# Patient Record
Sex: FEMALE | Race: WHITE | NOT HISPANIC OR LATINO | URBAN - METROPOLITAN AREA
[De-identification: names, ages, dates, MRNs, and addresses within clinical notes are randomized per-mention and may not be internally consistent; named-entity substitution may affect disease eponyms.]

---

## 2017-05-18 ENCOUNTER — OUTPATIENT (OUTPATIENT)
Dept: OUTPATIENT SERVICES | Facility: HOSPITAL | Age: 67
LOS: 1 days | End: 2017-05-18
Payer: MEDICARE

## 2017-05-18 DIAGNOSIS — Z89.421 ACQUIRED ABSENCE OF OTHER RIGHT TOE(S): Chronic | ICD-10-CM

## 2017-05-18 DIAGNOSIS — S91.309A UNSPECIFIED OPEN WOUND, UNSPECIFIED FOOT, INITIAL ENCOUNTER: ICD-10-CM

## 2017-05-18 PROCEDURE — G0463: CPT

## 2017-05-20 DIAGNOSIS — E78.5 HYPERLIPIDEMIA, UNSPECIFIED: ICD-10-CM

## 2017-05-20 DIAGNOSIS — Z89.422 ACQUIRED ABSENCE OF OTHER LEFT TOE(S): ICD-10-CM

## 2017-05-20 DIAGNOSIS — M19.90 UNSPECIFIED OSTEOARTHRITIS, UNSPECIFIED SITE: ICD-10-CM

## 2017-05-20 DIAGNOSIS — Z82.61 FAMILY HISTORY OF ARTHRITIS: ICD-10-CM

## 2017-05-20 DIAGNOSIS — Z98.1 ARTHRODESIS STATUS: ICD-10-CM

## 2017-05-20 DIAGNOSIS — F32.9 MAJOR DEPRESSIVE DISORDER, SINGLE EPISODE, UNSPECIFIED: ICD-10-CM

## 2017-05-20 DIAGNOSIS — Z80.52 FAMILY HISTORY OF MALIGNANT NEOPLASM OF BLADDER: ICD-10-CM

## 2017-05-20 DIAGNOSIS — E66.9 OBESITY, UNSPECIFIED: ICD-10-CM

## 2017-05-20 DIAGNOSIS — G62.9 POLYNEUROPATHY, UNSPECIFIED: ICD-10-CM

## 2017-05-20 DIAGNOSIS — Z88.0 ALLERGY STATUS TO PENICILLIN: ICD-10-CM

## 2017-05-20 DIAGNOSIS — Z79.82 LONG TERM (CURRENT) USE OF ASPIRIN: ICD-10-CM

## 2017-05-20 DIAGNOSIS — Z79.899 OTHER LONG TERM (CURRENT) DRUG THERAPY: ICD-10-CM

## 2017-05-20 DIAGNOSIS — Z86.19 PERSONAL HISTORY OF OTHER INFECTIOUS AND PARASITIC DISEASES: ICD-10-CM

## 2017-05-20 DIAGNOSIS — Z91.040 LATEX ALLERGY STATUS: ICD-10-CM

## 2017-05-20 DIAGNOSIS — M48.00 SPINAL STENOSIS, SITE UNSPECIFIED: ICD-10-CM

## 2017-05-20 DIAGNOSIS — I10 ESSENTIAL (PRIMARY) HYPERTENSION: ICD-10-CM

## 2017-05-20 DIAGNOSIS — Z98.890 OTHER SPECIFIED POSTPROCEDURAL STATES: ICD-10-CM

## 2017-07-24 ENCOUNTER — RESULT REVIEW (OUTPATIENT)
Age: 67
End: 2017-07-24

## 2017-07-25 ENCOUNTER — OUTPATIENT (OUTPATIENT)
Dept: OUTPATIENT SERVICES | Facility: HOSPITAL | Age: 67
LOS: 1 days | End: 2017-07-25
Payer: MEDICARE

## 2017-07-25 DIAGNOSIS — L97.501 NON-PRESSURE CHRONIC ULCER OF OTHER PART OF UNSPECIFIED FOOT LIMITED TO BREAKDOWN OF SKIN: ICD-10-CM

## 2017-07-25 DIAGNOSIS — Z89.421 ACQUIRED ABSENCE OF OTHER RIGHT TOE(S): Chronic | ICD-10-CM

## 2017-07-25 PROCEDURE — G0463: CPT

## 2017-07-25 PROCEDURE — 73630 X-RAY EXAM OF FOOT: CPT

## 2017-07-25 PROCEDURE — 73630 X-RAY EXAM OF FOOT: CPT | Mod: 26,50

## 2017-07-28 DIAGNOSIS — G62.9 POLYNEUROPATHY, UNSPECIFIED: ICD-10-CM

## 2017-07-28 DIAGNOSIS — Z98.890 OTHER SPECIFIED POSTPROCEDURAL STATES: ICD-10-CM

## 2017-07-28 DIAGNOSIS — F32.9 MAJOR DEPRESSIVE DISORDER, SINGLE EPISODE, UNSPECIFIED: ICD-10-CM

## 2017-07-28 DIAGNOSIS — Z79.82 LONG TERM (CURRENT) USE OF ASPIRIN: ICD-10-CM

## 2017-07-28 DIAGNOSIS — I10 ESSENTIAL (PRIMARY) HYPERTENSION: ICD-10-CM

## 2017-07-28 DIAGNOSIS — Z88.0 ALLERGY STATUS TO PENICILLIN: ICD-10-CM

## 2017-07-28 DIAGNOSIS — Z82.61 FAMILY HISTORY OF ARTHRITIS: ICD-10-CM

## 2017-07-28 DIAGNOSIS — Z80.52 FAMILY HISTORY OF MALIGNANT NEOPLASM OF BLADDER: ICD-10-CM

## 2017-07-28 DIAGNOSIS — E78.5 HYPERLIPIDEMIA, UNSPECIFIED: ICD-10-CM

## 2017-07-28 DIAGNOSIS — M48.00 SPINAL STENOSIS, SITE UNSPECIFIED: ICD-10-CM

## 2017-07-28 DIAGNOSIS — Z79.899 OTHER LONG TERM (CURRENT) DRUG THERAPY: ICD-10-CM

## 2017-07-28 DIAGNOSIS — E66.9 OBESITY, UNSPECIFIED: ICD-10-CM

## 2017-07-28 DIAGNOSIS — Z89.421 ACQUIRED ABSENCE OF OTHER RIGHT TOE(S): ICD-10-CM

## 2017-07-28 DIAGNOSIS — Z89.422 ACQUIRED ABSENCE OF OTHER LEFT TOE(S): ICD-10-CM

## 2017-07-28 DIAGNOSIS — M19.90 UNSPECIFIED OSTEOARTHRITIS, UNSPECIFIED SITE: ICD-10-CM

## 2017-07-28 DIAGNOSIS — Z98.1 ARTHRODESIS STATUS: ICD-10-CM

## 2017-07-28 DIAGNOSIS — M20.41 OTHER HAMMER TOE(S) (ACQUIRED), RIGHT FOOT: ICD-10-CM

## 2017-07-28 DIAGNOSIS — Z86.19 PERSONAL HISTORY OF OTHER INFECTIOUS AND PARASITIC DISEASES: ICD-10-CM

## 2017-07-28 DIAGNOSIS — Z91.040 LATEX ALLERGY STATUS: ICD-10-CM

## 2018-07-25 ENCOUNTER — RESULT REVIEW (OUTPATIENT)
Age: 68
End: 2018-07-25

## 2019-08-14 ENCOUNTER — RESULT REVIEW (OUTPATIENT)
Age: 69
End: 2019-08-14

## 2019-10-16 ENCOUNTER — APPOINTMENT (OUTPATIENT)
Dept: CARDIOLOGY | Facility: CLINIC | Age: 69
End: 2019-10-16
Payer: MEDICARE

## 2019-10-16 ENCOUNTER — NON-APPOINTMENT (OUTPATIENT)
Age: 69
End: 2019-10-16

## 2019-10-16 VITALS
BODY MASS INDEX: 36.73 KG/M2 | OXYGEN SATURATION: 96 % | HEART RATE: 90 BPM | HEIGHT: 67 IN | SYSTOLIC BLOOD PRESSURE: 152 MMHG | DIASTOLIC BLOOD PRESSURE: 87 MMHG | WEIGHT: 234 LBS

## 2019-10-16 DIAGNOSIS — I10 ESSENTIAL (PRIMARY) HYPERTENSION: ICD-10-CM

## 2019-10-16 DIAGNOSIS — R06.02 SHORTNESS OF BREATH: ICD-10-CM

## 2019-10-16 DIAGNOSIS — E78.00 PURE HYPERCHOLESTEROLEMIA, UNSPECIFIED: ICD-10-CM

## 2019-10-16 DIAGNOSIS — M54.9 DORSALGIA, UNSPECIFIED: ICD-10-CM

## 2019-10-16 DIAGNOSIS — R07.89 OTHER CHEST PAIN: ICD-10-CM

## 2019-10-16 PROCEDURE — 99205 OFFICE O/P NEW HI 60 MIN: CPT

## 2019-10-16 PROCEDURE — 93000 ELECTROCARDIOGRAM COMPLETE: CPT

## 2019-10-16 RX ORDER — SIMVASTATIN 40 MG/1
40 TABLET, FILM COATED ORAL
Refills: 0 | Status: ACTIVE | COMMUNITY

## 2019-10-16 RX ORDER — POTASSIUM CHLORIDE 10 MEQ
10 CAPSULE, EXTENDED RELEASE ORAL
Refills: 0 | Status: ACTIVE | COMMUNITY

## 2019-10-16 RX ORDER — OXYCODONE HYDROCHLORIDE 30 MG/1
30 TABLET, FILM COATED, EXTENDED RELEASE ORAL
Refills: 0 | Status: ACTIVE | COMMUNITY

## 2019-10-16 NOTE — HISTORY OF PRESENT ILLNESS
[FreeTextEntry1] : Jessica presented to the office today for cardiovascular evaluation. She presents for further evaluation of atypical chest discomfort.\par \par She is now 69 years old, with a history of back problems and obesity. She has a history of hypertension. She had an episode of volume overload many years ago in the setting of surgery, which has not been an issue since then.\par \par At baseline, she has been sedentary. She reports a degree of dyspnea with activity, which is long-standing, likely related at least in part to her PCP and deconditioning. Over the last 2 or 3 years, she has been experiencing an intermittent discomfort in her chest. She describes it as a “tightness’, which will occur randomly, without clear relationship to physical activity, emotional stress or food. It may radiate to her back. It will last for about 10 or 15 minutes, and then resolve.  Although typically there is no relationship to food intake, last night she did have an episode provoked by eating rice. She saw a gastroenterologist, Dr. Cazares. Although they agree that an endoscopy is appropriate, the potential cardiac connection to her symptoms needs to be clarified first.\par \par She reports a degree of edema, much worse in the right than the left, for which she continues to take diuretics. She said that until recently she could not even get a shoe on her right foot. She is wearing shoes on both feet today.\par

## 2019-10-16 NOTE — PHYSICAL EXAM
[General Appearance - Well Developed] : well developed [Normal Appearance] : normal appearance [Well Groomed] : well groomed [General Appearance - Well Nourished] : well nourished [No Deformities] : no deformities [General Appearance - In No Acute Distress] : no acute distress [Normal Conjunctiva] : the conjunctiva exhibited no abnormalities [Eyelids - No Xanthelasma] : the eyelids demonstrated no xanthelasmas [Normal Oral Mucosa] : normal oral mucosa [No Oral Pallor] : no oral pallor [No Oral Cyanosis] : no oral cyanosis [Normal Jugular Venous A Waves Present] : normal jugular venous A waves present [Normal Jugular Venous V Waves Present] : normal jugular venous V waves present [No Jugular Venous Frazier A Waves] : no jugular venous frazier A waves [Normal] : normal [Normal Rate] : normal [Rhythm Regular] : regular [Normal S1] : normal S1 [Normal S2] : normal S2 [Distant] : the heart sounds were distant [No Murmur] : no murmurs heard [2+] : left 2+ [No Pitting Edema] : no pitting edema present [Respiration, Rhythm And Depth] : normal respiratory rhythm and effort [Exaggerated Use Of Accessory Muscles For Inspiration] : no accessory muscle use [Auscultation Breath Sounds / Voice Sounds] : lungs were clear to auscultation bilaterally [Abdomen Soft] : soft [Abdomen Tenderness] : non-tender [Abdomen Mass (___ Cm)] : no abdominal mass palpated [Abnormal Walk] : normal gait [Gait - Sufficient For Exercise Testing] : the gait was sufficient for exercise testing [Nail Clubbing] : no clubbing of the fingernails [Cyanosis, Localized] : no localized cyanosis [Petechial Hemorrhages (___cm)] : no petechial hemorrhages [Skin Color & Pigmentation] : normal skin color and pigmentation [] : no rash [No Venous Stasis] : no venous stasis [Skin Lesions] : no skin lesions [No Skin Ulcers] : no skin ulcer [No Xanthoma] : no  xanthoma was observed [Oriented To Time, Place, And Person] : oriented to person, place, and time [Affect] : the affect was normal [Mood] : the mood was normal [No Anxiety] : not feeling anxious [S3] : no S3 [S4] : no S4 [Right Carotid Bruit] : no bruit heard over the right carotid [Left Carotid Bruit] : no bruit heard over the left carotid [Right Femoral Bruit] : no bruit heard over the right femoral artery [Left Femoral Bruit] : no bruit heard over the left femoral artery

## 2019-10-16 NOTE — DISCUSSION/SUMMARY
[FreeTextEntry1] : It is unclear whether her symptoms are cardiac, or more likely, gastrointestinal. I do agree that additional evaluation is needed. Noting her symptoms, she will schedule pharmacologic stress testing and echocardiogram.\par \par Her blood pressure is elevated. She says this is never the case, even when she is at the doctor's office. She will check her blood pressure at home, and drop off a list of readings in a couple of weeks.

## 2019-10-22 ENCOUNTER — APPOINTMENT (OUTPATIENT)
Dept: CARDIOLOGY | Facility: CLINIC | Age: 69
End: 2019-10-22
Payer: MEDICARE

## 2019-10-22 PROCEDURE — 93306 TTE W/DOPPLER COMPLETE: CPT

## 2019-10-24 ENCOUNTER — TRANSCRIPTION ENCOUNTER (OUTPATIENT)
Age: 69
End: 2019-10-24

## 2019-11-14 ENCOUNTER — APPOINTMENT (OUTPATIENT)
Dept: CARDIOLOGY | Facility: CLINIC | Age: 69
End: 2019-11-14
Payer: MEDICARE

## 2019-11-14 PROCEDURE — A9500: CPT

## 2019-11-14 PROCEDURE — 93015 CV STRESS TEST SUPVJ I&R: CPT

## 2019-11-14 PROCEDURE — 78452 HT MUSCLE IMAGE SPECT MULT: CPT

## 2020-10-17 ENCOUNTER — EMERGENCY (EMERGENCY)
Facility: HOSPITAL | Age: 70
LOS: 0 days | Discharge: HOME | End: 2020-10-17
Attending: FAMILY MEDICINE
Payer: MEDICARE

## 2020-10-17 VITALS
SYSTOLIC BLOOD PRESSURE: 138 MMHG | RESPIRATION RATE: 18 BRPM | OXYGEN SATURATION: 97 % | WEIGHT: 184.97 LBS | TEMPERATURE: 98 F | HEART RATE: 89 BPM | HEIGHT: 69 IN | DIASTOLIC BLOOD PRESSURE: 82 MMHG

## 2020-10-17 VITALS
TEMPERATURE: 98 F | RESPIRATION RATE: 17 BRPM | HEART RATE: 84 BPM | DIASTOLIC BLOOD PRESSURE: 76 MMHG | SYSTOLIC BLOOD PRESSURE: 140 MMHG | OXYGEN SATURATION: 100 %

## 2020-10-17 DIAGNOSIS — M25.571 PAIN IN RIGHT ANKLE AND JOINTS OF RIGHT FOOT: ICD-10-CM

## 2020-10-17 DIAGNOSIS — S20.221A CONTUSION OF RIGHT BACK WALL OF THORAX, INITIAL ENCOUNTER: ICD-10-CM

## 2020-10-17 DIAGNOSIS — Z91.040 LATEX ALLERGY STATUS: ICD-10-CM

## 2020-10-17 DIAGNOSIS — Z89.421 ACQUIRED ABSENCE OF OTHER RIGHT TOE(S): ICD-10-CM

## 2020-10-17 DIAGNOSIS — Z23 ENCOUNTER FOR IMMUNIZATION: ICD-10-CM

## 2020-10-17 DIAGNOSIS — Z79.899 OTHER LONG TERM (CURRENT) DRUG THERAPY: ICD-10-CM

## 2020-10-17 DIAGNOSIS — M54.6 PAIN IN THORACIC SPINE: ICD-10-CM

## 2020-10-17 DIAGNOSIS — I10 ESSENTIAL (PRIMARY) HYPERTENSION: ICD-10-CM

## 2020-10-17 DIAGNOSIS — Z79.82 LONG TERM (CURRENT) USE OF ASPIRIN: ICD-10-CM

## 2020-10-17 DIAGNOSIS — E03.9 HYPOTHYROIDISM, UNSPECIFIED: ICD-10-CM
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
## 2020-10-17 DIAGNOSIS — Z89.421 ACQUIRED ABSENCE OF OTHER RIGHT TOE(S): Chronic | ICD-10-CM

## 2020-10-17 DIAGNOSIS — R07.81 PLEURODYNIA: ICD-10-CM
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
## 2020-10-17 DIAGNOSIS — E78.5 HYPERLIPIDEMIA, UNSPECIFIED: ICD-10-CM

## 2020-10-17 DIAGNOSIS — Z88.0 ALLERGY STATUS TO PENICILLIN: ICD-10-CM

## 2020-10-17 DIAGNOSIS — G62.9 POLYNEUROPATHY, UNSPECIFIED: ICD-10-CM

## 2020-10-17 LAB
ALBUMIN SERPL ELPH-MCNC: 3.6 G/DL — SIGNIFICANT CHANGE UP (ref 3.3–5)
ALP SERPL-CCNC: 110 U/L — SIGNIFICANT CHANGE UP (ref 40–120)
ALT FLD-CCNC: 38 U/L — SIGNIFICANT CHANGE UP (ref 12–78)
ANION GAP SERPL CALC-SCNC: 2 MMOL/L — LOW (ref 5–17)
APTT BLD: 32.3 SEC — SIGNIFICANT CHANGE UP (ref 27.5–35.5)
AST SERPL-CCNC: 37 U/L — SIGNIFICANT CHANGE UP (ref 15–37)
BASOPHILS # BLD AUTO: 0.06 K/UL — SIGNIFICANT CHANGE UP (ref 0–0.2)
BASOPHILS NFR BLD AUTO: 0.9 % — SIGNIFICANT CHANGE UP (ref 0–2)
BILIRUB SERPL-MCNC: 0.3 MG/DL — SIGNIFICANT CHANGE UP (ref 0.2–1.2)
BUN SERPL-MCNC: 25 MG/DL — HIGH (ref 7–23)
CALCIUM SERPL-MCNC: 9 MG/DL — SIGNIFICANT CHANGE UP (ref 8.5–10.1)
CHLORIDE SERPL-SCNC: 102 MMOL/L — SIGNIFICANT CHANGE UP (ref 96–108)
CO2 SERPL-SCNC: 32 MMOL/L — HIGH (ref 22–31)
CREAT SERPL-MCNC: 0.97 MG/DL — SIGNIFICANT CHANGE UP (ref 0.5–1.3)
EOSINOPHIL # BLD AUTO: 0.28 K/UL — SIGNIFICANT CHANGE UP (ref 0–0.5)
EOSINOPHIL NFR BLD AUTO: 4.2 % — SIGNIFICANT CHANGE UP (ref 0–6)
GLUCOSE SERPL-MCNC: 96 MG/DL — SIGNIFICANT CHANGE UP (ref 70–99)
HCT VFR BLD CALC: 41.5 % — SIGNIFICANT CHANGE UP (ref 34.5–45)
HGB BLD-MCNC: 13.2 G/DL — SIGNIFICANT CHANGE UP (ref 11.5–15.5)
IMM GRANULOCYTES NFR BLD AUTO: 0.3 % — SIGNIFICANT CHANGE UP (ref 0–1.5)
INR BLD: 1.05 RATIO — SIGNIFICANT CHANGE UP (ref 0.88–1.16)
LYMPHOCYTES # BLD AUTO: 1.45 K/UL — SIGNIFICANT CHANGE UP (ref 1–3.3)
LYMPHOCYTES # BLD AUTO: 21.7 % — SIGNIFICANT CHANGE UP (ref 13–44)
MCHC RBC-ENTMCNC: 29.7 PG — SIGNIFICANT CHANGE UP (ref 27–34)
MCHC RBC-ENTMCNC: 31.8 GM/DL — LOW (ref 32–36)
MCV RBC AUTO: 93.5 FL — SIGNIFICANT CHANGE UP (ref 80–100)
MONOCYTES # BLD AUTO: 0.64 K/UL — SIGNIFICANT CHANGE UP (ref 0–0.9)
MONOCYTES NFR BLD AUTO: 9.6 % — SIGNIFICANT CHANGE UP (ref 2–14)
NEUTROPHILS # BLD AUTO: 4.23 K/UL — SIGNIFICANT CHANGE UP (ref 1.8–7.4)
NEUTROPHILS NFR BLD AUTO: 63.3 % — SIGNIFICANT CHANGE UP (ref 43–77)
PLATELET # BLD AUTO: 187 K/UL — SIGNIFICANT CHANGE UP (ref 150–400)
POTASSIUM SERPL-MCNC: 5.1 MMOL/L — SIGNIFICANT CHANGE UP (ref 3.5–5.3)
POTASSIUM SERPL-SCNC: 5.1 MMOL/L — SIGNIFICANT CHANGE UP (ref 3.5–5.3)
PROT SERPL-MCNC: 7.9 GM/DL — SIGNIFICANT CHANGE UP (ref 6–8.3)
PROTHROM AB SERPL-ACNC: 12.2 SEC — SIGNIFICANT CHANGE UP (ref 10.6–13.6)
RBC # BLD: 4.44 M/UL — SIGNIFICANT CHANGE UP (ref 3.8–5.2)
RBC # FLD: 14.3 % — SIGNIFICANT CHANGE UP (ref 10.3–14.5)
SODIUM SERPL-SCNC: 136 MMOL/L — SIGNIFICANT CHANGE UP (ref 135–145)
TROPONIN I SERPL-MCNC: <0.015 NG/ML — SIGNIFICANT CHANGE UP (ref 0.01–0.04)
WBC # BLD: 6.68 K/UL — SIGNIFICANT CHANGE UP (ref 3.8–10.5)
WBC # FLD AUTO: 6.68 K/UL — SIGNIFICANT CHANGE UP (ref 3.8–10.5)

## 2020-10-17 PROCEDURE — 86901 BLOOD TYPING SEROLOGIC RH(D): CPT

## 2020-10-17 PROCEDURE — 90715 TDAP VACCINE 7 YRS/> IM: CPT

## 2020-10-17 PROCEDURE — 70450 CT HEAD/BRAIN W/O DYE: CPT

## 2020-10-17 PROCEDURE — 85730 THROMBOPLASTIN TIME PARTIAL: CPT

## 2020-10-17 PROCEDURE — 71260 CT THORAX DX C+: CPT

## 2020-10-17 PROCEDURE — 74177 CT ABD & PELVIS W/CONTRAST: CPT

## 2020-10-17 PROCEDURE — 93005 ELECTROCARDIOGRAM TRACING: CPT

## 2020-10-17 PROCEDURE — 73060 X-RAY EXAM OF HUMERUS: CPT | Mod: 26,RT

## 2020-10-17 PROCEDURE — 86900 BLOOD TYPING SEROLOGIC ABO: CPT

## 2020-10-17 PROCEDURE — 72125 CT NECK SPINE W/O DYE: CPT

## 2020-10-17 PROCEDURE — 74177 CT ABD & PELVIS W/CONTRAST: CPT | Mod: 26

## 2020-10-17 PROCEDURE — 85025 COMPLETE CBC W/AUTO DIFF WBC: CPT

## 2020-10-17 PROCEDURE — 36415 COLL VENOUS BLD VENIPUNCTURE: CPT

## 2020-10-17 PROCEDURE — 84484 ASSAY OF TROPONIN QUANT: CPT

## 2020-10-17 PROCEDURE — 73060 X-RAY EXAM OF HUMERUS: CPT | Mod: RT

## 2020-10-17 PROCEDURE — 93010 ELECTROCARDIOGRAM REPORT: CPT

## 2020-10-17 PROCEDURE — 72125 CT NECK SPINE W/O DYE: CPT | Mod: 26

## 2020-10-17 PROCEDURE — 90471 IMMUNIZATION ADMIN: CPT

## 2020-10-17 PROCEDURE — 99285 EMERGENCY DEPT VISIT HI MDM: CPT

## 2020-10-17 PROCEDURE — 85610 PROTHROMBIN TIME: CPT

## 2020-10-17 PROCEDURE — 86850 RBC ANTIBODY SCREEN: CPT

## 2020-10-17 PROCEDURE — 99284 EMERGENCY DEPT VISIT MOD MDM: CPT | Mod: 25

## 2020-10-17 PROCEDURE — 73030 X-RAY EXAM OF SHOULDER: CPT | Mod: 26,RT

## 2020-10-17 PROCEDURE — 80053 COMPREHEN METABOLIC PANEL: CPT

## 2020-10-17 PROCEDURE — 70450 CT HEAD/BRAIN W/O DYE: CPT | Mod: 26

## 2020-10-17 PROCEDURE — 71260 CT THORAX DX C+: CPT | Mod: 26

## 2020-10-17 PROCEDURE — 73030 X-RAY EXAM OF SHOULDER: CPT | Mod: RT

## 2020-10-17 RX ORDER — TETANUS TOXOID, REDUCED DIPHTHERIA TOXOID AND ACELLULAR PERTUSSIS VACCINE, ADSORBED 5; 2.5; 8; 8; 2.5 [IU]/.5ML; [IU]/.5ML; UG/.5ML; UG/.5ML; UG/.5ML
0.5 SUSPENSION INTRAMUSCULAR ONCE
Refills: 0 | Status: COMPLETED | OUTPATIENT
Start: 2020-10-17 | End: 2020-10-17

## 2020-10-17 RX ORDER — ACETAMINOPHEN 500 MG
1000 TABLET ORAL ONCE
Refills: 0 | Status: COMPLETED | OUTPATIENT
Start: 2020-10-17 | End: 2020-10-17

## 2020-10-17 RX ORDER — SODIUM CHLORIDE 9 MG/ML
1000 INJECTION INTRAMUSCULAR; INTRAVENOUS; SUBCUTANEOUS ONCE
Refills: 0 | Status: COMPLETED | OUTPATIENT
Start: 2020-10-17 | End: 2020-10-17

## 2020-10-17 RX ADMIN — SODIUM CHLORIDE 1000 MILLILITER(S): 9 INJECTION INTRAMUSCULAR; INTRAVENOUS; SUBCUTANEOUS at 18:19

## 2020-10-17 RX ADMIN — Medication 1000 MILLIGRAM(S): at 20:31

## 2020-10-17 RX ADMIN — TETANUS TOXOID, REDUCED DIPHTHERIA TOXOID AND ACELLULAR PERTUSSIS VACCINE, ADSORBED 0.5 MILLILITER(S): 5; 2.5; 8; 8; 2.5 SUSPENSION INTRAMUSCULAR at 21:32

## 2020-10-17 NOTE — ED ADULT NURSE REASSESSMENT NOTE - NS ED NURSE REASSESS COMMENT FT1
pt dc in no acute distress. bacitracin applied to right back abrasions and covered with gauze. pt aox4, ambulatory but taken out in wheelchair as precaution. pt verbalized understanding of dc instructions given.

## 2020-10-17 NOTE — ED PROVIDER NOTE - CHPI ED SYMPTOMS POS
PAIN/+right shoulder pain +right rib pain +right ankle pain +right upper back pain +right rib pain +right ankle pain/PAIN

## 2020-10-17 NOTE — ED PROVIDER NOTE - OBJECTIVE STATEMENT
69 y/o female with PMHx of hypothyroidism, HLD, HTN, neuropathy, spinal stenosis presents to the ED BIBEMS s/p mechanical fall PTA. Pt reports she was going down the escalator at [employer] and felt the escalator jerk and pt fell down the escalator. States she is not sure of complete details of how she fell but that she was at top of the escalator and fell down the entire escalator with right-sided impact. Pt c/o right rib pain, right shoulder pain, and right ankle pain. Denies head trauma, LOC. On baby ASA. No other complaints at this time. Allergies: latex, penicillin. PCP: Moses Richards. 71 y/o female with PMHx of hypothyroidism, HLD, HTN, neuropathy, spinal stenosis presents to the ED BIBEMS s/p mechanical fall PTA. Pt reports she was going down the escalator at [employer] and felt the escalator jerk and pt fell down the escalator. States she is not sure of complete details of how she fell but that she was at top of the escalator and fell down the entire escalator with right-sided impact. Pt c/o right rib pain, right upper back pain, and right ankle pain. Denies head trauma, LOC. On baby ASA. No other complaints at this time. Allergies: latex, penicillin. PCP: Moses Richards.

## 2020-10-17 NOTE — ED STATDOCS - PROGRESS NOTE DETAILS
Paulie SAAB for ED attending, Dr. Yang: 71 y/o female with PMHx of hypothyroidism, HLD, HTN, neuropathy, spinal stenosis presents to the ED s/p fall al mall on escalator. c/o R shoulder pain, leg pain. Denies head trauma, use of blood thinners. States she does not remember what happened, states she was at the top of the escalator when she fell down the entire escalator. On baby ASA. Denies dizziness, lightheadedness prior to falling. Will send pt to main ED for further evaluation.

## 2020-10-17 NOTE — ED PROVIDER NOTE - PATIENT PORTAL LINK FT
You can access the FollowMyHealth Patient Portal offered by [facility] by registering at the following website: [email or web address]. By joining [facility]’s FollowMyHealth portal, you will also be able to view your health information using other applications (apps) compatible with our system.

## 2020-10-17 NOTE — ED STATDOCS - PMH
Constipation    HLD (hyperlipidemia)    HTN (hypertension)    Hypothyroid    Neuropathy    Spinal stenosis

## 2020-10-17 NOTE — ED PROVIDER NOTE - CARE PLAN
Principal Discharge DX:	Contusion of back wall of thorax, initial encounter  Secondary Diagnosis:	Fall (on) (from) unspecified stairs and steps, initial encounter

## 2020-10-17 NOTE — ED PROVIDER NOTE - NSFOLLOWUPINSTRUCTIONS_ED_ALL_ED_FT
Apply ice 15 min on and off. Rest. Take tylenol every four or motrin 400 mg every eight hours with food. Follow up with your doctor this week. Breathe deeply. Apply bacitracin daily to area.

## 2020-10-17 NOTE — ED PROVIDER NOTE - CLINICAL SUMMARY MEDICAL DECISION MAKING FREE TEXT BOX
Pt with fall down escalator steps here with right upper back pain. Pt trauma alert with pan scan, labs.

## 2020-10-17 NOTE — ED ADULT NURSE NOTE - NSIMPLEMENTINTERV_GEN_ALL_ED
Implemented All Universal Safety Interventions:  [place] to call system. Call bell, personal items and telephone within reach. Instruct patient to call for assistance. Room bathroom lighting operational. Non-slip footwear when patient is off stretcher. Physically safe environment: no spills, clutter or unnecessary equipment. Stretcher in lowest position, wheels locked, appropriate side rails in place.

## 2020-10-17 NOTE — ED PROVIDER NOTE - MUSCULOSKELETAL [+], MLM
+right shoulder pain +right rib pain +right ankle pain +right upper back pain +right rib pain +right ankle pain

## 2020-10-17 NOTE — ED ADULT TRIAGE NOTE - CHIEF COMPLAINT QUOTE
patient BIBEMS [place] fall at mall on escalator. no head strike, no LOC. patient c/o shoulder pain. no blood thinners. patient alert and oriented in triage.

## 2021-01-28 ENCOUNTER — INPATIENT (INPATIENT)
Facility: HOSPITAL | Age: 71
LOS: 8 days | Discharge: HOME | DRG: 504 | End: 2021-02-06
Attending: FAMILY MEDICINE | Admitting: FAMILY MEDICINE
Payer: MEDICARE

## 2021-01-28 ENCOUNTER — TRANSCRIPTION ENCOUNTER (OUTPATIENT)
Age: 71
End: 2021-01-28

## 2021-01-28 VITALS
SYSTOLIC BLOOD PRESSURE: 133 MMHG | OXYGEN SATURATION: 96 % | RESPIRATION RATE: 16 BRPM | DIASTOLIC BLOOD PRESSURE: 74 MMHG | WEIGHT: 225.09 LBS | TEMPERATURE: 98 F | HEART RATE: 86 BPM | HEIGHT: 69 IN

## 2021-01-28 DIAGNOSIS — L97.519 NON-PRESSURE CHRONIC ULCER OF OTHER PART OF RIGHT FOOT WITH UNSPECIFIED SEVERITY: ICD-10-CM

## 2021-01-28 DIAGNOSIS — Z89.421 ACQUIRED ABSENCE OF OTHER RIGHT TOE(S): Chronic | ICD-10-CM

## 2021-01-28 LAB
ALBUMIN SERPL ELPH-MCNC: 3.9 G/DL — SIGNIFICANT CHANGE UP (ref 3.3–5)
ALP SERPL-CCNC: 127 U/L — HIGH (ref 40–120)
ALT FLD-CCNC: 29 U/L — SIGNIFICANT CHANGE UP (ref 12–78)
ANION GAP SERPL CALC-SCNC: 5 MMOL/L — SIGNIFICANT CHANGE UP (ref 5–17)
APTT BLD: 36.9 SEC — HIGH (ref 27.5–35.5)
AST SERPL-CCNC: 15 U/L — SIGNIFICANT CHANGE UP (ref 15–37)
BASOPHILS # BLD AUTO: 0.06 K/UL — SIGNIFICANT CHANGE UP (ref 0–0.2)
BASOPHILS NFR BLD AUTO: 1 % — SIGNIFICANT CHANGE UP (ref 0–2)
BILIRUB SERPL-MCNC: 0.2 MG/DL — SIGNIFICANT CHANGE UP (ref 0.2–1.2)
BUN SERPL-MCNC: 38 MG/DL — HIGH (ref 7–23)
CALCIUM SERPL-MCNC: 9.4 MG/DL — SIGNIFICANT CHANGE UP (ref 8.5–10.1)
CHLORIDE SERPL-SCNC: 102 MMOL/L — SIGNIFICANT CHANGE UP (ref 96–108)
CO2 SERPL-SCNC: 30 MMOL/L — SIGNIFICANT CHANGE UP (ref 22–31)
CREAT SERPL-MCNC: 1.1 MG/DL — SIGNIFICANT CHANGE UP (ref 0.5–1.3)
CRP SERPL-MCNC: 1.8 MG/DL — HIGH (ref 0–0.4)
EOSINOPHIL # BLD AUTO: 0.31 K/UL — SIGNIFICANT CHANGE UP (ref 0–0.5)
EOSINOPHIL NFR BLD AUTO: 4.9 % — SIGNIFICANT CHANGE UP (ref 0–6)
ERYTHROCYTE [SEDIMENTATION RATE] IN BLOOD: 42 MM/HR — HIGH (ref 0–20)
GLUCOSE SERPL-MCNC: 110 MG/DL — HIGH (ref 70–99)
HCT VFR BLD CALC: 41.6 % — SIGNIFICANT CHANGE UP (ref 34.5–45)
HGB BLD-MCNC: 13.2 G/DL — SIGNIFICANT CHANGE UP (ref 11.5–15.5)
IMM GRANULOCYTES NFR BLD AUTO: 0.3 % — SIGNIFICANT CHANGE UP (ref 0–1.5)
INR BLD: 1.13 RATIO — SIGNIFICANT CHANGE UP (ref 0.88–1.16)
LACTATE SERPL-SCNC: 1.6 MMOL/L — SIGNIFICANT CHANGE UP (ref 0.7–2)
LYMPHOCYTES # BLD AUTO: 1.51 K/UL — SIGNIFICANT CHANGE UP (ref 1–3.3)
LYMPHOCYTES # BLD AUTO: 24.1 % — SIGNIFICANT CHANGE UP (ref 13–44)
MCHC RBC-ENTMCNC: 28.6 PG — SIGNIFICANT CHANGE UP (ref 27–34)
MCHC RBC-ENTMCNC: 31.7 GM/DL — LOW (ref 32–36)
MCV RBC AUTO: 90 FL — SIGNIFICANT CHANGE UP (ref 80–100)
MONOCYTES # BLD AUTO: 0.39 K/UL — SIGNIFICANT CHANGE UP (ref 0–0.9)
MONOCYTES NFR BLD AUTO: 6.2 % — SIGNIFICANT CHANGE UP (ref 2–14)
NEUTROPHILS # BLD AUTO: 3.98 K/UL — SIGNIFICANT CHANGE UP (ref 1.8–7.4)
NEUTROPHILS NFR BLD AUTO: 63.5 % — SIGNIFICANT CHANGE UP (ref 43–77)
NRBC # BLD: 0 /100 WBCS — SIGNIFICANT CHANGE UP (ref 0–0)
NT-PROBNP SERPL-SCNC: 44 PG/ML — SIGNIFICANT CHANGE UP (ref 0–125)
PLATELET # BLD AUTO: 273 K/UL — SIGNIFICANT CHANGE UP (ref 150–400)
POTASSIUM SERPL-MCNC: 4.4 MMOL/L — SIGNIFICANT CHANGE UP (ref 3.5–5.3)
POTASSIUM SERPL-SCNC: 4.4 MMOL/L — SIGNIFICANT CHANGE UP (ref 3.5–5.3)
PROT SERPL-MCNC: 8.4 G/DL — HIGH (ref 6–8.3)
PROTHROM AB SERPL-ACNC: 13.1 SEC — SIGNIFICANT CHANGE UP (ref 10.6–13.6)
RBC # BLD: 4.62 M/UL — SIGNIFICANT CHANGE UP (ref 3.8–5.2)
RBC # FLD: 13.2 % — SIGNIFICANT CHANGE UP (ref 10.3–14.5)
SARS-COV-2 RNA SPEC QL NAA+PROBE: SIGNIFICANT CHANGE UP
SODIUM SERPL-SCNC: 137 MMOL/L — SIGNIFICANT CHANGE UP (ref 135–145)
WBC # BLD: 6.27 K/UL — SIGNIFICANT CHANGE UP (ref 3.8–10.5)
WBC # FLD AUTO: 6.27 K/UL — SIGNIFICANT CHANGE UP (ref 3.8–10.5)

## 2021-01-28 PROCEDURE — 73660 X-RAY EXAM OF TOE(S): CPT | Mod: 26,RT

## 2021-01-28 PROCEDURE — 99285 EMERGENCY DEPT VISIT HI MDM: CPT

## 2021-01-28 PROCEDURE — 71045 X-RAY EXAM CHEST 1 VIEW: CPT | Mod: 26

## 2021-01-28 PROCEDURE — 93010 ELECTROCARDIOGRAM REPORT: CPT

## 2021-01-28 PROCEDURE — 99222 1ST HOSP IP/OBS MODERATE 55: CPT | Mod: 57

## 2021-01-28 RX ORDER — SIMVASTATIN 20 MG/1
40 TABLET, FILM COATED ORAL AT BEDTIME
Refills: 0 | Status: DISCONTINUED | OUTPATIENT
Start: 2021-01-28 | End: 2021-01-29

## 2021-01-28 RX ORDER — CARVEDILOL PHOSPHATE 80 MG/1
6.25 CAPSULE, EXTENDED RELEASE ORAL EVERY 12 HOURS
Refills: 0 | Status: DISCONTINUED | OUTPATIENT
Start: 2021-01-28 | End: 2021-01-29

## 2021-01-28 RX ORDER — OXYCODONE HYDROCHLORIDE 5 MG/1
20 TABLET ORAL EVERY 12 HOURS
Refills: 0 | Status: DISCONTINUED | OUTPATIENT
Start: 2021-01-28 | End: 2021-01-29

## 2021-01-28 RX ORDER — POTASSIUM CHLORIDE 20 MEQ
10 PACKET (EA) ORAL DAILY
Refills: 0 | Status: DISCONTINUED | OUTPATIENT
Start: 2021-01-28 | End: 2021-01-29

## 2021-01-28 RX ORDER — AMITRIPTYLINE HCL 25 MG
100 TABLET ORAL AT BEDTIME
Refills: 0 | Status: DISCONTINUED | OUTPATIENT
Start: 2021-01-28 | End: 2021-01-29

## 2021-01-28 RX ORDER — LIDOCAINE 4 G/100G
1 CREAM TOPICAL EVERY 24 HOURS
Refills: 0 | Status: DISCONTINUED | OUTPATIENT
Start: 2021-01-28 | End: 2021-01-29

## 2021-01-28 RX ORDER — VANCOMYCIN HCL 1 G
1250 VIAL (EA) INTRAVENOUS EVERY 12 HOURS
Refills: 0 | Status: DISCONTINUED | OUTPATIENT
Start: 2021-01-29 | End: 2021-01-29

## 2021-01-28 RX ORDER — VANCOMYCIN HCL 1 G
1000 VIAL (EA) INTRAVENOUS EVERY 12 HOURS
Refills: 0 | Status: DISCONTINUED | OUTPATIENT
Start: 2021-01-28 | End: 2021-01-28

## 2021-01-28 RX ORDER — ACETAMINOPHEN 500 MG
325 TABLET ORAL DAILY
Refills: 0 | Status: DISCONTINUED | OUTPATIENT
Start: 2021-01-28 | End: 2021-01-29

## 2021-01-28 RX ORDER — PANTOPRAZOLE SODIUM 20 MG/1
40 TABLET, DELAYED RELEASE ORAL
Refills: 0 | Status: DISCONTINUED | OUTPATIENT
Start: 2021-01-28 | End: 2021-01-29

## 2021-01-28 RX ORDER — VANCOMYCIN HCL 1 G
1000 VIAL (EA) INTRAVENOUS ONCE
Refills: 0 | Status: COMPLETED | OUTPATIENT
Start: 2021-01-28 | End: 2021-01-28

## 2021-01-28 RX ORDER — DULOXETINE HYDROCHLORIDE 30 MG/1
60 CAPSULE, DELAYED RELEASE ORAL AT BEDTIME
Refills: 0 | Status: DISCONTINUED | OUTPATIENT
Start: 2021-01-28 | End: 2021-01-29

## 2021-01-28 RX ORDER — LANOLIN ALCOHOL/MO/W.PET/CERES
5 CREAM (GRAM) TOPICAL AT BEDTIME
Refills: 0 | Status: DISCONTINUED | OUTPATIENT
Start: 2021-01-28 | End: 2021-01-29

## 2021-01-28 RX ORDER — LACTOBACILLUS ACIDOPHILUS 100MM CELL
1 CAPSULE ORAL DAILY
Refills: 0 | Status: DISCONTINUED | OUTPATIENT
Start: 2021-01-28 | End: 2021-01-29

## 2021-01-28 RX ORDER — SUCRALFATE 1 G
1 TABLET ORAL
Refills: 0 | Status: DISCONTINUED | OUTPATIENT
Start: 2021-01-28 | End: 2021-01-29

## 2021-01-28 RX ORDER — LEVOTHYROXINE SODIUM 125 MCG
200 TABLET ORAL DAILY
Refills: 0 | Status: DISCONTINUED | OUTPATIENT
Start: 2021-01-28 | End: 2021-01-29

## 2021-01-28 RX ORDER — FUROSEMIDE 40 MG
20 TABLET ORAL DAILY
Refills: 0 | Status: DISCONTINUED | OUTPATIENT
Start: 2021-01-28 | End: 2021-01-29

## 2021-01-28 RX ORDER — GABAPENTIN 400 MG/1
400 CAPSULE ORAL
Refills: 0 | Status: DISCONTINUED | OUTPATIENT
Start: 2021-01-28 | End: 2021-01-29

## 2021-01-28 RX ORDER — ROPINIROLE 8 MG/1
3 TABLET, FILM COATED, EXTENDED RELEASE ORAL DAILY
Refills: 0 | Status: DISCONTINUED | OUTPATIENT
Start: 2021-01-28 | End: 2021-01-29

## 2021-01-28 RX ORDER — LOSARTAN POTASSIUM 100 MG/1
25 TABLET, FILM COATED ORAL DAILY
Refills: 0 | Status: DISCONTINUED | OUTPATIENT
Start: 2021-01-28 | End: 2021-01-29

## 2021-01-28 RX ADMIN — Medication 100 MILLIGRAM(S): at 23:31

## 2021-01-28 RX ADMIN — GABAPENTIN 400 MILLIGRAM(S): 400 CAPSULE ORAL at 23:32

## 2021-01-28 RX ADMIN — SIMVASTATIN 40 MILLIGRAM(S): 20 TABLET, FILM COATED ORAL at 23:32

## 2021-01-28 RX ADMIN — Medication 250 MILLIGRAM(S): at 16:15

## 2021-01-28 RX ADMIN — Medication 5 MILLIGRAM(S): at 23:32

## 2021-01-28 RX ADMIN — Medication 1 GRAM(S): at 23:32

## 2021-01-28 RX ADMIN — DULOXETINE HYDROCHLORIDE 60 MILLIGRAM(S): 30 CAPSULE, DELAYED RELEASE ORAL at 23:32

## 2021-01-28 NOTE — ED PROVIDER NOTE - CLINICAL SUMMARY MEDICAL DECISION MAKING FREE TEXT BOX
69 yo M with hx of htn, hld, neuropathy co R 2nd toe discoloration/redness/drainage getting worse, sent by pcp to see Dr. Gray today, no fever/chills; +distal R 2nd toe maceration with erythema and swelling, no streaking noted; will get xrays, labs, IV ABX, podiatry consult, admit

## 2021-01-28 NOTE — ED PROVIDER NOTE - PROGRESS NOTE DETAILS
Spoke with Dr. Gray and podiatry resident, Dr. Red, advised will need to go to OR tomorrow for partial toe amputation, advised IV vancomycin and labs, admit Spoke with Dr. Michael, discussed case and results, accepted admission.

## 2021-01-28 NOTE — ED ADULT NURSE NOTE - CHIEF COMPLAINT
Bedside and Verbal shift change report given to DEVAUGHN Mahoney (oncoming nurse) by Richard Grover RN (offgoing nurse). Report included the following information SBAR, Kardex, ED Summary, Intake/Output, MAR, Recent Results and Cardiac Rhythm NSR. Patient Vitals for the past 12 hrs:   Temp Pulse Resp BP SpO2   11/19/20 0704 99.6 °F (37.6 °C) 98 25 117/66 92 %   11/19/20 0550 100.4 °F (38 °C) (!) 106  (!) 140/69    11/19/20 0319 (!) 100.6 °F (38.1 °C) (!) 103 22 119/65 100 %   11/19/20 0109 (!) 101.3 °F (38.5 °C) (!) 103 22 138/76 97 %   11/19/20 0022 (!) 101.3 °F (38.5 °C) (!) 116 26 134/69 92 %   11/18/20 2310    (!) 165/80    11/18/20 2308    (!) 175/85    11/18/20 2305    (!) 156/87    11/18/20 2301 (!) 102.7 °F (39.3 °C) (!) 129 26 (!) 149/98 92 %   11/18/20 2256 (!) 102.7 °F (39.3 °C)       11/18/20 2003 99.6 °F (37.6 °C) (!) 104 19 (!) 152/80 93 %     Last 3 Recorded Weights in this Encounter    11/18/20 2003 11/19/20 0319   Weight: 116.6 kg (257 lb 0.9 oz) 113.4 kg (249 lb 14.4 oz)     Weight change noted-- 11/18 was standing weight  11/19 was bed weight    Problem: Airway Clearance - Ineffective  Goal: Achieve or maintain patent airway  Outcome: Progressing Towards Goal  Note: Patent airway maintained throughout shift     Problem: Gas Exchange - Impaired  Goal: Absence of hypoxia  Note: Pt SPO2 WDL on RA and Cpap     Problem: Body Temperature -  Risk of, Imbalanced  Goal: Ability to maintain a body temperature within defined limits  Outcome: Progressing Towards Goal  Note: Pt temp x>102.  Tylenol and ice packs given The patient is a 70y Female complaining of toe pain.

## 2021-01-28 NOTE — H&P ADULT - NSICDXPASTMEDICALHX_GEN_ALL_CORE_FT
PAST MEDICAL HISTORY:  Constipation     HLD (hyperlipidemia)     HTN (hypertension)     Hypothyroid     Neuropathy     Spinal stenosis

## 2021-01-28 NOTE — ED ADULT NURSE NOTE - CHPI ED NUR SYMPTOMS NEG
no bleeding at site/no blood in mucus/no chills/no drainage/no fever/no pain/no purulent drainage/no rectal pain/no redness/no vomiting

## 2021-01-28 NOTE — ED PROVIDER NOTE - MUSCULOSKELETAL, MLM
R foot: +maceration noted to distal R 2nd toe with erythema and swelling, no streaking or active drainage noted, +old distal R 3rd and 4th and L 3rd toe amputations noted

## 2021-01-28 NOTE — ED PROVIDER NOTE - ATTENDING CONTRIBUTION TO CARE
71 y/o female with PMHx of hypothyroidism, HLD, HTN, neuropathy, spinal stenosis presents for right toe 2nd digit infection, worsening over last week  noticed more redness and pain since yesterday, no fevers, no chills  saw dr rodríguez who told her to go to wound care, was told by dr doss to come to ed  on exam + DP pulses, swelling erythema of toe, ulceration   will get labs, abx, podiatry recs

## 2021-01-28 NOTE — ED ADULT TRIAGE NOTE - TEMPERATURE IN FAHRENHEIT (DEGREES F)
[facility]  Phone: [phone]  Salvador Chan [facility] based on PSG in [facility] done 2016.    Needs fu with Dr. Min Muhammad in the [facility] in 2 months.    Discussed with patient, wife and RN.    Salvador Chan MD  17  1:15 PM    Part of this note may be an electronic  transcription/translation of spoken language to printed text using the Dragon Dictation System.     97.9

## 2021-01-28 NOTE — H&P ADULT - NECK DETAILS
Problem: Adult Inpatient Plan of Care  Goal: Plan of Care Review  Outcome: Ongoing, Progressing  Patient remain on vent settings PRVC 22/450/+5/ FIO2 40% with Precedex max @ 1.4mcg/kg/hr. Several anxious, combative and restless throughout night, PRN Ativan administered. Patient had low grade temp 100.5, removed excess blankets and sheets and lowered temp to assist with cooling. Patient temp currently 99.0. B/P elevated between 160-190's received PRN ordered for Hydralazine 10mg IV  Q8hr for SBP> 180 1 x administered. Tolerating tube feeding Glucerna 1.5 @ goal 50ml/hr.  Urine output adequate 550cc clear urine. Restraints continue because patient continue to interfere with treatment (pulling at lines, attempting climb out bed). Verbal redirection unsuccessful. Reinforcement needed daily.  Seizure and aspiration precautions maintained. Scheduled SBT in AM. Pulm on board for vent co-management. No falls, injuries, discuss plan of care with patient, no evidence of learning. Safety monitoring maintain.       supple/no JVD/normal thyroid gland/positive Brudzinski's

## 2021-01-28 NOTE — H&P ADULT - HISTORY OF PRESENT ILLNESS
· HPI Objective Statement: 69 y/o F with hx of HTN, HLD, neuropathy, spinal stenosis presents with c/o R toe 2nd digit infection x 2 days. States that she noticed bleeding and toe nail avulsion a month ago, was treating with betadine until 10 days ago. States that she noticed yesterday that her R 2nd toe had whitish discoloration with surrounding redness and had clear discharge. Denies fever, chills, streaking or other symptoms. Pt has hx of multiple partial toe amputations. Pt was seen by her PCP, Dr. Richards today who spoke with Dr. Gray and sent to ED for evaluation. MILAN SHRESTHA is a  71 YO F presented with Right foot 2nd digit infection x 2 days. Patient states that she noticed bleeding and toe nail avulsion a month ago, patient was treating it with betadine until 10 days ago. States that she noticed yesterday, that her Right 2nd toe had whitish discoloration with surrounding redness and had clear discharge. Denies fever, chills, streaking or other symptoms. Patient has history of multiple partial toe amputations. Patient was seen by her PCP, Dr. Richards today who spoke with Dr. Gray and sent to ED for further evaluation.

## 2021-01-28 NOTE — H&P ADULT - NSHPLABSRESULTS_GEN_ALL_CORE
13.2   6.27  )-----------( 273      ( 28 Jan 2021 16:24 )             41.6     28 Jan 2021 16:24    137    |  102    |  38     ----------------------------<  110    4.4     |  30     |  1.10     Ca    9.4        28 Jan 2021 16:24    TPro  8.4    /  Alb  3.9    /  TBili  0.2    /  DBili  x      /  AST  15     /  ALT  29     /  AlkPhos  127    28 Jan 2021 16:24    LIVER FUNCTIONS - ( 28 Jan 2021 16:24 )  Alb: 3.9 g/dL / Pro: 8.4 g/dL / ALK PHOS: 127 U/L / ALT: 29 U/L / AST: 15 U/L / GGT: x           PT/INR - ( 28 Jan 2021 16:24 )   PT: 13.1 sec;   INR: 1.13 ratio         PTT - ( 28 Jan 2021 16:24 )  PTT:36.9 sec  CAPILLARY BLOOD GLUCOSE

## 2021-01-28 NOTE — CONSULT NOTE ADULT - PROBLEM SELECTOR RECOMMENDATION 9
Pt evaluated and chart reviewed  WCx obtained  Wound dressed with DSD    Need medical clearance    Planned Procedure:  Right 2nd partial ray resection - booked 1/29/21 w/ Dr Gray (following 0915 case)    Podiatry will follow pt while in house

## 2021-01-28 NOTE — ED ADULT NURSE NOTE - NSSUHOSCREENINGYN_ED_ALL_ED
Intake Note    Chief Complaint   Patient presents with   • Abdominal Pain   • Diarrhea Adult       HPI: Sania Duran is a 26 year old male who presents to the ED for evaluation of lower abd pain and diarrhea for the past 4-5 weeks. He took ibuprofen with relief in his pain. The pt states \"I haven't had a normal bowel movement since the beginning of November.\" He states that his stool is intermittently blood-tinged. The pt reports that he does not have an appointment with a PCP until February of 2019. The pt denies known sick contacts. He denies a personal h/o C diff or intestinal infections. The pt denies fever. He denies a h/o Crohn's disease or IBS. He notes a h/o GERD. The pt notes a h/o HTN but denies being on antihypertensives.    PE: Resting comfortably no acute distress.     Plan: Further evaluation by the next ED provider.   ________________________________________________________________    I have reviewed the information recorded by the scribe for accuracy and agree with its contents.    Darrick Lara acting as scribe for Judith Almeida PA-C    Scribe: Darrick Lara  Physician Assistant: Judith Almeida PA-C  Provider #: [id number]  12/10/2018       Judith Almeida PA-C  12/10/18 1333     Yes - the patient is able to be screened

## 2021-01-28 NOTE — ED ADULT NURSE NOTE - NSIMPLEMENTINTERV_GEN_ALL_ED
Implemented All Fall with Harm Risk Interventions:  [place] to call system. Call bell, personal items and telephone within reach. Instruct patient to call for assistance. Room bathroom lighting operational. Non-slip footwear when patient is off stretcher. Physically safe environment: no spills, clutter or unnecessary equipment. Stretcher in lowest position, wheels locked, appropriate side rails in place. Provide visual cue, wrist band, yellow gown, etc. Monitor gait and stability. Monitor for mental status changes and reorient to person, place, and time. Review medications for side effects contributing to fall risk. Reinforce activity limits and safety measures with patient and family. Provide visual clues: red socks.

## 2021-01-28 NOTE — PHARMACOTHERAPY INTERVENTION NOTE - COMMENTS
Med rec completed for patient. Multiple discrepancies identified. Discussed with Dr. Alec MD will review updated med rec and adjust regimen as needed.

## 2021-01-28 NOTE — ED PROVIDER NOTE - OBJECTIVE STATEMENT
69 y/o F with hx of HTN, HLD, neuropathy, spinal stenosis presents with c/o R toe 2nd digit infection x 2 days. States that she noticed bleeding and toe nail avulsion a month ago, was treating with betadine until 10 days ago. States that she noticed yesterday that her R 2nd toe had whitish discoloration with surrounding redness and had clear discharge. Denies fever, chills, streaking or other symptoms. Pt has hx of multiple partial toe amputations. Pt was seen by her PCP, Dr. Richards today who spoke with Dr. Gray and sent to ED for evaluation.

## 2021-01-28 NOTE — ED PROVIDER NOTE - CARE PLAN
Principal Discharge DX:	Ulcer of toe of right foot  Secondary Diagnosis:	Cellulitis of toe of right foot

## 2021-01-28 NOTE — CONSULT NOTE ADULT - SUBJECTIVE AND OBJECTIVE BOX
70y year old Female seen at [facility] for right distal phalanx wound.  Pt said the wound has been present for approximately 2 weeks.  Pt had not seen a medical professional for treatment.  Denies any fever, chills, nausea, vomiting, chest pain, shortness of breath, or calf pain at this time.    REVIEW OF SYSTEMS    PAST MEDICAL & SURGICAL HISTORY:  Constipation    Hypothyroid    HLD (hyperlipidemia)    HTN (hypertension)    Neuropathy    Spinal stenosis    Toe amputation status, right  tip of right 3rd toe        Allergies    latex (Rash)  penicillin (Hives)    Intolerances        MEDICATIONS  (STANDING):    MEDICATIONS  (PRN):      Social History:      FAMILY HISTORY:      Vital Signs Last 24 Hrs  T(C): 36.7 (28 Jan 2021 18:17), Max: 36.7 (28 Jan 2021 18:17)  T(F): 98.1 (28 Jan 2021 18:17), Max: 98.1 (28 Jan 2021 18:17)  HR: 77 (28 Jan 2021 18:17) (77 - 86)  BP: 132/77 (28 Jan 2021 18:17) (132/77 - 133/74)  BP(mean): --  RR: 18 (28 Jan 2021 18:17) (16 - 18)  SpO2: 97% (28 Jan 2021 18:17) (96% - 97%)    PHYSICAL EXAM:  Vascular: DP & PT palpable bilaterally, Capillary refill 3 seconds.  Edema and erythema right 2nd digit.    Neurological: Light touch sensation intact bilaterally  Musculoskeletal: No POP wound  Dermatological: 0.75 cm ulceration noted to the distal right 2nd phalanx, HPK wound bed, PTB+, no fluctuance, no malodor, no proximal streaking at this time    CBC Full  -  ( 28 Jan 2021 16:24 )  WBC Count : 6.27 K/uL  RBC Count : 4.62 M/uL  Hemoglobin : 13.2 g/dL  Hematocrit : 41.6 %  Platelet Count - Automated : 273 K/uL  Mean Cell Volume : 90.0 fl  Mean Cell Hemoglobin : 28.6 pg  Mean Cell Hemoglobin Concentration : 31.7 gm/dL  Auto Neutrophil # : 3.98 K/uL  Auto Lymphocyte # : 1.51 K/uL  Auto Monocyte # : 0.39 K/uL  Auto Eosinophil # : 0.31 K/uL  Auto Basophil # : 0.06 K/uL  Auto Neutrophil % : 63.5 %  Auto Lymphocyte % : 24.1 %  Auto Monocyte % : 6.2 %  Auto Eosinophil % : 4.9 %  Auto Basophil % : 1.0 %      ----------CHEM PANEL----------    PT/INR - ( 28 Jan 2021 16:24 )   PT: 13.1 sec;   INR: 1.13 ratio         PTT - ( 28 Jan 2021 16:24 )  PTT:36.9 sec        Imaging: ----------

## 2021-01-29 ENCOUNTER — RESULT REVIEW (OUTPATIENT)
Age: 71
End: 2021-01-29

## 2021-01-29 DIAGNOSIS — K59.00 CONSTIPATION, UNSPECIFIED: ICD-10-CM

## 2021-01-29 DIAGNOSIS — L03.031 CELLULITIS OF RIGHT TOE: ICD-10-CM

## 2021-01-29 DIAGNOSIS — E78.5 HYPERLIPIDEMIA, UNSPECIFIED: ICD-10-CM

## 2021-01-29 DIAGNOSIS — M48.00 SPINAL STENOSIS, SITE UNSPECIFIED: ICD-10-CM

## 2021-01-29 DIAGNOSIS — M86.9 OSTEOMYELITIS, UNSPECIFIED: ICD-10-CM

## 2021-01-29 DIAGNOSIS — G62.9 POLYNEUROPATHY, UNSPECIFIED: ICD-10-CM

## 2021-01-29 LAB
A1C WITH ESTIMATED AVERAGE GLUCOSE RESULT: 6.8 % — HIGH (ref 4–5.6)
APTT BLD: 33.4 SEC — SIGNIFICANT CHANGE UP (ref 27.5–35.5)
ESTIMATED AVERAGE GLUCOSE: 148 MG/DL — HIGH (ref 68–114)
GRAM STN FLD: SIGNIFICANT CHANGE UP
GRAM STN FLD: SIGNIFICANT CHANGE UP
HCV AB S/CO SERPL IA: 0.1 S/CO — SIGNIFICANT CHANGE UP (ref 0–0.99)
HCV AB SERPL-IMP: SIGNIFICANT CHANGE UP
INR BLD: 1.14 RATIO — SIGNIFICANT CHANGE UP (ref 0.88–1.16)
PROTHROM AB SERPL-ACNC: 13.2 SEC — SIGNIFICANT CHANGE UP (ref 10.6–13.6)
SPECIMEN SOURCE: SIGNIFICANT CHANGE UP
SPECIMEN SOURCE: SIGNIFICANT CHANGE UP

## 2021-01-29 PROCEDURE — 28825 PARTIAL AMPUTATION OF TOE: CPT | Mod: T6

## 2021-01-29 PROCEDURE — 99222 1ST HOSP IP/OBS MODERATE 55: CPT

## 2021-01-29 PROCEDURE — 73630 X-RAY EXAM OF FOOT: CPT | Mod: 26,RT

## 2021-01-29 PROCEDURE — 93926 LOWER EXTREMITY STUDY: CPT | Mod: 26,RT

## 2021-01-29 PROCEDURE — 88311 DECALCIFY TISSUE: CPT | Mod: 26

## 2021-01-29 PROCEDURE — 88304 TISSUE EXAM BY PATHOLOGIST: CPT | Mod: 26

## 2021-01-29 RX ORDER — SIMVASTATIN 20 MG/1
40 TABLET, FILM COATED ORAL AT BEDTIME
Refills: 0 | Status: DISCONTINUED | OUTPATIENT
Start: 2021-01-29 | End: 2021-02-04

## 2021-01-29 RX ORDER — ROPINIROLE 8 MG/1
3 TABLET, FILM COATED, EXTENDED RELEASE ORAL DAILY
Refills: 0 | Status: DISCONTINUED | OUTPATIENT
Start: 2021-01-29 | End: 2021-01-31

## 2021-01-29 RX ORDER — ACETAMINOPHEN 500 MG
325 TABLET ORAL DAILY
Refills: 0 | Status: DISCONTINUED | OUTPATIENT
Start: 2021-01-29 | End: 2021-02-04

## 2021-01-29 RX ORDER — CARVEDILOL PHOSPHATE 80 MG/1
6.25 CAPSULE, EXTENDED RELEASE ORAL EVERY 12 HOURS
Refills: 0 | Status: DISCONTINUED | OUTPATIENT
Start: 2021-01-29 | End: 2021-01-30

## 2021-01-29 RX ORDER — ONDANSETRON 8 MG/1
4 TABLET, FILM COATED ORAL ONCE
Refills: 0 | Status: DISCONTINUED | OUTPATIENT
Start: 2021-01-29 | End: 2021-01-29

## 2021-01-29 RX ORDER — LOSARTAN POTASSIUM 100 MG/1
25 TABLET, FILM COATED ORAL DAILY
Refills: 0 | Status: DISCONTINUED | OUTPATIENT
Start: 2021-01-29 | End: 2021-01-30

## 2021-01-29 RX ORDER — AMITRIPTYLINE HCL 25 MG
100 TABLET ORAL AT BEDTIME
Refills: 0 | Status: DISCONTINUED | OUTPATIENT
Start: 2021-01-29 | End: 2021-02-04

## 2021-01-29 RX ORDER — GABAPENTIN 400 MG/1
400 CAPSULE ORAL
Refills: 0 | Status: DISCONTINUED | OUTPATIENT
Start: 2021-01-29 | End: 2021-02-04

## 2021-01-29 RX ORDER — LACTOBACILLUS ACIDOPHILUS 100MM CELL
1 CAPSULE ORAL DAILY
Refills: 0 | Status: DISCONTINUED | OUTPATIENT
Start: 2021-01-29 | End: 2021-01-30

## 2021-01-29 RX ORDER — PANTOPRAZOLE SODIUM 20 MG/1
40 TABLET, DELAYED RELEASE ORAL
Refills: 0 | Status: DISCONTINUED | OUTPATIENT
Start: 2021-01-29 | End: 2021-02-04

## 2021-01-29 RX ORDER — LEVOTHYROXINE SODIUM 125 MCG
200 TABLET ORAL DAILY
Refills: 0 | Status: DISCONTINUED | OUTPATIENT
Start: 2021-01-29 | End: 2021-02-04

## 2021-01-29 RX ORDER — HYDROMORPHONE HYDROCHLORIDE 2 MG/ML
0.5 INJECTION INTRAMUSCULAR; INTRAVENOUS; SUBCUTANEOUS
Refills: 0 | Status: DISCONTINUED | OUTPATIENT
Start: 2021-01-29 | End: 2021-01-29

## 2021-01-29 RX ORDER — HYDROMORPHONE HYDROCHLORIDE 2 MG/ML
0.5 INJECTION INTRAMUSCULAR; INTRAVENOUS; SUBCUTANEOUS
Refills: 0 | Status: DISCONTINUED | OUTPATIENT
Start: 2021-01-29 | End: 2021-01-30

## 2021-01-29 RX ORDER — DULOXETINE HYDROCHLORIDE 30 MG/1
60 CAPSULE, DELAYED RELEASE ORAL AT BEDTIME
Refills: 0 | Status: DISCONTINUED | OUTPATIENT
Start: 2021-01-29 | End: 2021-02-04

## 2021-01-29 RX ORDER — SUCRALFATE 1 G
1 TABLET ORAL
Refills: 0 | Status: DISCONTINUED | OUTPATIENT
Start: 2021-01-29 | End: 2021-02-04

## 2021-01-29 RX ORDER — OXYCODONE HYDROCHLORIDE 5 MG/1
5 TABLET ORAL ONCE
Refills: 0 | Status: DISCONTINUED | OUTPATIENT
Start: 2021-01-29 | End: 2021-01-29

## 2021-01-29 RX ORDER — SODIUM CHLORIDE 9 MG/ML
1000 INJECTION, SOLUTION INTRAVENOUS
Refills: 0 | Status: DISCONTINUED | OUTPATIENT
Start: 2021-01-29 | End: 2021-01-29

## 2021-01-29 RX ORDER — LANOLIN ALCOHOL/MO/W.PET/CERES
5 CREAM (GRAM) TOPICAL AT BEDTIME
Refills: 0 | Status: DISCONTINUED | OUTPATIENT
Start: 2021-01-29 | End: 2021-02-01

## 2021-01-29 RX ORDER — LIDOCAINE 4 G/100G
1 CREAM TOPICAL EVERY 24 HOURS
Refills: 0 | Status: DISCONTINUED | OUTPATIENT
Start: 2021-01-29 | End: 2021-02-04

## 2021-01-29 RX ORDER — OXYCODONE HYDROCHLORIDE 5 MG/1
20 TABLET ORAL EVERY 12 HOURS
Refills: 0 | Status: DISCONTINUED | OUTPATIENT
Start: 2021-01-29 | End: 2021-02-01

## 2021-01-29 RX ORDER — ACETAMINOPHEN 500 MG
650 TABLET ORAL ONCE
Refills: 0 | Status: COMPLETED | OUTPATIENT
Start: 2021-01-29 | End: 2021-01-29

## 2021-01-29 RX ORDER — POTASSIUM CHLORIDE 20 MEQ
10 PACKET (EA) ORAL DAILY
Refills: 0 | Status: DISCONTINUED | OUTPATIENT
Start: 2021-01-29 | End: 2021-01-31

## 2021-01-29 RX ORDER — FUROSEMIDE 40 MG
20 TABLET ORAL DAILY
Refills: 0 | Status: DISCONTINUED | OUTPATIENT
Start: 2021-01-29 | End: 2021-02-04

## 2021-01-29 RX ORDER — VANCOMYCIN HCL 1 G
1250 VIAL (EA) INTRAVENOUS EVERY 12 HOURS
Refills: 0 | Status: DISCONTINUED | OUTPATIENT
Start: 2021-01-29 | End: 2021-02-01

## 2021-01-29 RX ADMIN — Medication 325 MILLIGRAM(S): at 06:41

## 2021-01-29 RX ADMIN — ROPINIROLE 3 MILLIGRAM(S): 8 TABLET, FILM COATED, EXTENDED RELEASE ORAL at 22:53

## 2021-01-29 RX ADMIN — Medication 650 MILLIGRAM(S): at 22:53

## 2021-01-29 RX ADMIN — Medication 20 MILLIGRAM(S): at 06:41

## 2021-01-29 RX ADMIN — PANTOPRAZOLE SODIUM 40 MILLIGRAM(S): 20 TABLET, DELAYED RELEASE ORAL at 06:41

## 2021-01-29 RX ADMIN — OXYCODONE HYDROCHLORIDE 20 MILLIGRAM(S): 5 TABLET ORAL at 00:10

## 2021-01-29 RX ADMIN — Medication 100 MILLIGRAM(S): at 23:01

## 2021-01-29 RX ADMIN — DULOXETINE HYDROCHLORIDE 60 MILLIGRAM(S): 30 CAPSULE, DELAYED RELEASE ORAL at 22:54

## 2021-01-29 RX ADMIN — GABAPENTIN 400 MILLIGRAM(S): 400 CAPSULE ORAL at 06:41

## 2021-01-29 RX ADMIN — GABAPENTIN 400 MILLIGRAM(S): 400 CAPSULE ORAL at 16:22

## 2021-01-29 RX ADMIN — Medication 1 GRAM(S): at 16:22

## 2021-01-29 RX ADMIN — ROPINIROLE 3 MILLIGRAM(S): 8 TABLET, FILM COATED, EXTENDED RELEASE ORAL at 00:10

## 2021-01-29 RX ADMIN — Medication 5 MILLIGRAM(S): at 22:54

## 2021-01-29 RX ADMIN — OXYCODONE HYDROCHLORIDE 20 MILLIGRAM(S): 5 TABLET ORAL at 22:54

## 2021-01-29 RX ADMIN — Medication 166.67 MILLIGRAM(S): at 06:42

## 2021-01-29 RX ADMIN — Medication 1 GRAM(S): at 06:41

## 2021-01-29 RX ADMIN — LOSARTAN POTASSIUM 25 MILLIGRAM(S): 100 TABLET, FILM COATED ORAL at 06:41

## 2021-01-29 RX ADMIN — Medication 200 MICROGRAM(S): at 06:51

## 2021-01-29 RX ADMIN — Medication 166.67 MILLIGRAM(S): at 17:01

## 2021-01-29 RX ADMIN — CARVEDILOL PHOSPHATE 6.25 MILLIGRAM(S): 80 CAPSULE, EXTENDED RELEASE ORAL at 06:41

## 2021-01-29 RX ADMIN — OXYCODONE HYDROCHLORIDE 20 MILLIGRAM(S): 5 TABLET ORAL at 06:41

## 2021-01-29 RX ADMIN — SIMVASTATIN 40 MILLIGRAM(S): 20 TABLET, FILM COATED ORAL at 22:53

## 2021-01-29 NOTE — CONSULT NOTE ADULT - ASSESSMENT
Assessment/Plan:  70y Female    Olga Lidia Radha DNP, NP-C  Cardiology  Spectra #[phone]       Assessment/Plan:  70 F with HTN, HLD, neuropathy, and spinal stenosis presented with right 2nd toe ulcer, now for resection of right 2nd partial ray resection.  Consult is called for cardiac clearance    Right toe ulcer  - Patient is planned for partial ray resection of right 2nd toe secondary to non-healing ulcer  - She has no known CAD or HF.  She has no evidence of cardiac ischemia, volume overload, arrhythmias.  Has a cardiac murmur on exam , though, mild in significance.  She is considered optimized for planned ortho procedure from cardiac standpoint.  - Can do routine TTE to evaluate cardiac structures but should not be a requirement for patient's surgery  - Routine hemodynamics recommended  - Abx per Primary  - Had reportedly a normal stress test 2 years ago, for unknown reason.  Does not follow a cardiologist  - Had a remote TTE, result unknown    HTN  - BP stable and controlled  - Continue Coreg, Losartan, and Lasix    HLD  - Resume home statin    DVT ppx  - Per Primary    Will continue to follow    Olga Lidia Garcia DNP, NP-C  Cardiology  Spectra #[phone]       70 F with HTN, HLD, neuropathy, and spinal stenosis presented with right 2nd toe ulcer, now for resection of right 2nd partial ray resection.  Consult is called for cardiac clearance in setting of murmur, HTN and comorbidities     Right toe ulcer  - Patient is planned for partial ray resection of right 2nd toe secondary to non-healing ulcer  - She has no known CAD or HF.  She has no evidence of cardiac ischemia, volume overload, arrhythmias.  Has a cardiac murmur on exam , though, mild in significance.  She is considered optimized for planned ortho procedure from cardiac standpoint.  - Can do routine TTE to evaluate cardiac structures but should not be a requirement for patient's surgery  - Routine hemodynamics recommended  - Abx per Primary  - Had reportedly a normal stress test 2 years ago, for unknown reason.  Does not follow a cardiologist       HTN  - BP stable and controlled  - Continue Coreg, Losartan, and Lasix    HLD  - Resume home statin    DVT ppx  - Per Primary    Will continue to follow    Olga Lidia Garcia DNP, NP-C  Cardiology  Spectra #[phone]

## 2021-01-29 NOTE — CONSULT NOTE ADULT - SUBJECTIVE AND OBJECTIVE BOX
[facility], Division of Infectious Diseases  YOKO Carlin, RENU Galarza  [phone]    MILAN SHRESTHA  70y, Female  [id number]    HPI--  HPI:  71 y/o F with hx of HTN, HLD, neuropathy, spinal stenosis presents with c/o R toe 2nd digit infection x 2 days. States that she noticed bleeding and toe nail avulsion a month ago, was treating with betadine until 10 days ago. States that she noticed yesterday that her R 2nd toe had whitish discoloration with surrounding redness and had clear discharge. Denies fever, chills, streaking or other symptoms. Pt has hx of multiple partial toe amputations. Pt was seen by her PCP, Dr. Richards today who spoke with Dr. Gray and sent to ED for evaluation.  Seen by podiatry, planned for Right 2nd partial ray resection - booked 1/29/21 w/ Dr Gray  Pt seen and examined at bedside. Awaiting to be taken to pre-op.   Reports that she has been receiving Abx and feels burning sensation during administration.     Chart reviewed, pt has already received vancomycin     Imaging reviewed. No clear evidence of OM      Active Medications--  acetaminophen   Tablet .. 325 milliGRAM(s) Oral daily PRN  amitriptyline 100 milliGRAM(s) Oral at bedtime  carvedilol 6.25 milliGRAM(s) Oral every 12 hours  DULoxetine 60 milliGRAM(s) Oral at bedtime  furosemide    Tablet 20 milliGRAM(s) Oral daily  gabapentin 400 milliGRAM(s) Oral four times a day  lactobacillus acidophilus 1 Tablet(s) Oral daily  levothyroxine 200 MICROGram(s) Oral daily  lidocaine   Patch 1 Patch Transdermal every 24 hours  losartan 25 milliGRAM(s) Oral daily  melatonin 5 milliGRAM(s) Oral at bedtime  multivitamin 1 Tablet(s) Oral daily  oxyCODONE  ER Tablet 20 milliGRAM(s) Oral every 12 hours  pantoprazole    Tablet 40 milliGRAM(s) Oral before breakfast  potassium chloride    Tablet ER 10 milliEquivalent(s) Oral daily  rOPINIRole 3 milliGRAM(s) Oral daily  simvastatin 40 milliGRAM(s) Oral at bedtime  sucralfate 1 Gram(s) Oral four times a day  vancomycin  IVPB 1250 milliGRAM(s) IV Intermittent every 12 hours    Antimicrobials:   vancomycin  IVPB 1250 milliGRAM(s) IV Intermittent every 12 hours    Immunologic:     ROS:  CONSTITUTIONAL: No fevers or chills. No weakness or headache. No weight changes.  EYES/ENT: No visual or hearing changes. No sore throat or throat pain .  NECK: No pain or stiffness  RESPIRATORY: No cough, wheezing, or hemoptysis. No shortness of breath  CARDIOVASCULAR: No chest pain or palpitations  GASTROINTESTINAL: No abdominal pain. No nausea or vomiting. No diarrhea or constipation.  GENITOURINARY: No dysuria, frequency or hematuria  NEUROLOGICAL: No numbness or weakness  SKIN: No itching or rashes  PSYCHIATRIC: Pleasant. Appropriate affect    Allergies: latex (Rash)  penicillin (Hives)    PMH -- Constipation    Hypothyroid    HLD (hyperlipidemia)    HTN (hypertension)    Neuropathy    Spinal stenosis      PSH -- Toe amputation status, right      FH --   Social History --  EtOH: denies   Tobacco: denies   Drug Use: denies     Travel/Environmental/Occupational History:    Physical Exam--  Vital Signs Last 24 Hrs  T(F): 97.7 (29 Jan 2021 05:08), Max: 98.1 (28 Jan 2021 18:17)  HR: 82 (29 Jan 2021 05:08) (75 - 86)  BP: 122/71 (29 Jan 2021 05:08) (122/71 - 139/71)  RR: 16 (29 Jan 2021 05:08) (16 - 18)  SpO2: 94% (29 Jan 2021 05:08) (94% - 98%)  General: nontoxic-appearing, no acute distress  HEENT: NC/AT, EOMI, anicteric, conjunctiva pink and moist, oropharynx clear, dentition fair  Neck: Not rigid. No sense of mass. No LAD  Lungs: Clear bilaterally without rales, wheezing or rhonchi  Heart: Regular rate and rhythm. No murmur, rub or gallop.  Abdomen: Soft. Nondistended. Nontender. Bowel sounds present. No organomegaly.  Back: No spinal tenderness. No costovertebral angle tenderness.  Extremities: No cyanosis or clubbing. No edema. R toe in dressing c/d/i  Skin: Warm. Dry. Good turgor. No rash. No vasculitic stigmata.  Psychiatric: Appropriate affect and mood for situation.     Laboratory & Imaging Data--  CBC:                       13.2   6.27  )-----------( 273      ( 28 Jan 2021 16:24 )             41.6     CMP: 01-28    137  |  102  |  38<H>  ----------------------------<  110<H>  4.4   |  30  |  1.10    Ca    9.4      28 Jan 2021 16:24    TPro  8.4<H>  /  Alb  3.9  /  TBili  0.2  /  DBili  x   /  AST  15  /  ALT  29  /  AlkPhos  127<H>  01-28    LIVER FUNCTIONS - ( 28 Jan 2021 16:24 )  Alb: 3.9 g/dL / Pro: 8.4 g/dL / ALK PHOS: 127 U/L / ALT: 29 U/L / AST: 15 U/L / GGT: x               Microbiology: reviewed      Radiology: reviewed    < from: Xray Toes, Right Foot (01.28.21 @ 17:26) >    EXAM:  TOES(S) RIGHT FOOT                            PROCEDURE DATE:  01/28/2021          INTERPRETATION:  Clinical information: Right toes redness, ulcer.    Comparison: Right toes 9/19/2015.    3 views of the right second toe.    There is soft tissue swelling and irregularity involving the terminal tuft of the distal phalanx of the second toe.    No definite focal osteolysis is noted.    There is a chronic deformity of the distal third toe.    Impression:    Findings as discussed above.    Ifthere is a clinical suspicion for an active osteomyelitis, recommend further evaluation with MRI if there are no clinical contraindications.            WEI HILLMAN M.D., ATTENDING RADIOLOGIST  This document has been electronically signed. Jan 29 2021  8:49AM    < end of copied text >

## 2021-01-29 NOTE — CONSULT NOTE ADULT - SUBJECTIVE AND OBJECTIVE BOX
[facility] - Jayne Aguila, Max Chacko, Marita, Hernandez, Miguel Wilkins  Office Number: [phone]    Initial Consult Note:  This is a 70 F with HTN, HLD, neuropathy, and spinal stenosis presented with right 2nd toe ulcer, now for resection of right 2nd partial ray resection.  Consult is called for cardiac clearance    CHIEF COMPLAINT: Patient is a 70y old  Female who presents with a chief complaint of     HPI:  · HPI Objective Statement: 69 y/o F with hx of HTN, HLD, neuropathy, spinal stenosis presents with c/o R toe 2nd digit infection x 2 days. States that she noticed bleeding and toe nail avulsion a month ago, was treating with betadine until 10 days ago. States that she noticed yesterday that her R 2nd toe had whitish discoloration with surrounding redness and had clear discharge. Denies fever, chills, streaking or other symptoms. Pt has hx of multiple partial toe amputations. Pt was seen by her PCP, Dr. Richards today who spoke with Dr. Gray and sent to ED for evaluation. (28 Jan 2021 21:22)    PAST MEDICAL & SURGICAL HISTORY:  Constipation    Hypothyroid    HLD (hyperlipidemia)    HTN (hypertension)    Neuropathy    Spinal stenosis    Toe amputation status, right  tip of right 3rd toe    SOCIAL HISTORY:  No tobacco, ethanol, or drug abuse.  FAMILY HISTORY:    No family history of acute MI or sudden cardiac death.  MEDICATIONS  (STANDING):  amitriptyline 100 milliGRAM(s) Oral at bedtime  carvedilol 6.25 milliGRAM(s) Oral every 12 hours  DULoxetine 60 milliGRAM(s) Oral at bedtime  furosemide    Tablet 20 milliGRAM(s) Oral daily  gabapentin 400 milliGRAM(s) Oral four times a day  lactobacillus acidophilus 1 Tablet(s) Oral daily  levothyroxine 200 MICROGram(s) Oral daily  lidocaine   Patch 1 Patch Transdermal every 24 hours  losartan 25 milliGRAM(s) Oral daily  melatonin 5 milliGRAM(s) Oral at bedtime  multivitamin 1 Tablet(s) Oral daily  oxyCODONE  ER Tablet 20 milliGRAM(s) Oral every 12 hours  pantoprazole    Tablet 40 milliGRAM(s) Oral before breakfast  potassium chloride    Tablet ER 10 milliEquivalent(s) Oral daily  rOPINIRole 3 milliGRAM(s) Oral daily  simvastatin 40 milliGRAM(s) Oral at bedtime  sucralfate 1 Gram(s) Oral four times a day  vancomycin  IVPB 1250 milliGRAM(s) IV Intermittent every 12 hours    MEDICATIONS  (PRN):  acetaminophen   Tablet .. 325 milliGRAM(s) Oral daily PRN Temp greater or equal to 38C (100.4F), Mild Pain (1 - 3)    Allergies    latex (Rash)  penicillin (Hives)    Intolerances    REVIEW OF SYSTEMS:  CONSTITUTIONAL: No weakness, fevers or chills  EYES/ENT: No visual changes;  No vertigo or throat pain   NECK: No pain or stiffness  RESPIRATORY: No cough, wheezing, hemoptysis; No shortness of breath  CARDIOVASCULAR: No chest pain or palpitations  GASTROINTESTINAL: No abdominal pain. No nausea, vomiting, or hematemesis; No diarrhea or constipation. No melena or hematochezia.  GENITOURINARY: No dysuria, frequency or hematuria  NEUROLOGICAL: No numbness or weakness  SKIN: No itching or rash  All other review of systems is negative unless indicated above  VITAL SIGNS:   Vital Signs Last 24 Hrs  T(C): 36.5 (29 Jan 2021 05:08), Max: 36.7 (28 Jan 2021 18:17)  T(F): 97.7 (29 Jan 2021 05:08), Max: 98.1 (28 Jan 2021 18:17)  HR: 82 (29 Jan 2021 05:08) (75 - 86)  BP: 122/71 (29 Jan 2021 05:08) (122/71 - 139/71)  BP(mean): --  RR: 16 (29 Jan 2021 05:08) (16 - 18)  SpO2: 94% (29 Jan 2021 05:08) (94% - 98%)  I&O's Summary    On Exam:  Constitutional: NAD, alert and oriented x 3  Lungs:  Non-labored, breath sounds are clear bilaterally, No wheezing, rales or rhonchi  Cardiovascular: RRR.  S1 and S2 positive.  No murmurs, rubs, gallops or clicks  Gastrointestinal: Bowel Sounds present, soft, nontender.   Lymph: No peripheral edema. No cervical lymphadenopathy.  Neurological: Alert, no focal deficits  Skin: No rashes or ulcers   Psych:  Mood & affect appropriate.    LABS: All Labs Reviewed:                        13.2   6.27  )-----------( 273      ( 28 Jan 2021 16:24 )             41.6     28 Jan 2021 16:24    137    |  102    |  38     ----------------------------<  110    4.4     |  30     |  1.10     Ca    9.4        28 Jan 2021 16:24    TPro  8.4    /  Alb  3.9    /  TBili  0.2    /  DBili  x      /  AST  15     /  ALT  29     /  AlkPhos  127    28 Jan 2021 16:24    PT/INR - ( 29 Jan 2021 07:29 )   PT: 13.2 sec;   INR: 1.14 ratio       PTT - ( 29 Jan 2021 07:29 )  PTT:33.4 sec    Blood Culture:   01-28 @ 16:24  Pro Bnp 44    RADIOLOGY:    EXAM:  XR CHEST AP OR PA 1V                          PROCEDURE DATE:  01/28/2021      INTERPRETATION:  CLINICAL STATEMENT: Chest pain.    TECHNIQUE: AP view of the chest.    COMPARISON: 9/28/2015    FINDINGS/  IMPRESSION:  Spinal hardware again noted.    No new consolidation or pleural effusion. Overlying chin obscures lung apices    Heart size within normal limits.    Small calcification adjacent to left humeral head.    PATRICIA RAMOS MD; Attending Radiologist  This document has been electronically signed. Jan 28 2021  5:36PM    EKG:     [facility] [place] [facility] - Jayne Aguila, Max Chacko, Marita, Hernandez, Miguel Wilkins  Office Number: [phone]    Initial Consult Note:  This is a 70 F with HTN, HLD, neuropathy, and spinal stenosis presented with right 2nd toe ulcer, now for resection of right 2nd partial ray resection.  Consult is called for cardiac clearance    CHIEF COMPLAINT: Patient is a 70y old  Female who presents with a chief complaint of     HPI:  · HPI Objective Statement: 71 y/o F with hx of HTN, HLD, neuropathy, spinal stenosis presents with c/o R toe 2nd digit infection x 2 days. States that she noticed bleeding and toe nail avulsion a month ago, was treating with betadine until 10 days ago. States that she noticed yesterday that her R 2nd toe had whitish discoloration with surrounding redness and had clear discharge. Denies fever, chills, streaking or other symptoms. Pt has hx of multiple partial toe amputations. Pt was seen by her PCP, Dr. Richards today who spoke with Dr. Gray and sent to ED for evaluation. (28 Jan 2021 21:22)    PAST MEDICAL & SURGICAL HISTORY:  Constipation    Hypothyroid    HLD (hyperlipidemia)    HTN (hypertension)    Neuropathy    Spinal stenosis    Toe amputation status, right  tip of right 3rd toe    SOCIAL HISTORY:  No tobacco, ethanol, or drug abuse.  FAMILY HISTORY:    No family history of acute MI or sudden cardiac death.  MEDICATIONS  (STANDING):  amitriptyline 100 milliGRAM(s) Oral at bedtime  carvedilol 6.25 milliGRAM(s) Oral every 12 hours  DULoxetine 60 milliGRAM(s) Oral at bedtime  furosemide    Tablet 20 milliGRAM(s) Oral daily  gabapentin 400 milliGRAM(s) Oral four times a day  lactobacillus acidophilus 1 Tablet(s) Oral daily  levothyroxine 200 MICROGram(s) Oral daily  lidocaine   Patch 1 Patch Transdermal every 24 hours  losartan 25 milliGRAM(s) Oral daily  melatonin 5 milliGRAM(s) Oral at bedtime  multivitamin 1 Tablet(s) Oral daily  oxyCODONE  ER Tablet 20 milliGRAM(s) Oral every 12 hours  pantoprazole    Tablet 40 milliGRAM(s) Oral before breakfast  potassium chloride    Tablet ER 10 milliEquivalent(s) Oral daily  rOPINIRole 3 milliGRAM(s) Oral daily  simvastatin 40 milliGRAM(s) Oral at bedtime  sucralfate 1 Gram(s) Oral four times a day  vancomycin  IVPB 1250 milliGRAM(s) IV Intermittent every 12 hours    MEDICATIONS  (PRN):  acetaminophen   Tablet .. 325 milliGRAM(s) Oral daily PRN Temp greater or equal to 38C (100.4F), Mild Pain (1 - 3)    Allergies    latex (Rash)  penicillin (Hives)    Intolerances    REVIEW OF SYSTEMS:  CONSTITUTIONAL: No weakness, fevers or chills  EYES/ENT: No visual changes;  No vertigo or throat pain   NECK: No pain or stiffness  RESPIRATORY: No cough, wheezing, hemoptysis; No shortness of breath  CARDIOVASCULAR: No chest pain or palpitations  GASTROINTESTINAL: No abdominal pain. No nausea, vomiting, or hematemesis; No diarrhea or constipation. No melena or hematochezia.  MSK: Left right 2nd toe ulcer with inflammation/redness  GENITOURINARY: No dysuria, frequency or hematuria  NEUROLOGICAL: No numbness or weakness  SKIN: No itching or rash  All other review of systems is negative unless indicated above  VITAL SIGNS:   Vital Signs Last 24 Hrs  T(C): 36.5 (29 Jan 2021 05:08), Max: 36.7 (28 Jan 2021 18:17)  T(F): 97.7 (29 Jan 2021 05:08), Max: 98.1 (28 Jan 2021 18:17)  HR: 82 (29 Jan 2021 05:08) (75 - 86)  BP: 122/71 (29 Jan 2021 05:08) (122/71 - 139/71)  BP(mean): --  RR: 16 (29 Jan 2021 05:08) (16 - 18)  SpO2: 94% (29 Jan 2021 05:08) (94% - 98%)  I&O's Summary    On Exam:  Constitutional: NAD, alert and oriented x 3, obese  Lungs:  Non-labored, breath sounds are clear bilaterally, No wheezing, rales or rhonchi  Cardiovascular: RRR.  S1 and S2 positive.  + murmurs, no rubs, gallops or clicks  Gastrointestinal: Bowel Sounds present, soft, nontender.   MSK: right 2nd toe ulcer  Lymph: No peripheral edema. No cervical lymphadenopathy.  Neurological: Alert, no focal deficits  Skin: No rashes or ulcers   Psych:  Mood & affect appropriate.    LABS: All Labs Reviewed:                        13.2   6.27  )-----------( 273      ( 28 Jan 2021 16:24 )             41.6     28 Jan 2021 16:24    137    |  102    |  38     ----------------------------<  110    4.4     |  30     |  1.10     Ca    9.4        28 Jan 2021 16:24    TPro  8.4    /  Alb  3.9    /  TBili  0.2    /  DBili  x      /  AST  15     /  ALT  29     /  AlkPhos  127    28 Jan 2021 16:24    PT/INR - ( 29 Jan 2021 07:29 )   PT: 13.2 sec;   INR: 1.14 ratio       PTT - ( 29 Jan 2021 07:29 )  PTT:33.4 sec    Blood Culture:   01-28 @ 16:24  Pro Bnp 44    RADIOLOGY:    EXAM:  XR CHEST AP OR PA 1V                          PROCEDURE DATE:  01/28/2021      INTERPRETATION:  CLINICAL STATEMENT: Chest pain.    TECHNIQUE: AP view of the chest.    COMPARISON: 9/28/2015    FINDINGS/  IMPRESSION:  Spinal hardware again noted.    No new consolidation or pleural effusion. Overlying chin obscures lung apices    Heart size within normal limits.    Small calcification adjacent to left humeral head.    PATRICIA RAMOS MD; Attending Radiologist  This document has been electronically signed. Jan 28 2021  5:36PM    EKG: NSR with no ischemic changes     [facility] - Jayne Aguila, Max Chacko, Marita, Hernandez, Miguel Wilkins  Office Number: [phone]    Initial Consult Note:  This is a 70 F with HTN, HLD, neuropathy, and spinal stenosis presented with right 2nd toe ulcer, now for resection of right 2nd partial ray resection.  Consult is called for cardiac clearance in setting of HTN and preop risk    CHIEF COMPLAINT: Patient is a 70y old  Female who presents with a chief complaint of     HPI:  · HPI Objective Statement: 69 y/o F with hx of HTN, HLD, neuropathy, spinal stenosis presents with c/o R toe 2nd digit infection x 2 days. States that she noticed bleeding and toe nail avulsion a month ago, was treating with betadine until 10 days ago. States that she noticed yesterday that her R 2nd toe had whitish discoloration with surrounding redness and had clear discharge. Denies fever, chills, streaking or other symptoms. Pt has hx of multiple partial toe amputations. Pt was seen by her PCP, Dr. Richards today who spoke with Dr. Gray and sent to ED for evaluation. (28 Jan 2021 21:22)    PAST MEDICAL & SURGICAL HISTORY:  Constipation    Hypothyroid    HLD (hyperlipidemia)    HTN (hypertension)    Neuropathy    Spinal stenosis    Toe amputation status, right  tip of right 3rd toe    SOCIAL HISTORY:  No tobacco, ethanol, or drug abuse.  FAMILY HISTORY:    No family history of acute MI or sudden cardiac death.  MEDICATIONS  (STANDING):  amitriptyline 100 milliGRAM(s) Oral at bedtime  carvedilol 6.25 milliGRAM(s) Oral every 12 hours  DULoxetine 60 milliGRAM(s) Oral at bedtime  furosemide    Tablet 20 milliGRAM(s) Oral daily  gabapentin 400 milliGRAM(s) Oral four times a day  lactobacillus acidophilus 1 Tablet(s) Oral daily  levothyroxine 200 MICROGram(s) Oral daily  lidocaine   Patch 1 Patch Transdermal every 24 hours  losartan 25 milliGRAM(s) Oral daily  melatonin 5 milliGRAM(s) Oral at bedtime  multivitamin 1 Tablet(s) Oral daily  oxyCODONE  ER Tablet 20 milliGRAM(s) Oral every 12 hours  pantoprazole    Tablet 40 milliGRAM(s) Oral before breakfast  potassium chloride    Tablet ER 10 milliEquivalent(s) Oral daily  rOPINIRole 3 milliGRAM(s) Oral daily  simvastatin 40 milliGRAM(s) Oral at bedtime  sucralfate 1 Gram(s) Oral four times a day  vancomycin  IVPB 1250 milliGRAM(s) IV Intermittent every 12 hours    MEDICATIONS  (PRN):  acetaminophen   Tablet .. 325 milliGRAM(s) Oral daily PRN Temp greater or equal to 38C (100.4F), Mild Pain (1 - 3)    Allergies    latex (Rash)  penicillin (Hives)    Intolerances    REVIEW OF SYSTEMS:  CONSTITUTIONAL: No weakness, fevers or chills  EYES/ENT: No visual changes;  No vertigo or throat pain   NECK: No pain or stiffness  RESPIRATORY: No cough, wheezing, hemoptysis; No shortness of breath  CARDIOVASCULAR: No chest pain or palpitations  GASTROINTESTINAL: No abdominal pain. No nausea, vomiting, or hematemesis; No diarrhea or constipation. No melena or hematochezia.  MSK: Left right 2nd toe ulcer with inflammation/redness  GENITOURINARY: No dysuria, frequency or hematuria  NEUROLOGICAL: No numbness or weakness  SKIN: No itching or rash  All other review of systems is negative unless indicated above  VITAL SIGNS:   Vital Signs Last 24 Hrs  T(C): 36.5 (29 Jan 2021 05:08), Max: 36.7 (28 Jan 2021 18:17)  T(F): 97.7 (29 Jan 2021 05:08), Max: 98.1 (28 Jan 2021 18:17)  HR: 82 (29 Jan 2021 05:08) (75 - 86)  BP: 122/71 (29 Jan 2021 05:08) (122/71 - 139/71)  BP(mean): --  RR: 16 (29 Jan 2021 05:08) (16 - 18)  SpO2: 94% (29 Jan 2021 05:08) (94% - 98%)  I&O's Summary    On Exam:  Constitutional: NAD, alert and oriented x 3, obese  HEENT MMM anicteric  Lungs:  Non-labored, breath sounds are clear bilaterally, No wheezing, rales or rhonchi  Cardiovascular: RRR.  S1 and S2 positive.  + murmurs, no rubs, gallops or clicks  Gastrointestinal: Bowel Sounds present, soft, nontender.   MSK: right 2nd toe ulcer  Lymph: No peripheral edema. No cervical lymphadenopathy.  Neurological: Alert, no focal deficits  Skin: No rashes or ulcers   Psych:  Mood & affect appropriate.    LABS: All Labs Reviewed:                        13.2   6.27  )-----------( 273      ( 28 Jan 2021 16:24 )             41.6     28 Jan 2021 16:24    137    |  102    |  38     ----------------------------<  110    4.4     |  30     |  1.10     Ca    9.4        28 Jan 2021 16:24    TPro  8.4    /  Alb  3.9    /  TBili  0.2    /  DBili  x      /  AST  15     /  ALT  29     /  AlkPhos  127    28 Jan 2021 16:24    PT/INR - ( 29 Jan 2021 07:29 )   PT: 13.2 sec;   INR: 1.14 ratio       PTT - ( 29 Jan 2021 07:29 )  PTT:33.4 sec    Blood Culture:   01-28 @ 16:24  Pro Bnp 44    RADIOLOGY:    EXAM:  XR CHEST AP OR PA 1V                          PROCEDURE DATE:  01/28/2021      INTERPRETATION:  CLINICAL STATEMENT: Chest pain.    TECHNIQUE: AP view of the chest.    COMPARISON: 9/28/2015    FINDINGS/  IMPRESSION:  Spinal hardware again noted.    No new consolidation or pleural effusion. Overlying chin obscures lung apices    Heart size within normal limits.    Small calcification adjacent to left humeral head.    PATRICIA RAMOS MD; Attending Radiologist  This document has been electronically signed. Jan 28 2021  5:36PM    EKG: NSR with no ischemic changes

## 2021-01-29 NOTE — PROGRESS NOTE ADULT - SUBJECTIVE AND OBJECTIVE BOX
Patient is a 70y old  Female who presents with a chief complaint of R toe infection (29 Jan 2021 09:33)      INTERVAL /OVERNIGHT EVENTS: denies pain    MEDICATIONS  (STANDING):  amitriptyline 100 milliGRAM(s) Oral at bedtime  carvedilol 6.25 milliGRAM(s) Oral every 12 hours  DULoxetine 60 milliGRAM(s) Oral at bedtime  furosemide    Tablet 20 milliGRAM(s) Oral daily  gabapentin 400 milliGRAM(s) Oral four times a day  HYDROmorphone  Injectable 0.5 milliGRAM(s) IV Push every 10 minutes  lactobacillus acidophilus 1 Tablet(s) Oral daily  levothyroxine 200 MICROGram(s) Oral daily  lidocaine   Patch 1 Patch Transdermal every 24 hours  losartan 25 milliGRAM(s) Oral daily  melatonin 5 milliGRAM(s) Oral at bedtime  multivitamin 1 Tablet(s) Oral daily  oxyCODONE  ER Tablet 20 milliGRAM(s) Oral every 12 hours  pantoprazole    Tablet 40 milliGRAM(s) Oral before breakfast  potassium chloride    Tablet ER 10 milliEquivalent(s) Oral daily  rOPINIRole 3 milliGRAM(s) Oral daily  simvastatin 40 milliGRAM(s) Oral at bedtime  sucralfate 1 Gram(s) Oral four times a day  vancomycin  IVPB 1250 milliGRAM(s) IV Intermittent every 12 hours    MEDICATIONS  (PRN):  acetaminophen   Tablet .. 325 milliGRAM(s) Oral daily PRN Temp greater or equal to 38C (100.4F), Mild Pain (1 - 3)      Allergies    latex (Rash)  penicillin (Hives)    Intolerances        REVIEW OF SYSTEMS:  CONSTITUTIONAL: No fever, weight loss, or fatigue  EYES: No eye pain, visual disturbances, or discharge  ENMT:  No difficulty hearing, tinnitus, vertigo; No sinus or throat pain  NECK: No pain or stiffness  RESPIRATORY: No cough, wheezing, chills or hemoptysis; No shortness of breath  CARDIOVASCULAR: No chest pain, palpitations, dizziness, or leg swelling  GASTROINTESTINAL: No abdominal or epigastric pain. No nausea, vomiting, or hematemesis; No diarrhea or constipation. No melena or hematochezia.  GENITOURINARY: No dysuria, frequency, hematuria, or incontinence  NEUROLOGICAL: No headaches, memory loss, loss of strength, numbness, or tremors  SKIN: No itching, burning, rashes, or lesions   LYMPH NODES: No enlarged glands  ENDOCRINE: No heat or cold intolerance; No hair loss; No polydipsia or polyuria  MUSCULOSKELETAL: No joint pain or swelling; No muscle, back, or extremity pain  PSYCHIATRIC: No depression, anxiety, mood swings, or difficulty sleeping  HEME/LYMPH: No easy bruising, or bleeding gums  ALLERGY AND IMMUNOLOGIC: No hives or eczema    Vital Signs Last 24 Hrs  T(C): 36.7 (29 Jan 2021 20:16), Max: 36.7 (28 Jan 2021 20:45)  T(F): 98.1 (29 Jan 2021 20:16), Max: 98.1 (29 Jan 2021 13:50)  HR: 89 (29 Jan 2021 20:16) (71 - 89)  BP: 122/64 (29 Jan 2021 20:16) (98/51 - 139/71)  BP(mean): --  RR: 17 (29 Jan 2021 20:16) (12 - 18)  SpO2: 91% (29 Jan 2021 20:16) (91% - 98%)    PHYSICAL EXAM:  GENERAL: NAD, well-groomed, well-developed  HEAD:  Atraumatic, Normocephalic  EYES: EOMI, PERRLA, conjunctiva and sclera clear  ENMT: No tonsillar erythema, exudates, or enlargement; Moist mucous membranes, Good dentition, No lesions  NECK: Supple, No JVD, Normal thyroid  NERVOUS SYSTEM:  Alert & Oriented X3, Good concentration; Motor Strength 5/5 B/L upper and lower extremities; DTRs 2+ intact and symmetric  CHEST/LUNG: Clear to auscultation bilaterally; No rales, rhonchi, wheezing, or rubs  HEART: Regular rate and rhythm; No murmurs, rubs, or gallops  ABDOMEN: Soft, Nontender, Nondistended; Bowel sounds present  EXTREMITIES:  2+ Peripheral Pulses, No clubbing, cyanosis, or edema  LYMPH: No lymphadenopathy noted  SKIN: No rashes or lesions    LABS:      Ca    9.4        28 Jan 2021 16:24      PT/INR - ( 29 Jan 2021 07:29 )   PT: 13.2 sec;   INR: 1.14 ratio         PTT - ( 29 Jan 2021 07:29 )  PTT:33.4 sec    CAPILLARY BLOOD GLUCOSE          RADIOLOGY & ADDITIONAL TESTS:    Notes Reviewed:  [x ] YES  [ ] NO    Care Discussed with Consultants/Other Providers [x ] YES  [ ] NO

## 2021-01-29 NOTE — CONSULT NOTE ADULT - ASSESSMENT
Pt is a 70W w/ PMHx of HTN, HLD, neuropathy, spinal stenosis presents with c/o R toe 2nd digit infection x 2 days.    R toe infection  -planned for Right 2nd partial ray resection - booked 1/29/21; awaiting pre-op  -appreciate additional podiatry recs  -please send tissue from OR for culture and pathology  No previous cx to help tailor therapy  -c/w vancomycin as causative organisms likely skin dom  Will narrow therapy pending above w/u    IV site thrombophlebitis  Pt w/ erythema at R IV site  Would remove and replace at different site Pt is a 70W w/ PMHx of HTN, HLD, neuropathy, spinal stenosis presents with c/o R toe 2nd digit infection x 2 days.    R toe infection  -planned for Right 2nd partial ray resection - booked 1/29/21; awaiting pre-op  -appreciate additional podiatry recs  -please send tissue from OR for culture and pathology  No previous cx to help tailor therapy  -c/w vancomycin as causative organisms likely skin dom  Will narrow therapy pending above w/u  Final therapy will depend on surgical pathology results (clean margins vs OM)    IV site thrombophlebitis  Pt w/ erythema at R IV site  Would remove and replace at different site

## 2021-01-29 NOTE — CONSULT NOTE ADULT - ATTENDING COMMENTS
Infectious Diseases will continue to follow. Please call with any questions.   Angeli Garcia M.D.  [facility], Division of Infectious Diseases [phone]  For over the weekend and after hours, please call [phone]
I saw and examined the patient personally. Spoke with above provider regarding this case. I reviewed the above findings completely.  I agree with the above history, physical, and plan which I have edited where appropriate.   No signs of significant ischemia or volume overload.   PE with mild soft murmur. no severe stenotic disease appreciated  HTN relatively controlled.  Currently no active cardiac conditions. No signs of ischemia, ADHF, clinical exam not consistent with severe stenotic valvular disease, no unstable arrhythmias noted. Therefore able to proceed with this low risk podiatry surgery without any further cardiac workup. Routine hemodynamic monitoring is suggested during the procedure.

## 2021-01-30 DIAGNOSIS — Z29.9 ENCOUNTER FOR PROPHYLACTIC MEASURES, UNSPECIFIED: ICD-10-CM

## 2021-01-30 LAB
-  AMIKACIN: SIGNIFICANT CHANGE UP
-  AMOXICILLIN/CLAVULANIC ACID: SIGNIFICANT CHANGE UP
-  AMPICILLIN/SULBACTAM: SIGNIFICANT CHANGE UP
-  AMPICILLIN/SULBACTAM: SIGNIFICANT CHANGE UP
-  AMPICILLIN: SIGNIFICANT CHANGE UP
-  AZTREONAM: SIGNIFICANT CHANGE UP
-  CEFAZOLIN: SIGNIFICANT CHANGE UP
-  CEFAZOLIN: SIGNIFICANT CHANGE UP
-  CEFEPIME: SIGNIFICANT CHANGE UP
-  CEFOXITIN: SIGNIFICANT CHANGE UP
-  CEFTRIAXONE: SIGNIFICANT CHANGE UP
-  CIPROFLOXACIN: SIGNIFICANT CHANGE UP
-  CLINDAMYCIN: SIGNIFICANT CHANGE UP
-  ERTAPENEM: SIGNIFICANT CHANGE UP
-  ERYTHROMYCIN: SIGNIFICANT CHANGE UP
-  GENTAMICIN: SIGNIFICANT CHANGE UP
-  GENTAMICIN: SIGNIFICANT CHANGE UP
-  IMIPENEM: SIGNIFICANT CHANGE UP
-  LEVOFLOXACIN: SIGNIFICANT CHANGE UP
-  MEROPENEM: SIGNIFICANT CHANGE UP
-  OXACILLIN: SIGNIFICANT CHANGE UP
-  PIPERACILLIN/TAZOBACTAM: SIGNIFICANT CHANGE UP
-  RIFAMPIN: SIGNIFICANT CHANGE UP
-  TETRACYCLINE: SIGNIFICANT CHANGE UP
-  TOBRAMYCIN: SIGNIFICANT CHANGE UP
-  TRIMETHOPRIM/SULFAMETHOXAZOLE: SIGNIFICANT CHANGE UP
-  TRIMETHOPRIM/SULFAMETHOXAZOLE: SIGNIFICANT CHANGE UP
-  VANCOMYCIN: SIGNIFICANT CHANGE UP
ANION GAP SERPL CALC-SCNC: 4 MMOL/L — LOW (ref 5–17)
BUN SERPL-MCNC: 21 MG/DL — SIGNIFICANT CHANGE UP (ref 7–23)
CALCIUM SERPL-MCNC: 8.8 MG/DL — SIGNIFICANT CHANGE UP (ref 8.5–10.1)
CHLORIDE SERPL-SCNC: 103 MMOL/L — SIGNIFICANT CHANGE UP (ref 96–108)
CO2 SERPL-SCNC: 31 MMOL/L — SIGNIFICANT CHANGE UP (ref 22–31)
CREAT SERPL-MCNC: 1.1 MG/DL — SIGNIFICANT CHANGE UP (ref 0.5–1.3)
CULTURE RESULTS: SIGNIFICANT CHANGE UP
GLUCOSE SERPL-MCNC: 126 MG/DL — HIGH (ref 70–99)
HCT VFR BLD CALC: 36.6 % — SIGNIFICANT CHANGE UP (ref 34.5–45)
HGB BLD-MCNC: 11.6 G/DL — SIGNIFICANT CHANGE UP (ref 11.5–15.5)
MCHC RBC-ENTMCNC: 28.6 PG — SIGNIFICANT CHANGE UP (ref 27–34)
MCHC RBC-ENTMCNC: 31.7 GM/DL — LOW (ref 32–36)
MCV RBC AUTO: 90.4 FL — SIGNIFICANT CHANGE UP (ref 80–100)
METHOD TYPE: SIGNIFICANT CHANGE UP
METHOD TYPE: SIGNIFICANT CHANGE UP
NRBC # BLD: 0 /100 WBCS — SIGNIFICANT CHANGE UP (ref 0–0)
ORGANISM # SPEC MICROSCOPIC CNT: SIGNIFICANT CHANGE UP
PLATELET # BLD AUTO: 218 K/UL — SIGNIFICANT CHANGE UP (ref 150–400)
POTASSIUM SERPL-MCNC: 4.4 MMOL/L — SIGNIFICANT CHANGE UP (ref 3.5–5.3)
POTASSIUM SERPL-SCNC: 4.4 MMOL/L — SIGNIFICANT CHANGE UP (ref 3.5–5.3)
RBC # BLD: 4.05 M/UL — SIGNIFICANT CHANGE UP (ref 3.8–5.2)
RBC # FLD: 13.3 % — SIGNIFICANT CHANGE UP (ref 10.3–14.5)
SODIUM SERPL-SCNC: 138 MMOL/L — SIGNIFICANT CHANGE UP (ref 135–145)
SPECIMEN SOURCE: SIGNIFICANT CHANGE UP
VANCOMYCIN TROUGH SERPL-MCNC: 17.6 UG/ML — SIGNIFICANT CHANGE UP (ref 10–20)
WBC # BLD: 10.38 K/UL — SIGNIFICANT CHANGE UP (ref 3.8–10.5)
WBC # FLD AUTO: 10.38 K/UL — SIGNIFICANT CHANGE UP (ref 3.8–10.5)

## 2021-01-30 PROCEDURE — 99232 SBSQ HOSP IP/OBS MODERATE 35: CPT

## 2021-01-30 PROCEDURE — 99024 POSTOP FOLLOW-UP VISIT: CPT

## 2021-01-30 RX ORDER — NALOXEGOL OXALATE 12.5 MG/1
25 TABLET, FILM COATED ORAL
Refills: 0 | Status: DISCONTINUED | OUTPATIENT
Start: 2021-01-30 | End: 2021-02-04

## 2021-01-30 RX ORDER — LACTOBACILLUS ACIDOPHILUS 100MM CELL
1 CAPSULE ORAL THREE TIMES A DAY
Refills: 0 | Status: DISCONTINUED | OUTPATIENT
Start: 2021-01-30 | End: 2021-02-04

## 2021-01-30 RX ORDER — CARVEDILOL PHOSPHATE 80 MG/1
6.25 CAPSULE, EXTENDED RELEASE ORAL EVERY 12 HOURS
Refills: 0 | Status: DISCONTINUED | OUTPATIENT
Start: 2021-01-30 | End: 2021-02-04

## 2021-01-30 RX ORDER — TAPENTADOL HYDROCHLORIDE 50 MG/1
75 TABLET, FILM COATED ORAL THREE TIMES A DAY
Refills: 0 | Status: DISCONTINUED | OUTPATIENT
Start: 2021-01-30 | End: 2021-02-04

## 2021-01-30 RX ORDER — HEPARIN SODIUM 5000 [USP'U]/ML
5000 INJECTION INTRAVENOUS; SUBCUTANEOUS EVERY 12 HOURS
Refills: 0 | Status: DISCONTINUED | OUTPATIENT
Start: 2021-01-30 | End: 2021-01-31

## 2021-01-30 RX ORDER — LOSARTAN POTASSIUM 100 MG/1
25 TABLET, FILM COATED ORAL DAILY
Refills: 0 | Status: DISCONTINUED | OUTPATIENT
Start: 2021-01-30 | End: 2021-02-04

## 2021-01-30 RX ADMIN — Medication 1 GRAM(S): at 19:17

## 2021-01-30 RX ADMIN — Medication 20 MILLIGRAM(S): at 06:15

## 2021-01-30 RX ADMIN — GABAPENTIN 400 MILLIGRAM(S): 400 CAPSULE ORAL at 17:53

## 2021-01-30 RX ADMIN — Medication 166.67 MILLIGRAM(S): at 19:25

## 2021-01-30 RX ADMIN — PANTOPRAZOLE SODIUM 40 MILLIGRAM(S): 20 TABLET, DELAYED RELEASE ORAL at 06:15

## 2021-01-30 RX ADMIN — Medication 1 GRAM(S): at 19:18

## 2021-01-30 RX ADMIN — LIDOCAINE 1 PATCH: 4 CREAM TOPICAL at 20:02

## 2021-01-30 RX ADMIN — Medication 10 MILLIEQUIVALENT(S): at 17:53

## 2021-01-30 RX ADMIN — Medication 200 MICROGRAM(S): at 06:14

## 2021-01-30 RX ADMIN — GABAPENTIN 400 MILLIGRAM(S): 400 CAPSULE ORAL at 09:02

## 2021-01-30 RX ADMIN — LOSARTAN POTASSIUM 25 MILLIGRAM(S): 100 TABLET, FILM COATED ORAL at 06:15

## 2021-01-30 RX ADMIN — Medication 1 TABLET(S): at 13:10

## 2021-01-30 RX ADMIN — Medication 5 MILLIGRAM(S): at 20:06

## 2021-01-30 RX ADMIN — OXYCODONE HYDROCHLORIDE 20 MILLIGRAM(S): 5 TABLET ORAL at 09:02

## 2021-01-30 RX ADMIN — CARVEDILOL PHOSPHATE 6.25 MILLIGRAM(S): 80 CAPSULE, EXTENDED RELEASE ORAL at 06:15

## 2021-01-30 RX ADMIN — Medication 1 TABLET(S): at 20:06

## 2021-01-30 RX ADMIN — SIMVASTATIN 40 MILLIGRAM(S): 20 TABLET, FILM COATED ORAL at 20:07

## 2021-01-30 RX ADMIN — Medication 325 MILLIGRAM(S): at 17:52

## 2021-01-30 RX ADMIN — Medication 100 MILLIGRAM(S): at 20:06

## 2021-01-30 RX ADMIN — ROPINIROLE 3 MILLIGRAM(S): 8 TABLET, FILM COATED, EXTENDED RELEASE ORAL at 17:54

## 2021-01-30 RX ADMIN — Medication 1 GRAM(S): at 06:15

## 2021-01-30 RX ADMIN — Medication 1 GRAM(S): at 00:00

## 2021-01-30 RX ADMIN — CARVEDILOL PHOSPHATE 6.25 MILLIGRAM(S): 80 CAPSULE, EXTENDED RELEASE ORAL at 17:53

## 2021-01-30 RX ADMIN — DULOXETINE HYDROCHLORIDE 60 MILLIGRAM(S): 30 CAPSULE, DELAYED RELEASE ORAL at 20:06

## 2021-01-30 RX ADMIN — Medication 166.67 MILLIGRAM(S): at 06:15

## 2021-01-30 RX ADMIN — OXYCODONE HYDROCHLORIDE 20 MILLIGRAM(S): 5 TABLET ORAL at 17:50

## 2021-01-30 RX ADMIN — GABAPENTIN 400 MILLIGRAM(S): 400 CAPSULE ORAL at 13:10

## 2021-01-30 RX ADMIN — HEPARIN SODIUM 5000 UNIT(S): 5000 INJECTION INTRAVENOUS; SUBCUTANEOUS at 19:17

## 2021-01-30 NOTE — PROGRESS NOTE ADULT - SUBJECTIVE AND OBJECTIVE BOX
70y year old Female seen at [facility] AP[facility] 01 for right distal phalanx wound.  Pt said the wound has been present for approximately 2 weeks.  Pt had not seen a medical professional for treatment.  Denies any fever, chills, nausea, vomiting, chest pain, shortness of breath, or calf pain at this time.      Allergies    latex (Rash)  penicillin (Hives)    Intolerances        MEDICATIONS  (STANDING):  amitriptyline 100 milliGRAM(s) Oral at bedtime  carvedilol 6.25 milliGRAM(s) Oral every 12 hours  DULoxetine 60 milliGRAM(s) Oral at bedtime  furosemide    Tablet 20 milliGRAM(s) Oral daily  gabapentin 400 milliGRAM(s) Oral four times a day  heparin   Injectable 5000 Unit(s) SubCutaneous every 12 hours  lactobacillus acidophilus 1 Tablet(s) Oral three times a day  levothyroxine 200 MICROGram(s) Oral daily  lidocaine   Patch 1 Patch Transdermal every 24 hours  losartan 25 milliGRAM(s) Oral daily  melatonin 5 milliGRAM(s) Oral at bedtime  multivitamin 1 Tablet(s) Oral daily  oxyCODONE  ER Tablet 20 milliGRAM(s) Oral every 12 hours  pantoprazole    Tablet 40 milliGRAM(s) Oral before breakfast  potassium chloride    Tablet ER 10 milliEquivalent(s) Oral daily  rOPINIRole 3 milliGRAM(s) Oral daily  simvastatin 40 milliGRAM(s) Oral at bedtime  sucralfate 1 Gram(s) Oral four times a day  vancomycin  IVPB 1250 milliGRAM(s) IV Intermittent every 12 hours    MEDICATIONS  (PRN):  acetaminophen   Tablet .. 325 milliGRAM(s) Oral daily PRN Temp greater or equal to 38C (100.4F), Mild Pain (1 - 3)  naloxegol 25 milliGRAM(s) Oral <User Schedule> PRN Home regimen  tapentadol 75 milliGRAM(s) Oral three times a day PRN home      Vital Signs Last 24 Hrs  T(C): 36.8 (30 Jan 2021 14:04), Max: 37.1 (30 Jan 2021 05:03)  T(F): 98.3 (30 Jan 2021 14:04), Max: 98.7 (30 Jan 2021 05:03)  HR: 92 (30 Jan 2021 14:04) (83 - 92)  BP: 114/70 (30 Jan 2021 14:04) (114/70 - 122/64)  BP(mean): --  RR: 17 (30 Jan 2021 14:04) (17 - 17)  SpO2: 93% (30 Jan 2021 14:04) (91% - 93%)    PHYSICAL EXAM:  Vascular: DP & PT palpable bilaterally, Capillary refill 3 seconds.  Edema and erythema right 2nd digit.    Neurological: Light touch sensation intact bilaterally  Musculoskeletal: No POP wound  Dermatological: 0.75 cm ulceration noted to the distal right 2nd phalanx, HPK wound bed, PTB+, no fluctuance, no malodor, no proximal streaking at this time            ----------CHEM PANEL----------    PT/INR - ( 29 Jan 2021 07:29 )   PT: 13.2 sec;   INR: 1.14 ratio         PTT - ( 29 Jan 2021 07:29 )  PTT:33.4 sec      Culture - Tissue with Gram Stain (collected 29 Jan 2021 18:43)  Source: .Tissue Other, R 2nd toe head of mid phalanx  Gram Stain (29 Jan 2021 22:31):    No polymorphonuclear cells seen    No organisms seen    Culture - Tissue with Gram Stain (collected 29 Jan 2021 18:43)  Source: .Tissue Other, Right 2nd toe distal phalanx  Gram Stain (29 Jan 2021 22:30):    No polymorphonuclear cells seen    No organisms seen    Culture - Other (collected 28 Jan 2021 22:17)  Source: .Other Right 2nd distal toe  Final Report (30 Jan 2021 16:44):    Numerous Enterobacter cloacae complex    Moderate Staphylococcus lugdunensis  Organism: Enterobacter cloacae complex  Staphylococcus lugdunensis (30 Jan 2021 16:44)  Organism: Staphylococcus lugdunensis (30 Jan 2021 16:44)  Organism: Enterobacter cloacae complex (30 Jan 2021 16:44)    Culture - Blood (collected 28 Jan 2021 22:00)  Source: .Blood Blood  Preliminary Report (29 Jan 2021 23:01):    No growth to date.    Culture - Blood (collected 28 Jan 2021 22:00)  Source: .Blood Blood  Preliminary Report (29 Jan 2021 23:01):    No growth to date.        Imaging: ----------

## 2021-01-30 NOTE — PROGRESS NOTE ADULT - SUBJECTIVE AND OBJECTIVE BOX
Patient is a 70y old  Female who presents with a chief complaint of Right foot 2nd digit infection. (30 Jan 2021 09:14)      INTERVAL /OVERNIGHT EVENTS: pod -1 s/p partial toe amputation    MEDICATIONS  (STANDING):  amitriptyline 100 milliGRAM(s) Oral at bedtime  carvedilol 6.25 milliGRAM(s) Oral every 12 hours  DULoxetine 60 milliGRAM(s) Oral at bedtime  furosemide    Tablet 20 milliGRAM(s) Oral daily  gabapentin 400 milliGRAM(s) Oral four times a day  HYDROmorphone  Injectable 0.5 milliGRAM(s) IV Push every 10 minutes  lactobacillus acidophilus 1 Tablet(s) Oral daily  levothyroxine 200 MICROGram(s) Oral daily  lidocaine   Patch 1 Patch Transdermal every 24 hours  losartan 25 milliGRAM(s) Oral daily  melatonin 5 milliGRAM(s) Oral at bedtime  multivitamin 1 Tablet(s) Oral daily  oxyCODONE  ER Tablet 20 milliGRAM(s) Oral every 12 hours  pantoprazole    Tablet 40 milliGRAM(s) Oral before breakfast  potassium chloride    Tablet ER 10 milliEquivalent(s) Oral daily  rOPINIRole 3 milliGRAM(s) Oral daily  simvastatin 40 milliGRAM(s) Oral at bedtime  sucralfate 1 Gram(s) Oral four times a day  vancomycin  IVPB 1250 milliGRAM(s) IV Intermittent every 12 hours    MEDICATIONS  (PRN):  acetaminophen   Tablet .. 325 milliGRAM(s) Oral daily PRN Temp greater or equal to 38C (100.4F), Mild Pain (1 - 3)  naloxegol 25 milliGRAM(s) Oral <User Schedule> PRN Home regimen  tapentadol 75 milliGRAM(s) Oral three times a day PRN home      Allergies    latex (Rash)  penicillin (Hives)    Intolerances        REVIEW OF SYSTEMS:  CONSTITUTIONAL: No fever, weight loss, or fatigue  EYES: No eye pain, visual disturbances, or discharge  ENMT:  No difficulty hearing, tinnitus, vertigo; No sinus or throat pain  NECK: No pain or stiffness  RESPIRATORY: No cough, wheezing, chills or hemoptysis; No shortness of breath  CARDIOVASCULAR: No chest pain, palpitations, dizziness, or leg swelling  GASTROINTESTINAL: No abdominal or epigastric pain. No nausea, vomiting, or hematemesis; No diarrhea or constipation. No melena or hematochezia.  GENITOURINARY: No dysuria, frequency, hematuria, or incontinence  NEUROLOGICAL: No headaches, memory loss, loss of strength, numbness, or tremors  SKIN: No itching, burning, rashes, or lesions   LYMPH NODES: No enlarged glands  ENDOCRINE: No heat or cold intolerance; No hair loss; No polydipsia or polyuria  MUSCULOSKELETAL: No joint pain or swelling; No muscle, back, or extremity pain  PSYCHIATRIC: No depression, anxiety, mood swings, or difficulty sleeping  HEME/LYMPH: No easy bruising, or bleeding gums  ALLERGY AND IMMUNOLOGIC: No hives or eczema    Vital Signs Last 24 Hrs  T(C): 36.8 (30 Jan 2021 14:04), Max: 37.1 (30 Jan 2021 05:03)  T(F): 98.3 (30 Jan 2021 14:04), Max: 98.7 (30 Jan 2021 05:03)  HR: 92 (30 Jan 2021 14:04) (78 - 92)  BP: 114/70 (30 Jan 2021 14:04) (109/65 - 122/64)  BP(mean): --  RR: 17 (30 Jan 2021 14:04) (17 - 18)  SpO2: 93% (30 Jan 2021 14:04) (91% - 93%)    PHYSICAL EXAM:  GENERAL: NAD, well-groomed, well-developed  HEAD:  Atraumatic, Normocephalic  EYES: EOMI, PERRLA, conjunctiva and sclera clear  ENMT: No tonsillar erythema, exudates, or enlargement; Moist mucous membranes, Good dentition, No lesions  NECK: Supple, No JVD, Normal thyroid  NERVOUS SYSTEM:  Alert & Oriented X3, Good concentration; Motor Strength 5/5 B/L upper and lower extremities; DTRs 2+ intact and symmetric  CHEST/LUNG: Clear to auscultation bilaterally; No rales, rhonchi, wheezing, or rubs  HEART: Regular rate and rhythm; No murmurs, rubs, or gallops  ABDOMEN: Soft, Nontender, Nondistended; Bowel sounds present  EXTREMITIES:  2+ Peripheral Pulses, No clubbing, cyanosis, or edema, toe amputation  LYMPH: No lymphadenopathy noted  SKIN: No rashes or lesions    LABS:      Ca    9.4        28 Jan 2021 16:24      PT/INR - ( 29 Jan 2021 07:29 )   PT: 13.2 sec;   INR: 1.14 ratio         PTT - ( 29 Jan 2021 07:29 )  PTT:33.4 sec    CAPILLARY BLOOD GLUCOSE          RADIOLOGY & ADDITIONAL TESTS:    Notes Reviewed:  [x ] YES  [ ] NO    Care Discussed with Consultants/Other Providers [x ] YES  [ ] NO

## 2021-01-30 NOTE — PROGRESS NOTE ADULT - PROBLEM SELECTOR PLAN 1
Pt evaluated and chart reviewed  S/P right distal 2nd digit amputation  Ischemic changes and no CRT distal 2nd digit  Vacular consulted    Podiatry will follow pt while in house

## 2021-01-30 NOTE — PROGRESS NOTE ADULT - SUBJECTIVE AND OBJECTIVE BOX
[facility] -- Jayne Aguila, Max Chacko, Hernandez Kirkland Savella, [place]  Office # [phone]    Follow Up:  Preop/Postop Optimization    Subjective/Observations: Comfortable on RA.  Asleep but arousable.  No postop pain at this time.  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    MEDICATIONS  (STANDING):  amitriptyline 100 milliGRAM(s) Oral at bedtime  carvedilol 6.25 milliGRAM(s) Oral every 12 hours  DULoxetine 60 milliGRAM(s) Oral at bedtime  furosemide    Tablet 20 milliGRAM(s) Oral daily  gabapentin 400 milliGRAM(s) Oral four times a day  HYDROmorphone  Injectable 0.5 milliGRAM(s) IV Push every 10 minutes  lactobacillus acidophilus 1 Tablet(s) Oral daily  levothyroxine 200 MICROGram(s) Oral daily  lidocaine   Patch 1 Patch Transdermal every 24 hours  losartan 25 milliGRAM(s) Oral daily  melatonin 5 milliGRAM(s) Oral at bedtime  multivitamin 1 Tablet(s) Oral daily  oxyCODONE  ER Tablet 20 milliGRAM(s) Oral every 12 hours  pantoprazole    Tablet 40 milliGRAM(s) Oral before breakfast  potassium chloride    Tablet ER 10 milliEquivalent(s) Oral daily  rOPINIRole 3 milliGRAM(s) Oral daily  simvastatin 40 milliGRAM(s) Oral at bedtime  sucralfate 1 Gram(s) Oral four times a day  vancomycin  IVPB 1250 milliGRAM(s) IV Intermittent every 12 hours    MEDICATIONS  (PRN):  acetaminophen   Tablet .. 325 milliGRAM(s) Oral daily PRN Temp greater or equal to 38C (100.4F), Mild Pain (1 - 3)    Allergies    latex (Rash)  penicillin (Hives)    Intolerances    Vital Signs Last 24 Hrs  T(C): 37.1 (30 Jan 2021 05:03), Max: 37.1 (30 Jan 2021 05:03)  T(F): 98.7 (30 Jan 2021 05:03), Max: 98.7 (30 Jan 2021 05:03)  HR: 83 (30 Jan 2021 05:03) (71 - 89)  BP: 120/72 (30 Jan 2021 05:03) (98/51 - 122/64)  BP(mean): --  RR: 17 (30 Jan 2021 05:03) (12 - 18)  SpO2: 92% (30 Jan 2021 05:03) (91% - 98%)  I&O's Summary    29 Jan 2021 07:01  -  30 Jan 2021 07:00  --------------------------------------------------------  IN: 275 mL / OUT: 0 mL / NET: 275 mL      Weight (kg): 102.1 (01-29 @ 12:55)    PHYSICAL EXAM:  TELE: Not on tele  Constitutional: NAD, awake and alert, obese  HEENT: Moist Mucous Membranes, Anicteric  Pulmonary: Non-labored, breath sounds are clear bilaterally, No wheezing, rales or rhonchi  Cardiovascular: Regular, S1 and S2, + murmurs, no rubs, gallops or clicks  Gastrointestinal: Bowel Sounds present, soft, nontender.   Lymph: No peripheral edema. No lymphadenopathy.  Skin: No visible rashes or ulcers.  Psych:  Mood & affect appropriate  LABS: All Labs Reviewed:                        13.2   6.27  )-----------( 273      ( 28 Jan 2021 16:24 )             41.6     28 Jan 2021 16:24    137    |  102    |  38     ----------------------------<  110    4.4     |  30     |  1.10     Ca    9.4        28 Jan 2021 16:24    TPro  8.4    /  Alb  3.9    /  TBili  0.2    /  DBili  x      /  AST  15     /  ALT  29     /  AlkPhos  127    28 Jan 2021 16:24    PT/INR - ( 29 Jan 2021 07:29 )   PT: 13.2 sec;   INR: 1.14 ratio       PTT - ( 29 Jan 2021 07:29 )  PTT:33.4 sec    RADIOLOGY:    EXAM:  XR CHEST AP OR PA 1V                          PROCEDURE DATE:  01/28/2021      INTERPRETATION:  CLINICAL STATEMENT: Chest pain.    TECHNIQUE: AP view of the chest.    COMPARISON: 9/28/2015    FINDINGS/  IMPRESSION:  Spinal hardware again noted.    No new consolidation or pleural effusion. Overlying chin obscures lung apices    Heart size within normal limits.    Small calcification adjacent to left humeral head.    PATRICIA RAMOS MD; Attending Radiologist  This document has been electronically signed. Jan 28 2021  5:36PM    EKG: NSR with no ischemic changes

## 2021-01-30 NOTE — PROGRESS NOTE ADULT - ASSESSMENT
70 F with HTN, HLD, neuropathy, and spinal stenosis presented with right 2nd toe ulcer, now for resection of right 2nd partial ray resection.  Consult is called for cardiac clearance in setting of murmur, HTN and comorbidities     Right toe ulcer  - s/p partial ray resection of right 2nd toe for non-healing ulcer, tolerated procedure well  - Can do routine TTE to evaluate cardiac structures, patient has murmur on exam of at least mild in significance  - Pain control  - Abx per Primary  - Had reportedly a normal stress test 2 years ago, for unknown reason.  Does not follow a cardiologist       HTN  - BP stable and controlled with one isolated hypotension yesterday  - Continue Coreg, Losartan, and Lasix    HLD  - Continue home statin    DVT ppx  - Per Primary    Will continue to follow    Olga Lidia Garcia DNP, NP-C  Cardiology  Spectra #[phone]

## 2021-01-31 LAB
-  AMIKACIN: SIGNIFICANT CHANGE UP
-  AMOXICILLIN/CLAVULANIC ACID: SIGNIFICANT CHANGE UP
-  AMPICILLIN/SULBACTAM: SIGNIFICANT CHANGE UP
-  AMPICILLIN: SIGNIFICANT CHANGE UP
-  AZTREONAM: SIGNIFICANT CHANGE UP
-  CEFAZOLIN: SIGNIFICANT CHANGE UP
-  CEFEPIME: SIGNIFICANT CHANGE UP
-  CEFOXITIN: SIGNIFICANT CHANGE UP
-  CEFTRIAXONE: SIGNIFICANT CHANGE UP
-  CIPROFLOXACIN: SIGNIFICANT CHANGE UP
-  ERTAPENEM: SIGNIFICANT CHANGE UP
-  GENTAMICIN: SIGNIFICANT CHANGE UP
-  IMIPENEM: SIGNIFICANT CHANGE UP
-  LEVOFLOXACIN: SIGNIFICANT CHANGE UP
-  MEROPENEM: SIGNIFICANT CHANGE UP
-  PIPERACILLIN/TAZOBACTAM: SIGNIFICANT CHANGE UP
-  TOBRAMYCIN: SIGNIFICANT CHANGE UP
-  TRIMETHOPRIM/SULFAMETHOXAZOLE: SIGNIFICANT CHANGE UP
METHOD TYPE: SIGNIFICANT CHANGE UP

## 2021-01-31 PROCEDURE — 99232 SBSQ HOSP IP/OBS MODERATE 35: CPT

## 2021-01-31 RX ORDER — ROPINIROLE 8 MG/1
1 TABLET, FILM COATED, EXTENDED RELEASE ORAL DAILY
Refills: 0 | Status: DISCONTINUED | OUTPATIENT
Start: 2021-01-31 | End: 2021-02-04

## 2021-01-31 RX ORDER — HEPARIN SODIUM 5000 [USP'U]/ML
5000 INJECTION INTRAVENOUS; SUBCUTANEOUS EVERY 8 HOURS
Refills: 0 | Status: DISCONTINUED | OUTPATIENT
Start: 2021-01-31 | End: 2021-02-04

## 2021-01-31 RX ORDER — ROPINIROLE 8 MG/1
2 TABLET, FILM COATED, EXTENDED RELEASE ORAL EVERY 24 HOURS
Refills: 0 | Status: DISCONTINUED | OUTPATIENT
Start: 2021-01-31 | End: 2021-02-04

## 2021-01-31 RX ADMIN — CARVEDILOL PHOSPHATE 6.25 MILLIGRAM(S): 80 CAPSULE, EXTENDED RELEASE ORAL at 17:39

## 2021-01-31 RX ADMIN — ROPINIROLE 2 MILLIGRAM(S): 8 TABLET, FILM COATED, EXTENDED RELEASE ORAL at 17:57

## 2021-01-31 RX ADMIN — DULOXETINE HYDROCHLORIDE 60 MILLIGRAM(S): 30 CAPSULE, DELAYED RELEASE ORAL at 21:33

## 2021-01-31 RX ADMIN — SIMVASTATIN 40 MILLIGRAM(S): 20 TABLET, FILM COATED ORAL at 21:33

## 2021-01-31 RX ADMIN — OXYCODONE HYDROCHLORIDE 20 MILLIGRAM(S): 5 TABLET ORAL at 17:57

## 2021-01-31 RX ADMIN — NALOXEGOL OXALATE 25 MILLIGRAM(S): 12.5 TABLET, FILM COATED ORAL at 17:53

## 2021-01-31 RX ADMIN — HEPARIN SODIUM 5000 UNIT(S): 5000 INJECTION INTRAVENOUS; SUBCUTANEOUS at 14:54

## 2021-01-31 RX ADMIN — Medication 1 TABLET(S): at 14:54

## 2021-01-31 RX ADMIN — Medication 1 GRAM(S): at 05:55

## 2021-01-31 RX ADMIN — Medication 100 MILLIGRAM(S): at 21:34

## 2021-01-31 RX ADMIN — Medication 200 MICROGRAM(S): at 05:55

## 2021-01-31 RX ADMIN — GABAPENTIN 400 MILLIGRAM(S): 400 CAPSULE ORAL at 05:53

## 2021-01-31 RX ADMIN — LOSARTAN POTASSIUM 25 MILLIGRAM(S): 100 TABLET, FILM COATED ORAL at 05:55

## 2021-01-31 RX ADMIN — OXYCODONE HYDROCHLORIDE 20 MILLIGRAM(S): 5 TABLET ORAL at 05:53

## 2021-01-31 RX ADMIN — LIDOCAINE 1 PATCH: 4 CREAM TOPICAL at 10:09

## 2021-01-31 RX ADMIN — Medication 325 MILLIGRAM(S): at 21:40

## 2021-01-31 RX ADMIN — Medication 1 TABLET(S): at 11:50

## 2021-01-31 RX ADMIN — LIDOCAINE 1 PATCH: 4 CREAM TOPICAL at 07:00

## 2021-01-31 RX ADMIN — Medication 1 GRAM(S): at 17:39

## 2021-01-31 RX ADMIN — Medication 1 TABLET(S): at 21:33

## 2021-01-31 RX ADMIN — CARVEDILOL PHOSPHATE 6.25 MILLIGRAM(S): 80 CAPSULE, EXTENDED RELEASE ORAL at 05:54

## 2021-01-31 RX ADMIN — Medication 5 MILLIGRAM(S): at 21:33

## 2021-01-31 RX ADMIN — Medication 1 GRAM(S): at 11:49

## 2021-01-31 RX ADMIN — HEPARIN SODIUM 5000 UNIT(S): 5000 INJECTION INTRAVENOUS; SUBCUTANEOUS at 21:33

## 2021-01-31 RX ADMIN — Medication 1 TABLET(S): at 05:55

## 2021-01-31 RX ADMIN — Medication 166.67 MILLIGRAM(S): at 17:39

## 2021-01-31 RX ADMIN — Medication 20 MILLIGRAM(S): at 05:53

## 2021-01-31 RX ADMIN — PANTOPRAZOLE SODIUM 40 MILLIGRAM(S): 20 TABLET, DELAYED RELEASE ORAL at 05:55

## 2021-01-31 RX ADMIN — Medication 166.67 MILLIGRAM(S): at 07:09

## 2021-01-31 RX ADMIN — ROPINIROLE 1 MILLIGRAM(S): 8 TABLET, FILM COATED, EXTENDED RELEASE ORAL at 12:07

## 2021-01-31 RX ADMIN — GABAPENTIN 400 MILLIGRAM(S): 400 CAPSULE ORAL at 17:39

## 2021-01-31 RX ADMIN — GABAPENTIN 400 MILLIGRAM(S): 400 CAPSULE ORAL at 11:49

## 2021-01-31 NOTE — CONSULT NOTE ADULT - SUBJECTIVE AND OBJECTIVE BOX
VASCULAR GENERAL SURGERY CONSULT NOTE    Patient is a 70y old  Female who presents with a chief complaint of Right foot 2nd digit infection. (31 Jan 2021 07:44)    Called for consult due to the Second toe of her right with infection.  States had surgery with Dr. Gray on TUesday 1/26.  States the toe has progressively look worse and worse with current drainage.  Pt states this is her fifth amputation and has never had an issue like this.  Currently patient is on IV antibiotics.  Is tolerating a diet, and able to ambulate on foot.  Podiatry is doing dressing changes.  No fevers, no chills, no SOB, no chest pain, no back pain.  No other complaints. 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       HPI:  MILAN SHRESTHA is a  71 YO F presented with Right foot 2nd digit infection x 2 days. Patient states that she noticed bleeding and toe nail avulsion a month ago, patient was treating it with betadine until 10 days ago. States that she noticed yesterday, that her Right 2nd toe had whitish discoloration with surrounding redness and had clear discharge. Denies fever, chills, streaking or other symptoms. Patient has history of multiple partial toe amputations. Patient was seen by her PCP, Dr. Richards today who spoke with Dr. Gray and sent to ED for further evaluation. (28 Jan 2021 21:22)      PAST MEDICAL & SURGICAL HISTORY:  Constipation    Hypothyroid    HLD (hyperlipidemia)    HTN (hypertension)    Neuropathy    Spinal stenosis    Toe amputation status, right  tip of right 3rd toe        REVIEW OF SYSTEMS      General:	no fevers no chills    Skin/Breast:  + Discoloration with sutures in place to second right toe.   	  Ophthalmologic:  no recent changes in vision    Respiratory and Thorax:  No SOB  	  Cardiovascular:	 No Palpitations    Gastrointestinal:	No abdominal pain.  No diarrhea.    Genitourinary:	 no urinary issues.     Musculoskeletal:	  No weakness.     Neurological:	 + neuropathy to feet secondary to back.     Psychiatric:	no anxiety, depression    Hematology/Lymphatics:	  Blood cultures negative to date    Endocrine:	no DM           MEDICATIONS  (STANDING):  amitriptyline 100 milliGRAM(s) Oral at bedtime  carvedilol 6.25 milliGRAM(s) Oral every 12 hours  DULoxetine 60 milliGRAM(s) Oral at bedtime  furosemide    Tablet 20 milliGRAM(s) Oral daily  gabapentin 400 milliGRAM(s) Oral four times a day  heparin   Injectable 5000 Unit(s) SubCutaneous every 8 hours  lactobacillus acidophilus 1 Tablet(s) Oral three times a day  levothyroxine 200 MICROGram(s) Oral daily  lidocaine   Patch 1 Patch Transdermal every 24 hours  losartan 25 milliGRAM(s) Oral daily  melatonin 5 milliGRAM(s) Oral at bedtime  multivitamin 1 Tablet(s) Oral daily  oxyCODONE  ER Tablet 20 milliGRAM(s) Oral every 12 hours  pantoprazole    Tablet 40 milliGRAM(s) Oral before breakfast  rOPINIRole 1 milliGRAM(s) Oral daily  rOPINIRole 2 milliGRAM(s) Oral every 24 hours  simvastatin 40 milliGRAM(s) Oral at bedtime  sucralfate 1 Gram(s) Oral four times a day  vancomycin  IVPB 1250 milliGRAM(s) IV Intermittent every 12 hours    MEDICATIONS  (PRN):  acetaminophen   Tablet .. 325 milliGRAM(s) Oral daily PRN Temp greater or equal to 38C (100.4F), Mild Pain (1 - 3)  naloxegol 25 milliGRAM(s) Oral <User Schedule> PRN constipation  tapentadol 75 milliGRAM(s) Oral three times a day PRN Pain      Allergies    latex (Rash)  penicillin (Hives)    Intolerances        SOCIAL HISTORY          Smoking: Yes [ ]  No [ X]   ______pk yrs          ETOH  Yes [ ]  No [X ]  Social [ ]          DRUGS:  Yes [ ]  No [X ]  if so what______________    FAMILY HISTORY:  No pertinent family history in first degree relatives        Vital Signs Last 24 Hrs  T(C): 36.8 (31 Jan 2021 05:21), Max: 37.3 (30 Jan 2021 20:59)  T(F): 98.3 (31 Jan 2021 05:21), Max: 99.2 (30 Jan 2021 20:59)  HR: 111 (30 Jan 2021 20:59) (111 - 111)  BP: 112/68 (31 Jan 2021 05:21) (112/68 - 149/66)  BP(mean): --  RR: 17 (31 Jan 2021 05:21) (17 - 17)  SpO2: 92% (30 Jan 2021 20:59) (92% - 92%)    Physical Exam:    General:  Appears stated age, well-groomed, well-nourished, no distress  Eyes : EOM'S intact  Chest:  clear breath sounds  Cardiovascular:  Regular rate & rhythm  Abdomen:  BS+ Soft, NT  Extremities:  Right foot- bandage removed. Using surgical shoe for ambulation.  RIght second toe with surrounding erythema.  Sutures intact.  No drainage noted.  + Palpable DP and PT pulses intact.  Foot rebandaged.   Skin:  No rash  Musculoskeletal:  normal strength  Neuro/Psych:  Alert, oriented to time, place and person       LABS:                        11.6   10.38 )-----------( 218      ( 30 Jan 2021 19:27 )             36.6     01-30    138  |  103  |  21  ----------------------------<  126<H>  4.4   |  31  |  1.10    Ca    8.8      30 Jan 2021 19:27            RADIOLOGY & ADDITIONAL STUDIES:  < from: Xray Foot AP + Lateral + Oblique, Right (01.29.21 @ 14:25) >  EXAM:  FOOT RIGHT (MINIMUM 3 VIEWS)                            PROCEDURE DATE:  01/29/2021          INTERPRETATION:  Right foot. 3 views. Patient had recent amputation.    Moderate dorsal tarsal degeneration noted.    There is an old amputation of the distal portion of the third toe similar to January 28.    Present film shows a new amputation of the mid shaft level of the mid phalanx of the second toe.    IMPRESSION:  There are new an old toe interpretations.            MACARENA ESTRADA M.D., ATTENDING RADIOLOGIST  This document has been electronically signed. Jan 29 2021  2:41PM    < end of copied text >      Risks, benefits, and alternatives to treatment discussed. All questions answered with understanding.

## 2021-01-31 NOTE — PROGRESS NOTE ADULT - SUBJECTIVE AND OBJECTIVE BOX
[facility] -- Jayne Aguila, Max Chacko, Hernandez Kirkland Savella, Goodger  Office # [phone]    Follow Up:  Preop/Postop Optimization    Subjective/Observations: Sleeping but easily awakened.  Comfortable on RA.  c/o right foot spasm.  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    MEDICATIONS  (STANDING):  amitriptyline 100 milliGRAM(s) Oral at bedtime  carvedilol 6.25 milliGRAM(s) Oral every 12 hours  DULoxetine 60 milliGRAM(s) Oral at bedtime  furosemide    Tablet 20 milliGRAM(s) Oral daily  gabapentin 400 milliGRAM(s) Oral four times a day  heparin   Injectable 5000 Unit(s) SubCutaneous every 12 hours  lactobacillus acidophilus 1 Tablet(s) Oral three times a day  levothyroxine 200 MICROGram(s) Oral daily  lidocaine   Patch 1 Patch Transdermal every 24 hours  losartan 25 milliGRAM(s) Oral daily  melatonin 5 milliGRAM(s) Oral at bedtime  multivitamin 1 Tablet(s) Oral daily  oxyCODONE  ER Tablet 20 milliGRAM(s) Oral every 12 hours  pantoprazole    Tablet 40 milliGRAM(s) Oral before breakfast  potassium chloride    Tablet ER 10 milliEquivalent(s) Oral daily  rOPINIRole 3 milliGRAM(s) Oral daily  simvastatin 40 milliGRAM(s) Oral at bedtime  sucralfate 1 Gram(s) Oral four times a day  vancomycin  IVPB 1250 milliGRAM(s) IV Intermittent every 12 hours    MEDICATIONS  (PRN):  acetaminophen   Tablet .. 325 milliGRAM(s) Oral daily PRN Temp greater or equal to 38C (100.4F), Mild Pain (1 - 3)  naloxegol 25 milliGRAM(s) Oral <User Schedule> PRN constipation  tapentadol 75 milliGRAM(s) Oral three times a day PRN Pain    Allergies    latex (Rash)  penicillin (Hives)    Intolerances    Vital Signs Last 24 Hrs  T(C): 36.8 (31 Jan 2021 05:21), Max: 37.3 (30 Jan 2021 20:59)  T(F): 98.3 (31 Jan 2021 05:21), Max: 99.2 (30 Jan 2021 20:59)  HR: 111 (30 Jan 2021 20:59) (92 - 111)  BP: 112/68 (31 Jan 2021 05:21) (112/68 - 149/66)  BP(mean): --  RR: 17 (31 Jan 2021 05:21) (17 - 17)  SpO2: 92% (30 Jan 2021 20:59) (92% - 93%)  I&O's Summary      PHYSICAL EXAM:  TELE: Not on tele  Constitutional: NAD, awake and alert, obese  HEENT: Moist Mucous Membranes, Anicteric  Pulmonary: Non-labored, breath sounds are clear bilaterally, No wheezing, rales or rhonchi  Cardiovascular: Regular, S1 and S2, + murmurs, no rubs, gallops or clicks  Gastrointestinal: Bowel Sounds present, soft, nontender.   Lymph: No peripheral edema. No lymphadenopathy.  Skin: No visible rashes.  Right foot dressing dry and intact  Psych:  Mood & affect appropriate    LABS: All Labs Reviewed:                        11.6   10.38 )-----------( 218      ( 30 Jan 2021 19:27 )             36.6                         13.2   6.27  )-----------( 273      ( 28 Jan 2021 16:24 )             41.6     30 Jan 2021 19:27    138    |  103    |  21     ----------------------------<  126    4.4     |  31     |  1.10   28 Jan 2021 16:24    137    |  102    |  38     ----------------------------<  110    4.4     |  30     |  1.10     Ca    8.8        30 Jan 2021 19:27  Ca    9.4        28 Jan 2021 16:24    TPro  8.4    /  Alb  3.9    /  TBili  0.2    /  DBili  x      /  AST  15     /  ALT  29     /  AlkPhos  127    28 Jan 2021 16:24    RADIOLOGY:    EXAM:  XR CHEST AP OR PA 1V                          PROCEDURE DATE:  01/28/2021      INTERPRETATION:  CLINICAL STATEMENT: Chest pain.    TECHNIQUE: AP view of the chest.    COMPARISON: 9/28/2015    FINDINGS/  IMPRESSION:  Spinal hardware again noted.    No new consolidation or pleural effusion. Overlying chin obscures lung apices    Heart size within normal limits.    Small calcification adjacent to left humeral head.    PATRICIA RAMOS MD; Attending Radiologist  This document has been electronically signed. Jan 28 2021  5:36PM    Ventricular Rate 83 BPM    Atrial Rate 83 BPM    P-R Interval 172 ms    QRS Duration 92 ms    Q-T Interval 368 ms    QTC Calculation(Bazett) 432 ms    P Axis 56 degrees    R Axis 22 degrees    T Axis 62 degrees    Diagnosis Line Normal sinus rhythm  Normal ECG  When compared with ECG of 20-SEP-2015 00:00,  No significant change was found  Confirmed by MAK MCINTYRE [id number]) on 1/30/2021 5:24:45 PM                     [facility] -- Jayne Aguila, Max Chacko, Hernandez Kirkland Savella, Goodger  Office # [phone]    Follow Up:  Preop/Postop Optimization HTN    Subjective/Observations: Sleeping but easily awakened.  Comfortable on RA.  c/o right foot spasm.  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    MEDICATIONS  (STANDING):  amitriptyline 100 milliGRAM(s) Oral at bedtime  carvedilol 6.25 milliGRAM(s) Oral every 12 hours  DULoxetine 60 milliGRAM(s) Oral at bedtime  furosemide    Tablet 20 milliGRAM(s) Oral daily  gabapentin 400 milliGRAM(s) Oral four times a day  heparin   Injectable 5000 Unit(s) SubCutaneous every 12 hours  lactobacillus acidophilus 1 Tablet(s) Oral three times a day  levothyroxine 200 MICROGram(s) Oral daily  lidocaine   Patch 1 Patch Transdermal every 24 hours  losartan 25 milliGRAM(s) Oral daily  melatonin 5 milliGRAM(s) Oral at bedtime  multivitamin 1 Tablet(s) Oral daily  oxyCODONE  ER Tablet 20 milliGRAM(s) Oral every 12 hours  pantoprazole    Tablet 40 milliGRAM(s) Oral before breakfast  potassium chloride    Tablet ER 10 milliEquivalent(s) Oral daily  rOPINIRole 3 milliGRAM(s) Oral daily  simvastatin 40 milliGRAM(s) Oral at bedtime  sucralfate 1 Gram(s) Oral four times a day  vancomycin  IVPB 1250 milliGRAM(s) IV Intermittent every 12 hours    MEDICATIONS  (PRN):  acetaminophen   Tablet .. 325 milliGRAM(s) Oral daily PRN Temp greater or equal to 38C (100.4F), Mild Pain (1 - 3)  naloxegol 25 milliGRAM(s) Oral <User Schedule> PRN constipation  tapentadol 75 milliGRAM(s) Oral three times a day PRN Pain    Allergies    latex (Rash)  penicillin (Hives)    Intolerances    Vital Signs Last 24 Hrs  T(C): 36.8 (31 Jan 2021 05:21), Max: 37.3 (30 Jan 2021 20:59)  T(F): 98.3 (31 Jan 2021 05:21), Max: 99.2 (30 Jan 2021 20:59)  HR: 111 (30 Jan 2021 20:59) (92 - 111)  BP: 112/68 (31 Jan 2021 05:21) (112/68 - 149/66)  BP(mean): --  RR: 17 (31 Jan 2021 05:21) (17 - 17)  SpO2: 92% (30 Jan 2021 20:59) (92% - 93%)  I&O's Summary      PHYSICAL EXAM:  TELE: Not on tele  Constitutional: NAD, awake and alert, obese  HEENT: Moist Mucous Membranes, Anicteric  Pulmonary: Non-labored, breath sounds are clear bilaterally, No wheezing, rales or rhonchi  Cardiovascular: Regular, S1 and S2, + murmurs, no rubs, gallops or clicks  Gastrointestinal: Bowel Sounds present, soft, nontender.   Lymph: No peripheral edema. No lymphadenopathy.  Skin: No visible rashes.  Right foot dressing dry and intact  Psych:  Mood & affect appropriate    LABS: All Labs Reviewed:                        11.6   10.38 )-----------( 218      ( 30 Jan 2021 19:27 )             36.6                         13.2   6.27  )-----------( 273      ( 28 Jan 2021 16:24 )             41.6     30 Jan 2021 19:27    138    |  103    |  21     ----------------------------<  126    4.4     |  31     |  1.10   28 Jan 2021 16:24    137    |  102    |  38     ----------------------------<  110    4.4     |  30     |  1.10     Ca    8.8        30 Jan 2021 19:27  Ca    9.4        28 Jan 2021 16:24    TPro  8.4    /  Alb  3.9    /  TBili  0.2    /  DBili  x      /  AST  15     /  ALT  29     /  AlkPhos  127    28 Jan 2021 16:24    RADIOLOGY:    EXAM:  XR CHEST AP OR PA 1V                          PROCEDURE DATE:  01/28/2021      INTERPRETATION:  CLINICAL STATEMENT: Chest pain.    TECHNIQUE: AP view of the chest.    COMPARISON: 9/28/2015    FINDINGS/  IMPRESSION:  Spinal hardware again noted.    No new consolidation or pleural effusion. Overlying chin obscures lung apices    Heart size within normal limits.    Small calcification adjacent to left humeral head.    PATRICIA RAMOS MD; Attending Radiologist  This document has been electronically signed. Jan 28 2021  5:36PM    Ventricular Rate 83 BPM    Atrial Rate 83 BPM    P-R Interval 172 ms    QRS Duration 92 ms    Q-T Interval 368 ms    QTC Calculation(Bazett) 432 ms    P Axis 56 degrees    R Axis 22 degrees    T Axis 62 degrees    Diagnosis Line Normal sinus rhythm  Normal ECG  When compared with ECG of 20-SEP-2015 00:00,  No significant change was found  Confirmed by MAK MCINTYRE ([id number]) on 1/30/2021 5:24:45 PM

## 2021-01-31 NOTE — PROGRESS NOTE ADULT - ASSESSMENT
70 F with HTN, HLD, neuropathy, and spinal stenosis presented with right 2nd toe ulcer, now for resection of right 2nd partial ray resection.  Consult is called for cardiac clearance in setting of murmur, HTN and comorbidities     Right toe ulcer  - s/p partial ray resection of right 2nd toe for non-healing ulcer, tolerated procedure well  - Can do routine TTE to evaluate cardiac structures, patient has murmur on exam of at least mild in significance  - Pain control  - Abx per Primary    HTN  - BP stable and controlled ranging 110-140 systolic  - Continue Coreg, Losartan, and Lasix  - Will obtain TTE for + murmur    HLD  - Continue home statin    DVT ppx  - Per Primary    Further cardia w/u pending clinical course  Will continue to follow    Olga Lidia Garcia DNP, NP-C  Cardiology  Spectra #[phone]     70 F with HTN, HLD, neuropathy, and spinal stenosis presented with right 2nd toe ulcer, now for resection of right 2nd partial ray resection.  Consult is called for cardiac clearance in setting of murmur, HTN and comorbidities     Right toe ulcer  - s/p partial ray resection of right 2nd toe for non-healing ulcer, tolerated procedure well  - Can do routine TTE to evaluate cardiac structures, patient has murmur on exam of at least mild in significance (can be done as outpt)  - Pain control  - Abx per Primary    HTN  - BP stable and controlled ranging 110-140 systolic  - Continue Coreg, Losartan, and Lasix       HLD  - Continue home statin    DVT ppx  - Per Primary    Further cardia w/u pending clinical course  Will continue to follow    Olga Lidia Garcia DNP, NP-C  Cardiology  Spectra #[phone]

## 2021-01-31 NOTE — CONSULT NOTE ADULT - ASSESSMENT
69 yo female with pmhx of HTN, Hypothyroidism, neuropathy, spinal stenosis, consulted for possible right second toe infection.  States it was amputed on 1/26 with dr. Gray.  Currently with palpable pulses.  No evidence of drainage at this time.     Plan:  Continue wound care with podiatry  Continue care per primary team  Continue antibiotics.   Continue SQH for VTE prophylaxis  Continue regular diet.    No plan for vascular intervention at this time  Case seen and discussed with Dr. Coronado.

## 2021-01-31 NOTE — PROGRESS NOTE ADULT - SUBJECTIVE AND OBJECTIVE BOX
Patient is a 70y old  Female who presents with a chief complaint of Right foot 2nd digit infection. (31 Jan 2021 14:02)      INTERVAL /OVERNIGHT EVENTS: still with some pain    MEDICATIONS  (STANDING):  amitriptyline 100 milliGRAM(s) Oral at bedtime  carvedilol 6.25 milliGRAM(s) Oral every 12 hours  DULoxetine 60 milliGRAM(s) Oral at bedtime  furosemide    Tablet 20 milliGRAM(s) Oral daily  gabapentin 400 milliGRAM(s) Oral four times a day  heparin   Injectable 5000 Unit(s) SubCutaneous every 8 hours  lactobacillus acidophilus 1 Tablet(s) Oral three times a day  levothyroxine 200 MICROGram(s) Oral daily  lidocaine   Patch 1 Patch Transdermal every 24 hours  losartan 25 milliGRAM(s) Oral daily  melatonin 5 milliGRAM(s) Oral at bedtime  multivitamin 1 Tablet(s) Oral daily  oxyCODONE  ER Tablet 20 milliGRAM(s) Oral every 12 hours  pantoprazole    Tablet 40 milliGRAM(s) Oral before breakfast  rOPINIRole 1 milliGRAM(s) Oral daily  rOPINIRole 2 milliGRAM(s) Oral every 24 hours  simvastatin 40 milliGRAM(s) Oral at bedtime  sucralfate 1 Gram(s) Oral four times a day  vancomycin  IVPB 1250 milliGRAM(s) IV Intermittent every 12 hours    MEDICATIONS  (PRN):  acetaminophen   Tablet .. 325 milliGRAM(s) Oral daily PRN Temp greater or equal to 38C (100.4F), Mild Pain (1 - 3)  naloxegol 25 milliGRAM(s) Oral <User Schedule> PRN constipation  tapentadol 75 milliGRAM(s) Oral three times a day PRN Pain      Allergies    latex (Rash)  penicillin (Hives)    Intolerances        REVIEW OF SYSTEMS:  CONSTITUTIONAL: No fever, weight loss, or fatigue  EYES: No eye pain, visual disturbances, or discharge  ENMT:  No difficulty hearing, tinnitus, vertigo; No sinus or throat pain  NECK: No pain or stiffness  RESPIRATORY: No cough, wheezing, chills or hemoptysis; No shortness of breath  CARDIOVASCULAR: No chest pain, palpitations, dizziness, or leg swelling  GASTROINTESTINAL: No abdominal or epigastric pain. No nausea, vomiting, or hematemesis; No diarrhea or constipation. No melena or hematochezia.  GENITOURINARY: No dysuria, frequency, hematuria, or incontinence  NEUROLOGICAL: No headaches, memory loss, loss of strength, numbness, or tremors  SKIN: No itching, burning, rashes, or lesions   LYMPH NODES: No enlarged glands  ENDOCRINE: No heat or cold intolerance; No hair loss; No polydipsia or polyuria  MUSCULOSKELETAL: No joint pain or swelling; No muscle, back, or extremity pain  PSYCHIATRIC: No depression, anxiety, mood swings, or difficulty sleeping  HEME/LYMPH: No easy bruising, or bleeding gums  ALLERGY AND IMMUNOLOGIC: No hives or eczema    Vital Signs Last 24 Hrs  T(C): 36.7 (31 Jan 2021 14:29), Max: 37.3 (30 Jan 2021 20:59)  T(F): 98 (31 Jan 2021 14:29), Max: 99.2 (30 Jan 2021 20:59)  HR: 79 (31 Jan 2021 14:29) (79 - 111)  BP: 117/70 (31 Jan 2021 14:29) (112/68 - 149/66)  BP(mean): --  RR: 17 (31 Jan 2021 14:29) (17 - 17)  SpO2: 94% (31 Jan 2021 14:29) (92% - 94%)    PHYSICAL EXAM:  GENERAL: NAD, well-groomed, well-developed  HEAD:  Atraumatic, Normocephalic  EYES: EOMI, PERRLA, conjunctiva and sclera clear  ENMT: No tonsillar erythema, exudates, or enlargement; Moist mucous membranes, Good dentition, No lesions  NECK: Supple, No JVD, Normal thyroid  NERVOUS SYSTEM:  Alert & Oriented X3, Good concentration; Motor Strength 5/5 B/L upper and lower extremities; DTRs 2+ intact and symmetric  CHEST/LUNG: Clear to auscultation bilaterally; No rales, rhonchi, wheezing, or rubs  HEART: Regular rate and rhythm; No murmurs, rubs, or gallops  ABDOMEN: Soft, Nontender, Nondistended; Bowel sounds present  EXTREMITIES:  2+ Peripheral Pulses, No clubbing, cyanosis, or edema  LYMPH: No lymphadenopathy noted  SKIN: No rashes or lesions    LABS:                        11.6   10.38 )-----------( 218      ( 30 Jan 2021 19:27 )             36.6     30 Jan 2021 19:27    138    |  103    |  21     ----------------------------<  126    4.4     |  31     |  1.10     Ca    8.8        30 Jan 2021 19:27          CAPILLARY BLOOD GLUCOSE          RADIOLOGY & ADDITIONAL TESTS:    Notes Reviewed:  [x ] YES  [ ] NO    Care Discussed with Consultants/Other Providers [x ] YES  [ ] NO

## 2021-02-01 LAB — VANCOMYCIN TROUGH SERPL-MCNC: 18 UG/ML — SIGNIFICANT CHANGE UP (ref 10–20)

## 2021-02-01 PROCEDURE — 99232 SBSQ HOSP IP/OBS MODERATE 35: CPT

## 2021-02-01 RX ORDER — LANOLIN ALCOHOL/MO/W.PET/CERES
10 CREAM (GRAM) TOPICAL AT BEDTIME
Refills: 0 | Status: DISCONTINUED | OUTPATIENT
Start: 2021-02-01 | End: 2021-02-04

## 2021-02-01 RX ORDER — BACLOFEN 100 %
10 POWDER (GRAM) MISCELLANEOUS EVERY 12 HOURS
Refills: 0 | Status: DISCONTINUED | OUTPATIENT
Start: 2021-02-01 | End: 2021-02-04

## 2021-02-01 RX ORDER — LACTOBACILLUS ACIDOPHILUS 100MM CELL
1 CAPSULE ORAL
Qty: 0 | Refills: 0 | DISCHARGE
Start: 2021-02-01

## 2021-02-01 RX ORDER — OXYCODONE HYDROCHLORIDE 5 MG/1
30 TABLET ORAL EVERY 8 HOURS
Refills: 0 | Status: DISCONTINUED | OUTPATIENT
Start: 2021-02-01 | End: 2021-02-01

## 2021-02-01 RX ORDER — OXYCODONE HYDROCHLORIDE 5 MG/1
30 TABLET ORAL EVERY 8 HOURS
Refills: 0 | Status: DISCONTINUED | OUTPATIENT
Start: 2021-02-01 | End: 2021-02-04

## 2021-02-01 RX ORDER — DOXEPIN HCL 100 MG
50 CAPSULE ORAL DAILY
Refills: 0 | Status: DISCONTINUED | OUTPATIENT
Start: 2021-02-01 | End: 2021-02-04

## 2021-02-01 RX ORDER — CHLORHEXIDINE GLUCONATE 213 G/1000ML
15 SOLUTION TOPICAL
Refills: 0 | Status: DISCONTINUED | OUTPATIENT
Start: 2021-02-01 | End: 2021-02-04

## 2021-02-01 RX ADMIN — TAPENTADOL HYDROCHLORIDE 75 MILLIGRAM(S): 50 TABLET, FILM COATED ORAL at 14:39

## 2021-02-01 RX ADMIN — Medication 166.67 MILLIGRAM(S): at 05:47

## 2021-02-01 RX ADMIN — Medication 20 MILLIGRAM(S): at 06:58

## 2021-02-01 RX ADMIN — Medication 50 MILLIGRAM(S): at 22:24

## 2021-02-01 RX ADMIN — GABAPENTIN 400 MILLIGRAM(S): 400 CAPSULE ORAL at 18:17

## 2021-02-01 RX ADMIN — Medication 1 TABLET(S): at 22:18

## 2021-02-01 RX ADMIN — HEPARIN SODIUM 5000 UNIT(S): 5000 INJECTION INTRAVENOUS; SUBCUTANEOUS at 13:08

## 2021-02-01 RX ADMIN — Medication 1 TABLET(S): at 13:08

## 2021-02-01 RX ADMIN — HEPARIN SODIUM 5000 UNIT(S): 5000 INJECTION INTRAVENOUS; SUBCUTANEOUS at 22:19

## 2021-02-01 RX ADMIN — OXYCODONE HYDROCHLORIDE 30 MILLIGRAM(S): 5 TABLET ORAL at 22:18

## 2021-02-01 RX ADMIN — Medication 10 MILLIGRAM(S): at 22:18

## 2021-02-01 RX ADMIN — GABAPENTIN 400 MILLIGRAM(S): 400 CAPSULE ORAL at 11:31

## 2021-02-01 RX ADMIN — LOSARTAN POTASSIUM 25 MILLIGRAM(S): 100 TABLET, FILM COATED ORAL at 05:26

## 2021-02-01 RX ADMIN — DULOXETINE HYDROCHLORIDE 60 MILLIGRAM(S): 30 CAPSULE, DELAYED RELEASE ORAL at 22:18

## 2021-02-01 RX ADMIN — SIMVASTATIN 40 MILLIGRAM(S): 20 TABLET, FILM COATED ORAL at 22:18

## 2021-02-01 RX ADMIN — GABAPENTIN 400 MILLIGRAM(S): 400 CAPSULE ORAL at 05:33

## 2021-02-01 RX ADMIN — Medication 1 GRAM(S): at 22:18

## 2021-02-01 RX ADMIN — Medication 1 GRAM(S): at 18:17

## 2021-02-01 RX ADMIN — TAPENTADOL HYDROCHLORIDE 75 MILLIGRAM(S): 50 TABLET, FILM COATED ORAL at 22:18

## 2021-02-01 RX ADMIN — ROPINIROLE 1 MILLIGRAM(S): 8 TABLET, FILM COATED, EXTENDED RELEASE ORAL at 11:31

## 2021-02-01 RX ADMIN — Medication 1 GRAM(S): at 11:31

## 2021-02-01 RX ADMIN — Medication 100 MILLIGRAM(S): at 22:19

## 2021-02-01 RX ADMIN — Medication 1 TABLET(S): at 18:19

## 2021-02-01 RX ADMIN — CARVEDILOL PHOSPHATE 6.25 MILLIGRAM(S): 80 CAPSULE, EXTENDED RELEASE ORAL at 05:29

## 2021-02-01 RX ADMIN — PANTOPRAZOLE SODIUM 40 MILLIGRAM(S): 20 TABLET, DELAYED RELEASE ORAL at 05:29

## 2021-02-01 RX ADMIN — ROPINIROLE 2 MILLIGRAM(S): 8 TABLET, FILM COATED, EXTENDED RELEASE ORAL at 18:17

## 2021-02-01 RX ADMIN — OXYCODONE HYDROCHLORIDE 20 MILLIGRAM(S): 5 TABLET ORAL at 05:33

## 2021-02-01 RX ADMIN — GABAPENTIN 400 MILLIGRAM(S): 400 CAPSULE ORAL at 22:18

## 2021-02-01 RX ADMIN — Medication 1 GRAM(S): at 05:28

## 2021-02-01 RX ADMIN — Medication 1 TABLET(S): at 05:28

## 2021-02-01 RX ADMIN — CARVEDILOL PHOSPHATE 6.25 MILLIGRAM(S): 80 CAPSULE, EXTENDED RELEASE ORAL at 18:17

## 2021-02-01 RX ADMIN — Medication 200 MICROGRAM(S): at 05:27

## 2021-02-01 RX ADMIN — Medication 325 MILLIGRAM(S): at 14:43

## 2021-02-01 RX ADMIN — Medication 1 TABLET(S): at 11:31

## 2021-02-01 NOTE — PROGRESS NOTE ADULT - SUBJECTIVE AND OBJECTIVE BOX
[facility] DIVISION of INFECTIOUS DISEASE  Jerry Frost MD PhD, Vero Jones MD, Angeli Garcia MD, Maddison Ng MD  and providing coverage with Katrina Orozco MD and Carol Navarro MD  Providing Infectious Disease Consultations at [facility], [facility]'s    Office# [phone] to schedule follow up appointments  Answering Service for urgent calls or New Consults [phone]  Cell# to text for urgent issues Jerry Frost [phone]     infectious diseases progress note:    MILAN SHRESTHA is a 70y y. o. Female patient    No concerning overnight events    Allergies    latex (Rash)  penicillin (Hives)    Intolerances        ANTIBIOTICS/RELEVANT:  antimicrobials  levoFLOXacin  Tablet 750 milliGRAM(s) Oral every 24 hours    immunologic:    OTHER:  acetaminophen   Tablet .. 325 milliGRAM(s) Oral daily PRN  amitriptyline 100 milliGRAM(s) Oral at bedtime  carvedilol 6.25 milliGRAM(s) Oral every 12 hours  DULoxetine 60 milliGRAM(s) Oral at bedtime  furosemide    Tablet 20 milliGRAM(s) Oral daily  gabapentin 400 milliGRAM(s) Oral four times a day  heparin   Injectable 5000 Unit(s) SubCutaneous every 8 hours  lactobacillus acidophilus 1 Tablet(s) Oral three times a day  levothyroxine 200 MICROGram(s) Oral daily  lidocaine   Patch 1 Patch Transdermal every 24 hours  losartan 25 milliGRAM(s) Oral daily  melatonin 5 milliGRAM(s) Oral at bedtime  multivitamin 1 Tablet(s) Oral daily  naloxegol 25 milliGRAM(s) Oral <User Schedule> PRN  oxyCODONE  ER Tablet 20 milliGRAM(s) Oral every 12 hours  pantoprazole    Tablet 40 milliGRAM(s) Oral before breakfast  rOPINIRole 1 milliGRAM(s) Oral daily  rOPINIRole 2 milliGRAM(s) Oral every 24 hours  simvastatin 40 milliGRAM(s) Oral at bedtime  sucralfate 1 Gram(s) Oral four times a day  tapentadol 75 milliGRAM(s) Oral three times a day PRN      Objective:  Vital Signs Last 24 Hrs  T(C): 36.6 (01 Feb 2021 04:35), Max: 37 (31 Jan 2021 21:14)  T(F): 97.9 (01 Feb 2021 04:35), Max: 98.6 (31 Jan 2021 21:14)  HR: 76 (01 Feb 2021 04:35) (76 - 86)  BP: 129/75 (01 Feb 2021 04:35) (113/67 - 129/75)  BP(mean): --  RR: 17 (01 Feb 2021 04:35) (17 - 17)  SpO2: 93% (01 Feb 2021 04:35) (93% - 95%)    T(C): 36.6 (02-01-21 @ 04:35), Max: 37.3 (01-30-21 @ 20:59)  T(C): 36.6 (02-01-21 @ 04:35), Max: 37.3 (01-30-21 @ 20:59)  T(C): 36.6 (02-01-21 @ 04:35), Max: 37.3 (01-30-21 @ 20:59)    PHYSICAL EXAM:  HEENT: NC atraumatic  Neck: supple  Respiratory: no accessory muscle use, breathing comfortably  Cardiovascular: distant  Gastrointestinal: normal appearing, nondistended  Extremities: no clubbing, no cyanosis, foot wrapped, bleeding into dressing        LABS:                          11.6   10.38 )-----------( 218      ( 30 Jan 2021 19:27 )             36.6       10.38 01-30 @ 19:27  6.27 01-28 @ 16:24      01-30    138  |  103  |  21  ----------------------------<  126<H>  4.4   |  31  |  1.10    Ca    8.8      30 Jan 2021 19:27        Creatinine, Serum: 1.10 mg/dL (01-30-21 @ 19:27)  Creatinine, Serum: 1.10 mg/dL (01-28-21 @ 16:24)          INFLAMMATORY MARKERS  Auto Neutrophil #: 3.98 K/uL (01-28-21 @ 16:24)  Auto Lymphocyte #: 1.51 K/uL (01-28-21 @ 16:24)    Lactate, Blood: 1.6 mmol/L (01-28-21 @ 16:24)    Auto Eosinophil #: 0.31 K/uL (01-28-21 @ 16:24)      Sedimentation Rate, Erythrocyte: 42 mm/hr (01-28-21 @ 16:24)                    INR: 1.14 ratio (01-29-21 @ 07:29)  Activated Partial Thromboplastin Time: 33.4 sec (01-29-21 @ 07:29)  INR: 1.13 ratio (01-28-21 @ 16:24)  Activated Partial Thromboplastin Time: 36.9 sec (01-28-21 @ 16:24)          MICROBIOLOGY:    Culture - Tissue with Gram Stain (01.29.21 @ 18:43)    Gram Stain:   No polymorphonuclear cells seen  No organisms seen    Specimen Source: .Tissue Other, R 2nd toe head of mid phalanx    Culture Results:   Few Enterobacter cloacae complex  See previous culture [id number]  Rare Coag Negative Staphylococcus "Susceptibilities not performed"  Moderate Trueperella bernardiae "Susceptibilities not performed"    Culture - Tissue with Gram Stain (01.29.21 @ 18:43)    -  Tobramycin: S <=2    -  Trimethoprim/Sulfamethoxazole: S <=0.5/9.5    -  Gentamicin: S <=2    -  Imipenem: S <=1    -  Levofloxacin: S <=0.5    -  Meropenem: S <=1    -  Piperacillin/Tazobactam: S <=8    -  Amikacin: S <=16    -  Amoxicillin/Clavulanic Acid: R >16/8    -  Ampicillin: R 16 These ampicillin results predict results for amoxicillin    -  Ampicillin/Sulbactam: R 8/4 Enterobacter, Citrobacter, and Serratia may develop resistance during prolonged therapy (3-4 days)    -  Aztreonam: S <=4    -  Cefazolin: R >16 Enterobacter, Citrobacter, and Serratia may develop resistance during prolonged therapy (3-4 days)    -  Cefepime: S <=2    -  Cefoxitin: R >16    -  Ceftriaxone: S <=1 Enterobacter, Citrobacter, and Serratia may develop resistance during prolonged therapy    -  Ciprofloxacin: S <=0.25    -  Ertapenem: S <=0.5    Specimen Source: .Tissue Other, Right 2nd toe distal phalanx    Culture Results:   Few Enterobacter cloacae complex  Normal skin dom isolated    Organism Identification: Enterobacter cloacae complex    Organism: Enterobacter cloacae complex    Method Type: LILIANE        RADIOLOGY & ADDITIONAL STUDIES:

## 2021-02-01 NOTE — PROGRESS NOTE ADULT - ASSESSMENT
70 F with HTN, HLD, neuropathy, and spinal stenosis presented with right 2nd toe ulcer, now for resection of right 2nd partial ray resection.  Consult is called for cardiac clearance in setting of murmur, HTN and comorbidities     Right toe ulcer  - s/p partial ray resection of right 2nd toe for non-healing ulcer, tolerated procedure well  - Can do routine TTE to evaluate cardiac structures, patient has murmur on exam of at least mild in significance (can be done as outpt)  - Pain control  - Abx per Primary    HTN  - BP stable and controlled   - Continue Coreg, Losartan, and Lasix       HLD  - Continue home statin    DVT ppx  - Per Primary    Further cardia w/u pending clinical course  Will continue to follow    Madhu Oropeza DNP, ANP-c  Cardiology   Spectra #[phone]    70 F with HTN, HLD, neuropathy, and spinal stenosis presented with right 2nd toe ulcer, now for resection of right 2nd partial ray resection.  Consult is called for cardiac clearance in setting of murmur, HTN and comorbidities     Right toe ulcer  - s/p partial ray resection of right 2nd toe for non-healing ulcer, tolerated procedure well  - Can do routine TTE to evaluate cardiac structures, patient has murmur on exam of at least mild in significance (can be done as outpt)  - Pain control  - Abx per Primary    HTN  - BP stable and controlled   - Continue Coreg, Losartan, and Lasix       HLD  - Continue home statin    DVT ppx  - Per Primary    Further cardiac w/u pending clinical course  Will continue to follow    Madhu Oropeza DNP, ANP-c  Cardiology   Spectra #[phone]

## 2021-02-01 NOTE — PROGRESS NOTE ADULT - SUBJECTIVE AND OBJECTIVE BOX
[facility] -- Jayne Aguila, Max Chacko Pannella, Patel, Savella  Office # [phone]      Follow Up:    Preop/Postop Optimization HTN  Subjective/Observations:   No events overnight resting comfortably in bed.  No complaints of chest pain, dyspnea, or palpitations reported. No signs of orthopnea or PND.     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        MEDICATIONS  (STANDING):  amitriptyline 100 milliGRAM(s) Oral at bedtime  carvedilol 6.25 milliGRAM(s) Oral every 12 hours  DULoxetine 60 milliGRAM(s) Oral at bedtime  furosemide    Tablet 20 milliGRAM(s) Oral daily  gabapentin 400 milliGRAM(s) Oral four times a day  heparin   Injectable 5000 Unit(s) SubCutaneous every 8 hours  lactobacillus acidophilus 1 Tablet(s) Oral three times a day  levothyroxine 200 MICROGram(s) Oral daily  lidocaine   Patch 1 Patch Transdermal every 24 hours  losartan 25 milliGRAM(s) Oral daily  melatonin 5 milliGRAM(s) Oral at bedtime  multivitamin 1 Tablet(s) Oral daily  oxyCODONE  ER Tablet 20 milliGRAM(s) Oral every 12 hours  pantoprazole    Tablet 40 milliGRAM(s) Oral before breakfast  rOPINIRole 1 milliGRAM(s) Oral daily  rOPINIRole 2 milliGRAM(s) Oral every 24 hours  simvastatin 40 milliGRAM(s) Oral at bedtime  sucralfate 1 Gram(s) Oral four times a day  trimethoprim  160 mG/sulfamethoxazole 800 mG 1 Tablet(s) Oral two times a day    MEDICATIONS  (PRN):  acetaminophen   Tablet .. 325 milliGRAM(s) Oral daily PRN Temp greater or equal to 38C (100.4F), Mild Pain (1 - 3)  naloxegol 25 milliGRAM(s) Oral <User Schedule> PRN constipation  tapentadol 75 milliGRAM(s) Oral three times a day PRN Pain      Allergies    latex (Rash)  penicillin (Hives)    Intolerances        Vital Signs Last 24 Hrs  T(C): 36.6 (01 Feb 2021 04:35), Max: 37 (31 Jan 2021 21:14)  T(F): 97.9 (01 Feb 2021 04:35), Max: 98.6 (31 Jan 2021 21:14)  HR: 76 (01 Feb 2021 04:35) (76 - 86)  BP: 129/75 (01 Feb 2021 04:35) (113/67 - 129/75)  BP(mean): --  RR: 17 (01 Feb 2021 04:35) (17 - 17)  SpO2: 93% (01 Feb 2021 04:35) (93% - 95%)    I&O's Summary        PHYSICAL EXAM:  TELE: Off tele   Constitutional: NAD, awake and alert, well-developed  HEENT: Moist Mucous Membranes, Anicteric  Pulmonary: Non-labored, breath sounds are clear bilaterally, No wheezing, crackles or rhonchi  Cardiovascular: Regular, S1 and S2 nl, + murmur No rubs, gallops or clicks   Gastrointestinal: Bowel Sounds present, soft, nontender.   Lymph: No lymphadenopathy. No peripheral edema.  Skin: No visible rashes or ulcers.  Psych:  Mood & affect appropriate    LABS: All Labs Reviewed:                        11.6   10.38 )-----------( 218      ( 30 Jan 2021 19:27 )             36.6     30 Jan 2021 19:27    138    |  103    |  21     ----------------------------<  126    4.4     |  31     |  1.10     Ca    8.8        30 Jan 2021 19:27

## 2021-02-01 NOTE — PROGRESS NOTE ADULT - ASSESSMENT
Pt is a 70W w/ PMHx of HTN, HLD, neuropathy, spinal stenosis presents with c/o R toe 2nd digit infection x 2 days.    R toe infection/osteomyelitis  Right 2nd partial ray resection - booked 1/29/21; :  Partial amputation of toe 29-Jan-2021 13:57:25  Rusty Red.    -will de-escalate to Bactrim today, recs to follow but not clear that path was sent

## 2021-02-01 NOTE — PROGRESS NOTE ADULT - PROBLEM SELECTOR PLAN 1
Pt evaluated and chart reviewed, case discussed with Dr. Gray  S/P right distal 2nd digit amputation  Ischemic changes and no CRT distal 2nd digit  Vacular consulted, recs appreciated - no surgical intervention at this time.    Podiatry will follow pt while in house Pt evaluated and chart reviewed, case discussed with Dr. Gray  S/P right distal 2nd digit amputation  Ischemic changes and no CRT distal 2nd digit  Vacular consulted, recs appreciated - no surgical intervention at this time.  Will pursue hyperbaric oxygen therapy as outpatient    Podiatry will follow pt while in house  Patient is stable for discharge from podiatry's standpoint    WOUND CARE INSTRUCTIONS  1) Pt to keep dressing clean dry and intact after discharge.    2) Please observe foot for signs of infection including swelling, redness, drainage, and warmth.  If noted, please come to ED immediately.   3) Please call to make an appointment with Dr. Gray at the [place] wound center within 5 days of discharge.

## 2021-02-01 NOTE — PROGRESS NOTE ADULT - SUBJECTIVE AND OBJECTIVE BOX
Patient seen and examined bedside resting comfortably.  No complaints offered. Denies any pain at this time  Denies fevers, chills, chest pain, SOB, palp  RLE duplex normal  Cultures negative to date    T(F): 97.9 (02-01-21 @ 04:35), Max: 98.6 (01-31-21 @ 21:14)  HR: 76 (02-01-21 @ 04:35) (76 - 86)  BP: 129/75 (02-01-21 @ 04:35) (113/67 - 129/75)  RR: 17 (02-01-21 @ 04:35) (17 - 17)  SpO2: 93% (02-01-21 @ 04:35) (93% - 95%)  Wt(kg): --  CAPILLARY BLOOD GLUCOSE          PHYSICAL EXAM:  General: NAD, WDWN.   Neuro:  Alert & oriented x 3  HEENT: NCAT, EOMI, conjunctiva clear  Extremities: R 3rd amputation site- with duskiness on plantar aspect of tip, surrounding erythema noted, decreased sensation , + 1 palpable pulses in DP/PT      LABS:                        11.6   10.38 )-----------( 218      ( 30 Jan 2021 19:27 )             36.6     01-30    138  |  103  |  21  ----------------------------<  126<H>  4.4   |  31  |  1.10    Ca    8.8      30 Jan 2021 19:27        I&O's Detail      Impression: 70y Female admitted with infected right 3rd toe amputation site with good arterial flow on duplex and exam    Plan:  - no vascular intervention at this time  - care of amputation site as per podiatry  -

## 2021-02-01 NOTE — PROGRESS NOTE ADULT - SUBJECTIVE AND OBJECTIVE BOX
70y year old Female seen at [facility] for right distal phalanx wound.  Pt is s/p right foot 2nd digit distal amputation.  Denies any fever, chills, nausea, vomiting, chest pain, shortness of breath, or calf pain at this time.      Allergies    latex (Rash)  penicillin (Hives)    Intolerances        MEDICATIONS  (STANDING):  amitriptyline 100 milliGRAM(s) Oral at bedtime  carvedilol 6.25 milliGRAM(s) Oral every 12 hours  DULoxetine 60 milliGRAM(s) Oral at bedtime  furosemide    Tablet 20 milliGRAM(s) Oral daily  gabapentin 400 milliGRAM(s) Oral four times a day  heparin   Injectable 5000 Unit(s) SubCutaneous every 12 hours  lactobacillus acidophilus 1 Tablet(s) Oral three times a day  levothyroxine 200 MICROGram(s) Oral daily  lidocaine   Patch 1 Patch Transdermal every 24 hours  losartan 25 milliGRAM(s) Oral daily  melatonin 5 milliGRAM(s) Oral at bedtime  multivitamin 1 Tablet(s) Oral daily  oxyCODONE  ER Tablet 20 milliGRAM(s) Oral every 12 hours  pantoprazole    Tablet 40 milliGRAM(s) Oral before breakfast  potassium chloride    Tablet ER 10 milliEquivalent(s) Oral daily  rOPINIRole 3 milliGRAM(s) Oral daily  simvastatin 40 milliGRAM(s) Oral at bedtime  sucralfate 1 Gram(s) Oral four times a day  vancomycin  IVPB 1250 milliGRAM(s) IV Intermittent every 12 hours    MEDICATIONS  (PRN):  acetaminophen   Tablet .. 325 milliGRAM(s) Oral daily PRN Temp greater or equal to 38C (100.4F), Mild Pain (1 - 3)  naloxegol 25 milliGRAM(s) Oral <User Schedule> PRN Home regimen  tapentadol 75 milliGRAM(s) Oral three times a day PRN home      Vital Signs Last 24 Hrs  T(C): 36.8 (30 Jan 2021 14:04), Max: 37.1 (30 Jan 2021 05:03)  T(F): 98.3 (30 Jan 2021 14:04), Max: 98.7 (30 Jan 2021 05:03)  HR: 92 (30 Jan 2021 14:04) (83 - 92)  BP: 114/70 (30 Jan 2021 14:04) (114/70 - 122/64)  BP(mean): --  RR: 17 (30 Jan 2021 14:04) (17 - 17)  SpO2: 93% (30 Jan 2021 14:04) (91% - 93%)    PHYSICAL EXAM:  Vascular: DP & PT palpable bilaterally, Capillary refill 3 seconds.  Edema and erythema right 2nd digit.    Neurological: Light touch sensation intact bilaterally  Musculoskeletal: No POP wound  Dermatological: Right foot 2nd digit surgical site noted with sutures intact, no signs of infection.  Plantar flap is dusky, devitalized.              ----------CHEM PANEL----------    PT/INR - ( 29 Jan 2021 07:29 )   PT: 13.2 sec;   INR: 1.14 ratio         PTT - ( 29 Jan 2021 07:29 )  PTT:33.4 sec      Culture - Tissue with Gram Stain (collected 29 Jan 2021 18:43)  Source: .Tissue Other, R 2nd toe head of mid phalanx  Gram Stain (29 Jan 2021 22:31):    No polymorphonuclear cells seen    No organisms seen    Culture - Tissue with Gram Stain (collected 29 Jan 2021 18:43)  Source: .Tissue Other, Right 2nd toe distal phalanx  Gram Stain (29 Jan 2021 22:30):    No polymorphonuclear cells seen    No organisms seen    Culture - Other (collected 28 Jan 2021 22:17)  Source: .Other Right 2nd distal toe  Final Report (30 Jan 2021 16:44):    Numerous Enterobacter cloacae complex    Moderate Staphylococcus lugdunensis  Organism: Enterobacter cloacae complex  Staphylococcus lugdunensis (30 Jan 2021 16:44)  Organism: Staphylococcus lugdunensis (30 Jan 2021 16:44)  Organism: Enterobacter cloacae complex (30 Jan 2021 16:44)    Culture - Blood (collected 28 Jan 2021 22:00)  Source: .Blood Blood  Preliminary Report (29 Jan 2021 23:01):    No growth to date.    Culture - Blood (collected 28 Jan 2021 22:00)  Source: .Blood Blood  Preliminary Report (29 Jan 2021 23:01):    No growth to date.        Imaging: ----------

## 2021-02-01 NOTE — PROGRESS NOTE ADULT - SUBJECTIVE AND OBJECTIVE BOX
Patient is a 70y old  Female who presents with a chief complaint of Right foot 2nd digit infection. (01 Feb 2021 16:34)      INTERVAL /OVERNIGHT EVENTS: c/o sx site discoloration    MEDICATIONS  (STANDING):  amitriptyline 100 milliGRAM(s) Oral at bedtime  baclofen 10 milliGRAM(s) Oral every 12 hours  carvedilol 6.25 milliGRAM(s) Oral every 12 hours  chlorhexidine 0.12% Liquid 15 milliLiter(s) Oral Mucosa two times a day  doxepin 50 milliGRAM(s) Oral daily  DULoxetine 60 milliGRAM(s) Oral at bedtime  furosemide    Tablet 20 milliGRAM(s) Oral daily  gabapentin 400 milliGRAM(s) Oral four times a day  heparin   Injectable 5000 Unit(s) SubCutaneous every 8 hours  lactobacillus acidophilus 1 Tablet(s) Oral three times a day  levothyroxine 200 MICROGram(s) Oral daily  lidocaine   Patch 1 Patch Transdermal every 24 hours  losartan 25 milliGRAM(s) Oral daily  melatonin 10 milliGRAM(s) Oral at bedtime  multivitamin 1 Tablet(s) Oral daily  oxyCODONE  ER Tablet 30 milliGRAM(s) Oral every 8 hours  pantoprazole    Tablet 40 milliGRAM(s) Oral before breakfast  rOPINIRole 1 milliGRAM(s) Oral daily  rOPINIRole 2 milliGRAM(s) Oral every 24 hours  simvastatin 40 milliGRAM(s) Oral at bedtime  sucralfate 1 Gram(s) Oral four times a day  trimethoprim  160 mG/sulfamethoxazole 800 mG 1 Tablet(s) Oral two times a day    MEDICATIONS  (PRN):  acetaminophen   Tablet .. 325 milliGRAM(s) Oral daily PRN Temp greater or equal to 38C (100.4F), Mild Pain (1 - 3)  naloxegol 25 milliGRAM(s) Oral <User Schedule> PRN constipation  tapentadol 75 milliGRAM(s) Oral three times a day PRN Severe Pain (7 - 10)      Allergies    latex (Rash)  penicillin (Hives)    Intolerances        REVIEW OF SYSTEMS:  CONSTITUTIONAL: No fever, weight loss, or fatigue  EYES: No eye pain, visual disturbances, or discharge  ENMT:  No difficulty hearing, tinnitus, vertigo; No sinus or throat pain  NECK: No pain or stiffness  RESPIRATORY: No cough, wheezing, chills or hemoptysis; No shortness of breath  CARDIOVASCULAR: No chest pain, palpitations, dizziness, or leg swelling  GASTROINTESTINAL: No abdominal or epigastric pain. No nausea, vomiting, or hematemesis; No diarrhea or constipation. No melena or hematochezia.  GENITOURINARY: No dysuria, frequency, hematuria, or incontinence  NEUROLOGICAL: No headaches, memory loss, loss of strength, numbness, or tremors  SKIN: No itching, burning, rashes, or lesions   LYMPH NODES: No enlarged glands  ENDOCRINE: No heat or cold intolerance; No hair loss; No polydipsia or polyuria  MUSCULOSKELETAL: No joint pain or swelling; No muscle, back, or extremity pain  PSYCHIATRIC: No depression, anxiety, mood swings, or difficulty sleeping  HEME/LYMPH: No easy bruising, or bleeding gums  ALLERGY AND IMMUNOLOGIC: No hives or eczema    Vital Signs Last 24 Hrs  T(C): 36.6 (01 Feb 2021 04:35), Max: 37 (31 Jan 2021 21:14)  T(F): 97.9 (01 Feb 2021 04:35), Max: 98.6 (31 Jan 2021 21:14)  HR: 76 (01 Feb 2021 04:35) (76 - 85)  BP: 129/75 (01 Feb 2021 04:35) (113/67 - 129/75)  BP(mean): --  RR: 17 (01 Feb 2021 04:35) (17 - 17)  SpO2: 93% (01 Feb 2021 04:35) (93% - 95%)    PHYSICAL EXAM:  GENERAL: NAD, well-groomed, well-developed  HEAD:  Atraumatic, Normocephalic  EYES: EOMI, PERRLA, conjunctiva and sclera clear  ENMT: No tonsillar erythema, exudates, or enlargement; Moist mucous membranes, Good dentition, No lesions  NECK: Supple, No JVD, Normal thyroid  NERVOUS SYSTEM:  Alert & Oriented X3, Good concentration; Motor Strength 5/5 B/L upper and lower extremities; DTRs 2+ intact and symmetric  CHEST/LUNG: Clear to auscultation bilaterally; No rales, rhonchi, wheezing, or rubs  HEART: Regular rate and rhythm; No murmurs, rubs, or gallops  ABDOMEN: Soft, Nontender, Nondistended; Bowel sounds present  EXTREMITIES:  2+ Peripheral Pulses, No clubbing, cyanosis, or edema  LYMPH: No lymphadenopathy noted  SKIN: No rashes or lesions    LABS:              CAPILLARY BLOOD GLUCOSE          RADIOLOGY & ADDITIONAL TESTS:    Notes Reviewed:  [x ] YES  [ ] NO    Care Discussed with Consultants/Other Providers [x ] YES  [ ] NO

## 2021-02-02 ENCOUNTER — TRANSCRIPTION ENCOUNTER (OUTPATIENT)
Age: 71
End: 2021-02-02

## 2021-02-02 LAB
CULTURE RESULTS: SIGNIFICANT CHANGE UP
CULTURE RESULTS: SIGNIFICANT CHANGE UP
SPECIMEN SOURCE: SIGNIFICANT CHANGE UP
SPECIMEN SOURCE: SIGNIFICANT CHANGE UP

## 2021-02-02 PROCEDURE — 99232 SBSQ HOSP IP/OBS MODERATE 35: CPT

## 2021-02-02 PROCEDURE — 93306 TTE W/DOPPLER COMPLETE: CPT | Mod: 26

## 2021-02-02 RX ORDER — CHLORHEXIDINE GLUCONATE 213 G/1000ML
15 SOLUTION TOPICAL
Qty: 0 | Refills: 0 | DISCHARGE
Start: 2021-02-02

## 2021-02-02 RX ADMIN — Medication 10 MILLIGRAM(S): at 16:20

## 2021-02-02 RX ADMIN — ROPINIROLE 2 MILLIGRAM(S): 8 TABLET, FILM COATED, EXTENDED RELEASE ORAL at 21:35

## 2021-02-02 RX ADMIN — Medication 20 MILLIGRAM(S): at 06:39

## 2021-02-02 RX ADMIN — Medication 1 TABLET(S): at 06:38

## 2021-02-02 RX ADMIN — LOSARTAN POTASSIUM 25 MILLIGRAM(S): 100 TABLET, FILM COATED ORAL at 06:38

## 2021-02-02 RX ADMIN — HEPARIN SODIUM 5000 UNIT(S): 5000 INJECTION INTRAVENOUS; SUBCUTANEOUS at 16:19

## 2021-02-02 RX ADMIN — Medication 1 TABLET(S): at 16:19

## 2021-02-02 RX ADMIN — PANTOPRAZOLE SODIUM 40 MILLIGRAM(S): 20 TABLET, DELAYED RELEASE ORAL at 06:39

## 2021-02-02 RX ADMIN — Medication 1 GRAM(S): at 21:53

## 2021-02-02 RX ADMIN — Medication 50 MILLIGRAM(S): at 10:45

## 2021-02-02 RX ADMIN — SIMVASTATIN 40 MILLIGRAM(S): 20 TABLET, FILM COATED ORAL at 21:53

## 2021-02-02 RX ADMIN — GABAPENTIN 400 MILLIGRAM(S): 400 CAPSULE ORAL at 21:49

## 2021-02-02 RX ADMIN — GABAPENTIN 400 MILLIGRAM(S): 400 CAPSULE ORAL at 16:20

## 2021-02-02 RX ADMIN — CARVEDILOL PHOSPHATE 6.25 MILLIGRAM(S): 80 CAPSULE, EXTENDED RELEASE ORAL at 06:39

## 2021-02-02 RX ADMIN — Medication 1 GRAM(S): at 10:45

## 2021-02-02 RX ADMIN — GABAPENTIN 400 MILLIGRAM(S): 400 CAPSULE ORAL at 06:38

## 2021-02-02 RX ADMIN — GABAPENTIN 400 MILLIGRAM(S): 400 CAPSULE ORAL at 10:45

## 2021-02-02 RX ADMIN — HEPARIN SODIUM 5000 UNIT(S): 5000 INJECTION INTRAVENOUS; SUBCUTANEOUS at 06:40

## 2021-02-02 RX ADMIN — Medication 10 MILLIGRAM(S): at 06:39

## 2021-02-02 RX ADMIN — Medication 1 TABLET(S): at 06:39

## 2021-02-02 RX ADMIN — CHLORHEXIDINE GLUCONATE 15 MILLILITER(S): 213 SOLUTION TOPICAL at 16:20

## 2021-02-02 RX ADMIN — CARVEDILOL PHOSPHATE 6.25 MILLIGRAM(S): 80 CAPSULE, EXTENDED RELEASE ORAL at 16:20

## 2021-02-02 RX ADMIN — DULOXETINE HYDROCHLORIDE 60 MILLIGRAM(S): 30 CAPSULE, DELAYED RELEASE ORAL at 21:34

## 2021-02-02 RX ADMIN — Medication 200 MICROGRAM(S): at 06:39

## 2021-02-02 RX ADMIN — Medication 1 TABLET(S): at 16:20

## 2021-02-02 RX ADMIN — HEPARIN SODIUM 5000 UNIT(S): 5000 INJECTION INTRAVENOUS; SUBCUTANEOUS at 21:35

## 2021-02-02 RX ADMIN — Medication 10 MILLIGRAM(S): at 21:35

## 2021-02-02 RX ADMIN — Medication 1 GRAM(S): at 06:42

## 2021-02-02 RX ADMIN — OXYCODONE HYDROCHLORIDE 30 MILLIGRAM(S): 5 TABLET ORAL at 21:47

## 2021-02-02 RX ADMIN — ROPINIROLE 1 MILLIGRAM(S): 8 TABLET, FILM COATED, EXTENDED RELEASE ORAL at 10:44

## 2021-02-02 RX ADMIN — OXYCODONE HYDROCHLORIDE 30 MILLIGRAM(S): 5 TABLET ORAL at 06:38

## 2021-02-02 RX ADMIN — CHLORHEXIDINE GLUCONATE 15 MILLILITER(S): 213 SOLUTION TOPICAL at 06:40

## 2021-02-02 RX ADMIN — Medication 100 MILLIGRAM(S): at 21:35

## 2021-02-02 RX ADMIN — Medication 325 MILLIGRAM(S): at 18:03

## 2021-02-02 RX ADMIN — Medication 1 GRAM(S): at 16:20

## 2021-02-02 RX ADMIN — Medication 1 TABLET(S): at 10:45

## 2021-02-02 NOTE — DISCHARGE NOTE PROVIDER - HOSPITAL COURSE
admitted for right toe ulcer  underwent partial toe amputation  ABX for possible OM per ID  DC after podiatry and ID clearance admitted for right toe ulcer  underwent toe amputations bilaterally  ABX for possible OM per ID  DC after podiatry and ID clearance

## 2021-02-02 NOTE — DISCHARGE NOTE PROVIDER - CARE PROVIDERS DIRECT ADDRESSES
[email or web address]

## 2021-02-02 NOTE — PROGRESS NOTE ADULT - ASSESSMENT
Pt is a 70W w/ PMHx of HTN, HLD, neuropathy, spinal stenosis presents with c/o R toe 2nd digit infection x 2 days.    R toe infection  S/p R 2nd partial ray resection 1/29/21  OR TCx E. cloacae   No previous cx to help tailor therapy  Pending surgical pathology report to determine final duration of therapy.   C/w bactrim 1DS tab BID for now  --Plan for 2 wks if clean margins obtained vs 6 wks if evidence of OM on surgical pathology.

## 2021-02-02 NOTE — PROGRESS NOTE ADULT - SUBJECTIVE AND OBJECTIVE BOX
[facility], Division of Infectious Diseases  YOKO Carlin Y. Patel, S. Shah  [phone]    Name: MILAN SHRESTHA  Age: 70y  Gender: Female  MRN: [id number]  Note Date: 02-02-21    Interval History:  Patient seen and examined at bedside this morning  No acute overnight events. Afebrile  Notes reviewed  S/p partial ray resection on 1/29    Antibiotics:  trimethoprim  160 mG/sulfamethoxazole 800 mG 1 Tablet(s) Oral two times a day      Medications:  acetaminophen   Tablet .. 325 milliGRAM(s) Oral daily PRN  amitriptyline 100 milliGRAM(s) Oral at bedtime  baclofen 10 milliGRAM(s) Oral every 12 hours  carvedilol 6.25 milliGRAM(s) Oral every 12 hours  chlorhexidine 0.12% Liquid 15 milliLiter(s) Oral Mucosa two times a day  doxepin 50 milliGRAM(s) Oral daily  DULoxetine 60 milliGRAM(s) Oral at bedtime  furosemide    Tablet 20 milliGRAM(s) Oral daily  gabapentin 400 milliGRAM(s) Oral four times a day  heparin   Injectable 5000 Unit(s) SubCutaneous every 8 hours  lactobacillus acidophilus 1 Tablet(s) Oral three times a day  levothyroxine 200 MICROGram(s) Oral daily  lidocaine   Patch 1 Patch Transdermal every 24 hours  losartan 25 milliGRAM(s) Oral daily  melatonin 10 milliGRAM(s) Oral at bedtime  multivitamin 1 Tablet(s) Oral daily  naloxegol 25 milliGRAM(s) Oral <User Schedule> PRN  oxyCODONE  ER Tablet 30 milliGRAM(s) Oral every 8 hours  pantoprazole    Tablet 40 milliGRAM(s) Oral before breakfast  rOPINIRole 1 milliGRAM(s) Oral daily  rOPINIRole 2 milliGRAM(s) Oral every 24 hours  simvastatin 40 milliGRAM(s) Oral at bedtime  sucralfate 1 Gram(s) Oral four times a day  tapentadol 75 milliGRAM(s) Oral three times a day PRN  trimethoprim  160 mG/sulfamethoxazole 800 mG 1 Tablet(s) Oral two times a day      Review of Systems:  A 10-point review of systems was obtained.     Pertinent positives and negatives--  Constitutional: No fevers. No Chills. No Rigors.   Cardiovascular: No chest pain. No palpitations.  Respiratory: No shortness of breath. No cough.  Gastrointestinal: No nausea or vomiting. No diarrhea or constipation.   Psychiatric: Pleasant. Appropriate affect.    Review of systems otherwise negative except as previously noted.    Allergies: latex (Rash)  penicillin (Hives)    For details regarding the patient's past medical history, social history, family history, and other miscellaneous elements, please refer the initial infectious diseases consultation and/or the admitting history and physical examination for this admission.    Objective:  Vitals:   T(C): 36.7 (02-02-21 @ 06:36), Max: 36.7 (02-01-21 @ 20:22)  HR: 72 (02-02-21 @ 06:36) (72 - 77)  BP: 107/62 (02-02-21 @ 06:36) (107/62 - 111/67)  RR: 16 (02-02-21 @ 06:36) (16 - 17)  SpO2: 93% (02-02-21 @ 06:36) (93% - 94%)    Physical Examination:  General: no acute distress  HEENT: NC/AT, EOMI, anicteric, no oral lesions  Neck: supple, no palpable LAD  Cardio: S1, S2 heard, RRR, no murmurs  Resp: breath sounds heard bilaterally, no rales, wheezes or rhonchi  Abd: soft, NT, ND, + bowel sounds  Neuro: no obvious focal deficits  Ext: no edema or cyanosis R foot dressing, c/d/i  Skin: warm, dry, no visible rash    Laboratory Studies:  CBC:   CMP:           Microbiology: reviewed    Culture - Tissue with Gram Stain (collected 01-29-21 @ 18:43)  Source: .Tissue Other, R 2nd toe head of mid phalanx  Gram Stain (01-29-21 @ 22:31):    No polymorphonuclear cells seen    No organisms seen  Preliminary Report (01-31-21 @ 22:39):    Few Enterobacter cloacae complex    See previous culture [id number]    Rare Coag Negative Staphylococcus "Susceptibilities not performed"    Moderate Trueperella bernardiae "Susceptibilities not performed"    Culture - Tissue with Gram Stain (collected 01-29-21 @ 18:43)  Source: .Tissue Other, Right 2nd toe distal phalanx  Gram Stain (01-29-21 @ 22:30):    No polymorphonuclear cells seen    No organisms seen  Preliminary Report (01-31-21 @ 20:20):    Few Enterobacter cloacae complex    Normal skin dom isolated  Organism: Enterobacter cloacae complex (01-31-21 @ 20:15)  Organism: Enterobacter cloacae complex (01-31-21 @ 20:15)      -  Amikacin: S <=16      -  Amoxicillin/Clavulanic Acid: R >16/8      -  Ampicillin: R 16 These ampicillin results predict results for amoxicillin      -  Ampicillin/Sulbactam: R 8/4 Enterobacter, Citrobacter, and Serratia may develop resistance during prolonged therapy (3-4 days)      -  Aztreonam: S <=4      -  Cefazolin: R >16 Enterobacter, Citrobacter, and Serratia may develop resistance during prolonged therapy (3-4 days)      -  Cefepime: S <=2      -  Cefoxitin: R >16      -  Ceftriaxone: S <=1 Enterobacter, Citrobacter, and Serratia may develop resistance during prolonged therapy      -  Ciprofloxacin: S <=0.25      -  Ertapenem: S <=0.5      -  Gentamicin: S <=2      -  Imipenem: S <=1      -  Levofloxacin: S <=0.5      -  Meropenem: S <=1      -  Piperacillin/Tazobactam: S <=8      -  Tobramycin: S <=2      -  Trimethoprim/Sulfamethoxazole: S <=0.5/9.5      Method Type: LILIANE    Culture - Other (collected 01-28-21 @ 22:17)  Source: .Other Right 2nd distal toe  Final Report (01-30-21 @ 16:44):    Numerous Enterobacter cloacae complex    Moderate Staphylococcus lugdunensis  Organism: Enterobacter cloacae complex  Staphylococcus lugdunensis (01-30-21 @ 16:44)  Organism: Staphylococcus lugdunensis (01-30-21 @ 16:44)      -  Ampicillin/Sulbactam: S <=8/4      -  Cefazolin: S <=4      -  Clindamycin: R >4      -  Erythromycin: R >4      -  Gentamicin: S <=1 Should not be used as monotherapy      -  Oxacillin: S <=0.25      -  RIF- Rifampin: S <=1 Should not be used as monotherapy      -  Tetra/Doxy: S <=1      -  Trimethoprim/Sulfamethoxazole: S <=0.5/9.5      -  Vancomycin: S 1      Method Type: LILIANE  Organism: Enterobacter cloacae complex (01-30-21 @ 16:44)      -  Amikacin: S <=16      -  Amoxicillin/Clavulanic Acid: R >16/8      -  Ampicillin: R >16 These ampicillin results predict results for amoxicillin      -  Ampicillin/Sulbactam: R 16/8 Enterobacter, Citrobacter, and Serratia may develop resistance during prolonged therapy (3-4 days)      -  Aztreonam: S <=4      -  Cefazolin: R >16 Enterobacter, Citrobacter, and Serratia may develop resistance during prolonged therapy (3-4 days)      -  Cefepime: S <=2      -  Cefoxitin: R >16      -  Ceftriaxone: S <=1 Enterobacter, Citrobacter, and Serratia may develop resistance during prolonged therapy      -  Ciprofloxacin: S <=0.25      -  Ertapenem: S <=0.5      -  Gentamicin: S <=2      -  Imipenem: S <=1      -  Levofloxacin: S <=0.5      -  Meropenem: S <=1      -  Piperacillin/Tazobactam: S <=8      -  Tobramycin: S <=2      -  Trimethoprim/Sulfamethoxazole: S <=0.5/9.5      Method Type: LILIANE    Culture - Blood (collected 01-28-21 @ 22:00)  Source: .Blood Blood  Preliminary Report (01-29-21 @ 23:01):    No growth to date.    Culture - Blood (collected 01-28-21 @ 22:00)  Source: .Blood Blood  Preliminary Report (01-29-21 @ 23:01):    No growth to date.        Radiology: reviewed

## 2021-02-02 NOTE — DISCHARGE NOTE PROVIDER - PROVIDER TOKENS
PROVIDER:[TOKEN:[[id number]]],PROVIDER:[TOKEN:[[id number]]],PROVIDER:[TOKEN:[[id number]]]

## 2021-02-02 NOTE — PROGRESS NOTE ADULT - SUBJECTIVE AND OBJECTIVE BOX
Patient is a 70y old  Female who presents with a chief complaint of Right foot 2nd digit infection. (02 Feb 2021 12:26)      INTERVAL /OVERNIGHT EVENTS: resting comfortably    MEDICATIONS  (STANDING):  amitriptyline 100 milliGRAM(s) Oral at bedtime  baclofen 10 milliGRAM(s) Oral every 12 hours  carvedilol 6.25 milliGRAM(s) Oral every 12 hours  chlorhexidine 0.12% Liquid 15 milliLiter(s) Oral Mucosa two times a day  doxepin 50 milliGRAM(s) Oral daily  DULoxetine 60 milliGRAM(s) Oral at bedtime  furosemide    Tablet 20 milliGRAM(s) Oral daily  gabapentin 400 milliGRAM(s) Oral four times a day  heparin   Injectable 5000 Unit(s) SubCutaneous every 8 hours  lactobacillus acidophilus 1 Tablet(s) Oral three times a day  levothyroxine 200 MICROGram(s) Oral daily  lidocaine   Patch 1 Patch Transdermal every 24 hours  losartan 25 milliGRAM(s) Oral daily  melatonin 10 milliGRAM(s) Oral at bedtime  multivitamin 1 Tablet(s) Oral daily  oxyCODONE  ER Tablet 30 milliGRAM(s) Oral every 8 hours  pantoprazole    Tablet 40 milliGRAM(s) Oral before breakfast  rOPINIRole 1 milliGRAM(s) Oral daily  rOPINIRole 2 milliGRAM(s) Oral every 24 hours  simvastatin 40 milliGRAM(s) Oral at bedtime  sucralfate 1 Gram(s) Oral four times a day  trimethoprim  160 mG/sulfamethoxazole 800 mG 1 Tablet(s) Oral two times a day    MEDICATIONS  (PRN):  acetaminophen   Tablet .. 325 milliGRAM(s) Oral daily PRN Temp greater or equal to 38C (100.4F), Mild Pain (1 - 3)  naloxegol 25 milliGRAM(s) Oral <User Schedule> PRN constipation  tapentadol 75 milliGRAM(s) Oral three times a day PRN Severe Pain (7 - 10)      Allergies    latex (Rash)  penicillin (Hives)    Intolerances        REVIEW OF SYSTEMS:  CONSTITUTIONAL: No fever, weight loss, or fatigue  EYES: No eye pain, visual disturbances, or discharge  ENMT:  No difficulty hearing, tinnitus, vertigo; No sinus or throat pain  NECK: No pain or stiffness  RESPIRATORY: No cough, wheezing, chills or hemoptysis; No shortness of breath  CARDIOVASCULAR: No chest pain, palpitations, dizziness, or leg swelling  GASTROINTESTINAL: No abdominal or epigastric pain. No nausea, vomiting, or hematemesis; No diarrhea or constipation. No melena or hematochezia.  GENITOURINARY: No dysuria, frequency, hematuria, or incontinence  NEUROLOGICAL: No headaches, memory loss, loss of strength, numbness, or tremors  SKIN: No itching, burning, rashes, or lesions   LYMPH NODES: No enlarged glands  ENDOCRINE: No heat or cold intolerance; No hair loss; No polydipsia or polyuria  MUSCULOSKELETAL: No joint pain or swelling; No muscle, back, or extremity pain  PSYCHIATRIC: No depression, anxiety, mood swings, or difficulty sleeping  HEME/LYMPH: No easy bruising, or bleeding gums  ALLERGY AND IMMUNOLOGIC: No hives or eczema    Vital Signs Last 24 Hrs  T(C): 36.7 (02 Feb 2021 13:22), Max: 36.7 (01 Feb 2021 20:22)  T(F): 98 (02 Feb 2021 13:22), Max: 98 (01 Feb 2021 20:22)  HR: 72 (02 Feb 2021 13:22) (72 - 77)  BP: 114/63 (02 Feb 2021 13:22) (107/62 - 114/63)  BP(mean): --  RR: 16 (02 Feb 2021 06:36) (16 - 17)  SpO2: 93% (02 Feb 2021 06:36) (93% - 94%)    PHYSICAL EXAM:  GENERAL: NAD, well-groomed, well-developed  HEAD:  Atraumatic, Normocephalic  EYES: EOMI, PERRLA, conjunctiva and sclera clear  ENMT: No tonsillar erythema, exudates, or enlargement; Moist mucous membranes, Good dentition, No lesions  NECK: Supple, No JVD, Normal thyroid  NERVOUS SYSTEM:  Alert & Oriented X3, Good concentration; Motor Strength 5/5 B/L upper and lower extremities; DTRs 2+ intact and symmetric  CHEST/LUNG: Clear to auscultation bilaterally; No rales, rhonchi, wheezing, or rubs  HEART: Regular rate and rhythm; No murmurs, rubs, or gallops  ABDOMEN: Soft, Nontender, Nondistended; Bowel sounds present  EXTREMITIES:  2+ Peripheral Pulses, No clubbing, cyanosis, or edema  LYMPH: No lymphadenopathy noted  SKIN: discolred surgical site    LABS:              CAPILLARY BLOOD GLUCOSE          RADIOLOGY & ADDITIONAL TESTS:    Notes Reviewed:  [x ] YES  [ ] NO    Care Discussed with Consultants/Other Providers [x ] YES  [ ] NO

## 2021-02-02 NOTE — PROGRESS NOTE ADULT - SUBJECTIVE AND OBJECTIVE BOX
[facility] -- Jayne Aguila, Max Chacko, Hernandez Kirkland Savella  Office # [phone]      Follow Up:    HTN  Subjective/Observations:   No events overnight resting comfortably in bed.  No complaints of chest pain, dyspnea, or palpitations reported. No signs of orthopnea or PND.     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        MEDICATIONS  (STANDING):  amitriptyline 100 milliGRAM(s) Oral at bedtime  baclofen 10 milliGRAM(s) Oral every 12 hours  carvedilol 6.25 milliGRAM(s) Oral every 12 hours  chlorhexidine 0.12% Liquid 15 milliLiter(s) Oral Mucosa two times a day  doxepin 50 milliGRAM(s) Oral daily  DULoxetine 60 milliGRAM(s) Oral at bedtime  furosemide    Tablet 20 milliGRAM(s) Oral daily  gabapentin 400 milliGRAM(s) Oral four times a day  heparin   Injectable 5000 Unit(s) SubCutaneous every 8 hours  lactobacillus acidophilus 1 Tablet(s) Oral three times a day  levothyroxine 200 MICROGram(s) Oral daily  lidocaine   Patch 1 Patch Transdermal every 24 hours  losartan 25 milliGRAM(s) Oral daily  melatonin 10 milliGRAM(s) Oral at bedtime  multivitamin 1 Tablet(s) Oral daily  oxyCODONE  ER Tablet 30 milliGRAM(s) Oral every 8 hours  pantoprazole    Tablet 40 milliGRAM(s) Oral before breakfast  rOPINIRole 1 milliGRAM(s) Oral daily  rOPINIRole 2 milliGRAM(s) Oral every 24 hours  simvastatin 40 milliGRAM(s) Oral at bedtime  sucralfate 1 Gram(s) Oral four times a day  trimethoprim  160 mG/sulfamethoxazole 800 mG 1 Tablet(s) Oral two times a day    MEDICATIONS  (PRN):  acetaminophen   Tablet .. 325 milliGRAM(s) Oral daily PRN Temp greater or equal to 38C (100.4F), Mild Pain (1 - 3)  naloxegol 25 milliGRAM(s) Oral <User Schedule> PRN constipation  tapentadol 75 milliGRAM(s) Oral three times a day PRN Severe Pain (7 - 10)      Allergies    latex (Rash)  penicillin (Hives)    Intolerances        Vital Signs Last 24 Hrs  T(C): 36.7 (02 Feb 2021 06:36), Max: 36.7 (01 Feb 2021 20:22)  T(F): 98 (02 Feb 2021 06:36), Max: 98 (01 Feb 2021 20:22)  HR: 72 (02 Feb 2021 06:36) (72 - 77)  BP: 107/62 (02 Feb 2021 06:36) (107/62 - 111/67)  BP(mean): --  RR: 16 (02 Feb 2021 06:36) (16 - 17)  SpO2: 93% (02 Feb 2021 06:36) (93% - 94%)    I&O's Summary        PHYSICAL EXAM:  TELE: Off tele   Constitutional: NAD, awake and alert, well-developed  HEENT: Moist Mucous Membranes, Anicteric  Pulmonary: Non-labored, breath sounds are clear bilaterally, No wheezing, crackles or rhonchi  Cardiovascular: Regular, S1 and S2 nl, No murmurs, rubs, gallops or clicks  Gastrointestinal: Bowel Sounds present, soft, nontender.   Lymph: No lymphadenopathy. No peripheral edema.  Skin: No visible rashes or ulcers.  Psych:  Mood & affect appropriate    LABS: All Labs Reviewed:                        11.6   10.38 )-----------( 218      ( 30 Jan 2021 19:27 )             36.6     30 Jan 2021 19:27    138    |  103    |  21     ----------------------------<  126    4.4     |  31     |  1.10     Ca    8.8        30 Jan 2021 19:27

## 2021-02-02 NOTE — DISCHARGE NOTE PROVIDER - NSDCCPCAREPLAN_GEN_ALL_CORE_FT
PRINCIPAL DISCHARGE DIAGNOSIS  Diagnosis: Ulcer of toe of right foot  Assessment and Plan of Treatment: follow up with podiatrist Dr. UMANZOR      SECONDARY DISCHARGE DIAGNOSES  Diagnosis: Cellulitis of toe of right foot  Assessment and Plan of Treatment:

## 2021-02-02 NOTE — PROGRESS NOTE ADULT - ATTENDING COMMENTS
Infectious Diseases will continue to follow. Please call with any questions.   Angeli Garcia M.D.  [facility], Division of Infectious Diseases [phone]  For over the weekend and after hours, please call [phone]

## 2021-02-02 NOTE — PROGRESS NOTE ADULT - PROBLEM SELECTOR PLAN 1
Pt evaluated and chart reviewed, case discussed with Dr. Gray  S/P right distal 2nd digit amputation  Applied aquacel DSD to wound which may remain in place for multiple days  Ischemic changes and no CRT distal 2nd digit  Vacular consulted, recs appreciated - no surgical intervention at this time.  Will pursue hyperbaric oxygen therapy as outpatient    Podiatry will follow pt while in house  Patient is stable for discharge from podiatry's standpoint    WOUND CARE INSTRUCTIONS  1) Pt to keep dressing clean dry and intact after discharge.    2) Please observe foot for signs of infection including swelling, redness, drainage, and warmth.  If noted, please come to ED immediately.   3) Please call to make an appointment with Dr. Gray at the [place] wound center within 5 days of discharge.

## 2021-02-02 NOTE — DISCHARGE NOTE PROVIDER - NSDCMRMEDTOKEN_GEN_ALL_CORE_FT
amitriptyline 100 mg oral tablet: 1 tab(s) orally once a day (at bedtime)  aspirin 81 mg oral tablet: 1 tab(s) orally once a day  baclofen 10 mg oral tablet: 1 tab(s) orally 2 times a day  carvedilol 6.25 mg oral tablet: 1 tab(s) orally 2 times a day  chlorhexidine 0.12% mucous membrane liquid: 15 milliliter(s) mucous membrane 2 times a day  Cymbalta 60 mg oral delayed release capsule: 1 cap(s) orally once a day (at bedtime). May take a morning dose, if necessary  doxepin 50 mg oral capsule: 1 cap(s) orally once a day (in the evening)  Dulcolax Stool Softener 100 mg oral capsule: 2 cap(s) orally once a day (in the morning) and then 1 cap(s) in the evening  furosemide 40 mg oral tablet: 1 tab(s) orally once a day (in the morning)  gabapentin 400 mg oral capsule: 4 cap(s) orally once a day. Not to exceed 4 capsules per day  Klor-Con 10 oral tablet, extended release: 1 tab(s) orally once a day (in the morning)  lactobacillus acidophilus oral capsule: 1  orally once a day  levothyroxine 200 mcg (0.2 mg) oral tablet: 1 tab(s) orally once a day  Melatonin 10 mg oral capsule: 1 cap(s) orally once a day (at bedtime)  multivitamin: 1 tab(s) orally once a day  naloxegol 25 mg oral tablet: 1 tab(s) orally 2 times a day, As Needed  naloxegol 25 mg oral tablet: 1 tab(s) orally 2 times a day, As Needed    *New Rx, pt previously has taken this medication.   Nucynta 75 mg oral tablet: 1 tab(s) orally 3 times a day, As Needed  olmesartan 20 mg oral tablet: 1 tab(s) orally once a day  oxyCODONE 30 mg oral tablet, extended release: 1 tab(s) orally every 8 hours, As Needed  pantoprazole 40 mg oral delayed release tablet: 1 tab(s) orally 2 times a day  rOPINIRole 1 mg oral tablet: 1-3 tab(s) orally once a day (in the evening) for spasms.    *Pt states typically taking 2 tab(s) in the evening.  simvastatin 40 mg oral tablet: 1 tab(s) orally once a day (at bedtime)  sucralfate 1 g/10 mL oral suspension: 10 milliliter(s) orally 2 times a day, As Needed  sulfamethoxazole-trimethoprim 800 mg-160 mg oral tablet: 1 tab(s) orally 2 times a day  Tylenol 325 mg oral tablet: 1 tab(s) orally once a day, As Needed  ZTlido 1.8% topical film: Apply topically to affected area once a day, As Needed (Lidoderm replacement)

## 2021-02-02 NOTE — DISCHARGE NOTE PROVIDER - CARE PROVIDER_API CALL
Karlos Gray (DPM)  Podiatric Medicine and Surgery  [address] [place] [address]  Phone: [phone]  Fax: [phone]  Follow Up Time:     Jerry Frost; PhD)  Infectious Disease; Internal Medicine  [address] [place] [address]  Phone: [phone]  Fax: [phone]  Follow Up Time:     Moses Richards)  Internal Medicine  [address] [place] [address]  Phone: [phone]  Fax: [phone]  Follow Up Time:

## 2021-02-02 NOTE — PROGRESS NOTE ADULT - SUBJECTIVE AND OBJECTIVE BOX
70y year old Female seen at [facility] for right distal phalanx wound.  Pt is s/p right foot 2nd digit distal amputation.  Denies any fever, chills, nausea, vomiting, chest pain, shortness of breath, or calf pain at this time.      Allergies    latex (Rash)  penicillin (Hives)    Intolerances        MEDICATIONS  (STANDING):  amitriptyline 100 milliGRAM(s) Oral at bedtime  carvedilol 6.25 milliGRAM(s) Oral every 12 hours  DULoxetine 60 milliGRAM(s) Oral at bedtime  furosemide    Tablet 20 milliGRAM(s) Oral daily  gabapentin 400 milliGRAM(s) Oral four times a day  heparin   Injectable 5000 Unit(s) SubCutaneous every 12 hours  lactobacillus acidophilus 1 Tablet(s) Oral three times a day  levothyroxine 200 MICROGram(s) Oral daily  lidocaine   Patch 1 Patch Transdermal every 24 hours  losartan 25 milliGRAM(s) Oral daily  melatonin 5 milliGRAM(s) Oral at bedtime  multivitamin 1 Tablet(s) Oral daily  oxyCODONE  ER Tablet 20 milliGRAM(s) Oral every 12 hours  pantoprazole    Tablet 40 milliGRAM(s) Oral before breakfast  potassium chloride    Tablet ER 10 milliEquivalent(s) Oral daily  rOPINIRole 3 milliGRAM(s) Oral daily  simvastatin 40 milliGRAM(s) Oral at bedtime  sucralfate 1 Gram(s) Oral four times a day  vancomycin  IVPB 1250 milliGRAM(s) IV Intermittent every 12 hours    MEDICATIONS  (PRN):  acetaminophen   Tablet .. 325 milliGRAM(s) Oral daily PRN Temp greater or equal to 38C (100.4F), Mild Pain (1 - 3)  naloxegol 25 milliGRAM(s) Oral <User Schedule> PRN Home regimen  tapentadol 75 milliGRAM(s) Oral three times a day PRN home      Vital Signs Last 24 Hrs  T(C): 36.8 (30 Jan 2021 14:04), Max: 37.1 (30 Jan 2021 05:03)  T(F): 98.3 (30 Jan 2021 14:04), Max: 98.7 (30 Jan 2021 05:03)  HR: 92 (30 Jan 2021 14:04) (83 - 92)  BP: 114/70 (30 Jan 2021 14:04) (114/70 - 122/64)  BP(mean): --  RR: 17 (30 Jan 2021 14:04) (17 - 17)  SpO2: 93% (30 Jan 2021 14:04) (91% - 93%)    PHYSICAL EXAM:  Vascular: DP & PT palpable bilaterally, Capillary refill 3 seconds.  Edema and erythema right 2nd digit.    Neurological: Light touch sensation intact bilaterally  Musculoskeletal: No POP wound  Dermatological: Right foot 2nd digit surgical site noted with sutures intact, no signs of infection.  Plantar flap is dusky, devitalized.              ----------CHEM PANEL----------    PT/INR - ( 29 Jan 2021 07:29 )   PT: 13.2 sec;   INR: 1.14 ratio         PTT - ( 29 Jan 2021 07:29 )  PTT:33.4 sec      Culture - Tissue with Gram Stain (collected 29 Jan 2021 18:43)  Source: .Tissue Other, R 2nd toe head of mid phalanx  Gram Stain (29 Jan 2021 22:31):    No polymorphonuclear cells seen    No organisms seen    Culture - Tissue with Gram Stain (collected 29 Jan 2021 18:43)  Source: .Tissue Other, Right 2nd toe distal phalanx  Gram Stain (29 Jan 2021 22:30):    No polymorphonuclear cells seen    No organisms seen    Culture - Other (collected 28 Jan 2021 22:17)  Source: .Other Right 2nd distal toe  Final Report (30 Jan 2021 16:44):    Numerous Enterobacter cloacae complex    Moderate Staphylococcus lugdunensis  Organism: Enterobacter cloacae complex  Staphylococcus lugdunensis (30 Jan 2021 16:44)  Organism: Staphylococcus lugdunensis (30 Jan 2021 16:44)  Organism: Enterobacter cloacae complex (30 Jan 2021 16:44)    Culture - Blood (collected 28 Jan 2021 22:00)  Source: .Blood Blood  Preliminary Report (29 Jan 2021 23:01):    No growth to date.    Culture - Blood (collected 28 Jan 2021 22:00)  Source: .Blood Blood  Preliminary Report (29 Jan 2021 23:01):    No growth to date.        Imaging: ----------

## 2021-02-02 NOTE — PROGRESS NOTE ADULT - ASSESSMENT
70 F with HTN, HLD, neuropathy, and spinal stenosis presented with right 2nd toe ulcer, now for resection of right 2nd partial ray resection.  Consult is called for cardiac clearance in setting of murmur, HTN and comorbidities     Right toe ulcer  - s/p partial ray resection of right 2nd toe for non-healing ulcer, tolerated procedure well  - Can do routine TTE to evaluate cardiac structures, patient has murmur on exam of at least mild in significance (can be done as outpt)  - Pain control  - Abx per Primary    HTN  - BP stable and controlled   - Continue Coreg, Losartan, and Lasix     HLD  - Continue home statin    DVT ppx  - Per Primary    Further cardiac w/u pending clinical course  Will continue to follow    Madhu Oropeza DNP, ANP-c  Cardiology   Spectra #[id number] [phone]

## 2021-02-03 ENCOUNTER — TRANSCRIPTION ENCOUNTER (OUTPATIENT)
Age: 71
End: 2021-02-03

## 2021-02-03 LAB
CULTURE RESULTS: SIGNIFICANT CHANGE UP
CULTURE RESULTS: SIGNIFICANT CHANGE UP
ORGANISM # SPEC MICROSCOPIC CNT: SIGNIFICANT CHANGE UP
ORGANISM # SPEC MICROSCOPIC CNT: SIGNIFICANT CHANGE UP
SPECIMEN SOURCE: SIGNIFICANT CHANGE UP
SPECIMEN SOURCE: SIGNIFICANT CHANGE UP
SURGICAL PATHOLOGY STUDY: SIGNIFICANT CHANGE UP

## 2021-02-03 PROCEDURE — 99232 SBSQ HOSP IP/OBS MODERATE 35: CPT

## 2021-02-03 PROCEDURE — 99232 SBSQ HOSP IP/OBS MODERATE 35: CPT | Mod: 24,57

## 2021-02-03 RX ORDER — DIPHENHYDRAMINE HCL 50 MG
50 CAPSULE ORAL EVERY 4 HOURS
Refills: 0 | Status: DISCONTINUED | OUTPATIENT
Start: 2021-02-03 | End: 2021-02-04

## 2021-02-03 RX ADMIN — OXYCODONE HYDROCHLORIDE 30 MILLIGRAM(S): 5 TABLET ORAL at 15:35

## 2021-02-03 RX ADMIN — HEPARIN SODIUM 5000 UNIT(S): 5000 INJECTION INTRAVENOUS; SUBCUTANEOUS at 21:58

## 2021-02-03 RX ADMIN — GABAPENTIN 400 MILLIGRAM(S): 400 CAPSULE ORAL at 12:36

## 2021-02-03 RX ADMIN — Medication 20 MILLIGRAM(S): at 05:43

## 2021-02-03 RX ADMIN — Medication 1 GRAM(S): at 21:54

## 2021-02-03 RX ADMIN — OXYCODONE HYDROCHLORIDE 30 MILLIGRAM(S): 5 TABLET ORAL at 21:53

## 2021-02-03 RX ADMIN — CHLORHEXIDINE GLUCONATE 15 MILLILITER(S): 213 SOLUTION TOPICAL at 05:46

## 2021-02-03 RX ADMIN — GABAPENTIN 400 MILLIGRAM(S): 400 CAPSULE ORAL at 17:17

## 2021-02-03 RX ADMIN — Medication 10 MILLIGRAM(S): at 05:44

## 2021-02-03 RX ADMIN — LOSARTAN POTASSIUM 25 MILLIGRAM(S): 100 TABLET, FILM COATED ORAL at 05:45

## 2021-02-03 RX ADMIN — Medication 1 TABLET(S): at 12:36

## 2021-02-03 RX ADMIN — Medication 10 MILLIGRAM(S): at 17:17

## 2021-02-03 RX ADMIN — Medication 200 MICROGRAM(S): at 05:44

## 2021-02-03 RX ADMIN — DULOXETINE HYDROCHLORIDE 60 MILLIGRAM(S): 30 CAPSULE, DELAYED RELEASE ORAL at 21:54

## 2021-02-03 RX ADMIN — HEPARIN SODIUM 5000 UNIT(S): 5000 INJECTION INTRAVENOUS; SUBCUTANEOUS at 05:45

## 2021-02-03 RX ADMIN — GABAPENTIN 400 MILLIGRAM(S): 400 CAPSULE ORAL at 05:44

## 2021-02-03 RX ADMIN — CHLORHEXIDINE GLUCONATE 15 MILLILITER(S): 213 SOLUTION TOPICAL at 17:18

## 2021-02-03 RX ADMIN — Medication 1 TABLET(S): at 21:54

## 2021-02-03 RX ADMIN — CARVEDILOL PHOSPHATE 6.25 MILLIGRAM(S): 80 CAPSULE, EXTENDED RELEASE ORAL at 05:43

## 2021-02-03 RX ADMIN — Medication 1 TABLET(S): at 05:44

## 2021-02-03 RX ADMIN — CARVEDILOL PHOSPHATE 6.25 MILLIGRAM(S): 80 CAPSULE, EXTENDED RELEASE ORAL at 17:18

## 2021-02-03 RX ADMIN — ROPINIROLE 2 MILLIGRAM(S): 8 TABLET, FILM COATED, EXTENDED RELEASE ORAL at 21:54

## 2021-02-03 RX ADMIN — Medication 100 MILLIGRAM(S): at 21:54

## 2021-02-03 RX ADMIN — Medication 10 MILLIGRAM(S): at 21:54

## 2021-02-03 RX ADMIN — Medication 50 MILLIGRAM(S): at 12:36

## 2021-02-03 RX ADMIN — GABAPENTIN 400 MILLIGRAM(S): 400 CAPSULE ORAL at 21:53

## 2021-02-03 RX ADMIN — SIMVASTATIN 40 MILLIGRAM(S): 20 TABLET, FILM COATED ORAL at 21:54

## 2021-02-03 RX ADMIN — PANTOPRAZOLE SODIUM 40 MILLIGRAM(S): 20 TABLET, DELAYED RELEASE ORAL at 05:44

## 2021-02-03 RX ADMIN — Medication 1 GRAM(S): at 05:44

## 2021-02-03 RX ADMIN — Medication 1 GRAM(S): at 17:17

## 2021-02-03 RX ADMIN — Medication 1 GRAM(S): at 15:24

## 2021-02-03 RX ADMIN — OXYCODONE HYDROCHLORIDE 30 MILLIGRAM(S): 5 TABLET ORAL at 05:45

## 2021-02-03 RX ADMIN — Medication 1 TABLET(S): at 17:18

## 2021-02-03 NOTE — PROGRESS NOTE ADULT - SUBJECTIVE AND OBJECTIVE BOX
Patient is a 70y old  Female who presents with a chief complaint of Right foot 2nd digit infection. (03 Feb 2021 16:06)      INTERVAL /OVERNIGHT EVENTS: waiting for path, per podiatry poor healing of surgical site    MEDICATIONS  (STANDING):  amitriptyline 100 milliGRAM(s) Oral at bedtime  baclofen 10 milliGRAM(s) Oral every 12 hours  carvedilol 6.25 milliGRAM(s) Oral every 12 hours  chlorhexidine 0.12% Liquid 15 milliLiter(s) Oral Mucosa two times a day  doxepin 50 milliGRAM(s) Oral daily  DULoxetine 60 milliGRAM(s) Oral at bedtime  furosemide    Tablet 20 milliGRAM(s) Oral daily  gabapentin 400 milliGRAM(s) Oral four times a day  heparin   Injectable 5000 Unit(s) SubCutaneous every 8 hours  lactobacillus acidophilus 1 Tablet(s) Oral three times a day  levothyroxine 200 MICROGram(s) Oral daily  lidocaine   Patch 1 Patch Transdermal every 24 hours  losartan 25 milliGRAM(s) Oral daily  melatonin 10 milliGRAM(s) Oral at bedtime  multivitamin 1 Tablet(s) Oral daily  oxyCODONE  ER Tablet 30 milliGRAM(s) Oral every 8 hours  pantoprazole    Tablet 40 milliGRAM(s) Oral before breakfast  rOPINIRole 1 milliGRAM(s) Oral daily  rOPINIRole 2 milliGRAM(s) Oral every 24 hours  simvastatin 40 milliGRAM(s) Oral at bedtime  sucralfate 1 Gram(s) Oral four times a day  trimethoprim  160 mG/sulfamethoxazole 800 mG 1 Tablet(s) Oral two times a day    MEDICATIONS  (PRN):  acetaminophen   Tablet .. 325 milliGRAM(s) Oral daily PRN Temp greater or equal to 38C (100.4F), Mild Pain (1 - 3)  diphenhydrAMINE 50 milliGRAM(s) Oral every 4 hours PRN Rash and/or Itching  naloxegol 25 milliGRAM(s) Oral <User Schedule> PRN constipation  tapentadol 75 milliGRAM(s) Oral three times a day PRN Severe Pain (7 - 10)      Allergies    latex (Rash)  penicillin (Hives)    Intolerances        REVIEW OF SYSTEMS:  CONSTITUTIONAL: No fever, weight loss, or fatigue  EYES: No eye pain, visual disturbances, or discharge  ENMT:  No difficulty hearing, tinnitus, vertigo; No sinus or throat pain  NECK: No pain or stiffness  RESPIRATORY: No cough, wheezing, chills or hemoptysis; No shortness of breath  CARDIOVASCULAR: No chest pain, palpitations, dizziness, or leg swelling  GASTROINTESTINAL: No abdominal or epigastric pain. No nausea, vomiting, or hematemesis; No diarrhea or constipation. No melena or hematochezia.  GENITOURINARY: No dysuria, frequency, hematuria, or incontinence  NEUROLOGICAL: No headaches, memory loss, loss of strength, numbness, or tremors  SKIN: discoloration of surgical site  LYMPH NODES: No enlarged glands  ENDOCRINE: No heat or cold intolerance; No hair loss; No polydipsia or polyuria  MUSCULOSKELETAL: No joint pain or swelling; No muscle, back, or extremity pain  PSYCHIATRIC: No depression, anxiety, mood swings, or difficulty sleeping  HEME/LYMPH: No easy bruising, or bleeding gums  ALLERGY AND IMMUNOLOGIC: No hives or eczema    Vital Signs Last 24 Hrs  T(C): 36.7 (03 Feb 2021 13:10), Max: 36.8 (03 Feb 2021 04:36)  T(F): 98.1 (03 Feb 2021 13:10), Max: 98.3 (03 Feb 2021 04:36)  HR: 77 (03 Feb 2021 13:10) (77 - 78)  BP: 121/66 (03 Feb 2021 13:10) (113/70 - 124/74)  BP(mean): --  RR: 18 (03 Feb 2021 13:10) (18 - 18)  SpO2: 92% (03 Feb 2021 13:10) (92% - 92%)    PHYSICAL EXAM:  GENERAL: NAD, well-groomed, well-developed  HEAD:  Atraumatic, Normocephalic  EYES: EOMI, PERRLA, conjunctiva and sclera clear  ENMT: No tonsillar erythema, exudates, or enlargement; Moist mucous membranes, Good dentition, No lesions  NECK: Supple, No JVD, Normal thyroid  NERVOUS SYSTEM:  Alert & Oriented X3, Good concentration; Motor Strength 5/5 B/L upper and lower extremities; DTRs 2+ intact and symmetric  CHEST/LUNG: Clear to auscultation bilaterally; No rales, rhonchi, wheezing, or rubs  HEART: Regular rate and rhythm; No murmurs, rubs, or gallops  ABDOMEN: Soft, Nontender, Nondistended; Bowel sounds present  EXTREMITIES:  2+ Peripheral Pulses, No clubbing, cyanosis, or edema  LYMPH: No lymphadenopathy noted  SKIN: dusky surgical site    LABS:              CAPILLARY BLOOD GLUCOSE          RADIOLOGY & ADDITIONAL TESTS:    Notes Reviewed:  [x ] YES  [ ] NO    Care Discussed with Consultants/Other Providers x[ ] YES  [ ] NO

## 2021-02-03 NOTE — PROGRESS NOTE ADULT - PROBLEM SELECTOR PLAN 1
Pt evaluated and chart reviewed, case discussed with Dr. Gray  S/P right distal 2nd digit amputation  Due to devitalization of plantar flap of right 2nd digit surgical site, pt will require full 2nd digit amputation, as well as left 2nd digit partial distal amputation scheduled for tomorrow 2/4/21 with Dr. Gray - pt will be NPO after midnight  Applied aquacel DSD to wound   Ischemic changes and no CRT distal 2nd digit  Vacular consulted, recs appreciated     Podiatry will follow pt while in house    WOUND CARE INSTRUCTIONS  1) Pt to keep dressing clean dry and intact after discharge.    2) Please observe foot for signs of infection including swelling, redness, drainage, and warmth.  If noted, please come to ED immediately.   3) Please call to make an appointment with Dr. Gray at the [facility] wound center within 5 days of discharge.

## 2021-02-03 NOTE — PROGRESS NOTE ADULT - SUBJECTIVE AND OBJECTIVE BOX
[facility], Division of Infectious Diseases  YOKO Carlin Y. Patel, S. Shah  [phone]    Name: MILAN SHRESTHA  Age: 70y  Gender: Female  MRN: [id number]  Note Date: 02-03-21    Interval History:  Patient seen and examined at bedside this morning  No acute overnight events. Afebrile. No complaints  Notes reviewed    Antibiotics:  trimethoprim  160 mG/sulfamethoxazole 800 mG 1 Tablet(s) Oral two times a day      Medications:  acetaminophen   Tablet .. 325 milliGRAM(s) Oral daily PRN  amitriptyline 100 milliGRAM(s) Oral at bedtime  baclofen 10 milliGRAM(s) Oral every 12 hours  carvedilol 6.25 milliGRAM(s) Oral every 12 hours  chlorhexidine 0.12% Liquid 15 milliLiter(s) Oral Mucosa two times a day  doxepin 50 milliGRAM(s) Oral daily  DULoxetine 60 milliGRAM(s) Oral at bedtime  furosemide    Tablet 20 milliGRAM(s) Oral daily  gabapentin 400 milliGRAM(s) Oral four times a day  heparin   Injectable 5000 Unit(s) SubCutaneous every 8 hours  lactobacillus acidophilus 1 Tablet(s) Oral three times a day  levothyroxine 200 MICROGram(s) Oral daily  lidocaine   Patch 1 Patch Transdermal every 24 hours  losartan 25 milliGRAM(s) Oral daily  melatonin 10 milliGRAM(s) Oral at bedtime  multivitamin 1 Tablet(s) Oral daily  naloxegol 25 milliGRAM(s) Oral <User Schedule> PRN  oxyCODONE  ER Tablet 30 milliGRAM(s) Oral every 8 hours  pantoprazole    Tablet 40 milliGRAM(s) Oral before breakfast  rOPINIRole 1 milliGRAM(s) Oral daily  rOPINIRole 2 milliGRAM(s) Oral every 24 hours  simvastatin 40 milliGRAM(s) Oral at bedtime  sucralfate 1 Gram(s) Oral four times a day  tapentadol 75 milliGRAM(s) Oral three times a day PRN  trimethoprim  160 mG/sulfamethoxazole 800 mG 1 Tablet(s) Oral two times a day      Review of Systems:  A 10-point review of systems was obtained.     Pertinent positives and negatives--  Constitutional: No fevers. No Chills. No Rigors.   Cardiovascular: No chest pain. No palpitations.  Respiratory: No shortness of breath. No cough.  Gastrointestinal: No nausea or vomiting. No diarrhea or constipation.   Psychiatric: Pleasant. Appropriate affect.    Review of systems otherwise negative except as previously noted.    Allergies: latex (Rash)  penicillin (Hives)    For details regarding the patient's past medical history, social history, family history, and other miscellaneous elements, please refer the initial infectious diseases consultation and/or the admitting history and physical examination for this admission.    Objective:  Vitals:   T(C): 36.8 (02-03-21 @ 04:36), Max: 36.8 (02-03-21 @ 04:36)  HR: 77 (02-03-21 @ 04:36) (72 - 78)  BP: 124/74 (02-03-21 @ 04:36) (113/70 - 124/74)  RR: 18 (02-03-21 @ 04:36) (18 - 18)  SpO2: 92% (02-03-21 @ 04:36) (92% - 95%)    Physical Examination:  General: no acute distress  HEENT: NC/AT, EOMI, anicteric, no oral lesions  Neck: supple, no palpable LAD  Cardio: S1, S2 heard, RRR, no murmurs  Resp: breath sounds heard bilaterally, no rales, wheezes or rhonchi  Abd: soft, NT, ND, + bowel sounds  Neuro: AAOx3, no obvious focal deficits  Ext: no edema or cyanosis. R foot dressed c/d/i  Skin: warm, dry, no visible rash    Laboratory Studies:  CBC:   CMP:           Microbiology: reviewed    Culture - Tissue with Gram Stain (collected 01-29-21 @ 18:43)  Source: .Tissue Other, R 2nd toe head of mid phalanx  Gram Stain (01-29-21 @ 22:31):    No polymorphonuclear cells seen    No organisms seen  Preliminary Report (01-31-21 @ 22:39):    Few Enterobacter cloacae complex    See previous culture [id number]    Rare Coag Negative Staphylococcus "Susceptibilities not performed"    Moderate Trueperella bernardiae "Susceptibilities not performed"    Culture - Tissue with Gram Stain (collected 01-29-21 @ 18:43)  Source: .Tissue Other, Right 2nd toe distal phalanx  Gram Stain (01-29-21 @ 22:30):    No polymorphonuclear cells seen    No organisms seen  Preliminary Report (01-31-21 @ 20:20):    Few Enterobacter cloacae complex    Normal skin dom isolated  Organism: Enterobacter cloacae complex (01-31-21 @ 20:15)  Organism: Enterobacter cloacae complex (01-31-21 @ 20:15)      -  Amikacin: S <=16      -  Amoxicillin/Clavulanic Acid: R >16/8      -  Ampicillin: R 16 These ampicillin results predict results for amoxicillin      -  Ampicillin/Sulbactam: R 8/4 Enterobacter, Citrobacter, and Serratia may develop resistance during prolonged therapy (3-4 days)      -  Aztreonam: S <=4      -  Cefazolin: R >16 Enterobacter, Citrobacter, and Serratia may develop resistance during prolonged therapy (3-4 days)      -  Cefepime: S <=2      -  Cefoxitin: R >16      -  Ceftriaxone: S <=1 Enterobacter, Citrobacter, and Serratia may develop resistance during prolonged therapy      -  Ciprofloxacin: S <=0.25      -  Ertapenem: S <=0.5      -  Gentamicin: S <=2      -  Imipenem: S <=1      -  Levofloxacin: S <=0.5      -  Meropenem: S <=1      -  Piperacillin/Tazobactam: S <=8      -  Tobramycin: S <=2      -  Trimethoprim/Sulfamethoxazole: S <=0.5/9.5      Method Type: LILIANE        Radiology: reviewed

## 2021-02-03 NOTE — PROGRESS NOTE ADULT - ASSESSMENT
70 F with HTN, HLD, neuropathy, and spinal stenosis presented with right 2nd toe ulcer, now for resection of right 2nd partial ray resection.  Consult is called for cardiac clearance in setting of murmur, HTN and comorbidities     Right toe ulcer  - s/p partial ray resection of right 2nd toe for non-healing ulcer, tolerated procedure well  - Pain control  - Abx per Primary    HTN  - BP stable and controlled   - Continue Coreg, Losartan, and Lasix  -TTE nL LV & RV size and function no significant valvular dysfunction EF 55%     HLD  - Continue home statin    DVT ppx  - Per Primary    Further cardiac w/u pending clinical course  Will continue to follow    Madhu Oropeza DNP, ANP-c  Cardiology   Spectra #[phone]

## 2021-02-03 NOTE — PROGRESS NOTE ADULT - ASSESSMENT
Pt is a 70W w/ PMHx of HTN, HLD, neuropathy, spinal stenosis presents with c/o R toe 2nd digit infection x 2 days.    R toe infection  S/p R 2nd partial ray resection 1/29/21  OR TCx E. cloacae   No previous cx to help tailor therapy  Pending surgical pathology report to determine final duration of therapy. I called pathology; specimen should be reported later today  C/w bactrim 1DS tab BID for now  --Plan for 2 wks if clean margins obtained vs 6 wks if evidence of OM on surgical pathology.  Pt is a 70W w/ PMHx of HTN, HLD, neuropathy, spinal stenosis presents with c/o R toe 2nd digit infection x 2 days.    R toe infection  S/p R 2nd partial ray resection 1/29/21  WCx Coag Negative Staphylococcus; Trueperella bernardiae  OR TCx E. cloacae  No previous cx to help tailor therapy  Pending surgical pathology report to determine final duration of therapy. I called pathology; specimen should be reported later today  C/w bactrim 1DS tab BID for now  --Plan for 2 wks if clean margins obtained vs 6 wks if evidence of OM on surgical pathology.  Pt is a 70W w/ PMHx of HTN, HLD, neuropathy, spinal stenosis presents with c/o R toe 2nd digit infection x 2 days.    R toe infection  S/p R 2nd partial ray resection 1/29/21  WCx Coag Negative Staphylococcus; Trueperella bernardiae  OR TCx E. cloacae  Surgical pathology reviewed. Distal phalanx OM, middle phalanx +clean margins  Patient can be discharged on bactrim 1DS tab BID for now to complete 2 wk course until 2/13/2021

## 2021-02-03 NOTE — PROGRESS NOTE ADULT - SUBJECTIVE AND OBJECTIVE BOX
[facility] -- Jayne Aguila, Max Chacko, Hernandez Kirkland Savella  Office # [phone]      Follow Up:    HTN  Subjective/Observations:   No events overnight resting comfortably in bed.  No complaints of chest pain, dyspnea, or palpitations reported. No signs of orthopnea or PND.     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        MEDICATIONS  (STANDING):  amitriptyline 100 milliGRAM(s) Oral at bedtime  baclofen 10 milliGRAM(s) Oral every 12 hours  carvedilol 6.25 milliGRAM(s) Oral every 12 hours  chlorhexidine 0.12% Liquid 15 milliLiter(s) Oral Mucosa two times a day  doxepin 50 milliGRAM(s) Oral daily  DULoxetine 60 milliGRAM(s) Oral at bedtime  furosemide    Tablet 20 milliGRAM(s) Oral daily  gabapentin 400 milliGRAM(s) Oral four times a day  heparin   Injectable 5000 Unit(s) SubCutaneous every 8 hours  lactobacillus acidophilus 1 Tablet(s) Oral three times a day  levothyroxine 200 MICROGram(s) Oral daily  lidocaine   Patch 1 Patch Transdermal every 24 hours  losartan 25 milliGRAM(s) Oral daily  melatonin 10 milliGRAM(s) Oral at bedtime  multivitamin 1 Tablet(s) Oral daily  oxyCODONE  ER Tablet 30 milliGRAM(s) Oral every 8 hours  pantoprazole    Tablet 40 milliGRAM(s) Oral before breakfast  rOPINIRole 1 milliGRAM(s) Oral daily  rOPINIRole 2 milliGRAM(s) Oral every 24 hours  simvastatin 40 milliGRAM(s) Oral at bedtime  sucralfate 1 Gram(s) Oral four times a day  trimethoprim  160 mG/sulfamethoxazole 800 mG 1 Tablet(s) Oral two times a day    MEDICATIONS  (PRN):  acetaminophen   Tablet .. 325 milliGRAM(s) Oral daily PRN Temp greater or equal to 38C (100.4F), Mild Pain (1 - 3)  diphenhydrAMINE 50 milliGRAM(s) Oral every 4 hours PRN Rash and/or Itching  naloxegol 25 milliGRAM(s) Oral <User Schedule> PRN constipation  tapentadol 75 milliGRAM(s) Oral three times a day PRN Severe Pain (7 - 10)      Allergies    latex (Rash)  penicillin (Hives)    Intolerances        Vital Signs Last 24 Hrs  T(C): 36.8 (03 Feb 2021 04:36), Max: 36.8 (03 Feb 2021 04:36)  T(F): 98.3 (03 Feb 2021 04:36), Max: 98.3 (03 Feb 2021 04:36)  HR: 77 (03 Feb 2021 04:36) (72 - 78)  BP: 124/74 (03 Feb 2021 04:36) (113/70 - 124/74)  BP(mean): --  RR: 18 (03 Feb 2021 04:36) (18 - 18)  SpO2: 92% (03 Feb 2021 04:36) (92% - 95%)    I&O's Summary        PHYSICAL EXAM:  TELE: Off tele   Constitutional: NAD, awake and alert, well-developed  HEENT: Moist Mucous Membranes, Anicteric  Pulmonary: Non-labored, breath sounds are clear bilaterally, No wheezing, crackles or rhonchi  Cardiovascular: Regular, S1 and S2 nl, No murmurs, rubs, gallops or clicks  Gastrointestinal: Bowel Sounds present, soft, nontender.   Lymph: No lymphadenopathy. No peripheral edema.  Skin: No visible rashes or ulcers.  Psych:  Mood & affect appropriate    LABS: All Labs Reviewed:      < from: TTE Echo Complete w/o Contrast w/ Doppler (02.02.21 @ 07:59) >  EXAM:  ECHO TTE WO CON COMP W DOPP         PROCEDURE DATE:  02/02/2021        INTERPRETATION:  INDICATION: Murmur  Sonographer     Blood Pressure 107/62    Height 175.3cm     Weight 102.1kg       BSA 2.17msq    Dimensions:  LA 3.4       Normal Values: 2.0 - 4.0 cm  Ao 3.2        Normal Values: 2.0 - 3.8 cm  SEPTUM 0.9       Normal Values: 0.6 - 1.2 cm  PWT 1.0       Normal Values: 0.6 - 1.1 cm  LVIDd 4.7         Normal Values: 3.0 - 5.6 cm  LVIDs 3.4         Normal Values: 1.8 - 4.0 cm      OBSERVATIONS:  Technically difficult study  Mitral Valve: normal, trace physiologic MR.  Aortic Valve/Aorta: normal trileaflet aortic valve.  Tricuspid Valve: normal with trace TR.  Pulmonic Valve: Not well-visualized  Left Atrium: normal  Right Atrium: Not well-visualized  Left Ventricle: normal LV size and systolic function, estimated LVEF of 55%. Endocardium is not well-visualized, cannot rule out segmental dysfunction  Right Ventricle: Grossly normal size and systolic function.  Pericardium/Pleura: normal, no significant pericardial effusion.  LV diastolic dysfunction is present      Conclusion:  Technically difficult study  Normal left ventricular internal dimensions and systolic function, estimated LVEF of 55%.  Grossly normal RV size and systolic function.  Normal trileaflet aortic valve, without AI.  Trace physiologic MR and TR.  No significant pericardial effusion.              BRAYAN HAMILTON MD; Attending Cardiologist  This document has been electronically signed. Feb  3 [id number]    < end of copied text >

## 2021-02-03 NOTE — PROGRESS NOTE ADULT - PROBLEM SELECTOR PLAN 1
Podiatry consult with Dr. UMANZOR appreciated  s/p partial toe amputation POD-5  poor surgical  wound healing

## 2021-02-03 NOTE — PROGRESS NOTE ADULT - SUBJECTIVE AND OBJECTIVE BOX
70y year old Female seen at [facility] [address] for right distal phalanx wound.  Pt is s/p right foot 2nd digit distal amputation.  Denies any fever, chills, nausea, vomiting, chest pain, shortness of breath, or calf pain at this time.      Allergies    latex (Rash)  penicillin (Hives)    Intolerances        MEDICATIONS  (STANDING):  amitriptyline 100 milliGRAM(s) Oral at bedtime  carvedilol 6.25 milliGRAM(s) Oral every 12 hours  DULoxetine 60 milliGRAM(s) Oral at bedtime  furosemide    Tablet 20 milliGRAM(s) Oral daily  gabapentin 400 milliGRAM(s) Oral four times a day  heparin   Injectable 5000 Unit(s) SubCutaneous every 12 hours  lactobacillus acidophilus 1 Tablet(s) Oral three times a day  levothyroxine 200 MICROGram(s) Oral daily  lidocaine   Patch 1 Patch Transdermal every 24 hours  losartan 25 milliGRAM(s) Oral daily  melatonin 5 milliGRAM(s) Oral at bedtime  multivitamin 1 Tablet(s) Oral daily  oxyCODONE  ER Tablet 20 milliGRAM(s) Oral every 12 hours  pantoprazole    Tablet 40 milliGRAM(s) Oral before breakfast  potassium chloride    Tablet ER 10 milliEquivalent(s) Oral daily  rOPINIRole 3 milliGRAM(s) Oral daily  simvastatin 40 milliGRAM(s) Oral at bedtime  sucralfate 1 Gram(s) Oral four times a day  vancomycin  IVPB 1250 milliGRAM(s) IV Intermittent every 12 hours    MEDICATIONS  (PRN):  acetaminophen   Tablet .. 325 milliGRAM(s) Oral daily PRN Temp greater or equal to 38C (100.4F), Mild Pain (1 - 3)  naloxegol 25 milliGRAM(s) Oral <User Schedule> PRN Home regimen  tapentadol 75 milliGRAM(s) Oral three times a day PRN home      Vital Signs Last 24 Hrs  T(C): 36.8 (30 Jan 2021 14:04), Max: 37.1 (30 Jan 2021 05:03)  T(F): 98.3 (30 Jan 2021 14:04), Max: 98.7 (30 Jan 2021 05:03)  HR: 92 (30 Jan 2021 14:04) (83 - 92)  BP: 114/70 (30 Jan 2021 14:04) (114/70 - 122/64)  BP(mean): --  RR: 17 (30 Jan 2021 14:04) (17 - 17)  SpO2: 93% (30 Jan 2021 14:04) (91% - 93%)    PHYSICAL EXAM:  Vascular: DP & PT palpable bilaterally, Capillary refill 3 seconds.  Edema and erythema right 2nd digit.    Neurological: Light touch sensation intact bilaterally  Musculoskeletal: No POP wound  Dermatological: Right foot 2nd digit surgical site noted with sutures intact, no signs of infection.  Plantar flap is dusky, devitalized.   Left foot 2nd digit pre-ulcerative lesion appreciated to distal tip with HPK overlying.             ----------CHEM PANEL----------    PT/INR - ( 29 Jan 2021 07:29 )   PT: 13.2 sec;   INR: 1.14 ratio         PTT - ( 29 Jan 2021 07:29 )  PTT:33.4 sec      Culture - Tissue with Gram Stain (collected 29 Jan 2021 18:43)  Source: .Tissue Other, R 2nd toe head of mid phalanx  Gram Stain (29 Jan 2021 22:31):    No polymorphonuclear cells seen    No organisms seen    Culture - Tissue with Gram Stain (collected 29 Jan 2021 18:43)  Source: .Tissue Other, Right 2nd toe distal phalanx  Gram Stain (29 Jan 2021 22:30):    No polymorphonuclear cells seen    No organisms seen    Culture - Other (collected 28 Jan 2021 22:17)  Source: .Other Right 2nd distal toe  Final Report (30 Jan 2021 16:44):    Numerous Enterobacter cloacae complex    Moderate Staphylococcus lugdunensis  Organism: Enterobacter cloacae complex  Staphylococcus lugdunensis (30 Jan 2021 16:44)  Organism: Staphylococcus lugdunensis (30 Jan 2021 16:44)  Organism: Enterobacter cloacae complex (30 Jan 2021 16:44)    Culture - Blood (collected 28 Jan 2021 22:00)  Source: .Blood Blood  Preliminary Report (29 Jan 2021 23:01):    No growth to date.    Culture - Blood (collected 28 Jan 2021 22:00)  Source: .Blood Blood  Preliminary Report (29 Jan 2021 23:01):    No growth to date.        Imaging: ----------

## 2021-02-03 NOTE — PROGRESS NOTE ADULT - ATTENDING COMMENTS
Infectious Diseases will continue to follow. Please call with any questions.   Angeli Garcia M.D.  [facility], Division of Infectious Diseases [phone]  For over the weekend and after hours, please call [phone] Infectious Diseases will sign off at this time. Please call with additional questions  Angeli Garcia M.D.  [facility], Division of Infectious Diseases [phone]  For over the weekend and after hours, please call [phone] Infectious Diseases will continue to follow. Please call with any questions.   Angeli Garcia M.D.  [facility], Division of Infectious Diseases [phone]  For over the weekend and after hours, please call [phone]

## 2021-02-04 ENCOUNTER — RESULT REVIEW (OUTPATIENT)
Age: 71
End: 2021-02-04

## 2021-02-04 LAB
ANION GAP SERPL CALC-SCNC: 7 MMOL/L — SIGNIFICANT CHANGE UP (ref 5–17)
BUN SERPL-MCNC: 21 MG/DL — SIGNIFICANT CHANGE UP (ref 7–23)
CALCIUM SERPL-MCNC: 9 MG/DL — SIGNIFICANT CHANGE UP (ref 8.5–10.1)
CHLORIDE SERPL-SCNC: 106 MMOL/L — SIGNIFICANT CHANGE UP (ref 96–108)
CO2 SERPL-SCNC: 29 MMOL/L — SIGNIFICANT CHANGE UP (ref 22–31)
CREAT SERPL-MCNC: 1.1 MG/DL — SIGNIFICANT CHANGE UP (ref 0.5–1.3)
GLUCOSE SERPL-MCNC: 119 MG/DL — HIGH (ref 70–99)
GRAM STN FLD: SIGNIFICANT CHANGE UP
HCT VFR BLD CALC: 38.2 % — SIGNIFICANT CHANGE UP (ref 34.5–45)
HGB BLD-MCNC: 11.7 G/DL — SIGNIFICANT CHANGE UP (ref 11.5–15.5)
MCHC RBC-ENTMCNC: 28.5 PG — SIGNIFICANT CHANGE UP (ref 27–34)
MCHC RBC-ENTMCNC: 30.6 GM/DL — LOW (ref 32–36)
MCV RBC AUTO: 92.9 FL — SIGNIFICANT CHANGE UP (ref 80–100)
NRBC # BLD: 0 /100 WBCS — SIGNIFICANT CHANGE UP (ref 0–0)
PLATELET # BLD AUTO: 266 K/UL — SIGNIFICANT CHANGE UP (ref 150–400)
POTASSIUM SERPL-MCNC: 4.8 MMOL/L — SIGNIFICANT CHANGE UP (ref 3.5–5.3)
POTASSIUM SERPL-SCNC: 4.8 MMOL/L — SIGNIFICANT CHANGE UP (ref 3.5–5.3)
RBC # BLD: 4.11 M/UL — SIGNIFICANT CHANGE UP (ref 3.8–5.2)
RBC # FLD: 13.5 % — SIGNIFICANT CHANGE UP (ref 10.3–14.5)
SODIUM SERPL-SCNC: 142 MMOL/L — SIGNIFICANT CHANGE UP (ref 135–145)
SPECIMEN SOURCE: SIGNIFICANT CHANGE UP
WBC # BLD: 5.6 K/UL — SIGNIFICANT CHANGE UP (ref 3.8–10.5)
WBC # FLD AUTO: 5.6 K/UL — SIGNIFICANT CHANGE UP (ref 3.8–10.5)

## 2021-02-04 PROCEDURE — 99232 SBSQ HOSP IP/OBS MODERATE 35: CPT

## 2021-02-04 PROCEDURE — 28124 PARTIAL REMOVAL OF TOE: CPT | Mod: T6

## 2021-02-04 PROCEDURE — 88311 DECALCIFY TISSUE: CPT | Mod: 26

## 2021-02-04 PROCEDURE — 28825 PARTIAL AMPUTATION OF TOE: CPT | Mod: T1

## 2021-02-04 PROCEDURE — 88304 TISSUE EXAM BY PATHOLOGIST: CPT | Mod: 26

## 2021-02-04 RX ORDER — FUROSEMIDE 40 MG
20 TABLET ORAL DAILY
Refills: 0 | Status: DISCONTINUED | OUTPATIENT
Start: 2021-02-04 | End: 2021-02-06

## 2021-02-04 RX ORDER — BACLOFEN 100 %
10 POWDER (GRAM) MISCELLANEOUS EVERY 12 HOURS
Refills: 0 | Status: DISCONTINUED | OUTPATIENT
Start: 2021-02-04 | End: 2021-02-06

## 2021-02-04 RX ORDER — LIDOCAINE 4 G/100G
1 CREAM TOPICAL EVERY 24 HOURS
Refills: 0 | Status: DISCONTINUED | OUTPATIENT
Start: 2021-02-04 | End: 2021-02-06

## 2021-02-04 RX ORDER — DIPHENHYDRAMINE HCL 50 MG
50 CAPSULE ORAL EVERY 4 HOURS
Refills: 0 | Status: DISCONTINUED | OUTPATIENT
Start: 2021-02-04 | End: 2021-02-06

## 2021-02-04 RX ORDER — ACETAMINOPHEN 500 MG
325 TABLET ORAL DAILY
Refills: 0 | Status: DISCONTINUED | OUTPATIENT
Start: 2021-02-04 | End: 2021-02-06

## 2021-02-04 RX ORDER — CARVEDILOL PHOSPHATE 80 MG/1
6.25 CAPSULE, EXTENDED RELEASE ORAL EVERY 12 HOURS
Refills: 0 | Status: DISCONTINUED | OUTPATIENT
Start: 2021-02-04 | End: 2021-02-06

## 2021-02-04 RX ORDER — OXYCODONE HYDROCHLORIDE 5 MG/1
30 TABLET ORAL EVERY 8 HOURS
Refills: 0 | Status: DISCONTINUED | OUTPATIENT
Start: 2021-02-04 | End: 2021-02-06

## 2021-02-04 RX ORDER — PANTOPRAZOLE SODIUM 20 MG/1
40 TABLET, DELAYED RELEASE ORAL
Refills: 0 | Status: DISCONTINUED | OUTPATIENT
Start: 2021-02-04 | End: 2021-02-06

## 2021-02-04 RX ORDER — NALOXEGOL OXALATE 12.5 MG/1
25 TABLET, FILM COATED ORAL
Refills: 0 | Status: DISCONTINUED | OUTPATIENT
Start: 2021-02-04 | End: 2021-02-06

## 2021-02-04 RX ORDER — AMITRIPTYLINE HCL 25 MG
100 TABLET ORAL AT BEDTIME
Refills: 0 | Status: DISCONTINUED | OUTPATIENT
Start: 2021-02-04 | End: 2021-02-06

## 2021-02-04 RX ORDER — SIMVASTATIN 20 MG/1
40 TABLET, FILM COATED ORAL AT BEDTIME
Refills: 0 | Status: DISCONTINUED | OUTPATIENT
Start: 2021-02-04 | End: 2021-02-06

## 2021-02-04 RX ORDER — SUCRALFATE 1 G
1 TABLET ORAL
Refills: 0 | Status: DISCONTINUED | OUTPATIENT
Start: 2021-02-04 | End: 2021-02-06

## 2021-02-04 RX ORDER — HYDROMORPHONE HYDROCHLORIDE 2 MG/ML
0.5 INJECTION INTRAMUSCULAR; INTRAVENOUS; SUBCUTANEOUS
Refills: 0 | Status: DISCONTINUED | OUTPATIENT
Start: 2021-02-04 | End: 2021-02-04

## 2021-02-04 RX ORDER — ROPINIROLE 8 MG/1
1 TABLET, FILM COATED, EXTENDED RELEASE ORAL DAILY
Refills: 0 | Status: DISCONTINUED | OUTPATIENT
Start: 2021-02-04 | End: 2021-02-06

## 2021-02-04 RX ORDER — ONDANSETRON 8 MG/1
4 TABLET, FILM COATED ORAL ONCE
Refills: 0 | Status: DISCONTINUED | OUTPATIENT
Start: 2021-02-04 | End: 2021-02-04

## 2021-02-04 RX ORDER — LACTOBACILLUS ACIDOPHILUS 100MM CELL
1 CAPSULE ORAL THREE TIMES A DAY
Refills: 0 | Status: DISCONTINUED | OUTPATIENT
Start: 2021-02-04 | End: 2021-02-06

## 2021-02-04 RX ORDER — OXYCODONE HYDROCHLORIDE 5 MG/1
5 TABLET ORAL ONCE
Refills: 0 | Status: DISCONTINUED | OUTPATIENT
Start: 2021-02-04 | End: 2021-02-04

## 2021-02-04 RX ORDER — SODIUM CHLORIDE 9 MG/ML
1000 INJECTION, SOLUTION INTRAVENOUS
Refills: 0 | Status: DISCONTINUED | OUTPATIENT
Start: 2021-02-04 | End: 2021-02-04

## 2021-02-04 RX ORDER — DULOXETINE HYDROCHLORIDE 30 MG/1
60 CAPSULE, DELAYED RELEASE ORAL AT BEDTIME
Refills: 0 | Status: DISCONTINUED | OUTPATIENT
Start: 2021-02-04 | End: 2021-02-06

## 2021-02-04 RX ORDER — ROPINIROLE 8 MG/1
2 TABLET, FILM COATED, EXTENDED RELEASE ORAL AT BEDTIME
Refills: 0 | Status: DISCONTINUED | OUTPATIENT
Start: 2021-02-04 | End: 2021-02-06

## 2021-02-04 RX ORDER — HEPARIN SODIUM 5000 [USP'U]/ML
5000 INJECTION INTRAVENOUS; SUBCUTANEOUS EVERY 8 HOURS
Refills: 0 | Status: DISCONTINUED | OUTPATIENT
Start: 2021-02-04 | End: 2021-02-06

## 2021-02-04 RX ORDER — LOSARTAN POTASSIUM 100 MG/1
25 TABLET, FILM COATED ORAL DAILY
Refills: 0 | Status: DISCONTINUED | OUTPATIENT
Start: 2021-02-04 | End: 2021-02-06

## 2021-02-04 RX ORDER — GABAPENTIN 400 MG/1
400 CAPSULE ORAL
Refills: 0 | Status: DISCONTINUED | OUTPATIENT
Start: 2021-02-04 | End: 2021-02-06

## 2021-02-04 RX ORDER — LANOLIN ALCOHOL/MO/W.PET/CERES
10 CREAM (GRAM) TOPICAL AT BEDTIME
Refills: 0 | Status: DISCONTINUED | OUTPATIENT
Start: 2021-02-04 | End: 2021-02-06

## 2021-02-04 RX ORDER — LEVOTHYROXINE SODIUM 125 MCG
200 TABLET ORAL DAILY
Refills: 0 | Status: DISCONTINUED | OUTPATIENT
Start: 2021-02-04 | End: 2021-02-06

## 2021-02-04 RX ADMIN — Medication 10 MILLIGRAM(S): at 21:16

## 2021-02-04 RX ADMIN — Medication 1 GRAM(S): at 17:05

## 2021-02-04 RX ADMIN — Medication 50 MILLIGRAM(S): at 22:49

## 2021-02-04 RX ADMIN — CARVEDILOL PHOSPHATE 6.25 MILLIGRAM(S): 80 CAPSULE, EXTENDED RELEASE ORAL at 17:05

## 2021-02-04 RX ADMIN — Medication 1 TABLET(S): at 22:10

## 2021-02-04 RX ADMIN — Medication 10 MILLIGRAM(S): at 17:05

## 2021-02-04 RX ADMIN — DULOXETINE HYDROCHLORIDE 60 MILLIGRAM(S): 30 CAPSULE, DELAYED RELEASE ORAL at 21:17

## 2021-02-04 RX ADMIN — Medication 10 MILLIGRAM(S): at 06:04

## 2021-02-04 RX ADMIN — Medication 20 MILLIGRAM(S): at 06:04

## 2021-02-04 RX ADMIN — HEPARIN SODIUM 5000 UNIT(S): 5000 INJECTION INTRAVENOUS; SUBCUTANEOUS at 21:17

## 2021-02-04 RX ADMIN — CARVEDILOL PHOSPHATE 6.25 MILLIGRAM(S): 80 CAPSULE, EXTENDED RELEASE ORAL at 06:04

## 2021-02-04 RX ADMIN — Medication 50 MILLIGRAM(S): at 19:02

## 2021-02-04 RX ADMIN — GABAPENTIN 400 MILLIGRAM(S): 400 CAPSULE ORAL at 22:10

## 2021-02-04 RX ADMIN — GABAPENTIN 400 MILLIGRAM(S): 400 CAPSULE ORAL at 06:04

## 2021-02-04 RX ADMIN — OXYCODONE HYDROCHLORIDE 30 MILLIGRAM(S): 5 TABLET ORAL at 06:05

## 2021-02-04 RX ADMIN — Medication 1 TABLET(S): at 17:05

## 2021-02-04 RX ADMIN — Medication 1 TABLET(S): at 06:04

## 2021-02-04 RX ADMIN — HEPARIN SODIUM 5000 UNIT(S): 5000 INJECTION INTRAVENOUS; SUBCUTANEOUS at 17:05

## 2021-02-04 RX ADMIN — OXYCODONE HYDROCHLORIDE 30 MILLIGRAM(S): 5 TABLET ORAL at 22:10

## 2021-02-04 RX ADMIN — CHLORHEXIDINE GLUCONATE 15 MILLILITER(S): 213 SOLUTION TOPICAL at 06:06

## 2021-02-04 RX ADMIN — Medication 1 GRAM(S): at 06:04

## 2021-02-04 RX ADMIN — ROPINIROLE 2 MILLIGRAM(S): 8 TABLET, FILM COATED, EXTENDED RELEASE ORAL at 22:16

## 2021-02-04 RX ADMIN — Medication 1 GRAM(S): at 22:16

## 2021-02-04 RX ADMIN — LIDOCAINE 1 PATCH: 4 CREAM TOPICAL at 22:49

## 2021-02-04 RX ADMIN — Medication 100 MILLIGRAM(S): at 21:17

## 2021-02-04 RX ADMIN — LOSARTAN POTASSIUM 25 MILLIGRAM(S): 100 TABLET, FILM COATED ORAL at 06:05

## 2021-02-04 RX ADMIN — Medication 200 MICROGRAM(S): at 05:18

## 2021-02-04 RX ADMIN — GABAPENTIN 400 MILLIGRAM(S): 400 CAPSULE ORAL at 17:05

## 2021-02-04 RX ADMIN — SIMVASTATIN 40 MILLIGRAM(S): 20 TABLET, FILM COATED ORAL at 21:16

## 2021-02-04 RX ADMIN — PANTOPRAZOLE SODIUM 40 MILLIGRAM(S): 20 TABLET, DELAYED RELEASE ORAL at 06:04

## 2021-02-04 NOTE — PROGRESS NOTE ADULT - ASSESSMENT
70 F with HTN, HLD, neuropathy, and spinal stenosis presented with right 2nd toe ulcer, now for resection of right 2nd partial ray resection.  Consult is called for cardiac clearance in setting of murmur, HTN and comorbidities     Right toe ulcer  - s/p partial ray resection of right 2nd toe for non-healing ulcer,   - Pain control  - Abx per Primary    HTN  - BP stable and controlled 102/55  - Continue Coreg, Losartan, and Lasix  -TTE nL LV & RV size and function no significant valvular dysfunction EF 55%     HLD  - Continue home statin    DVT ppx  - Per Primary    Further cardiac w/u pending clinical course  Will continue to follow      Tracy Flores FNP-C  Cardiology NP  [facility] [id number] [phone]     70 F with HTN, HLD, neuropathy, and spinal stenosis presented with right 2nd toe ulcer, now for resection of right 2nd partial ray resection.  Consult is called for cardiac clearance in setting of murmur, HTN and comorbidities     Right toe ulcer  - s/p partial ray resection of right 2nd toe for non-healing ulcer,   - Pain control  - Abx per Primary    HTN  - BP stable and controlled 102/55  - Continue Coreg, Losartan, and Lasix  - TTE nL LV & RV size and function no significant valvular dysfunction EF 55%     HLD  - Continue home statin    DVT ppx  - Per Primary    Further cardiac w/u pending clinical course  Will continue to follow      Tracy Flores FNP-C  Cardiology NP  [facility] [phone]

## 2021-02-04 NOTE — BRIEF OPERATIVE NOTE - NSICDXBRIEFPROCEDURE_GEN_ALL_CORE_FT
PROCEDURES:  Complete amputation of second toe of right foot by open approach 04-Feb-2021 12:10:33  Karlos Gray  Partial amputation of left second toe 04-Feb-2021 12:10:16  Karlos Gray  
PROCEDURES:  Partial amputation of toe 29-Jan-2021 13:57:25  Rusty Red

## 2021-02-04 NOTE — PROGRESS NOTE ADULT - ASSESSMENT
Pt is a 70W w/ PMHx of HTN, HLD, neuropathy, spinal stenosis presents with c/o R toe 2nd digit infection x 2 days.    R toe infection  S/p R 2nd partial ray resection 1/29/21  Planned for repeat surgery today   WCx Coag Negative Staphylococcus; Trueperella bernardiae  OR TCx E. cloacae  Surgical pathology reviewed. Distal phalanx OM, middle phalanx +clean margins  C/w bactrim  Patient can be discharged on bactrim 1DS tab BID for now to complete course until 2/13/2021

## 2021-02-04 NOTE — BRIEF OPERATIVE NOTE - NSICDXBRIEFPOSTOP_GEN_ALL_CORE_FT
POST-OP DIAGNOSIS:  Toe osteomyelitis 29-Jan-2021 13:58:06  Rusty Red  
POST-OP DIAGNOSIS:  Toe osteomyelitis 29-Jan-2021 13:58:06  Rusty Red

## 2021-02-04 NOTE — DIETITIAN INITIAL EVALUATION ADULT. - OTHER INFO
Pt sleeping soundly at time of visit; just returned from OR. Seen secondary to LOS > 7days. S/p complete amputation of right foot 2nd toe and partial amputation of left foot second toe. Regular diet rx. Well tolerated since admission, minimal po documentation however 100% documented 1/31. Per RN tolerating meals well. GI wdl however no BM documented since admission. Recommend bowel regimen. Per labs elevated HgbA1c 6.8%, no hx DM. Will remain available for diet education prior to discharge. Pt sleeping soundly at time of visit; just returned from OR. Seen secondary to LOS > 7days. S/p complete amputation of right foot 2nd toe and partial amputation of left foot second toe POD#0 s/p partial toe amputation POD#6. Regular diet rx. Well tolerated since admission, minimal po documentation however 100% documented 1/31. Per RN aide pt tolerates meals well with good po intake. GI wdl however no BM documented since admission. Recommend bowel regimen. Per labs elevated HgbA1c 6.8%, no hx DM. Will remain available for diet, weight loss education prior to discharge. Pt sleeping soundly at time of visit; just returned from OR. Seen secondary to LOS > 7days. S/p complete amputation of right foot 2nd toe and partial amputation of left foot second toe POD#0 s/p partial toe amputation POD#6. Regular diet rx. Well tolerated since admission, minimal po documentation however 100% documented 1/31. Per RN aide pt tolerates meals well with good po intake. GI wdl however no BM documented since admission. Recommend bowel regimen. Per labs elevated HgbA1c 6.8%, no hx DM. MD made aware. Recommend accuchecks. Will remain available for diet & weight loss nutrition therapy prior to discharge.

## 2021-02-04 NOTE — BRIEF OPERATIVE NOTE - NSICDXBRIEFPREOP_GEN_ALL_CORE_FT
PRE-OP DIAGNOSIS:  Toe osteomyelitis 29-Jan-2021 13:57:38  Rusty Red  
PRE-OP DIAGNOSIS:  Toe osteomyelitis 29-Jan-2021 13:57:38  Rusty Red

## 2021-02-04 NOTE — PROGRESS NOTE ADULT - SUBJECTIVE AND OBJECTIVE BOX
[facility], Division of Infectious Diseases  YOKO Carlin Y. Patel, S. Shah  [phone]    Name: MILAN SHRESTHA  Age: 70y  Gender: Female  MRN: [id number]  Note Date: 02-04-21    Interval History:  Patient seen and examined at bedside this morning  No acute overnight events. Afebrile  Sleeping comfortably  Notes reviewed  Per podiatry, due to devitalization of plantar flap of right 2nd digit surgical site, pt will require full 2nd digit amputation, as well as left 2nd digit partial distal amputation scheduled for tomorrow 2/4/21    Antibiotics:  trimethoprim  160 mG/sulfamethoxazole 800 mG 1 Tablet(s) Oral two times a day      Medications:  acetaminophen   Tablet .. 325 milliGRAM(s) Oral daily PRN  amitriptyline 100 milliGRAM(s) Oral at bedtime  baclofen 10 milliGRAM(s) Oral every 12 hours  carvedilol 6.25 milliGRAM(s) Oral every 12 hours  chlorhexidine 0.12% Liquid 15 milliLiter(s) Oral Mucosa two times a day  diphenhydrAMINE 50 milliGRAM(s) Oral every 4 hours PRN  doxepin 50 milliGRAM(s) Oral daily  DULoxetine 60 milliGRAM(s) Oral at bedtime  furosemide    Tablet 20 milliGRAM(s) Oral daily  gabapentin 400 milliGRAM(s) Oral four times a day  heparin   Injectable 5000 Unit(s) SubCutaneous every 8 hours  lactobacillus acidophilus 1 Tablet(s) Oral three times a day  levothyroxine 200 MICROGram(s) Oral daily  lidocaine   Patch 1 Patch Transdermal every 24 hours  losartan 25 milliGRAM(s) Oral daily  melatonin 10 milliGRAM(s) Oral at bedtime  multivitamin 1 Tablet(s) Oral daily  naloxegol 25 milliGRAM(s) Oral <User Schedule> PRN  oxyCODONE  ER Tablet 30 milliGRAM(s) Oral every 8 hours  pantoprazole    Tablet 40 milliGRAM(s) Oral before breakfast  rOPINIRole 1 milliGRAM(s) Oral daily  rOPINIRole 2 milliGRAM(s) Oral every 24 hours  simvastatin 40 milliGRAM(s) Oral at bedtime  sucralfate 1 Gram(s) Oral four times a day  tapentadol 75 milliGRAM(s) Oral three times a day PRN  trimethoprim  160 mG/sulfamethoxazole 800 mG 1 Tablet(s) Oral two times a day      Review of Systems:  A 10-point review of systems was obtained.     Pertinent positives and negatives--  Constitutional: No fevers. No Chills. No Rigors.   Cardiovascular: No chest pain. No palpitations.  Respiratory: No shortness of breath. No cough.  Gastrointestinal: No nausea or vomiting. No diarrhea or constipation.   Psychiatric: Pleasant. Appropriate affect.    Review of systems otherwise negative except as previously noted.    Allergies: latex (Rash)  penicillin (Hives)    For details regarding the patient's past medical history, social history, family history, and other miscellaneous elements, please refer the initial infectious diseases consultation and/or the admitting history and physical examination for this admission.    Objective:  Vitals:   T(C): 36.3 (02-04-21 @ 06:36), Max: 36.7 (02-03-21 @ 13:10)  HR: 78 (02-04-21 @ 06:36) (77 - 79)  BP: 128/75 (02-04-21 @ 06:36) (111/70 - 128/75)  RR: 16 (02-04-21 @ 06:36) (16 - 18)  SpO2: 96% (02-04-21 @ 06:36) (92% - 96%)    Physical Examination:  General: no acute distress  HEENT: NC/AT, EOMI, anicteric, no oral lesions  Neck: supple, no palpable LAD  Cardio: S1, S2 heard, RRR, no murmurs  Resp: breath sounds heard bilaterally, no rales, wheezes or rhonchi  Abd: soft, NT, ND, + bowel sounds  Neuro: AAOx3, no obvious focal deficits  Ext: R foot dressed, c/d/i  Skin: warm, dry, no visible rash    Laboratory Studies:  CBC:                       11.7   5.60  )-----------( 266      ( 04 Feb 2021 09:27 )             38.2     CMP:           Microbiology: reviewed      Radiology: reviewed

## 2021-02-04 NOTE — PROGRESS NOTE ADULT - SUBJECTIVE AND OBJECTIVE BOX
[facility] -- Jayne Aguila, Max Chacko Pannella, Patel, Savella  Office # [phone]      Follow Up:  htn    Subjective/Observations:   unable to assess this am as patient was in OR    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        MEDICATIONS  (STANDING):  amitriptyline 100 milliGRAM(s) Oral at bedtime  baclofen 10 milliGRAM(s) Oral every 12 hours  carvedilol 6.25 milliGRAM(s) Oral every 12 hours  DULoxetine 60 milliGRAM(s) Oral at bedtime  furosemide    Tablet 20 milliGRAM(s) Oral daily  gabapentin 400 milliGRAM(s) Oral four times a day  heparin   Injectable 5000 Unit(s) SubCutaneous every 8 hours  lactated ringers. 1000 milliLiter(s) (75 mL/Hr) IV Continuous <Continuous>  lactobacillus acidophilus 1 Tablet(s) Oral three times a day  levothyroxine 200 MICROGram(s) Oral daily  lidocaine   Patch 1 Patch Transdermal every 24 hours  losartan 25 milliGRAM(s) Oral daily  melatonin 10 milliGRAM(s) Oral at bedtime  multivitamin 1 Tablet(s) Oral daily  oxyCODONE  ER Tablet 30 milliGRAM(s) Oral every 8 hours  pantoprazole    Tablet 40 milliGRAM(s) Oral before breakfast  rOPINIRole 1 milliGRAM(s) Oral daily  rOPINIRole 2 milliGRAM(s) Oral every 24 hours  simvastatin 40 milliGRAM(s) Oral at bedtime  sucralfate 1 Gram(s) Oral four times a day  trimethoprim  160 mG/sulfamethoxazole 800 mG 1 Tablet(s) Oral two times a day    MEDICATIONS  (PRN):  acetaminophen   Tablet .. 325 milliGRAM(s) Oral daily PRN Temp greater or equal to 38C (100.4F), Mild Pain (1 - 3)  diphenhydrAMINE 50 milliGRAM(s) Oral every 4 hours PRN Rash and/or Itching  HYDROmorphone  Injectable 0.5 milliGRAM(s) IV Push every 15 minutes PRN Severe Pain (7 - 10)  naloxegol 25 milliGRAM(s) Oral <User Schedule> PRN constipation  ondansetron Injectable 4 milliGRAM(s) IV Push once PRN Nausea and/or Vomiting  oxyCODONE    IR 5 milliGRAM(s) Oral once PRN Moderate Pain (4 - 6)      Allergies    latex (Rash)  penicillin (Hives)    Intolerances        Vital Signs Last 24 Hrs  T(C): 36.4 (04 Feb 2021 10:01), Max: 36.7 (03 Feb 2021 13:10)  T(F): 97.5 (04 Feb 2021 10:01), Max: 98.1 (03 Feb 2021 13:10)  HR: 69 (04 Feb 2021 12:14) (69 - 79)  BP: 102/55 (04 Feb 2021 12:14) (101/54 - 129/65)  BP(mean): --  RR: 16 (04 Feb 2021 12:14) (15 - 18)  SpO2: 99% (04 Feb 2021 12:14) (92% - 99%)    I&O's Summary    Weight (kg): 102.1 (02-04 @ 06:44)    PHYSICAL EXAM:  TELE: not on tele     LABS: All Labs Reviewed:                        11.7   5.60  )-----------( 266      ( 04 Feb 2021 09:27 )             38.2     04 Feb 2021 09:27    142    |  106    |  21     ----------------------------<  119    4.8     |  29     |  1.10     Ca    9.0        04 Feb 2021 09:27               ECG:  < from: 12 Lead ECG (10.17.20 @ 18:01) >    Ventricular Rate 81 BPM    Atrial Rate 81 BPM    P-R Interval 174 ms    QRS Duration 88 ms    Q-T Interval 368 ms    QTC Calculation(Bazett) 427 ms    P Axis 66 degrees    R Axis 19 degrees    T Axis 58 degrees    Diagnosis Line Normal sinus rhythm  Normal ECG  No previous ECGs available

## 2021-02-04 NOTE — PROGRESS NOTE ADULT - SUBJECTIVE AND OBJECTIVE BOX
Patient is a 70y old  Female who presents with a chief complaint of Right foot 2nd digit infection. (04 Feb 2021 13:53)      INTERVAL /OVERNIGHT EVENTS: seen in pacu    MEDICATIONS  (STANDING):  amitriptyline 100 milliGRAM(s) Oral at bedtime  baclofen 10 milliGRAM(s) Oral every 12 hours  carvedilol 6.25 milliGRAM(s) Oral every 12 hours  DULoxetine 60 milliGRAM(s) Oral at bedtime  furosemide    Tablet 20 milliGRAM(s) Oral daily  gabapentin 400 milliGRAM(s) Oral four times a day  heparin   Injectable 5000 Unit(s) SubCutaneous every 8 hours  lactated ringers. 1000 milliLiter(s) (75 mL/Hr) IV Continuous <Continuous>  lactobacillus acidophilus 1 Tablet(s) Oral three times a day  levothyroxine 200 MICROGram(s) Oral daily  lidocaine   Patch 1 Patch Transdermal every 24 hours  losartan 25 milliGRAM(s) Oral daily  melatonin 10 milliGRAM(s) Oral at bedtime  multivitamin 1 Tablet(s) Oral daily  oxyCODONE  ER Tablet 30 milliGRAM(s) Oral every 8 hours  pantoprazole    Tablet 40 milliGRAM(s) Oral before breakfast  rOPINIRole 1 milliGRAM(s) Oral daily  rOPINIRole 2 milliGRAM(s) Oral at bedtime  simvastatin 40 milliGRAM(s) Oral at bedtime  sucralfate 1 Gram(s) Oral four times a day  trimethoprim  160 mG/sulfamethoxazole 800 mG 1 Tablet(s) Oral two times a day    MEDICATIONS  (PRN):  acetaminophen   Tablet .. 325 milliGRAM(s) Oral daily PRN Temp greater or equal to 38C (100.4F), Mild Pain (1 - 3)  diphenhydrAMINE 50 milliGRAM(s) Oral every 4 hours PRN Rash and/or Itching  HYDROmorphone  Injectable 0.5 milliGRAM(s) IV Push every 15 minutes PRN Severe Pain (7 - 10)  naloxegol 25 milliGRAM(s) Oral <User Schedule> PRN constipation  ondansetron Injectable 4 milliGRAM(s) IV Push once PRN Nausea and/or Vomiting  oxyCODONE    IR 5 milliGRAM(s) Oral once PRN Moderate Pain (4 - 6)      Allergies    latex (Rash)  penicillin (Hives)    Intolerances        REVIEW OF SYSTEMS:  unable to obtain    Vital Signs Last 24 Hrs  T(C): 36.3 (04 Feb 2021 13:49), Max: 36.6 (03 Feb 2021 20:24)  T(F): 97.4 (04 Feb 2021 13:49), Max: 97.9 (03 Feb 2021 20:24)  HR: 65 (04 Feb 2021 13:49) (65 - 79)  BP: 102/63 (04 Feb 2021 13:49) (101/54 - 129/65)  BP(mean): 58 (04 Feb 2021 13:29) (58 - 58)  RR: 14 (04 Feb 2021 13:49) (14 - 18)  SpO2: 92% (04 Feb 2021 13:49) (92% - 99%)    PHYSICAL EXAM:  GENERAL: NAD, well-groomed, well-developed  HEAD:  Atraumatic, Normocephalic  EYES: EOMI, PERRLA, conjunctiva and sclera clear  ENMT: No tonsillar erythema, exudates, or enlargement; Moist mucous membranes, Good dentition, No lesions  NECK: Supple, No JVD, Normal thyroid  NERVOUS SYSTEM:  Alert & Oriented X3, Good concentration; Motor Strength 5/5 B/L upper and lower extremities; DTRs 2+ intact and symmetric  CHEST/LUNG: Clear to auscultation bilaterally; No rales, rhonchi, wheezing, or rubs  HEART: Regular rate and rhythm; No murmurs, rubs, or gallops  ABDOMEN: Soft, Nontender, Nondistended; Bowel sounds present  EXTREMITIES:  2+ Peripheral Pulses, No clubbing, cyanosis, or edema  LYMPH: No lymphadenopathy noted  SKIN: No rashes or lesions    LABS:                        11.7   5.60  )-----------( 266      ( 04 Feb 2021 09:27 )             38.2     04 Feb 2021 09:27    142    |  106    |  21     ----------------------------<  119    4.8     |  29     |  1.10     Ca    9.0        04 Feb 2021 09:27          CAPILLARY BLOOD GLUCOSE          RADIOLOGY & ADDITIONAL TESTS:    Notes Reviewed:  [x ] YES  [ ] NO    Care Discussed with Consultants/Other Providers [x ] YES  [ ] NO

## 2021-02-04 NOTE — PROGRESS NOTE ADULT - ATTENDING COMMENTS
Infectious Diseases will continue to follow. Please call with any questions.   Angeli Garcia M.D.  [facility], Division of Infectious Diseases [phone]  For over the weekend and after hours, please call [phone]

## 2021-02-04 NOTE — PROGRESS NOTE ADULT - PROBLEM SELECTOR PLAN 1
Podiatry consult with Dr. UMANZOR appreciated  bilateral  toe amputations  poor surgical  wound healing

## 2021-02-05 LAB
GRAM STN FLD: SIGNIFICANT CHANGE UP
SPECIMEN SOURCE: SIGNIFICANT CHANGE UP

## 2021-02-05 PROCEDURE — 99232 SBSQ HOSP IP/OBS MODERATE 35: CPT

## 2021-02-05 PROCEDURE — 99024 POSTOP FOLLOW-UP VISIT: CPT

## 2021-02-05 RX ORDER — CHLORHEXIDINE GLUCONATE 213 G/1000ML
15 SOLUTION TOPICAL
Refills: 0 | Status: DISCONTINUED | OUTPATIENT
Start: 2021-02-05 | End: 2021-02-06

## 2021-02-05 RX ORDER — LANOLIN ALCOHOL/MO/W.PET/CERES
5 CREAM (GRAM) TOPICAL ONCE
Refills: 0 | Status: COMPLETED | OUTPATIENT
Start: 2021-02-05 | End: 2021-02-05

## 2021-02-05 RX ADMIN — Medication 1 GRAM(S): at 21:48

## 2021-02-05 RX ADMIN — Medication 1 TABLET(S): at 12:36

## 2021-02-05 RX ADMIN — CARVEDILOL PHOSPHATE 6.25 MILLIGRAM(S): 80 CAPSULE, EXTENDED RELEASE ORAL at 18:29

## 2021-02-05 RX ADMIN — Medication 1 TABLET(S): at 18:29

## 2021-02-05 RX ADMIN — ROPINIROLE 2 MILLIGRAM(S): 8 TABLET, FILM COATED, EXTENDED RELEASE ORAL at 21:48

## 2021-02-05 RX ADMIN — Medication 100 MILLIGRAM(S): at 21:31

## 2021-02-05 RX ADMIN — Medication 10 MILLIGRAM(S): at 21:29

## 2021-02-05 RX ADMIN — OXYCODONE HYDROCHLORIDE 30 MILLIGRAM(S): 5 TABLET ORAL at 05:15

## 2021-02-05 RX ADMIN — Medication 1 GRAM(S): at 18:28

## 2021-02-05 RX ADMIN — HEPARIN SODIUM 5000 UNIT(S): 5000 INJECTION INTRAVENOUS; SUBCUTANEOUS at 21:49

## 2021-02-05 RX ADMIN — LIDOCAINE 1 PATCH: 4 CREAM TOPICAL at 05:16

## 2021-02-05 RX ADMIN — Medication 1 TABLET(S): at 21:30

## 2021-02-05 RX ADMIN — Medication 1 TABLET(S): at 05:14

## 2021-02-05 RX ADMIN — HEPARIN SODIUM 5000 UNIT(S): 5000 INJECTION INTRAVENOUS; SUBCUTANEOUS at 12:37

## 2021-02-05 RX ADMIN — ROPINIROLE 1 MILLIGRAM(S): 8 TABLET, FILM COATED, EXTENDED RELEASE ORAL at 12:37

## 2021-02-05 RX ADMIN — PANTOPRAZOLE SODIUM 40 MILLIGRAM(S): 20 TABLET, DELAYED RELEASE ORAL at 05:15

## 2021-02-05 RX ADMIN — Medication 10 MILLIGRAM(S): at 05:15

## 2021-02-05 RX ADMIN — HEPARIN SODIUM 5000 UNIT(S): 5000 INJECTION INTRAVENOUS; SUBCUTANEOUS at 05:16

## 2021-02-05 RX ADMIN — Medication 200 MICROGRAM(S): at 05:15

## 2021-02-05 RX ADMIN — GABAPENTIN 400 MILLIGRAM(S): 400 CAPSULE ORAL at 18:25

## 2021-02-05 RX ADMIN — LOSARTAN POTASSIUM 25 MILLIGRAM(S): 100 TABLET, FILM COATED ORAL at 05:15

## 2021-02-05 RX ADMIN — GABAPENTIN 400 MILLIGRAM(S): 400 CAPSULE ORAL at 21:48

## 2021-02-05 RX ADMIN — Medication 10 MILLIGRAM(S): at 18:28

## 2021-02-05 RX ADMIN — LIDOCAINE 1 PATCH: 4 CREAM TOPICAL at 11:20

## 2021-02-05 RX ADMIN — Medication 50 MILLIGRAM(S): at 21:29

## 2021-02-05 RX ADMIN — GABAPENTIN 400 MILLIGRAM(S): 400 CAPSULE ORAL at 12:36

## 2021-02-05 RX ADMIN — SIMVASTATIN 40 MILLIGRAM(S): 20 TABLET, FILM COATED ORAL at 21:29

## 2021-02-05 RX ADMIN — CARVEDILOL PHOSPHATE 6.25 MILLIGRAM(S): 80 CAPSULE, EXTENDED RELEASE ORAL at 05:15

## 2021-02-05 RX ADMIN — Medication 1 TABLET(S): at 05:15

## 2021-02-05 RX ADMIN — Medication 1 GRAM(S): at 05:14

## 2021-02-05 RX ADMIN — GABAPENTIN 400 MILLIGRAM(S): 400 CAPSULE ORAL at 05:15

## 2021-02-05 RX ADMIN — Medication 1 GRAM(S): at 12:36

## 2021-02-05 RX ADMIN — OXYCODONE HYDROCHLORIDE 30 MILLIGRAM(S): 5 TABLET ORAL at 21:45

## 2021-02-05 RX ADMIN — DULOXETINE HYDROCHLORIDE 60 MILLIGRAM(S): 30 CAPSULE, DELAYED RELEASE ORAL at 21:30

## 2021-02-05 RX ADMIN — Medication 20 MILLIGRAM(S): at 05:15

## 2021-02-05 NOTE — PROGRESS NOTE ADULT - SUBJECTIVE AND OBJECTIVE BOX
Patient is a 70y old  Female who presents with a chief complaint of Right foot 2nd digit infection. (05 Feb 2021 11:29)      INTERVAL /OVERNIGHT EVENTS: denies pain    MEDICATIONS  (STANDING):  amitriptyline 100 milliGRAM(s) Oral at bedtime  baclofen 10 milliGRAM(s) Oral every 12 hours  carvedilol 6.25 milliGRAM(s) Oral every 12 hours  DULoxetine 60 milliGRAM(s) Oral at bedtime  furosemide    Tablet 20 milliGRAM(s) Oral daily  gabapentin 400 milliGRAM(s) Oral four times a day  heparin   Injectable 5000 Unit(s) SubCutaneous every 8 hours  lactobacillus acidophilus 1 Tablet(s) Oral three times a day  levothyroxine 200 MICROGram(s) Oral daily  lidocaine   Patch 1 Patch Transdermal every 24 hours  losartan 25 milliGRAM(s) Oral daily  melatonin 10 milliGRAM(s) Oral at bedtime  multivitamin 1 Tablet(s) Oral daily  oxyCODONE  ER Tablet 30 milliGRAM(s) Oral every 8 hours  pantoprazole    Tablet 40 milliGRAM(s) Oral before breakfast  rOPINIRole 1 milliGRAM(s) Oral daily  rOPINIRole 2 milliGRAM(s) Oral at bedtime  simvastatin 40 milliGRAM(s) Oral at bedtime  sucralfate 1 Gram(s) Oral four times a day  trimethoprim  160 mG/sulfamethoxazole 800 mG 1 Tablet(s) Oral two times a day    MEDICATIONS  (PRN):  acetaminophen   Tablet .. 325 milliGRAM(s) Oral daily PRN Temp greater or equal to 38C (100.4F), Mild Pain (1 - 3)  diphenhydrAMINE 50 milliGRAM(s) Oral every 4 hours PRN Rash and/or Itching  naloxegol 25 milliGRAM(s) Oral <User Schedule> PRN constipation      Allergies    latex (Rash)  penicillin (Hives)    Intolerances        REVIEW OF SYSTEMS:  denies    Vital Signs Last 24 Hrs  T(C): 36.8 (05 Feb 2021 13:08), Max: 36.8 (05 Feb 2021 13:08)  T(F): 98.2 (05 Feb 2021 13:08), Max: 98.2 (05 Feb 2021 13:08)  HR: 77 (05 Feb 2021 13:08) (73 - 77)  BP: 109/64 (05 Feb 2021 13:08) (106/71 - 141/68)  BP(mean): --  RR: 18 (05 Feb 2021 13:08) (15 - 18)  SpO2: 90% (05 Feb 2021 13:08) (90% - 93%)    PHYSICAL EXAM:  GENERAL: NAD, well-groomed, well-developed  HEAD:  Atraumatic, Normocephalic  EYES: EOMI, PERRLA, conjunctiva and sclera clear  ENMT: No tonsillar erythema, exudates, or enlargement; Moist mucous membranes, Good dentition, No lesions  NECK: Supple, No JVD, Normal thyroid  NERVOUS SYSTEM:  Alert & Oriented X3, Good concentration; Motor Strength 5/5 B/L upper and lower extremities; DTRs 2+ intact and symmetric  CHEST/LUNG: Clear to auscultation bilaterally; No rales, rhonchi, wheezing, or rubs  HEART: Regular rate and rhythm; No murmurs, rubs, or gallops  ABDOMEN: Soft, Nontender, Nondistended; Bowel sounds present  EXTREMITIES:  2+ Peripheral Pulses, No clubbing, cyanosis, or edema  LYMPH: No lymphadenopathy noted  SKIN: No rashes or lesions    LABS:      Ca    9.0        04 Feb 2021 09:27          CAPILLARY BLOOD GLUCOSE          RADIOLOGY & ADDITIONAL TESTS:    Notes Reviewed:  [x ] YES  [ ] NO    Care Discussed with Consultants/Other Providers [x ] YES  [ ] NO

## 2021-02-05 NOTE — PROGRESS NOTE ADULT - ATTENDING COMMENTS
Infectious Diseases will continue to follow. Please call with any questions.   Angeli Garcia M.D.  [facility], Division of Infectious Diseases [phone]  For over the weekend and after hours, please call [phone] Infectious Diseases will sign off at this time. Please call back with any additional questions  Angeli Garcia M.D.  [facility], Division of Infectious Diseases [phone]  For over the weekend and after hours, please call [phone]

## 2021-02-05 NOTE — PROGRESS NOTE ADULT - SUBJECTIVE AND OBJECTIVE BOX
70y year old Female seen at [facility] for right distal phalanx wound.  Pt is s/p right foot 2nd digit distal amputation.  Denies any fever, chills, nausea, vomiting, chest pain, shortness of breath, or calf pain at this time.      Allergies    latex (Rash)  penicillin (Hives)    Intolerances        MEDICATIONS  (STANDING):  amitriptyline 100 milliGRAM(s) Oral at bedtime  carvedilol 6.25 milliGRAM(s) Oral every 12 hours  DULoxetine 60 milliGRAM(s) Oral at bedtime  furosemide    Tablet 20 milliGRAM(s) Oral daily  gabapentin 400 milliGRAM(s) Oral four times a day  heparin   Injectable 5000 Unit(s) SubCutaneous every 12 hours  lactobacillus acidophilus 1 Tablet(s) Oral three times a day  levothyroxine 200 MICROGram(s) Oral daily  lidocaine   Patch 1 Patch Transdermal every 24 hours  losartan 25 milliGRAM(s) Oral daily  melatonin 5 milliGRAM(s) Oral at bedtime  multivitamin 1 Tablet(s) Oral daily  oxyCODONE  ER Tablet 20 milliGRAM(s) Oral every 12 hours  pantoprazole    Tablet 40 milliGRAM(s) Oral before breakfast  potassium chloride    Tablet ER 10 milliEquivalent(s) Oral daily  rOPINIRole 3 milliGRAM(s) Oral daily  simvastatin 40 milliGRAM(s) Oral at bedtime  sucralfate 1 Gram(s) Oral four times a day  vancomycin  IVPB 1250 milliGRAM(s) IV Intermittent every 12 hours    MEDICATIONS  (PRN):  acetaminophen   Tablet .. 325 milliGRAM(s) Oral daily PRN Temp greater or equal to 38C (100.4F), Mild Pain (1 - 3)  naloxegol 25 milliGRAM(s) Oral <User Schedule> PRN Home regimen  tapentadol 75 milliGRAM(s) Oral three times a day PRN home      Vital Signs Last 24 Hrs  T(C): 36.8 (30 Jan 2021 14:04), Max: 37.1 (30 Jan 2021 05:03)  T(F): 98.3 (30 Jan 2021 14:04), Max: 98.7 (30 Jan 2021 05:03)  HR: 92 (30 Jan 2021 14:04) (83 - 92)  BP: 114/70 (30 Jan 2021 14:04) (114/70 - 122/64)  BP(mean): --  RR: 17 (30 Jan 2021 14:04) (17 - 17)  SpO2: 93% (30 Jan 2021 14:04) (91% - 93%)    PHYSICAL EXAM:  Vascular: DP & PT palpable bilaterally, Capillary refill 3 seconds.  Edema and erythema right 2nd digit.    Neurological: Light touch sensation intact bilaterally  Musculoskeletal: No POP wound  Dermatological: Right foot 2nd digit surgical site noted with sutures intact, no signs of infection - surgical site flaps are healthy and well perfused.    Left foot 2nd digit surgical site appreciated with no signs of infection and sutures intact.             ----------CHEM PANEL----------    PT/INR - ( 29 Jan 2021 07:29 )   PT: 13.2 sec;   INR: 1.14 ratio         PTT - ( 29 Jan 2021 07:29 )  PTT:33.4 sec      Culture - Tissue with Gram Stain (collected 29 Jan 2021 18:43)  Source: .Tissue Other, R 2nd toe head of mid phalanx  Gram Stain (29 Jan 2021 22:31):    No polymorphonuclear cells seen    No organisms seen    Culture - Tissue with Gram Stain (collected 29 Jan 2021 18:43)  Source: .Tissue Other, Right 2nd toe distal phalanx  Gram Stain (29 Jan 2021 22:30):    No polymorphonuclear cells seen    No organisms seen    Culture - Other (collected 28 Jan 2021 22:17)  Source: .Other Right 2nd distal toe  Final Report (30 Jan 2021 16:44):    Numerous Enterobacter cloacae complex    Moderate Staphylococcus lugdunensis  Organism: Enterobacter cloacae complex  Staphylococcus lugdunensis (30 Jan 2021 16:44)  Organism: Staphylococcus lugdunensis (30 Jan 2021 16:44)  Organism: Enterobacter cloacae complex (30 Jan 2021 16:44)    Culture - Blood (collected 28 Jan 2021 22:00)  Source: .Blood Blood  Preliminary Report (29 Jan 2021 23:01):    No growth to date.    Culture - Blood (collected 28 Jan 2021 22:00)  Source: .Blood Blood  Preliminary Report (29 Jan 2021 23:01):    No growth to date.        Imaging: ----------

## 2021-02-05 NOTE — PROGRESS NOTE ADULT - SUBJECTIVE AND OBJECTIVE BOX
[facility] -- Jayne Aguila, Max Chacko, Hernandez Kirkland Savella  Office # [phone]      Follow Up:  htn    Subjective/Observations:   No events overnight resting comfortably in bed.  No complaints of chest pain, dyspnea, or palpitations reported. No signs of orthopnea or PND.      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        MEDICATIONS  (STANDING):  amitriptyline 100 milliGRAM(s) Oral at bedtime  baclofen 10 milliGRAM(s) Oral every 12 hours  carvedilol 6.25 milliGRAM(s) Oral every 12 hours  DULoxetine 60 milliGRAM(s) Oral at bedtime  furosemide    Tablet 20 milliGRAM(s) Oral daily  gabapentin 400 milliGRAM(s) Oral four times a day  heparin   Injectable 5000 Unit(s) SubCutaneous every 8 hours  lactobacillus acidophilus 1 Tablet(s) Oral three times a day  levothyroxine 200 MICROGram(s) Oral daily  lidocaine   Patch 1 Patch Transdermal every 24 hours  losartan 25 milliGRAM(s) Oral daily  melatonin 10 milliGRAM(s) Oral at bedtime  multivitamin 1 Tablet(s) Oral daily  oxyCODONE  ER Tablet 30 milliGRAM(s) Oral every 8 hours  pantoprazole    Tablet 40 milliGRAM(s) Oral before breakfast  rOPINIRole 1 milliGRAM(s) Oral daily  rOPINIRole 2 milliGRAM(s) Oral at bedtime  simvastatin 40 milliGRAM(s) Oral at bedtime  sucralfate 1 Gram(s) Oral four times a day  trimethoprim  160 mG/sulfamethoxazole 800 mG 1 Tablet(s) Oral two times a day    MEDICATIONS  (PRN):  acetaminophen   Tablet .. 325 milliGRAM(s) Oral daily PRN Temp greater or equal to 38C (100.4F), Mild Pain (1 - 3)  diphenhydrAMINE 50 milliGRAM(s) Oral every 4 hours PRN Rash and/or Itching  naloxegol 25 milliGRAM(s) Oral <User Schedule> PRN constipation      Allergies    latex (Rash)  penicillin (Hives)    Intolerances        Vital Signs Last 24 Hrs  T(C): 36.7 (05 Feb 2021 05:14), Max: 36.7 (04 Feb 2021 20:37)  T(F): 98 (05 Feb 2021 05:14), Max: 98 (04 Feb 2021 20:37)  HR: 75 (05 Feb 2021 05:14) (65 - 75)  BP: 141/68 (05 Feb 2021 05:14) (101/54 - 141/68)  BP(mean): 58 (04 Feb 2021 13:29) (58 - 58)  RR: 17 (05 Feb 2021 05:14) (14 - 17)  SpO2: 93% (05 Feb 2021 05:14) (92% - 99%)    I&O's Summary        PHYSICAL EXAM:  TELE: not on tele   Constitutional: NAD, awake and alert, well-developed  HEENT: Moist Mucous Membranes, Anicteric  Pulmonary: Non-labored, breath sounds are clear bilaterally, No wheezing, crackles or rhonchi  Cardiovascular: Regular, S1 and S2 nl, No murmurs, rubs, gallops or clicks  Gastrointestinal: Bowel Sounds present, soft, nontender.   Lymph: No lymphadenopathy. No peripheral edema.  Skin: foot dressing   Psych:  Mood & affect appropriate    LABS: All Labs Reviewed:                        11.7   5.60  )-----------( 266      ( 04 Feb 2021 09:27 )             38.2     04 Feb 2021 09:27    142    |  106    |  21     ----------------------------<  119    4.8     |  29     |  1.10     Ca    9.0        04 Feb 2021 09:27               ECG:  < from: 12 Lead ECG (01.28.21 @ 16:17) >    Ventricular Rate 83 BPM    Atrial Rate 83 BPM    P-R Interval 172 ms    QRS Duration 92 ms    Q-T Interval 368 ms    QTC Calculation(Bazett) 432 ms    P Axis 56 degrees    R Axis 22 degrees    T Axis 62 degrees    Diagnosis Line Normal sinus rhythm  Normal ECG  When compared with ECG of 20-SEP-2015 00:00,  No significant change was found      Echo:  < from: TTE Echo Complete w/o Contrast w/ Doppler (02.02.21 @ 07:59) >  OBSERVATIONS:  Technically difficult study  Mitral Valve: normal, trace physiologic MR.  Aortic Valve/Aorta: normal trileaflet aortic valve.  Tricuspid Valve: normal with trace TR.  Pulmonic Valve: Not well-visualized  Left Atrium: normal  Right Atrium: Not well-visualized  Left Ventricle: normal LV size and systolic function, estimated LVEF of 55%. Endocardium is not well-visualized, cannot rule out segmental dysfunction  Right Ventricle: Grossly normal size and systolic function.  Pericardium/Pleura: normal, no significant pericardial effusion.  LV diastolic dysfunction is present      Conclusion:  Technically difficult study  Normal left ventricular internal dimensions and systolic function, estimated LVEF of 55%.  Grossly normal RV size and systolic function.  Normal trileaflet aortic valve, without AI.  Trace physiologic MR and TR.  No significant pericardial effusion.      < end of copied text >      Radiology:

## 2021-02-05 NOTE — PROGRESS NOTE ADULT - NSHPATTENDINGPLANDISCUSS_GEN_ALL_CORE
patient and RN

## 2021-02-05 NOTE — PROGRESS NOTE ADULT - ASSESSMENT
Pt is a 70W w/ PMHx of HTN, HLD, neuropathy, spinal stenosis presents with c/o R toe 2nd digit infection x 2 days.    R toe infection  S/p R 2nd partial ray resection 1/29/21  Planned for repeat surgery today   WCx Coag Negative Staphylococcus; Trueperella bernardiae  OR TCx E. cloacae  Surgical pathology reviewed. Distal phalanx OM, middle phalanx +clean margins  C/w bactrim  Patient can be discharged on bactrim 1DS tab BID for now to complete course until 2/13/2021 Pt is a 70W w/ PMHx of HTN, HLD, neuropathy, spinal stenosis presents with c/o R toe 2nd digit infection x 2 days.    R toe infection  S/p R 2nd partial ray resection 1/29/21  Planned for repeat surgery today   WCx Coag Negative Staphylococcus; Trueperella bernardiae  OR TCx E. cloacae  Surgical pathology reviewed. Distal phalanx OM, middle phalanx +clean margins  C/w bactrim  When ready for d/c, patient can go on bactrim 1DS tab BID for now to complete course until 2/13/2021

## 2021-02-05 NOTE — PROGRESS NOTE ADULT - ASSESSMENT
70 F with HTN, HLD, neuropathy, and spinal stenosis presented with right 2nd toe ulcer, now for resection of right 2nd partial ray resection.  Consult is called for cardiac clearance in setting of murmur, HTN and comorbidities     Right toe ulcer  - s/p partial ray resection of right 2nd toe for non-healing ulcer,   - Pain control  - Abx per Primary    HTN  - BP stable and controlled 141/68  - Continue Coreg, Losartan, and Lasix  - TTE nL LV & RV size and function no significant valvular dysfunction EF 55%     HLD  - Continue home statin    DVT ppx  - Per Primary    Further cardiac w/u pending clinical course  Will continue to follow      Tracy Flores FNP-C  Cardiology NP  [id number] [phone]

## 2021-02-05 NOTE — PROGRESS NOTE ADULT - SUBJECTIVE AND OBJECTIVE BOX
[facility], Division of Infectious Diseases  YOKO Carlin Y. Patel, S. Shah  [phone]    Name: MILAN SHRESTHA  Age: 70y  Gender: Female  MRN: [id number]  Note Date: 02-05-21    Interval History:  Patient seen and examined at bedside this morning  No acute overnight events. Afebrile  S/p amputation of R 2nd toe    Antibiotics:  trimethoprim  160 mG/sulfamethoxazole 800 mG 1 Tablet(s) Oral two times a day      Medications:  acetaminophen   Tablet .. 325 milliGRAM(s) Oral daily PRN  amitriptyline 100 milliGRAM(s) Oral at bedtime  baclofen 10 milliGRAM(s) Oral every 12 hours  carvedilol 6.25 milliGRAM(s) Oral every 12 hours  chlorhexidine 0.12% Liquid 15 milliLiter(s) Oral Mucosa two times a day  diphenhydrAMINE 50 milliGRAM(s) Oral every 4 hours PRN  doxepin 50 milliGRAM(s) Oral daily  DULoxetine 60 milliGRAM(s) Oral at bedtime  furosemide    Tablet 20 milliGRAM(s) Oral daily  gabapentin 400 milliGRAM(s) Oral four times a day  heparin   Injectable 5000 Unit(s) SubCutaneous every 8 hours  lactobacillus acidophilus 1 Tablet(s) Oral three times a day  levothyroxine 200 MICROGram(s) Oral daily  lidocaine   Patch 1 Patch Transdermal every 24 hours  losartan 25 milliGRAM(s) Oral daily  melatonin 10 milliGRAM(s) Oral at bedtime  multivitamin 1 Tablet(s) Oral daily  naloxegol 25 milliGRAM(s) Oral <User Schedule> PRN  oxyCODONE  ER Tablet 30 milliGRAM(s) Oral every 8 hours  pantoprazole    Tablet 40 milliGRAM(s) Oral before breakfast  rOPINIRole 1 milliGRAM(s) Oral daily  rOPINIRole 2 milliGRAM(s) Oral every 24 hours  simvastatin 40 milliGRAM(s) Oral at bedtime  sucralfate 1 Gram(s) Oral four times a day  tapentadol 75 milliGRAM(s) Oral three times a day PRN  trimethoprim  160 mG/sulfamethoxazole 800 mG 1 Tablet(s) Oral two times a day      Review of Systems:  A 10-point review of systems was obtained.     Pertinent positives and negatives--  Constitutional: No fevers. No Chills. No Rigors.   Cardiovascular: No chest pain. No palpitations.  Respiratory: No shortness of breath. No cough.  Gastrointestinal: No nausea or vomiting. No diarrhea or constipation.   Psychiatric: Pleasant. Appropriate affect.    Review of systems otherwise negative except as previously noted.    Allergies: latex (Rash)  penicillin (Hives)    For details regarding the patient's past medical history, social history, family history, and other miscellaneous elements, please refer the initial infectious diseases consultation and/or the admitting history and physical examination for this admission.    Objective:  Vital Signs Last 24 Hrs  T(C): 36.7 (05 Feb 2021 05:14), Max: 36.7 (04 Feb 2021 20:37)  T(F): 98 (05 Feb 2021 05:14), Max: 98 (04 Feb 2021 20:37)  HR: 75 (05 Feb 2021 05:14) (65 - 75)  BP: 141/68 (05 Feb 2021 05:14) (101/54 - 141/68)  BP(mean): 58 (04 Feb 2021 13:29) (58 - 58)  RR: 17 (05 Feb 2021 05:14) (14 - 17)  SpO2: 93% (05 Feb 2021 05:14) (92% - 99%)    Physical Examination:  General: no acute distress  HEENT: NC/AT, EOMI, anicteric, no oral lesions  Neck: supple, no palpable LAD  Cardio: S1, S2 heard, RRR, no murmurs  Resp: breath sounds heard bilaterally, no rales, wheezes or rhonchi  Abd: soft, NT, ND, + bowel sounds  Neuro: AAOx3, no obvious focal deficits  Ext: R foot dressed, c/d/i  Skin: warm, dry, no visible rash    Laboratory Studies: reviewed      Microbiology: reviewed      Radiology: reviewed

## 2021-02-05 NOTE — PROGRESS NOTE ADULT - PROBLEM SELECTOR PLAN 1
Podiatry consult with Dr. UMANZOR appreciated  bilateral  toe amputations yesterday  poor surgical  wound healing

## 2021-02-05 NOTE — PROGRESS NOTE ADULT - PROBLEM SELECTOR PROBLEM 2
Cellulitis of toe of right foot

## 2021-02-05 NOTE — PROGRESS NOTE ADULT - PROBLEM SELECTOR PLAN 1
Pt evaluated with Dr. Gray  Applied aquacel DSD to surgical sites  No further podiatric intervention at this time  Podiatry will follow pt while in house    WOUND CARE INSTRUCTIONS  1) Pt to keep dressing clean dry and intact after discharge.    2) Please observe foot for signs of infection including swelling, redness, drainage, and warmth.  If noted, please come to ED immediately.   3) Please call to make an appointment with Dr. Gray at the [place] wound center within 3-5 days of discharge.

## 2021-02-05 NOTE — PROGRESS NOTE ADULT - PROBLEM SELECTOR PLAN 2
ABX - bactrim per ID  ID eval with Dr. SARAH loo appreciated
IV ABX-vancomycin  ID eval with Dr. SMITH
ABXper ID  ID eval with Dr. SARAH jaramillo
IV ABX  ID eval
IV ABX-vancomycin per ID  ID eval with Dr. SARAH jaramillo
ABX - bactrim per ID  ID eval with Dr. SARAH loo appreciated
IV ABX-vancomycin per ID  ID eval with Dr. SARAH jaramillo
ABX - bactrim per ID  ID eval with Dr. SARAH loo appreciated

## 2021-02-05 NOTE — PROGRESS NOTE ADULT - PROBLEM SELECTOR PLAN 3
Heparin for DVT prevention

## 2021-02-06 ENCOUNTER — TRANSCRIPTION ENCOUNTER (OUTPATIENT)
Age: 71
End: 2021-02-06

## 2021-02-06 VITALS
HEART RATE: 75 BPM | RESPIRATION RATE: 17 BRPM | OXYGEN SATURATION: 95 % | SYSTOLIC BLOOD PRESSURE: 123 MMHG | DIASTOLIC BLOOD PRESSURE: 80 MMHG | TEMPERATURE: 99 F

## 2021-02-06 PROCEDURE — 88311 DECALCIFY TISSUE: CPT

## 2021-02-06 PROCEDURE — 36415 COLL VENOUS BLD VENIPUNCTURE: CPT

## 2021-02-06 PROCEDURE — 86850 RBC ANTIBODY SCREEN: CPT

## 2021-02-06 PROCEDURE — 85730 THROMBOPLASTIN TIME PARTIAL: CPT

## 2021-02-06 PROCEDURE — 73630 X-RAY EXAM OF FOOT: CPT

## 2021-02-06 PROCEDURE — 80202 ASSAY OF VANCOMYCIN: CPT

## 2021-02-06 PROCEDURE — 99285 EMERGENCY DEPT VISIT HI MDM: CPT | Mod: 25

## 2021-02-06 PROCEDURE — 87186 SC STD MICRODIL/AGAR DIL: CPT

## 2021-02-06 PROCEDURE — 97164 PT RE-EVAL EST PLAN CARE: CPT

## 2021-02-06 PROCEDURE — 99232 SBSQ HOSP IP/OBS MODERATE 35: CPT

## 2021-02-06 PROCEDURE — 73660 X-RAY EXAM OF TOE(S): CPT

## 2021-02-06 PROCEDURE — 80048 BASIC METABOLIC PNL TOTAL CA: CPT

## 2021-02-06 PROCEDURE — 87077 CULTURE AEROBIC IDENTIFY: CPT

## 2021-02-06 PROCEDURE — 99024 POSTOP FOLLOW-UP VISIT: CPT

## 2021-02-06 PROCEDURE — 85610 PROTHROMBIN TIME: CPT

## 2021-02-06 PROCEDURE — 83605 ASSAY OF LACTIC ACID: CPT

## 2021-02-06 PROCEDURE — 85027 COMPLETE CBC AUTOMATED: CPT

## 2021-02-06 PROCEDURE — 93306 TTE W/DOPPLER COMPLETE: CPT

## 2021-02-06 PROCEDURE — 97161 PT EVAL LOW COMPLEX 20 MIN: CPT

## 2021-02-06 PROCEDURE — 85025 COMPLETE CBC W/AUTO DIFF WBC: CPT

## 2021-02-06 PROCEDURE — 93926 LOWER EXTREMITY STUDY: CPT

## 2021-02-06 PROCEDURE — 85652 RBC SED RATE AUTOMATED: CPT

## 2021-02-06 PROCEDURE — 86769 SARS-COV-2 COVID-19 ANTIBODY: CPT

## 2021-02-06 PROCEDURE — 87070 CULTURE OTHR SPECIMN AEROBIC: CPT

## 2021-02-06 PROCEDURE — 71045 X-RAY EXAM CHEST 1 VIEW: CPT

## 2021-02-06 PROCEDURE — 87075 CULTR BACTERIA EXCEPT BLOOD: CPT

## 2021-02-06 PROCEDURE — 86140 C-REACTIVE PROTEIN: CPT

## 2021-02-06 PROCEDURE — 83036 HEMOGLOBIN GLYCOSYLATED A1C: CPT

## 2021-02-06 PROCEDURE — U0003: CPT

## 2021-02-06 PROCEDURE — 96374 THER/PROPH/DIAG INJ IV PUSH: CPT

## 2021-02-06 PROCEDURE — 80053 COMPREHEN METABOLIC PANEL: CPT

## 2021-02-06 PROCEDURE — 86900 BLOOD TYPING SEROLOGIC ABO: CPT

## 2021-02-06 PROCEDURE — 87040 BLOOD CULTURE FOR BACTERIA: CPT

## 2021-02-06 PROCEDURE — U0005: CPT

## 2021-02-06 PROCEDURE — 93005 ELECTROCARDIOGRAM TRACING: CPT

## 2021-02-06 PROCEDURE — 88304 TISSUE EXAM BY PATHOLOGIST: CPT

## 2021-02-06 PROCEDURE — 86901 BLOOD TYPING SEROLOGIC RH(D): CPT

## 2021-02-06 PROCEDURE — 86803 HEPATITIS C AB TEST: CPT

## 2021-02-06 PROCEDURE — 83880 ASSAY OF NATRIURETIC PEPTIDE: CPT

## 2021-02-06 RX ADMIN — Medication 200 MICROGRAM(S): at 06:37

## 2021-02-06 RX ADMIN — Medication 1 GRAM(S): at 14:08

## 2021-02-06 RX ADMIN — LIDOCAINE 1 PATCH: 4 CREAM TOPICAL at 00:37

## 2021-02-06 RX ADMIN — Medication 1 TABLET(S): at 06:34

## 2021-02-06 RX ADMIN — Medication 1 TABLET(S): at 14:06

## 2021-02-06 RX ADMIN — HEPARIN SODIUM 5000 UNIT(S): 5000 INJECTION INTRAVENOUS; SUBCUTANEOUS at 06:34

## 2021-02-06 RX ADMIN — LIDOCAINE 1 PATCH: 4 CREAM TOPICAL at 13:07

## 2021-02-06 RX ADMIN — ROPINIROLE 1 MILLIGRAM(S): 8 TABLET, FILM COATED, EXTENDED RELEASE ORAL at 14:08

## 2021-02-06 RX ADMIN — OXYCODONE HYDROCHLORIDE 30 MILLIGRAM(S): 5 TABLET ORAL at 07:32

## 2021-02-06 RX ADMIN — HEPARIN SODIUM 5000 UNIT(S): 5000 INJECTION INTRAVENOUS; SUBCUTANEOUS at 14:07

## 2021-02-06 RX ADMIN — Medication 325 MILLIGRAM(S): at 00:37

## 2021-02-06 RX ADMIN — Medication 1 GRAM(S): at 06:34

## 2021-02-06 RX ADMIN — GABAPENTIN 400 MILLIGRAM(S): 400 CAPSULE ORAL at 14:11

## 2021-02-06 RX ADMIN — PANTOPRAZOLE SODIUM 40 MILLIGRAM(S): 20 TABLET, DELAYED RELEASE ORAL at 06:34

## 2021-02-06 RX ADMIN — Medication 10 MILLIGRAM(S): at 06:34

## 2021-02-06 RX ADMIN — GABAPENTIN 400 MILLIGRAM(S): 400 CAPSULE ORAL at 06:34

## 2021-02-06 RX ADMIN — CHLORHEXIDINE GLUCONATE 15 MILLILITER(S): 213 SOLUTION TOPICAL at 06:34

## 2021-02-06 RX ADMIN — Medication 20 MILLIGRAM(S): at 06:34

## 2021-02-06 NOTE — PHYSICAL THERAPY INITIAL EVALUATION ADULT - ADDITIONAL COMMENTS
Pt lives w/ her spouse in a house, few steps to enter. Pt is an independent ambulator with SC or rollator and spouse can assist with ADLs as needed. Pt was going to outpatient PT for balance deficits and neuropathy
Pt lives w/ her spouse in a house, few steps to enter. Pt is an independent ambulator with SC or rollator and spouse can assist with ADLs as needed. Pt was going to outpatient PT for balance deficits and neuropathy

## 2021-02-06 NOTE — PROGRESS NOTE ADULT - SUBJECTIVE AND OBJECTIVE BOX
70y year old Female seen at [facility] for right distal phalanx wound.  Pt is s/p right foot 2nd digit distal amputation.  Denies any fever, chills, nausea, vomiting, chest pain, shortness of breath, or calf pain at this time.      Allergies    latex (Rash)  penicillin (Hives)    Intolerances        MEDICATIONS  (STANDING):  amitriptyline 100 milliGRAM(s) Oral at bedtime  carvedilol 6.25 milliGRAM(s) Oral every 12 hours  DULoxetine 60 milliGRAM(s) Oral at bedtime  furosemide    Tablet 20 milliGRAM(s) Oral daily  gabapentin 400 milliGRAM(s) Oral four times a day  heparin   Injectable 5000 Unit(s) SubCutaneous every 12 hours  lactobacillus acidophilus 1 Tablet(s) Oral three times a day  levothyroxine 200 MICROGram(s) Oral daily  lidocaine   Patch 1 Patch Transdermal every 24 hours  losartan 25 milliGRAM(s) Oral daily  melatonin 5 milliGRAM(s) Oral at bedtime  multivitamin 1 Tablet(s) Oral daily  oxyCODONE  ER Tablet 20 milliGRAM(s) Oral every 12 hours  pantoprazole    Tablet 40 milliGRAM(s) Oral before breakfast  potassium chloride    Tablet ER 10 milliEquivalent(s) Oral daily  rOPINIRole 3 milliGRAM(s) Oral daily  simvastatin 40 milliGRAM(s) Oral at bedtime  sucralfate 1 Gram(s) Oral four times a day  vancomycin  IVPB 1250 milliGRAM(s) IV Intermittent every 12 hours    MEDICATIONS  (PRN):  acetaminophen   Tablet .. 325 milliGRAM(s) Oral daily PRN Temp greater or equal to 38C (100.4F), Mild Pain (1 - 3)  naloxegol 25 milliGRAM(s) Oral <User Schedule> PRN Home regimen  tapentadol 75 milliGRAM(s) Oral three times a day PRN home      Vital Signs Last 24 Hrs  T(C): 36.8 (30 Jan 2021 14:04), Max: 37.1 (30 Jan 2021 05:03)  T(F): 98.3 (30 Jan 2021 14:04), Max: 98.7 (30 Jan 2021 05:03)  HR: 92 (30 Jan 2021 14:04) (83 - 92)  BP: 114/70 (30 Jan 2021 14:04) (114/70 - 122/64)  BP(mean): --  RR: 17 (30 Jan 2021 14:04) (17 - 17)  SpO2: 93% (30 Jan 2021 14:04) (91% - 93%)    PHYSICAL EXAM:  Vascular: DP & PT palpable bilaterally, Capillary refill 3 seconds.  Edema and erythema right 2nd digit.    Neurological: Light touch sensation intact bilaterally  Musculoskeletal: No POP wound  Dermatological: Right foot 2nd digit surgical site noted with sutures intact, no signs of infection - surgical site flaps are healthy and well perfused.    Left foot 2nd digit surgical site appreciated with no signs of infection and sutures intact.             ----------CHEM PANEL----------    PT/INR - ( 29 Jan 2021 07:29 )   PT: 13.2 sec;   INR: 1.14 ratio         PTT - ( 29 Jan 2021 07:29 )  PTT:33.4 sec      Culture - Tissue with Gram Stain (collected 29 Jan 2021 18:43)  Source: .Tissue Other, R 2nd toe head of mid phalanx  Gram Stain (29 Jan 2021 22:31):    No polymorphonuclear cells seen    No organisms seen    Culture - Tissue with Gram Stain (collected 29 Jan 2021 18:43)  Source: .Tissue Other, Right 2nd toe distal phalanx  Gram Stain (29 Jan 2021 22:30):    No polymorphonuclear cells seen    No organisms seen    Culture - Other (collected 28 Jan 2021 22:17)  Source: .Other Right 2nd distal toe  Final Report (30 Jan 2021 16:44):    Numerous Enterobacter cloacae complex    Moderate Staphylococcus lugdunensis  Organism: Enterobacter cloacae complex  Staphylococcus lugdunensis (30 Jan 2021 16:44)  Organism: Staphylococcus lugdunensis (30 Jan 2021 16:44)  Organism: Enterobacter cloacae complex (30 Jan 2021 16:44)    Culture - Blood (collected 28 Jan 2021 22:00)  Source: .Blood Blood  Preliminary Report (29 Jan 2021 23:01):    No growth to date.    Culture - Blood (collected 28 Jan 2021 22:00)  Source: .Blood Blood  Preliminary Report (29 Jan 2021 23:01):    No growth to date.        Imaging: ----------

## 2021-02-06 NOTE — PROGRESS NOTE ADULT - REASON FOR ADMISSION
Right foot 2nd digit infection.
toe infection
Right foot 2nd digit infection.

## 2021-02-06 NOTE — PHYSICAL THERAPY INITIAL EVALUATION ADULT - GAIT TRAINING, PT EVAL
(2 weeks) independent ambulation ~ 200 feet
STG (3-5 sessions) Amb 100 feet w/ rollator vs SC, independent

## 2021-02-06 NOTE — PHYSICAL THERAPY INITIAL EVALUATION ADULT - MANUAL MUSCLE TESTING RESULTS, REHAB EVAL
Grossly greater than or equal to 3/5 throughout except R foot not tested
BUE/ BLE grossly 3+/5 throughout

## 2021-02-06 NOTE — PHYSICAL THERAPY INITIAL EVALUATION ADULT - PERTINENT HX OF CURRENT PROBLEM, REHAB EVAL
69 y/o female adm 1/28 with osteomyelitis R 2nd toe. s/p partial amputation
71 y/o female adm 1/28 with osteomyelitis R 2nd toe. s/p partial amputation

## 2021-02-06 NOTE — PHYSICAL THERAPY INITIAL EVALUATION ADULT - RANGE OF MOTION EXAMINATION, REHAB EVAL
except R foot not tested/no ROM deficits were identified/bilateral upper extremity ROM was WFL (within functional limits)/bilateral lower extremity ROM was WFL (within functional limits)
except R foot not tested/no ROM deficits were identified

## 2021-02-06 NOTE — DISCHARGE NOTE NURSING/CASE MANAGEMENT/SOCIAL WORK - PATIENT PORTAL LINK FT
You can access the FollowMyHealth Patient Portal offered by [facility] by registering at the following website: [email or web address]. By joining [employer]’s FollowMyHealth portal, you will also be able to view your health information using other applications (apps) compatible with our system.

## 2021-02-06 NOTE — CHART NOTE - NSCHARTNOTEFT_GEN_A_CORE
Do you have Advance Directives (HCP / LV / Organ donation / Documentation of oral advance Directive):   (  x  )  yes    (      )    NO                                                                            Do you have LV - Living will :                                                                                                                                             (   x )  yes    (      )   No    Do you have HCP - Health Care Proxy:                                                                                                                            (  x   )  yes   (       ) N0    Do you have DNR- Do Not Resuscitate :                                                                                                                           (      )  yes  (     x   )  No    Do you have DNI- Do Not intubate  :                                                                                                                               (      )  yes   (   x    ) No    Do you have MOLST - Medical orders for Life sustaining treatment  :                                                                    (      ) yes    (    x   ) No    Decision Maker :  (   x  ) Patient     (      )  HCA   (     ) Public Health Law Surrogate     (      ) Surrogate  (       ) Guardian    Goals of Care :  (     x )   Complete Care     (       ) No Limitations                              (       )   Comfort Care       (       )  Hospice                               (      )   Limited medical Intervention / s    Medical Interventions :   (   x     )   CPR       (        )  DNR                                               (    x    )  Intubation with MV - Mechanical Ventilation  (   x   ) BIPAP/CPAP    (         )   DNI                                               (      x   )  Artificial Nutrition -  IVF, TPN / PPN, Tube Feeds             (         )   No Feeding Tube                                                (      x  ) Use Antibiotics                         (          ) No Antibiotics                                                (     x    ) Blood and Blood Products     (         )   No Blood or Blood products                                                (     x     )  Dialysis                                    (         )  No Dialysis                                                (          )  Medical Management only  (         )  No Invasive Interventions or Surgery  Time spent :                        (   x    ) upto 30 minutes                       (           )   more than 30 minutes  ACP reviewed and discussed

## 2021-02-06 NOTE — PROGRESS NOTE ADULT - PROBLEM SELECTOR PLAN 1
Pt evaluated with Dr. Gray  Applied aquacel DSD to surgical sites  No further podiatric intervention at this time  Pt is stable from podiatry's standpoint  Podiatry will follow pt while in house    WOUND CARE INSTRUCTIONS  1) Pt to keep dressing clean dry and intact after discharge.    2) Please observe foot for signs of infection including swelling, redness, drainage, and warmth.  If noted, please come to ED immediately.   3) Please call to make an appointment with Dr. Gray at the [facility] within 3-5 days of discharge.

## 2021-02-06 NOTE — PROGRESS NOTE ADULT - PROBLEM SELECTOR PROBLEM 1
Toe osteomyelitis
Ulcer of toe of right foot, unspecified ulcer stage
Toe osteomyelitis
Toe osteomyelitis
Ulcer of toe of right foot, unspecified ulcer stage
Ulcer of toe of right foot, unspecified ulcer stage
Toe osteomyelitis
Ulcer of toe of right foot, unspecified ulcer stage
Ulcer of toe of right foot, unspecified ulcer stage
Toe osteomyelitis
Toe osteomyelitis
Ulcer of toe of right foot, unspecified ulcer stage

## 2021-02-06 NOTE — PHYSICAL THERAPY INITIAL EVALUATION ADULT - NS ASR WT BEARING DETAIL RLE
weight bearing to heel with surgical shoe/partial weight-bearing
weight bearing to heel with surgical shoe/partial weight-bearing

## 2021-02-06 NOTE — PROGRESS NOTE ADULT - SUBJECTIVE AND OBJECTIVE BOX
[facility] - Jayne Aguila, Ricco, Max, Marita, Miguel Ng  Office Number:  [phone]    Patient resting comfortably in bed in NAD.  Laying flat with no respiratory distress.  No complaints of chest pain, dyspnea, palpitations, PND, or orthopnea.    F/U for:  HTN      MEDICATIONS  (STANDING):  amitriptyline 100 milliGRAM(s) Oral at bedtime  baclofen 10 milliGRAM(s) Oral every 12 hours  carvedilol 6.25 milliGRAM(s) Oral every 12 hours  chlorhexidine 0.12% Liquid 15 milliLiter(s) Oral Mucosa two times a day  DULoxetine 60 milliGRAM(s) Oral at bedtime  furosemide    Tablet 20 milliGRAM(s) Oral daily  gabapentin 400 milliGRAM(s) Oral four times a day  heparin   Injectable 5000 Unit(s) SubCutaneous every 8 hours  lactobacillus acidophilus 1 Tablet(s) Oral three times a day  levothyroxine 200 MICROGram(s) Oral daily  lidocaine   Patch 1 Patch Transdermal every 24 hours  losartan 25 milliGRAM(s) Oral daily  melatonin 10 milliGRAM(s) Oral at bedtime  multivitamin 1 Tablet(s) Oral daily  oxyCODONE  ER Tablet 30 milliGRAM(s) Oral every 8 hours  pantoprazole    Tablet 40 milliGRAM(s) Oral before breakfast  rOPINIRole 1 milliGRAM(s) Oral daily  rOPINIRole 2 milliGRAM(s) Oral at bedtime  simvastatin 40 milliGRAM(s) Oral at bedtime  sucralfate 1 Gram(s) Oral four times a day  trimethoprim  160 mG/sulfamethoxazole 800 mG 1 Tablet(s) Oral two times a day    MEDICATIONS  (PRN):  acetaminophen   Tablet .. 325 milliGRAM(s) Oral daily PRN Temp greater or equal to 38C (100.4F), Mild Pain (1 - 3)  diphenhydrAMINE 50 milliGRAM(s) Oral every 4 hours PRN Rash and/or Itching  naloxegol 25 milliGRAM(s) Oral <User Schedule> PRN constipation      Allergies    latex (Rash)  penicillin (Hives)    Intolerances        Vital Signs Last 24 Hrs  T(C): 36.9 (05 Feb 2021 20:38), Max: 36.9 (05 Feb 2021 20:38)  T(F): 98.4 (05 Feb 2021 20:38), Max: 98.4 (05 Feb 2021 20:38)  HR: 78 (05 Feb 2021 20:38) (77 - 78)  BP: 108/56 (05 Feb 2021 20:38) (108/56 - 109/64)  BP(mean): --  RR: 18 (05 Feb 2021 20:38) (18 - 18)  SpO2: 92% (05 Feb 2021 20:38) (90% - 92%)    I&O's Summary      ON EXAM:    Constitutional: NAD, awake and alert, well-developed  HEENT: Moist Mucous Membranes, Anicteric  Pulmonary: Non-labored, breath sounds are clear bilaterally, No wheezing, crackles or rhonchi  Cardiovascular: Regular, S1 and S2 nl, No murmurs, rubs, gallops or clicks  Gastrointestinal: Bowel Sounds present, soft, nontender.   Lymph: No lymphadenopathy. No peripheral edema.  Skin: foot dressing   Psych:  Mood & affect appropriate      LABS: All Labs Reviewed:                        11.7   5.60  )-----------( 266      ( 04 Feb 2021 09:27 )             38.2     04 Feb 2021 09:27    142    |  106    |  21     ----------------------------<  119    4.8     |  29     |  1.10     Ca    9.0        04 Feb 2021 09:27        Blood Culture: Organism --  Gram Stain Blood -- Gram Stain   Rare polymorphonuclear leukocytes seen per low power field  Rare Gram Negative Rods seen per oil power field  Specimen Source .Tissue Other, 2ndtoe rt footmiddle phalonx  Culture-Blood --

## 2021-02-06 NOTE — PROGRESS NOTE ADULT - ASSESSMENT
70 F with HTN, HLD, neuropathy, and spinal stenosis presented with right 2nd toe ulcer, now for resection of right 2nd partial ray resection.  Consult is called for cardiac clearance in setting of murmur, HTN and comorbidities     Right toe ulcer  - s/p partial ray resection of right 2nd toe for non-healing ulcer,   - Pain control  - Abx per Primary    HTN  - BP stable and controlled   - Continue Coreg, Losartan, and Lasix  - TTE nL LV & RV size and function no significant valvular dysfunction EF 55%     HLD  - Continue home statin    DVT ppx  - Per Primary    Further cardiac w/u pending clinical course  Will continue to follow

## 2021-02-06 NOTE — PHYSICAL THERAPY INITIAL EVALUATION ADULT - PLANNED THERAPY INTERVENTIONS, PT EVAL
balance training/bed mobility training/gait training/transfer training
bed mobility training/gait training/transfer training

## 2021-02-06 NOTE — PROGRESS NOTE ADULT - PROVIDER SPECIALTY LIST ADULT
Cardiology
Vascular Surgery
Cardiology
Cardiology
Infectious Disease
Infectious Disease
Anesthesia
Infectious Disease
Infectious Disease
Cardiology
Infectious Disease
Podiatry
Family Medicine

## 2021-02-08 ENCOUNTER — OUTPATIENT (OUTPATIENT)
Dept: OUTPATIENT SERVICES | Facility: HOSPITAL | Age: 71
LOS: 1 days | Discharge: HOME | End: 2021-02-08
Payer: MEDICARE

## 2021-02-08 ENCOUNTER — APPOINTMENT (OUTPATIENT)
Dept: WOUND CARE | Facility: HOSPITAL | Age: 71
End: 2021-02-08
Payer: MEDICARE

## 2021-02-08 VITALS
SYSTOLIC BLOOD PRESSURE: 143 MMHG | WEIGHT: 220 LBS | HEART RATE: 79 BPM | HEIGHT: 68 IN | DIASTOLIC BLOOD PRESSURE: 77 MMHG | RESPIRATION RATE: 20 BRPM | TEMPERATURE: 97.7 F | BODY MASS INDEX: 33.34 KG/M2

## 2021-02-08 VITALS
BODY MASS INDEX: 33.34 KG/M2 | RESPIRATION RATE: 18 BRPM | OXYGEN SATURATION: 99 % | SYSTOLIC BLOOD PRESSURE: 143 MMHG | DIASTOLIC BLOOD PRESSURE: 77 MMHG | HEIGHT: 68 IN | TEMPERATURE: 97.7 F | HEART RATE: 79 BPM | WEIGHT: 220 LBS

## 2021-02-08 DIAGNOSIS — M86.9 OSTEOMYELITIS, UNSPECIFIED: ICD-10-CM

## 2021-02-08 DIAGNOSIS — Z89.421 ACQUIRED ABSENCE OF OTHER RIGHT TOE(S): Chronic | ICD-10-CM

## 2021-02-08 DIAGNOSIS — Z09 ENCOUNTER FOR FOLLOW-UP EXAMINATION AFTER COMPLETED TREATMENT FOR CONDITIONS OTHER THAN MALIGNANT NEOPLASM: ICD-10-CM

## 2021-02-08 DIAGNOSIS — Z84.81 FAMILY HISTORY OF CARRIER OF GENETIC DISEASE: ICD-10-CM

## 2021-02-08 DIAGNOSIS — Z80.41 FAMILY HISTORY OF MALIGNANT NEOPLASM OF OVARY: ICD-10-CM

## 2021-02-08 DIAGNOSIS — Z80.52 FAMILY HISTORY OF MALIGNANT NEOPLASM OF BLADDER: ICD-10-CM

## 2021-02-08 DIAGNOSIS — Z87.39 PERSONAL HISTORY OF OTHER DISEASES OF THE MUSCULOSKELETAL SYSTEM AND CONNECTIVE TISSUE: ICD-10-CM

## 2021-02-08 LAB
-  AMIKACIN: SIGNIFICANT CHANGE UP
-  AMOXICILLIN/CLAVULANIC ACID: SIGNIFICANT CHANGE UP
-  AMPICILLIN/SULBACTAM: SIGNIFICANT CHANGE UP
-  AMPICILLIN: SIGNIFICANT CHANGE UP
-  AZTREONAM: SIGNIFICANT CHANGE UP
-  CEFAZOLIN: SIGNIFICANT CHANGE UP
-  CEFEPIME: SIGNIFICANT CHANGE UP
-  CEFOXITIN: SIGNIFICANT CHANGE UP
-  CEFTRIAXONE: SIGNIFICANT CHANGE UP
-  CIPROFLOXACIN: SIGNIFICANT CHANGE UP
-  ERTAPENEM: SIGNIFICANT CHANGE UP
-  GENTAMICIN: SIGNIFICANT CHANGE UP
-  IMIPENEM: SIGNIFICANT CHANGE UP
-  LEVOFLOXACIN: SIGNIFICANT CHANGE UP
-  MEROPENEM: SIGNIFICANT CHANGE UP
-  PIPERACILLIN/TAZOBACTAM: SIGNIFICANT CHANGE UP
-  TOBRAMYCIN: SIGNIFICANT CHANGE UP
-  TRIMETHOPRIM/SULFAMETHOXAZOLE: SIGNIFICANT CHANGE UP
METHOD TYPE: SIGNIFICANT CHANGE UP
SARS-COV-2 RNA SPEC QL NAA+PROBE: SIGNIFICANT CHANGE UP
SURGICAL PATHOLOGY STUDY: SIGNIFICANT CHANGE UP

## 2021-02-08 PROCEDURE — 99024 POSTOP FOLLOW-UP VISIT: CPT

## 2021-02-08 PROCEDURE — G0463: CPT

## 2021-02-08 PROCEDURE — 87635 SARS-COV-2 COVID-19 AMP PRB: CPT

## 2021-02-08 PROCEDURE — U0005: CPT

## 2021-02-08 RX ORDER — CARVEDILOL 6.25 MG/1
6.25 TABLET, FILM COATED ORAL TWICE DAILY
Refills: 0 | Status: ACTIVE | COMMUNITY

## 2021-02-08 RX ORDER — DOXEPIN HYDROCHLORIDE 50 MG/1
50 CAPSULE ORAL DAILY
Refills: 0 | Status: ACTIVE | COMMUNITY

## 2021-02-08 RX ORDER — ROPINIROLE 1 MG/1
1 TABLET, FILM COATED ORAL
Refills: 0 | Status: ACTIVE | COMMUNITY

## 2021-02-08 RX ORDER — GLUCOSAMINE/MSM/CHONDROIT SULF 500-166.6
10 TABLET ORAL DAILY
Refills: 0 | Status: ACTIVE | COMMUNITY

## 2021-02-08 RX ORDER — BACLOFEN 10 MG/1
10 TABLET ORAL TWICE DAILY
Refills: 0 | Status: ACTIVE | COMMUNITY

## 2021-02-08 RX ORDER — FUROSEMIDE 40 MG/1
40 TABLET ORAL
Refills: 0 | Status: DISCONTINUED | COMMUNITY
End: 2021-02-08

## 2021-02-08 RX ORDER — TAPENTADOL HYDROCHLORIDE 75 MG/1
75 TABLET, FILM COATED ORAL 3 TIMES DAILY
Refills: 0 | Status: ACTIVE | COMMUNITY

## 2021-02-08 RX ORDER — AMITRIPTYLINE HYDROCHLORIDE 100 MG/1
100 TABLET, FILM COATED ORAL
Refills: 0 | Status: ACTIVE | COMMUNITY

## 2021-02-08 RX ORDER — SUCRALFATE 1 G/10ML
1 SUSPENSION ORAL TWICE DAILY
Refills: 0 | Status: ACTIVE | COMMUNITY

## 2021-02-08 RX ORDER — FUROSEMIDE 40 MG/1
40 TABLET ORAL DAILY
Refills: 0 | Status: ACTIVE | COMMUNITY

## 2021-02-08 RX ORDER — CALCIUM CARBONATE/VITAMIN D3 600 MG-10
600-400 TABLET ORAL DAILY
Refills: 0 | Status: ACTIVE | COMMUNITY

## 2021-02-08 RX ORDER — ACETAMINOPHEN 500 MG/1
500 TABLET, COATED ORAL 3 TIMES DAILY
Refills: 0 | Status: ACTIVE | COMMUNITY

## 2021-02-08 RX ORDER — LUBIPROSTONE 24 UG/1
24 CAPSULE, GELATIN COATED ORAL
Refills: 0 | Status: DISCONTINUED | COMMUNITY
End: 2021-02-08

## 2021-02-08 RX ORDER — GABAPENTIN 400 MG/1
400 CAPSULE ORAL DAILY
Refills: 0 | Status: ACTIVE | COMMUNITY

## 2021-02-08 RX ORDER — LEVOTHYROXINE SODIUM 175 UG/1
175 TABLET ORAL
Refills: 0 | Status: DISCONTINUED | COMMUNITY
End: 2021-02-08

## 2021-02-08 RX ORDER — AMITRIPTYLINE HYDROCHLORIDE 25 MG/1
25 TABLET ORAL
Refills: 0 | Status: DISCONTINUED | COMMUNITY
End: 2021-02-08

## 2021-02-08 RX ORDER — LEVOTHYROXINE SODIUM 0.2 MG/1
200 TABLET ORAL DAILY
Refills: 0 | Status: ACTIVE | COMMUNITY

## 2021-02-08 RX ORDER — PANTOPRAZOLE 40 MG/1
40 TABLET, DELAYED RELEASE ORAL TWICE DAILY
Refills: 0 | Status: ACTIVE | COMMUNITY

## 2021-02-08 RX ORDER — CHLORHEXIDINE GLUCONATE, 0.12% ORAL RINSE 1.2 MG/ML
0.12 SOLUTION DENTAL
Refills: 0 | Status: ACTIVE | COMMUNITY

## 2021-02-08 RX ORDER — MULTIVITAMIN
TABLET ORAL DAILY
Refills: 0 | Status: ACTIVE | COMMUNITY

## 2021-02-08 RX ORDER — NALOXEGOL OXALATE 25 MG/1
25 TABLET, FILM COATED ORAL TWICE DAILY
Refills: 0 | Status: ACTIVE | COMMUNITY

## 2021-02-08 RX ORDER — OLMESARTAN MEDOXOMIL 20 MG/1
20 TABLET, FILM COATED ORAL DAILY
Refills: 0 | Status: ACTIVE | COMMUNITY

## 2021-02-08 RX ORDER — DULOXETINE HYDROCHLORIDE 60 MG/1
60 CAPSULE, DELAYED RELEASE ORAL DAILY
Refills: 0 | Status: ACTIVE | COMMUNITY

## 2021-02-08 RX ORDER — VALSARTAN 40 MG/1
40 TABLET ORAL DAILY
Refills: 0 | Status: ACTIVE | COMMUNITY

## 2021-02-08 RX ORDER — LACTOBACILLUS ACIDOPHILUS 500MM CELL
CAPSULE ORAL DAILY
Refills: 0 | Status: ACTIVE | COMMUNITY

## 2021-02-08 RX ORDER — LIDOCAINE 5% 700 MG/1
PATCH TOPICAL
Refills: 0 | Status: ACTIVE | COMMUNITY

## 2021-02-09 DIAGNOSIS — M48.00 SPINAL STENOSIS, SITE UNSPECIFIED: ICD-10-CM

## 2021-02-09 DIAGNOSIS — Z80.52 FAMILY HISTORY OF MALIGNANT NEOPLASM OF BLADDER: ICD-10-CM

## 2021-02-09 DIAGNOSIS — Z88.0 ALLERGY STATUS TO PENICILLIN: ICD-10-CM

## 2021-02-09 DIAGNOSIS — Z89.422 ACQUIRED ABSENCE OF OTHER LEFT TOE(S): ICD-10-CM

## 2021-02-09 DIAGNOSIS — Z80.41 FAMILY HISTORY OF MALIGNANT NEOPLASM OF OVARY: ICD-10-CM
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
## 2021-02-09 DIAGNOSIS — I10 ESSENTIAL (PRIMARY) HYPERTENSION: ICD-10-CM

## 2021-02-09 DIAGNOSIS — T86.828 OTHER COMPLICATIONS OF SKIN GRAFT (ALLOGRAFT) (AUTOGRAFT): ICD-10-CM

## 2021-02-09 DIAGNOSIS — Z84.81 FAMILY HISTORY OF CARRIER OF GENETIC DISEASE: ICD-10-CM

## 2021-02-09 DIAGNOSIS — Z98.1 ARTHRODESIS STATUS: ICD-10-CM

## 2021-02-09 DIAGNOSIS — Z89.421 ACQUIRED ABSENCE OF OTHER RIGHT TOE(S): ICD-10-CM

## 2021-02-09 DIAGNOSIS — Z20.822 CONTACT WITH AND (SUSPECTED) EXPOSURE TO COVID-19: ICD-10-CM

## 2021-02-09 DIAGNOSIS — E78.00 PURE HYPERCHOLESTEROLEMIA, UNSPECIFIED: ICD-10-CM

## 2021-02-09 DIAGNOSIS — Z79.899 OTHER LONG TERM (CURRENT) DRUG THERAPY: ICD-10-CM

## 2021-02-09 DIAGNOSIS — Z98.890 OTHER SPECIFIED POSTPROCEDURAL STATES: ICD-10-CM

## 2021-02-09 DIAGNOSIS — Z90.49 ACQUIRED ABSENCE OF OTHER SPECIFIED PARTS OF DIGESTIVE TRACT: ICD-10-CM

## 2021-02-09 DIAGNOSIS — Z79.82 LONG TERM (CURRENT) USE OF ASPIRIN: ICD-10-CM

## 2021-02-09 LAB
CULTURE RESULTS: SIGNIFICANT CHANGE UP
CULTURE RESULTS: SIGNIFICANT CHANGE UP
ORGANISM # SPEC MICROSCOPIC CNT: SIGNIFICANT CHANGE UP
ORGANISM # SPEC MICROSCOPIC CNT: SIGNIFICANT CHANGE UP
SPECIMEN SOURCE: SIGNIFICANT CHANGE UP
SPECIMEN SOURCE: SIGNIFICANT CHANGE UP

## 2021-02-09 NOTE — HISTORY OF PRESENT ILLNESS
[FreeTextEntry1] : The wounds are located on Right Foot 2nd Digit & Left Foot 2nd Digit- pt developed wound on Right Foot 2nd Toe about 1 month ago- pt self treated & then Toe became red & swollen & pt went to [facility] ER & was hospitalized from 01/28/21-02/06/21 & underwent Right Foot 2nd Digit Distal Amputation on 01/29/21 & then Right Foot 2nd Digit Amputation Revision & Left Foot 2nd Digit Distal Amputation on 02/04/21 & was instructed to follow up to [facility]

## 2021-02-09 NOTE — REVIEW OF SYSTEMS
[Skin Wound] : skin wound [Fever] : no fever [Eye Pain] : no eye pain [Earache] : no earache [Chest Pain] : no chest pain [Cough] : no cough [Shortness Of Breath] : no shortness of breath [Abdominal Pain] : no abdominal pain [FreeTextEntry9] : right and left 2nd digit partial amputations, multiple partial toe amputations prior [de-identified] : right and left 2nd digit amputation sites

## 2021-02-09 NOTE — ASSESSMENT
[Verbal] : Verbal [Demo] : Demo [Patient] : Patient [Good - alert, interested, motivated] : Good - alert, interested, motivated [Verbalizes knowledge/Understanding] : Verbalizes knowledge/understanding [Dressing changes] : dressing changes [Foot Care] : foot care [Skin Care] : skin care [Pressure relief] : pressure relief [Signs and symptoms of infection] : sign and symptoms of infection [How and When to Call] : how and when to call [Off-loading] : off-loading [Patient responsibility to plan of care] : patient responsibility to plan of care [] : Yes [Labs and Tests] : labs and tests [Hyperbaric Therapy] : hyperbaric therapy [Stable] : stable [Home] : Home [Cane] : Cane [FreeTextEntry2] : Alteration in skin integrity- promote optimal skin integrity\par  [FreeTextEntry4] : Dr Love/ Photos taken\par HBOT ordered by Dr Love (Compromised Flap) - HBO preauth sheet submitted\par HBO orientation provided- Pt declined HBO orientation video stating she has already viewed HBO orientation video in the past/ all consents signed\par COVID-19 PCR Nasopharyngeal Swab specimen obtained & sent\par F/U to [facility] on Thursday, 02/11/21 for dressing change & then Monday, 02/15/21 for assessment pending HBO authorization\par \par \par

## 2021-02-09 NOTE — PHYSICAL EXAM
[1+] : left 1+ [Skin Ulcer] : ulcer [Calm] : calm [Ankle Swelling (On Exam)] : not present [Varicose Veins Of Lower Extremities] : not present [] : not present [de-identified] : A&Ox3, NAD [de-identified] : bilateral partial 2nd digit amputation, multiple partial toe amputations [de-identified] : bilateral partial 2nd digit amputation sites with dusky changes along incision site, sutures intact, no purulent drainage, no malodor, no proximal streaking, no probe to bone [FreeTextEntry1] : Right Foot 2nd Digit Distal Amp site- Incision line D&I with sutures in place [de-identified] : Intact/ mild erythema [de-identified] : Silver Alginate/ Dry Dressing & Kerlix [de-identified] : Cleansed with Normal saline\par  [FreeTextEntry7] : Left Foot 2nd Digit Distal Amp site- Incision line D&I with sutures in place [de-identified] : Intact/ mild erythema [de-identified] : Silver Alginate/ Dry Dressing & Kerlix [de-identified] : Cleansed with Normal saline\par  [de-identified] : CIRCULATION\par Dorsalis Pedis: R palpable  L palpable\par Posterior Tibialis: R palpable L palpable\par Extremity Color: Pigmented\par Extremity Temperature: Warm\par Capillary Refill: < 3 seconds bilaterally\par Vascular studies not ordered by Dr Love- pt underwent Vascular studies during recent hospitalization\par \par \par  [TWNoteComboBox4] : None [de-identified] : None [de-identified] : None [de-identified] : None [de-identified] : None

## 2021-02-09 NOTE — PLAN
[FreeTextEntry1] : Patient examined and evaluated at this time.\par Continue local wound care and offloading.\par Patient will benefit from HBO and will auth.\par Spent 30 minutes for patient care and medical decision making.\par

## 2021-02-10 ENCOUNTER — NON-APPOINTMENT (OUTPATIENT)
Age: 71
End: 2021-02-10

## 2021-02-10 ENCOUNTER — OUTPATIENT (OUTPATIENT)
Dept: OUTPATIENT SERVICES | Facility: HOSPITAL | Age: 71
LOS: 1 days | Discharge: HOME | End: 2021-02-10
Payer: MEDICARE

## 2021-02-10 ENCOUNTER — APPOINTMENT (OUTPATIENT)
Dept: HYPERBARIC MEDICINE | Facility: HOSPITAL | Age: 71
End: 2021-02-10
Payer: MEDICARE

## 2021-02-10 VITALS
TEMPERATURE: 98.5 F | RESPIRATION RATE: 18 BRPM | HEART RATE: 69 BPM | OXYGEN SATURATION: 100 % | SYSTOLIC BLOOD PRESSURE: 137 MMHG | DIASTOLIC BLOOD PRESSURE: 82 MMHG

## 2021-02-10 VITALS
DIASTOLIC BLOOD PRESSURE: 71 MMHG | OXYGEN SATURATION: 99 % | TEMPERATURE: 97 F | SYSTOLIC BLOOD PRESSURE: 120 MMHG | RESPIRATION RATE: 20 BRPM | HEART RATE: 78 BPM

## 2021-02-10 DIAGNOSIS — T86.828 OTHER COMPLICATIONS OF SKIN GRAFT (ALLOGRAFT) (AUTOGRAFT): ICD-10-CM
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
## 2021-02-10 DIAGNOSIS — E11.621 TYPE 2 DIABETES MELLITUS WITH FOOT ULCER: ICD-10-CM

## 2021-02-10 DIAGNOSIS — Z89.421 ACQUIRED ABSENCE OF OTHER RIGHT TOE(S): Chronic | ICD-10-CM

## 2021-02-10 PROCEDURE — G0277: CPT

## 2021-02-10 PROCEDURE — 99183 HYPERBARIC OXYGEN THERAPY: CPT

## 2021-02-10 PROCEDURE — 82962 GLUCOSE BLOOD TEST: CPT

## 2021-02-11 ENCOUNTER — OUTPATIENT (OUTPATIENT)
Dept: OUTPATIENT SERVICES | Facility: HOSPITAL | Age: 71
LOS: 1 days | Discharge: HOME | End: 2021-02-11
Payer: MEDICARE

## 2021-02-11 ENCOUNTER — APPOINTMENT (OUTPATIENT)
Dept: HYPERBARIC MEDICINE | Facility: HOSPITAL | Age: 71
End: 2021-02-11

## 2021-02-11 ENCOUNTER — APPOINTMENT (OUTPATIENT)
Dept: HYPERBARIC MEDICINE | Facility: HOSPITAL | Age: 71
End: 2021-02-11
Payer: MEDICARE

## 2021-02-11 ENCOUNTER — APPOINTMENT (OUTPATIENT)
Dept: PODIATRY | Facility: HOSPITAL | Age: 71
End: 2021-02-11
Payer: MEDICARE

## 2021-02-11 VITALS
HEART RATE: 65 BPM | RESPIRATION RATE: 18 BRPM | DIASTOLIC BLOOD PRESSURE: 74 MMHG | TEMPERATURE: 97 F | SYSTOLIC BLOOD PRESSURE: 117 MMHG | OXYGEN SATURATION: 98 %

## 2021-02-11 VITALS
OXYGEN SATURATION: 100 % | TEMPERATURE: 97.2 F | SYSTOLIC BLOOD PRESSURE: 129 MMHG | RESPIRATION RATE: 20 BRPM | DIASTOLIC BLOOD PRESSURE: 74 MMHG | HEART RATE: 78 BPM

## 2021-02-11 DIAGNOSIS — T86.828 OTHER COMPLICATIONS OF SKIN GRAFT (ALLOGRAFT) (AUTOGRAFT): ICD-10-CM

## 2021-02-11 DIAGNOSIS — Z89.421 ACQUIRED ABSENCE OF OTHER RIGHT TOE(S): Chronic | ICD-10-CM
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
## 2021-02-11 PROCEDURE — 99183 HYPERBARIC OXYGEN THERAPY: CPT

## 2021-02-11 PROCEDURE — 82962 GLUCOSE BLOOD TEST: CPT

## 2021-02-11 PROCEDURE — G0277: CPT

## 2021-02-11 NOTE — ADDENDUM
[FreeTextEntry1] : PT DESCENDED TO 2.4 YOUNG @ 2.2PSI/MIN WITHOUT INCIDENT IN CHAMBER #1\par PT RESTING AT TX DEPTH WITH VISIBLE CHEST RISE AND FALL OBSERVED CHAMBERSIDE \par PT ASCENDED FROM TX DEPTH WITHOUT INCIDENT \par PT RECEIVED WOUND CARE BY MD POST HBOT \par PT TOLERATED TX WELL

## 2021-02-11 NOTE — ASSESSMENT
[No change from previous assessment] : No change from previous assessment [Patient descended without problem for 9 minutes] : Patient descended without problem for 9 minutes [No dizziness or thirst] :  No dizziness or thirst [No ear problems] : No ear problems [Tolerating dive well] : Tolerating dive well [Vital signs stable] : Vital signs stable [No Chest Pain, shortness of breath] : No Chest Pain, shortness of breath [Respiratory Rate Stable] : Respiratory Rate Stable [No chest pain, shortness of breath, or ear pain] :  No chest pain, shortness of breath, or ear pain  [Tolerated Ascent well] : Tolerated Ascent well [Vital Signs stable] : Vital Signs stable [A physician was present throughout the entire HBOT] : A physician was present throughout the entire HBOT [Clinically Stable] : Clinically stable [No] : No [Continue Treatment Plan] : Continue treatment plan [0] : 0 out of 10

## 2021-02-11 NOTE — PROCEDURE
[Outpatient] : Outpatient [Ambulatory] : Patient is ambulatory. [Cane] : cane [THIS CHAMBER HAS BEEN CLEANED / DISINFECTED] : This chamber has been cleaned / disinfected according to local and hospital policy and procedure prior to this treatment. [____] : Post-Dive: Time - [unfilled] [___] : Post-Dive: Value - [unfilled] mg/dL [Patient demonstrated and verbalized proper technique for using air break mask] : Patient demonstrated and verbalized proper technique for using air break mask [Patient educated on the risks of SMOKING prior to HBOT with understanding] : Patient educated on the risks of SMOKING prior to HBOT with understanding [Patient educated on the risks of CONSUMING ALCOHOL prior to HBOT with understanding] : Patient educated on the risks of CONSUMING ALCOHOL prior to HBOT with understanding [100% Cotton] : 100% cotton [Empty all pockets] : empty all pockets [No hair oils, wigs, hairpieces, pins] : no hair oils, wigs, hairpieces, pins  [Pre tx medications] : pre tx medications  [No make-up, creams] : no make-up, creams  [No jewelry] : no jewelry  [No matches, cigarettes, lighters] : no matches, cigarettes, lighters  [Hearing aid removed] : hearing aid removed [Dentures removed] : dentures removed [Ground bracelet on pt's wrist] : ground bracelet on pt's wrist  [Contacts removed] : contacts removed  [Remove nail polish] : remove nail polish  [No reading material] : no reading material  [Bra, undergarments removed] : bra, undergarments removed  [No contraindicated dressings] : no contraindicated dressings [Ground Wire - VISUAL Verification - Intact/Free of Obstruction] : Ground Wire - VISUAL Verification - Intact/Free of Obstruction  [Ground Continuity - Verified < 1 ohm w/ Wrist Strap Denny] : Ground Continuity - Verified < 1 ohm w/ Wrist Strap Denny [Number: ___] : Number: [unfilled] [Diagnosis: ___] : Diagnosis: [unfilled] [Clear all fields] : clear all fields [] : No [FreeTextEntry4] : 100  [FreeTextEntuq6] : 3387 [FreeTextEntry8] : 5938 [de-identified] : 9672 [de-identified] : 5880 [de-identified] : 8086 [de-identified] : 1761 [de-identified] : 2973 [de-identified] : 5560 [de-identified] : 120 mins

## 2021-02-11 NOTE — PROCEDURE
[Outpatient] : Outpatient [Ambulatory] : Patient is ambulatory. [Cane] : cane [THIS CHAMBER HAS BEEN CLEANED / DISINFECTED] : This chamber has been cleaned / disinfected according to local and hospital policy and procedure prior to this treatment. [Patient demonstrated and verbalized proper technique for using air break mask] : Patient demonstrated and verbalized proper technique for using air break mask [Patient educated on the risks of SMOKING prior to HBOT with understanding] : Patient educated on the risks of SMOKING prior to HBOT with understanding [Patient educated on the risks of CONSUMING ALCOHOL prior to HBOT with understanding] : Patient educated on the risks of CONSUMING ALCOHOL prior to HBOT with understanding [100% Cotton] : 100% cotton [Empty all pockets] : empty all pockets [No hair oils, wigs, hairpieces, pins] : no hair oils, wigs, hairpieces, pins  [Pre tx medications] : pre tx medications  [No make-up, creams] : no make-up, creams  [No jewelry] : no jewelry  [No matches, cigarettes, lighters] : no matches, cigarettes, lighters  [Hearing aid removed] : hearing aid removed [Dentures removed] : dentures removed [Ground bracelet on pt's wrist] : ground bracelet on pt's wrist  [Contacts removed] : contacts removed  [Remove nail polish] : remove nail polish  [No reading material] : no reading material  [Bra, undergarments removed] : bra, undergarments removed  [No contraindicated dressings] : no contraindicated dressings [Ground Wire - VISUAL Verification - Intact/Free of Obstruction] : Ground Wire - VISUAL Verification - Intact/Free of Obstruction  [Ground Continuity - Verified < 1 ohm w/ Wrist Strap Denny] : Ground Continuity - Verified < 1 ohm w/ Wrist Strap Denny [Number: ___] : Number: [unfilled] [Diagnosis: ___] : Diagnosis: [unfilled] [____] : Post-Dive: Time - [unfilled] [___] : Post-Dive: Value - [unfilled] mg/dL [Clear all fields] : clear all fields [] : No [FreeTextEntry6] : 1721 [FreeTextEntry4] : 100 [FreeTextEntry8] : 1522 [de-identified] : 3405 [de-identified] : 6590 [de-identified] : 3961 [de-identified] : 9629 [de-identified] : 4948 [de-identified] : 6960 [de-identified] : 120 minutes [de-identified] : JOEL SARGENT

## 2021-02-11 NOTE — ASSESSMENT
[Patient undergoing HBO treatment for __________] : Patient undergoing HBO treatment for [unfilled] [Patient descended without problem for 9 minutes] : Patient descended without problem for 9 minutes [No ear problems] : No ear problems [No dizziness or thirst] :  No dizziness or thirst [Vital signs stable] : Vital signs stable [Tolerating dive well] : Tolerating dive well [No Chest Pain, shortness of breath] : No Chest Pain, shortness of breath [Respiratory Rate Stable] : Respiratory Rate Stable [No chest pain, shortness of breath, or ear pain] :  No chest pain, shortness of breath, or ear pain  [Tolerated Ascent well] : Tolerated Ascent well [Vital Signs stable] : Vital Signs stable [A physician was present throughout the entire HBOT] : A physician was present throughout the entire HBOT [No] : No [Clinically Stable] : Clinically stable [Continue Treatment Plan] : Continue treatment plan [0] : 0 out of 10

## 2021-02-11 NOTE — ADDENDUM
[FreeTextEntry1] : PT DESCENDED TO 2.0 YOUNG @ 2.2 PSI/MIN, @ 10(g) PT WAS COMPLAINING OF A HEADACHE AND EAR PAIN. PT WAS LOWERED TO A 1.75 PSI/MIN RATE. \par PT IS RESTING AT TX DEPTH WITH VISIBLE CHEST RISE AND FALL IN CHAMBER #1 OBSERVED CHAMBERSIDE. \par PT TOLERATED AIRBREAKS WITHOUT INCIDENT.\par PT ASCENDED FROM TX DEPTH TO SURFACE WITHOUT INCIDENT.\par PT DENIED ANY PAIN OR PRESSURE IN EARS POST-HBOT. HEADACHE THAT OCCURED IN BEGINNING OF TX HAD SUBSIDED COMPLETELY. PT DID NOT FEEL IT WAS NECESSARY TO GET TM EVALUATED BY DOCTOR POST HBOT.

## 2021-02-12 ENCOUNTER — RESULT REVIEW (OUTPATIENT)
Age: 71
End: 2021-02-12

## 2021-02-12 ENCOUNTER — APPOINTMENT (OUTPATIENT)
Dept: HYPERBARIC MEDICINE | Facility: HOSPITAL | Age: 71
End: 2021-02-12
Payer: MEDICARE

## 2021-02-12 ENCOUNTER — NON-APPOINTMENT (OUTPATIENT)
Age: 71
End: 2021-02-12

## 2021-02-12 ENCOUNTER — OUTPATIENT (OUTPATIENT)
Dept: OUTPATIENT SERVICES | Facility: HOSPITAL | Age: 71
LOS: 1 days | Discharge: HOME | End: 2021-02-12
Payer: MEDICARE

## 2021-02-12 VITALS
OXYGEN SATURATION: 97 % | TEMPERATURE: 98 F | DIASTOLIC BLOOD PRESSURE: 72 MMHG | SYSTOLIC BLOOD PRESSURE: 113 MMHG | RESPIRATION RATE: 18 BRPM | HEART RATE: 65 BPM

## 2021-02-12 VITALS
HEART RATE: 80 BPM | OXYGEN SATURATION: 95 % | SYSTOLIC BLOOD PRESSURE: 118 MMHG | TEMPERATURE: 98.2 F | RESPIRATION RATE: 20 BRPM | DIASTOLIC BLOOD PRESSURE: 69 MMHG

## 2021-02-12 DIAGNOSIS — T86.828 OTHER COMPLICATIONS OF SKIN GRAFT (ALLOGRAFT) (AUTOGRAFT): ICD-10-CM

## 2021-02-12 DIAGNOSIS — Z89.421 ACQUIRED ABSENCE OF OTHER RIGHT TOE(S): Chronic | ICD-10-CM
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
## 2021-02-12 PROCEDURE — 86140 C-REACTIVE PROTEIN: CPT

## 2021-02-12 PROCEDURE — 73630 X-RAY EXAM OF FOOT: CPT

## 2021-02-12 PROCEDURE — 36415 COLL VENOUS BLD VENIPUNCTURE: CPT

## 2021-02-12 PROCEDURE — G0277: CPT

## 2021-02-12 PROCEDURE — 73630 X-RAY EXAM OF FOOT: CPT | Mod: 26,50

## 2021-02-12 PROCEDURE — 82962 GLUCOSE BLOOD TEST: CPT

## 2021-02-12 PROCEDURE — 99183 HYPERBARIC OXYGEN THERAPY: CPT

## 2021-02-13 ENCOUNTER — OUTPATIENT (OUTPATIENT)
Dept: OUTPATIENT SERVICES | Facility: HOSPITAL | Age: 71
LOS: 1 days | Discharge: HOME | End: 2021-02-13
Payer: MEDICARE

## 2021-02-13 ENCOUNTER — APPOINTMENT (OUTPATIENT)
Dept: HYPERBARIC MEDICINE | Facility: HOSPITAL | Age: 71
End: 2021-02-13
Payer: MEDICARE

## 2021-02-13 VITALS
SYSTOLIC BLOOD PRESSURE: 118 MMHG | OXYGEN SATURATION: 99 % | TEMPERATURE: 98.1 F | DIASTOLIC BLOOD PRESSURE: 70 MMHG | HEART RATE: 80 BPM | RESPIRATION RATE: 18 BRPM

## 2021-02-13 VITALS
DIASTOLIC BLOOD PRESSURE: 65 MMHG | RESPIRATION RATE: 18 BRPM | OXYGEN SATURATION: 99 % | HEART RATE: 69 BPM | SYSTOLIC BLOOD PRESSURE: 105 MMHG | TEMPERATURE: 97.3 F

## 2021-02-13 DIAGNOSIS — T86.828 OTHER COMPLICATIONS OF SKIN GRAFT (ALLOGRAFT) (AUTOGRAFT): ICD-10-CM
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
## 2021-02-13 DIAGNOSIS — Z89.421 ACQUIRED ABSENCE OF OTHER RIGHT TOE(S): Chronic | ICD-10-CM

## 2021-02-13 LAB — CRP SERPL-MCNC: 0.73 MG/DL — HIGH (ref 0–0.4)

## 2021-02-13 PROCEDURE — 82962 GLUCOSE BLOOD TEST: CPT

## 2021-02-13 PROCEDURE — 99183 HYPERBARIC OXYGEN THERAPY: CPT

## 2021-02-13 PROCEDURE — G0277: CPT

## 2021-02-15 ENCOUNTER — APPOINTMENT (OUTPATIENT)
Dept: WOUND CARE | Facility: HOSPITAL | Age: 71
End: 2021-02-15

## 2021-02-15 ENCOUNTER — APPOINTMENT (OUTPATIENT)
Dept: HYPERBARIC MEDICINE | Facility: HOSPITAL | Age: 71
End: 2021-02-15
Payer: MEDICARE

## 2021-02-15 ENCOUNTER — OUTPATIENT (OUTPATIENT)
Dept: OUTPATIENT SERVICES | Facility: HOSPITAL | Age: 71
LOS: 1 days | Discharge: HOME | End: 2021-02-15
Payer: MEDICARE

## 2021-02-15 VITALS
TEMPERATURE: 97 F | HEART RATE: 93 BPM | SYSTOLIC BLOOD PRESSURE: 116 MMHG | RESPIRATION RATE: 18 BRPM | DIASTOLIC BLOOD PRESSURE: 75 MMHG | OXYGEN SATURATION: 100 %

## 2021-02-15 VITALS
DIASTOLIC BLOOD PRESSURE: 78 MMHG | TEMPERATURE: 97.1 F | RESPIRATION RATE: 20 BRPM | OXYGEN SATURATION: 98 % | SYSTOLIC BLOOD PRESSURE: 134 MMHG | HEART RATE: 84 BPM

## 2021-02-15 DIAGNOSIS — T86.828 OTHER COMPLICATIONS OF SKIN GRAFT (ALLOGRAFT) (AUTOGRAFT): ICD-10-CM

## 2021-02-15 DIAGNOSIS — Z89.421 ACQUIRED ABSENCE OF OTHER RIGHT TOE(S): Chronic | ICD-10-CM
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
## 2021-02-15 LAB — SARS-COV-2 RNA SPEC QL NAA+PROBE: SIGNIFICANT CHANGE UP

## 2021-02-15 PROCEDURE — 82962 GLUCOSE BLOOD TEST: CPT

## 2021-02-15 PROCEDURE — U0003: CPT

## 2021-02-15 PROCEDURE — G0277: CPT

## 2021-02-15 PROCEDURE — U0005: CPT

## 2021-02-15 PROCEDURE — 99183 HYPERBARIC OXYGEN THERAPY: CPT

## 2021-02-15 NOTE — ASSESSMENT
[No change from previous assessment] : No change from previous assessment [Patient prepared for dive] : Patient prepared for dive [Patient undergoing HBO treatment for __________] : Patient undergoing HBO treatment for [unfilled] [Patient descended without problem for 9 minutes] : Patient descended without problem for 9 minutes [No dizziness or thirst] :  No dizziness or thirst [No ear problems] : No ear problems [Vital signs stable] : Vital signs stable [Tolerating dive well] : Tolerating dive well [No Chest Pain, shortness of breath] : No Chest Pain, shortness of breath [Respiratory Rate Stable] : Respiratory Rate Stable [No chest pain, shortness of breath, or ear pain] :  No chest pain, shortness of breath, or ear pain  [Tolerated Ascent well] : Tolerated Ascent well [Vital Signs stable] : Vital Signs stable [A physician was present throughout the entire HBOT] : A physician was present throughout the entire HBOT [Clinically Stable] : Clinically stable [No] : No [Continue Treatment Plan] : Continue treatment plan [FreeTextEntry2] : none

## 2021-02-15 NOTE — PROCEDURE
[Ambulatory] : Patient is ambulatory. [Outpatient] : Outpatient [Cane] : cane [THIS CHAMBER HAS BEEN CLEANED / DISINFECTED] : This chamber has been cleaned / disinfected according to local and hospital policy and procedure prior to this treatment. [____] : Post-Dive: Time - [unfilled] [___] : Post-Dive: Value - [unfilled] mg/dL [Patient demonstrated and verbalized proper technique for using air break mask] : Patient demonstrated and verbalized proper technique for using air break mask [Patient educated on the risks of SMOKING prior to HBOT with understanding] : Patient educated on the risks of SMOKING prior to HBOT with understanding [Patient educated on the risks of CONSUMING ALCOHOL prior to HBOT with understanding] : Patient educated on the risks of CONSUMING ALCOHOL prior to HBOT with understanding [100% Cotton] : 100% cotton [Empty all pockets] : empty all pockets [No hair oils, wigs, hairpieces, pins] : no hair oils, wigs, hairpieces, pins  [Pre tx medications] : pre tx medications  [No jewelry] : no jewelry  [No make-up, creams] : no make-up, creams  [No matches, cigarettes, lighters] : no matches, cigarettes, lighters  [Hearing aid removed] : hearing aid removed [Dentures removed] : dentures removed [Ground bracelet on pt's wrist] : ground bracelet on pt's wrist  [Contacts removed] : contacts removed  [Remove nail polish] : remove nail polish  [No reading material] : no reading material  [No contraindicated dressings] : no contraindicated dressings [Bra, undergarments removed] : bra, undergarments removed  [Ground Wire - VISUAL Verification - Intact/Free of Obstruction] : Ground Wire - VISUAL Verification - Intact/Free of Obstruction  [Ground Continuity - Verified < 1 ohm w/ Wrist Strap Denny] : Ground Continuity - Verified < 1 ohm w/ Wrist Strap Denny [Diagnosis: ___] : Diagnosis: [unfilled] [Number: ___] : Number: [unfilled] [Clear all fields] : clear all fields [] : No [FreeTextEntry4] : 100 [FreeTextEntry6] : 1015 [FreeTextEntry8] : 1020 [de-identified] : 1383 [de-identified] : 1058 [de-identified] : 1129 [de-identified] : 8229 [de-identified] : 1206 [de-identified] : 122 mins [de-identified] : 9713

## 2021-02-15 NOTE — PROCEDURE
[Outpatient] : Outpatient [Ambulatory] : Patient is ambulatory. [Cane] : cane [THIS CHAMBER HAS BEEN CLEANED / DISINFECTED] : This chamber has been cleaned / disinfected according to local and hospital policy and procedure prior to this treatment. [Patient demonstrated and verbalized proper technique for using air break mask] : Patient demonstrated and verbalized proper technique for using air break mask [Patient educated on the risks of SMOKING prior to HBOT with understanding] : Patient educated on the risks of SMOKING prior to HBOT with understanding [Patient educated on the risks of CONSUMING ALCOHOL prior to HBOT with understanding] : Patient educated on the risks of CONSUMING ALCOHOL prior to HBOT with understanding [100% Cotton] : 100% cotton [Empty all pockets] : empty all pockets [No hair oils, wigs, hairpieces, pins] : no hair oils, wigs, hairpieces, pins  [Pre tx medications] : pre tx medications  [No make-up, creams] : no make-up, creams  [No jewelry] : no jewelry  [No matches, cigarettes, lighters] : no matches, cigarettes, lighters  [Hearing aid removed] : hearing aid removed [Dentures removed] : dentures removed [Ground bracelet on pt's wrist] : ground bracelet on pt's wrist  [Remove nail polish] : remove nail polish  [Contacts removed] : contacts removed  [No reading material] : no reading material  [Bra, undergarments removed] : bra, undergarments removed  [No contraindicated dressings] : no contraindicated dressings [Ground Wire - VISUAL Verification - Intact/Free of Obstruction] : Ground Wire - VISUAL Verification - Intact/Free of Obstruction  [Ground Continuity - Verified < 1 ohm w/ Wrist Strap Denny] : Ground Continuity - Verified < 1 ohm w/ Wrist Strap Denny [Number: ___] : Number: [unfilled] [Diagnosis: ___] : Diagnosis: [unfilled] [____] : Post-Dive: Time - [unfilled] [___] : Post-Dive: Value - [unfilled] mg/dL [Clear all fields] : clear all fields [] : No [FreeTextEntry4] : 100 [FreeTextEntry2] : 4629 [FreeTextEntry8] : 5956 [de-identified] : 8616 [de-identified] : 1907 [de-identified] : 4752 [de-identified] : 5578 [de-identified] : 4417 [de-identified] : 9094 [de-identified] : 122 minutes

## 2021-02-15 NOTE — ASSESSMENT
[No change from previous assessment] : No change from previous assessment [Patient descended without problem for 9 minutes] : Patient descended without problem for 9 minutes [Patient prepared for dive] : Patient prepared for dive [No dizziness or thirst] :  No dizziness or thirst [No ear problems] : No ear problems [Vital signs stable] : Vital signs stable [Tolerating dive well] : Tolerating dive well [Respiratory Rate Stable] : Respiratory Rate Stable [No Chest Pain, shortness of breath] : No Chest Pain, shortness of breath [No chest pain, shortness of breath, or ear pain] :  No chest pain, shortness of breath, or ear pain  [Tolerated Ascent well] : Tolerated Ascent well [Vital Signs stable] : Vital Signs stable [A physician was present throughout the entire HBOT] : A physician was present throughout the entire HBOT [No] : No [Clinically Stable] : Clinically stable [0] : 0 out of 10 [Continue Treatment Plan] : Continue treatment plan

## 2021-02-15 NOTE — ADDENDUM
[FreeTextEntry1] : Pt's BGL low. RN notified.as per order  Pt given 16g of glucose via 8oz juice prior to tx. pt  was rested and retested. pt BGL now within acceptable limits for HBOT tx. \par \par Pt descended to 2.4 YOUNG @ 2.2 PSI/min without incident in chamber #2\par Pt resting @ depth with chest rise and fall observed throughout tx. \par Pt tolerated air breaks well. \par Pt ascended from 2.4 YOUNG @ 2.2 PSI/min without incident. \par Pt tolerated tx well. Pt Recieved WC by LPN post tx. \par

## 2021-02-15 NOTE — ADDENDUM
[FreeTextEntry1] : PT COMPLAINED OF ALLERGIES AND A HEADACHE PRIOR TO DESCENT. PT REPORTED OF TAKING A TYLENOL FOR HEADACHE RELIEF BUT WITH NO RESOLVE. PT WAS GIVEN AFRIN, NASAL DECONGESTANT FOR ALLERGIES AND WAS EDUCATED ON USE PRIOR TO DESCENT BY RN. \par COVID-19 PCR SWAB OBTAINED PRIOR TO DESCENT.\par PRIOR TO DIVE, PT'S BGL LOW. RN NOTIFIED. PT GIVEN (2) 4 OZ JUICES. AFTER 15 MIN INTERVENTION, PT'S BGL WITHIN DESIRED RANGE FOR HBOT. PT PUT IN CHAMBER.\par CARE TRANSFERRED TO [facility] PRIOR TO DESCENT.\par PT DESCENDED TO 2.4 YOUNG @ 2.2 PSI/MIN WITHOUT INCIDENT IN CHAMBER #3\par PT RESTING AT TX DEPTH WITH VISIBLE CHEST RISE AND FALL OBSERVED CHAMBER SIDE\par CARE TRANSFERRED @ 1445 PM\par PT ASCENDED FROM TX DEPTH WITHOUT INCIDENT \par PT RECEIVED WOUND CARE POST HBOT BY RN \par PT TOLERATED TX WELL

## 2021-02-16 ENCOUNTER — APPOINTMENT (OUTPATIENT)
Dept: HYPERBARIC MEDICINE | Facility: HOSPITAL | Age: 71
End: 2021-02-16

## 2021-02-17 ENCOUNTER — APPOINTMENT (OUTPATIENT)
Dept: HYPERBARIC MEDICINE | Facility: HOSPITAL | Age: 71
End: 2021-02-17
Payer: MEDICARE

## 2021-02-17 ENCOUNTER — OUTPATIENT (OUTPATIENT)
Dept: OUTPATIENT SERVICES | Facility: HOSPITAL | Age: 71
LOS: 1 days | Discharge: HOME | End: 2021-02-17
Payer: MEDICARE

## 2021-02-17 VITALS
TEMPERATURE: 97.5 F | DIASTOLIC BLOOD PRESSURE: 67 MMHG | RESPIRATION RATE: 18 BRPM | SYSTOLIC BLOOD PRESSURE: 124 MMHG | HEART RATE: 90 BPM | OXYGEN SATURATION: 99 %

## 2021-02-17 VITALS
DIASTOLIC BLOOD PRESSURE: 64 MMHG | RESPIRATION RATE: 18 BRPM | OXYGEN SATURATION: 100 % | HEART RATE: 74 BPM | SYSTOLIC BLOOD PRESSURE: 109 MMHG | TEMPERATURE: 97.6 F

## 2021-02-17 DIAGNOSIS — T86.828 OTHER COMPLICATIONS OF SKIN GRAFT (ALLOGRAFT) (AUTOGRAFT): ICD-10-CM
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
## 2021-02-17 DIAGNOSIS — Z89.421 ACQUIRED ABSENCE OF OTHER RIGHT TOE(S): Chronic | ICD-10-CM

## 2021-02-17 PROCEDURE — 99183 HYPERBARIC OXYGEN THERAPY: CPT

## 2021-02-17 PROCEDURE — G0277: CPT

## 2021-02-17 PROCEDURE — 82962 GLUCOSE BLOOD TEST: CPT

## 2021-02-17 NOTE — PROCEDURE
[Outpatient] : Outpatient [Ambulatory] : Patient is ambulatory. [Cane] : cane [THIS CHAMBER HAS BEEN CLEANED / DISINFECTED] : This chamber has been cleaned / disinfected according to local and hospital policy and procedure prior to this treatment. [Patient demonstrated and verbalized proper technique for using air break mask] : Patient demonstrated and verbalized proper technique for using air break mask [Patient educated on the risks of SMOKING prior to HBOT with understanding] : Patient educated on the risks of SMOKING prior to HBOT with understanding [Patient educated on the risks of CONSUMING ALCOHOL prior to HBOT with understanding] : Patient educated on the risks of CONSUMING ALCOHOL prior to HBOT with understanding [100% Cotton] : 100% cotton [Empty all pockets] : empty all pockets [No hair oils, wigs, hairpieces, pins] : no hair oils, wigs, hairpieces, pins  [Pre tx medications] : pre tx medications  [No make-up, creams] : no make-up, creams  [No jewelry] : no jewelry  [No matches, cigarettes, lighters] : no matches, cigarettes, lighters  [Hearing aid removed] : hearing aid removed [Dentures removed] : dentures removed [Ground bracelet on pt's wrist] : ground bracelet on pt's wrist  [Contacts removed] : contacts removed  [Remove nail polish] : remove nail polish  [No reading material] : no reading material  [Bra, undergarments removed] : bra, undergarments removed  [No contraindicated dressings] : no contraindicated dressings [Ground Wire - VISUAL Verification - Intact/Free of Obstruction] : Ground Wire - VISUAL Verification - Intact/Free of Obstruction  [Ground Continuity - Verified < 1 ohm w/ Wrist Strap Denny] : Ground Continuity - Verified < 1 ohm w/ Wrist Strap Denny [Number: ___] : Number: [unfilled] [Diagnosis: ___] : Diagnosis: [unfilled] [____] : Post-Dive: Time - [unfilled] [___] : Post-Dive: Value - [unfilled] mg/dL [Clear all fields] : clear all fields [] : No [FreeTextEntry4] : 100 mins [FreeTextEntry9] : 2181 [FreeTextEntry8] : 0262 [de-identified] : 0069 [de-identified] : 0900 [de-identified] : 5451 [de-identified] : 5187 [de-identified] : 6373 [de-identified] : 9669 [de-identified] : 122 MIN

## 2021-02-17 NOTE — ADDENDUM
[FreeTextEntry1] : PRIOR TO DIVE, PT'S BGL LOW. RN NOTIFIED. MD NOTIFIED. MD CLEARED PT TO DIVE WITHOUT FURTHER RE-TEST OR INTERVENTION.\par PT DESCENDED TO 2.4 YOUNG @ 1.75 PSI/MIN WITHOUT INCIDENT IN CHAMBER # 3 .PT'S RESTING AT TX DEPTH WITH CHEST RISE AND FALL OBSERVED CHAMBER SIDE.\par PT TOLERATED AIR BREAKS WELL.\par PT ASCENDED FROM TX DEPTH TO SURFACE WITHOUT INCIDENT. \par PT TOLERATED HBOT WITHOUT INCIDENT.

## 2021-02-17 NOTE — ASSESSMENT
[No change from previous assessment] : No change from previous assessment [Patient descended without problem for 9 minutes] : Patient descended without problem for 9 minutes [No ear problems] : No ear problems [Vital signs stable] : Vital signs stable [Tolerating dive well] : Tolerating dive well [No Chest Pain, shortness of breath] : No Chest Pain, shortness of breath [Respiratory Rate Stable] : Respiratory Rate Stable [No chest pain, shortness of breath, or ear pain] :  No chest pain, shortness of breath, or ear pain  [Tolerated Ascent well] : Tolerated Ascent well [Vital Signs stable] : Vital Signs stable [A physician was present throughout the entire HBOT] : A physician was present throughout the entire HBOT [No] : No [Clinically Stable] : Clinically stable [Continue Treatment Plan] : Continue treatment plan [0] : 0 out of 10

## 2021-02-18 ENCOUNTER — APPOINTMENT (OUTPATIENT)
Dept: HYPERBARIC MEDICINE | Facility: HOSPITAL | Age: 71
End: 2021-02-18

## 2021-02-19 ENCOUNTER — OUTPATIENT (OUTPATIENT)
Dept: OUTPATIENT SERVICES | Facility: HOSPITAL | Age: 71
LOS: 1 days | Discharge: HOME | End: 2021-02-19
Payer: MEDICARE

## 2021-02-19 ENCOUNTER — APPOINTMENT (OUTPATIENT)
Dept: HYPERBARIC MEDICINE | Facility: HOSPITAL | Age: 71
End: 2021-02-19
Payer: MEDICARE

## 2021-02-19 VITALS
TEMPERATURE: 97.8 F | DIASTOLIC BLOOD PRESSURE: 71 MMHG | RESPIRATION RATE: 18 BRPM | SYSTOLIC BLOOD PRESSURE: 123 MMHG | HEART RATE: 90 BPM | OXYGEN SATURATION: 99 %

## 2021-02-19 VITALS
RESPIRATION RATE: 18 BRPM | SYSTOLIC BLOOD PRESSURE: 109 MMHG | TEMPERATURE: 98.8 F | DIASTOLIC BLOOD PRESSURE: 61 MMHG | HEART RATE: 82 BPM | OXYGEN SATURATION: 96 %
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
## 2021-02-19 DIAGNOSIS — T86.828 OTHER COMPLICATIONS OF SKIN GRAFT (ALLOGRAFT) (AUTOGRAFT): ICD-10-CM

## 2021-02-19 DIAGNOSIS — Z89.421 ACQUIRED ABSENCE OF OTHER RIGHT TOE(S): Chronic | ICD-10-CM

## 2021-02-19 PROCEDURE — G0277: CPT

## 2021-02-19 PROCEDURE — 82962 GLUCOSE BLOOD TEST: CPT

## 2021-02-19 PROCEDURE — 99183 HYPERBARIC OXYGEN THERAPY: CPT

## 2021-02-19 NOTE — ADDENDUM
[FreeTextEntry1] : PT DESCENDED TO 2.4 YOUNG @ 2.2 PSI/MIN WITHOUT INCIDENT IN CHAMBER #2\par PT RESTING AT TX DEPTH WITH VISIBLE CHEST RISE AND FALL OBSERVED CHAMBERSIDE \par PT TOLERATED AIR BREAKS WELL \par PT ASCENDED FROM TX DEPTH WITHOUT INCIDENT \par PT RECEIVED WOUND CARE BY RN POST HBOT \par PT TOLERATED TX WELL

## 2021-02-19 NOTE — ADDENDUM
[FreeTextEntry1] : Pt descended at 1.75 psi/min to 2.4 liliana in chamber #3. At 5 psi, pt experienced ear pain at her left ear. Dive was halted. Pt was coached on clearing techniques to help equalize pressure. Pt described pain 2 out of 10. Pt was able to clear ear's. Pt was willing to continue treatment. Dive was resumed. Pt was able to reach treatment pressure without incident.\par Pt's resting at tx depth with visible chest rise and fall observed chamberside.\par Pt tolerated airbreaks without incident.\par Pt ascended from tx depth to surface without incident.\par Pt to receive eound care via Nurse post-HBOT.\par Pt will have left ear evaluated by Doctor post-HBOT. \par Care transferred to [facility] tech upon ascent.\par PT ASCENDED FROM TX DEPTH WITHOUT INCIDENT. \par DPM ASSESSED PT CHAMBER SIDE POST HBOT RE: EAR PAIN ON DESCENT. PT GIVEN CONTACT INFO FOR [facility] @ [employer]. PT TO RESUME HBOT 2/13/21, PT TO BE GIVEN AFRIN NASAL DECONGESTANT & EAR PLANE TO ASSIST IN CLEARING.

## 2021-02-19 NOTE — PROCEDURE
[] : No [FreeTextEntry4] : 100 [FreeTextEntry2] : 6866 [FreeTextEntry8] : 8863 [de-identified] : 2394 [de-identified] : 0189 [de-identified] : 8150 [de-identified] : 9771 [de-identified] : 4248 [de-identified] : 8856 [de-identified] : 136 mins [de-identified] : CRISTINA SARGENT 2/12/21

## 2021-02-19 NOTE — PROCEDURE
[Outpatient] : Outpatient [Ambulatory] : Patient is ambulatory. [Cane] : cane [THIS CHAMBER HAS BEEN CLEANED / DISINFECTED] : This chamber has been cleaned / disinfected according to local and hospital policy and procedure prior to this treatment. [Patient demonstrated and verbalized proper technique for using air break mask] : Patient demonstrated and verbalized proper technique for using air break mask [Patient educated on the risks of SMOKING prior to HBOT with understanding] : Patient educated on the risks of SMOKING prior to HBOT with understanding [Patient educated on the risks of CONSUMING ALCOHOL prior to HBOT with understanding] : Patient educated on the risks of CONSUMING ALCOHOL prior to HBOT with understanding [100% Cotton] : 100% cotton [Empty all pockets] : empty all pockets [No hair oils, wigs, hairpieces, pins] : no hair oils, wigs, hairpieces, pins  [No make-up, creams] : no make-up, creams  [Pre tx medications] : pre tx medications  [No jewelry] : no jewelry  [No matches, cigarettes, lighters] : no matches, cigarettes, lighters  [Hearing aid removed] : hearing aid removed [Dentures removed] : dentures removed [Ground bracelet on pt's wrist] : ground bracelet on pt's wrist  [Contacts removed] : contacts removed  [Remove nail polish] : remove nail polish  [No reading material] : no reading material  [Bra, undergarments removed] : bra, undergarments removed  [No contraindicated dressings] : no contraindicated dressings [Ground Wire - VISUAL Verification - Intact/Free of Obstruction] : Ground Wire - VISUAL Verification - Intact/Free of Obstruction  [Ground Continuity - Verified < 1 ohm w/ Wrist Strap Denny] : Ground Continuity - Verified < 1 ohm w/ Wrist Strap Denny [Number: ___] : Number: [unfilled] [Diagnosis: ___] : Diagnosis: [unfilled] [____] : Post-Dive: Time - [unfilled] [___] : Post-Dive: Value - [unfilled] mg/dL [Clear all fields] : clear all fields [] : No [FreeTextEntry6] : 1781 [FreeTextEntry4] : 100 [FreeTextEntry8] : 1567 [de-identified] : 8482 [de-identified] : 2354 [de-identified] : 0093 [de-identified] : 9813 [de-identified] : 9104 [de-identified] : 7633 [de-identified] : 120 minutes

## 2021-02-19 NOTE — ASSESSMENT
[No change from previous assessment] : No change from previous assessment [Patient prepared for dive] : Patient prepared for dive [Patient descended without problem for 9 minutes] : Patient descended without problem for 9 minutes [No dizziness or thirst] :  No dizziness or thirst [No ear problems] : No ear problems [Vital signs stable] : Vital signs stable [Tolerating dive well] : Tolerating dive well [No Chest Pain, shortness of breath] : No Chest Pain, shortness of breath [Respiratory Rate Stable] : Respiratory Rate Stable [No chest pain, shortness of breath, or ear pain] :  No chest pain, shortness of breath, or ear pain  [Tolerated Ascent well] : Tolerated Ascent well [Vital Signs stable] : Vital Signs stable [A physician was present throughout the entire HBOT] : A physician was present throughout the entire HBOT [No] : No [Clinically Stable] : Clinically stable [Continue Treatment Plan] : Continue treatment plan

## 2021-02-20 ENCOUNTER — OUTPATIENT (OUTPATIENT)
Dept: OUTPATIENT SERVICES | Facility: HOSPITAL | Age: 71
LOS: 1 days | Discharge: HOME | End: 2021-02-20
Payer: MEDICARE

## 2021-02-20 ENCOUNTER — APPOINTMENT (OUTPATIENT)
Dept: HYPERBARIC MEDICINE | Facility: HOSPITAL | Age: 71
End: 2021-02-20
Payer: MEDICARE

## 2021-02-20 VITALS
RESPIRATION RATE: 18 BRPM | OXYGEN SATURATION: 99 % | TEMPERATURE: 98 F | SYSTOLIC BLOOD PRESSURE: 122 MMHG | DIASTOLIC BLOOD PRESSURE: 69 MMHG | HEART RATE: 98 BPM

## 2021-02-20 VITALS
OXYGEN SATURATION: 99 % | SYSTOLIC BLOOD PRESSURE: 117 MMHG | DIASTOLIC BLOOD PRESSURE: 58 MMHG | RESPIRATION RATE: 18 BRPM | TEMPERATURE: 98 F | HEART RATE: 72 BPM

## 2021-02-20 DIAGNOSIS — T86.828 OTHER COMPLICATIONS OF SKIN GRAFT (ALLOGRAFT) (AUTOGRAFT): ICD-10-CM

## 2021-02-20 DIAGNOSIS — Z89.421 ACQUIRED ABSENCE OF OTHER RIGHT TOE(S): Chronic | ICD-10-CM

## 2021-02-20 PROCEDURE — 82962 GLUCOSE BLOOD TEST: CPT

## 2021-02-20 PROCEDURE — 99183 HYPERBARIC OXYGEN THERAPY: CPT

## 2021-02-20 PROCEDURE — G0277: CPT

## 2021-02-20 NOTE — ADDENDUM
[FreeTextEntry1] : Pt descended to 2.4 YOUNG @ 2.2 PSI/min without incident in chamber #2\par Pt resting @ depth with chest rise and fall observed throughout tx. \par Pt tolerated air breaks well. \par Pt ascended from 2.4 YOUNG @ 2.2 PSI/min without incident. \par Pt tolerated tx well.\par

## 2021-02-20 NOTE — ASSESSMENT
[No change from previous assessment] : No change from previous assessment [Patient prepared for dive] : Patient prepared for dive [Patient descended without problem for 9 minutes] : Patient descended without problem for 9 minutes [No dizziness or thirst] :  No dizziness or thirst [No ear problems] : No ear problems [Vital signs stable] : Vital signs stable [Tolerating dive well] : Tolerating dive well [No Chest Pain, shortness of breath] : No Chest Pain, shortness of breath [Respiratory Rate Stable] : Respiratory Rate Stable [No chest pain, shortness of breath, or ear pain] :  No chest pain, shortness of breath, or ear pain  [Tolerated Ascent well] : Tolerated Ascent well [Vital Signs stable] : Vital Signs stable [A physician was present throughout the entire HBOT] : A physician was present throughout the entire HBOT [No] : No [Continue Treatment Plan] : Continue treatment plan [Clinically Stable] : Clinically stable

## 2021-02-20 NOTE — PROCEDURE
[Outpatient] : Outpatient [Ambulatory] : Patient is ambulatory. [Cane] : cane [THIS CHAMBER HAS BEEN CLEANED / DISINFECTED] : This chamber has been cleaned / disinfected according to local and hospital policy and procedure prior to this treatment. [____] : Post-Dive: Time - [unfilled] [___] : Post-Dive: Value - [unfilled] mg/dL [Patient demonstrated and verbalized proper technique for using air break mask] : Patient demonstrated and verbalized proper technique for using air break mask [Patient educated on the risks of SMOKING prior to HBOT with understanding] : Patient educated on the risks of SMOKING prior to HBOT with understanding [Patient educated on the risks of CONSUMING ALCOHOL prior to HBOT with understanding] : Patient educated on the risks of CONSUMING ALCOHOL prior to HBOT with understanding [100% Cotton] : 100% cotton [Empty all pockets] : empty all pockets [No hair oils, wigs, hairpieces, pins] : no hair oils, wigs, hairpieces, pins  [Pre tx medications] : pre tx medications  [No make-up, creams] : no make-up, creams  [No jewelry] : no jewelry  [No matches, cigarettes, lighters] : no matches, cigarettes, lighters  [Hearing aid removed] : hearing aid removed [Dentures removed] : dentures removed [Ground bracelet on pt's wrist] : ground bracelet on pt's wrist  [Contacts removed] : contacts removed  [Remove nail polish] : remove nail polish  [No reading material] : no reading material  [Bra, undergarments removed] : bra, undergarments removed  [No contraindicated dressings] : no contraindicated dressings [Ground Wire - VISUAL Verification - Intact/Free of Obstruction] : Ground Wire - VISUAL Verification - Intact/Free of Obstruction  [Ground Continuity - Verified < 1 ohm w/ Wrist Strap Denny] : Ground Continuity - Verified < 1 ohm w/ Wrist Strap Denny [Diagnosis: ___] : Diagnosis: [unfilled] [Number: ___] : Number: [unfilled] [Clear all fields] : clear all fields [] : No [FreeTextEntry4] : 100 [FreeTextEntry8] : 7317 [FreeTextEntry6] : 9040 [de-identified] : 4878 [de-identified] : 7508 [de-identified] : 2962 [de-identified] : 9354 [de-identified] : 5416 [de-identified] : 1002 [de-identified] : 120 minutes
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
## 2021-02-21 DIAGNOSIS — T86.828 OTHER COMPLICATIONS OF SKIN GRAFT (ALLOGRAFT) (AUTOGRAFT): ICD-10-CM

## 2021-02-22 ENCOUNTER — OUTPATIENT (OUTPATIENT)
Dept: OUTPATIENT SERVICES | Facility: HOSPITAL | Age: 71
LOS: 1 days | Discharge: HOME | End: 2021-02-22
Payer: MEDICARE

## 2021-02-22 ENCOUNTER — APPOINTMENT (OUTPATIENT)
Dept: HYPERBARIC MEDICINE | Facility: HOSPITAL | Age: 71
End: 2021-02-22
Payer: MEDICARE

## 2021-02-22 VITALS
HEART RATE: 90 BPM | OXYGEN SATURATION: 96 % | DIASTOLIC BLOOD PRESSURE: 73 MMHG | SYSTOLIC BLOOD PRESSURE: 124 MMHG | BODY MASS INDEX: 35.31 KG/M2 | RESPIRATION RATE: 18 BRPM | HEIGHT: 67 IN | WEIGHT: 225 LBS | TEMPERATURE: 97 F

## 2021-02-22 DIAGNOSIS — Z89.421 ACQUIRED ABSENCE OF OTHER RIGHT TOE(S): Chronic | ICD-10-CM

## 2021-02-22 DIAGNOSIS — T86.828 OTHER COMPLICATIONS OF SKIN GRAFT (ALLOGRAFT) (AUTOGRAFT): ICD-10-CM

## 2021-02-22 PROCEDURE — G0463: CPT

## 2021-02-22 PROCEDURE — U0005: CPT

## 2021-02-22 PROCEDURE — U0003: CPT

## 2021-02-22 PROCEDURE — 99024 POSTOP FOLLOW-UP VISIT: CPT

## 2021-02-23 ENCOUNTER — OUTPATIENT (OUTPATIENT)
Dept: OUTPATIENT SERVICES | Facility: HOSPITAL | Age: 71
LOS: 1 days | Discharge: HOME | End: 2021-02-23
Payer: MEDICARE

## 2021-02-23 ENCOUNTER — APPOINTMENT (OUTPATIENT)
Dept: HYPERBARIC MEDICINE | Facility: HOSPITAL | Age: 71
End: 2021-02-23
Payer: MEDICARE

## 2021-02-23 VITALS
DIASTOLIC BLOOD PRESSURE: 72 MMHG | TEMPERATURE: 97.7 F | RESPIRATION RATE: 18 BRPM | OXYGEN SATURATION: 99 % | SYSTOLIC BLOOD PRESSURE: 138 MMHG | HEART RATE: 87 BPM

## 2021-02-23 VITALS
TEMPERATURE: 97.6 F | OXYGEN SATURATION: 100 % | SYSTOLIC BLOOD PRESSURE: 141 MMHG | DIASTOLIC BLOOD PRESSURE: 87 MMHG | HEART RATE: 75 BPM | RESPIRATION RATE: 18 BRPM

## 2021-02-23 DIAGNOSIS — Z89.421 ACQUIRED ABSENCE OF OTHER RIGHT TOE(S): ICD-10-CM

## 2021-02-23 DIAGNOSIS — T86.828 OTHER COMPLICATIONS OF SKIN GRAFT (ALLOGRAFT) (AUTOGRAFT): ICD-10-CM

## 2021-02-23 DIAGNOSIS — Z88.0 ALLERGY STATUS TO PENICILLIN: ICD-10-CM

## 2021-02-23 DIAGNOSIS — Z80.41 FAMILY HISTORY OF MALIGNANT NEOPLASM OF OVARY: ICD-10-CM
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
## 2021-02-23 DIAGNOSIS — Z84.81 FAMILY HISTORY OF CARRIER OF GENETIC DISEASE: ICD-10-CM

## 2021-02-23 DIAGNOSIS — Z89.421 ACQUIRED ABSENCE OF OTHER RIGHT TOE(S): Chronic | ICD-10-CM

## 2021-02-23 DIAGNOSIS — Z79.899 OTHER LONG TERM (CURRENT) DRUG THERAPY: ICD-10-CM

## 2021-02-23 DIAGNOSIS — Z98.1 ARTHRODESIS STATUS: ICD-10-CM

## 2021-02-23 DIAGNOSIS — Z80.52 FAMILY HISTORY OF MALIGNANT NEOPLASM OF BLADDER: ICD-10-CM

## 2021-02-23 DIAGNOSIS — Z98.890 OTHER SPECIFIED POSTPROCEDURAL STATES: ICD-10-CM

## 2021-02-23 DIAGNOSIS — Z90.49 ACQUIRED ABSENCE OF OTHER SPECIFIED PARTS OF DIGESTIVE TRACT: ICD-10-CM

## 2021-02-23 DIAGNOSIS — Z89.422 ACQUIRED ABSENCE OF OTHER LEFT TOE(S): ICD-10-CM

## 2021-02-23 DIAGNOSIS — M48.00 SPINAL STENOSIS, SITE UNSPECIFIED: ICD-10-CM

## 2021-02-23 DIAGNOSIS — Z48.02 ENCOUNTER FOR REMOVAL OF SUTURES: ICD-10-CM

## 2021-02-23 DIAGNOSIS — I10 ESSENTIAL (PRIMARY) HYPERTENSION: ICD-10-CM

## 2021-02-23 DIAGNOSIS — Z79.82 LONG TERM (CURRENT) USE OF ASPIRIN: ICD-10-CM

## 2021-02-23 DIAGNOSIS — E78.00 PURE HYPERCHOLESTEROLEMIA, UNSPECIFIED: ICD-10-CM

## 2021-02-23 LAB — SARS-COV-2 RNA SPEC QL NAA+PROBE: SIGNIFICANT CHANGE UP

## 2021-02-23 PROCEDURE — G0277: CPT

## 2021-02-23 PROCEDURE — 82962 GLUCOSE BLOOD TEST: CPT

## 2021-02-23 PROCEDURE — 99183 HYPERBARIC OXYGEN THERAPY: CPT

## 2021-02-24 ENCOUNTER — OUTPATIENT (OUTPATIENT)
Dept: OUTPATIENT SERVICES | Facility: HOSPITAL | Age: 71
LOS: 1 days | Discharge: HOME | End: 2021-02-24
Payer: MEDICARE

## 2021-02-24 ENCOUNTER — APPOINTMENT (OUTPATIENT)
Dept: HYPERBARIC MEDICINE | Facility: HOSPITAL | Age: 71
End: 2021-02-24
Payer: MEDICARE

## 2021-02-24 VITALS
HEART RATE: 85 BPM | RESPIRATION RATE: 18 BRPM | DIASTOLIC BLOOD PRESSURE: 71 MMHG | TEMPERATURE: 98.4 F | OXYGEN SATURATION: 96 % | SYSTOLIC BLOOD PRESSURE: 124 MMHG

## 2021-02-24 VITALS
SYSTOLIC BLOOD PRESSURE: 157 MMHG | DIASTOLIC BLOOD PRESSURE: 90 MMHG | HEART RATE: 81 BPM | RESPIRATION RATE: 18 BRPM | TEMPERATURE: 97.4 F | OXYGEN SATURATION: 99 %

## 2021-02-24 DIAGNOSIS — T86.828 OTHER COMPLICATIONS OF SKIN GRAFT (ALLOGRAFT) (AUTOGRAFT): ICD-10-CM

## 2021-02-24 DIAGNOSIS — Z89.421 ACQUIRED ABSENCE OF OTHER RIGHT TOE(S): Chronic | ICD-10-CM
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
## 2021-02-24 PROCEDURE — G0277: CPT

## 2021-02-24 PROCEDURE — 99183 HYPERBARIC OXYGEN THERAPY: CPT

## 2021-02-24 PROCEDURE — 82962 GLUCOSE BLOOD TEST: CPT

## 2021-02-24 NOTE — VITALS
[] : No [de-identified] : Pt rates pain 0/10.  Patient denies any pain or discomfort at the present

## 2021-02-24 NOTE — PLAN
[FreeTextEntry1] : Patient examined and evaluated at this time.\par Continue local wound care and offloading.\par Sutures from right foot removed and steristrips applied.\par Will continue HBOT at this time.\par Spent 20 minutes for patient care and medical decision making.\par

## 2021-02-24 NOTE — ASSESSMENT
[No change from previous assessment] : No change from previous assessment [Patient descended without problem for 9 minutes] : Patient descended without problem for 9 minutes [No dizziness or thirst] :  No dizziness or thirst [No ear problems] : No ear problems [Vital signs stable] : Vital signs stable [Tolerating dive well] : Tolerating dive well [No Chest Pain, shortness of breath] : No Chest Pain, shortness of breath [Respiratory Rate Stable] : Respiratory Rate Stable [No chest pain, shortness of breath, or ear pain] :  No chest pain, shortness of breath, or ear pain  [Tolerated Ascent well] : Tolerated Ascent well [Vital Signs stable] : Vital Signs stable [A physician was present throughout the entire HBOT] : A physician was present throughout the entire HBOT [Clinically Stable] : Clinically stable [No] : No [Continue Treatment Plan] : Continue treatment plan [0] : 0 out of 10

## 2021-02-24 NOTE — HISTORY OF PRESENT ILLNESS
[FreeTextEntry1] : Patient seen s/p right and left 2nd digit partial amputations. Patient currently in HBO at this time. Denies any other complaints.

## 2021-02-24 NOTE — ASSESSMENT
[Verbal] : Verbal [Demo] : Demo [Patient] : Patient [Good - alert, interested, motivated] : Good - alert, interested, motivated [Verbalizes knowledge/Understanding] : Verbalizes knowledge/understanding [Dressing changes] : dressing changes [Foot Care] : foot care [Skin Care] : skin care [Pressure relief] : pressure relief [Signs and symptoms of infection] : sign and symptoms of infection [How and When to Call] : how and when to call [Hyperbaric Therapy] : hyperbaric therapy [Off-loading] : off-loading [Patient responsibility to plan of care] : patient responsibility to plan of care [] : Yes [Stable] : stable [Home] : Home [Cane] : Cane [Not Applicable - Long Term Care/Home Health Agency] : Long Term Care/Home Health Agency: Not Applicable [FreeTextEntry2] : Infection Prevention\par Localized Wound Care\par Offloading / Pressure Relief\par Promote Optimal Skin Integrity\par Maintain acceptable pain level with use of pharmacological and nonpharmacological interventions \par Daily Hyperbaric Therapy [FreeTextEntry4] : F/U to [facility] in One Week to remove remaining suture \par Pt has completed 8/30 HBOT, no additional treatments ordered at this time \par COVID PCR Nasoswab obtained, pt tolerated test well

## 2021-02-24 NOTE — REVIEW OF SYSTEMS
[Skin Wound] : skin wound [Fever] : no fever [Eye Pain] : no eye pain [Earache] : no earache [Chest Pain] : no chest pain [Shortness Of Breath] : no shortness of breath [Cough] : no cough [Abdominal Pain] : no abdominal pain [FreeTextEntry9] : right and left 2nd digit partial amputations, multiple partial toe amputations prior [de-identified] : right and left 2nd digit amputation sites

## 2021-02-24 NOTE — PHYSICAL EXAM
[1+] : left 1+ [Skin Ulcer] : ulcer [Calm] : calm [4 x 4] : 4 x 4  [Ankle Swelling (On Exam)] : not present [Varicose Veins Of Lower Extremities] : not present [] : not present [de-identified] : A&Ox3, NAD [de-identified] : bilateral partial 2nd digit amputation, multiple partial toe amputations [de-identified] : bilateral partial 2nd digit amputation sites with dusky changes along incision site, sutures intact, no purulent drainage, no malodor, no proximal streaking, no probe to bone [de-identified] : Sutures Removed by DPM  [FreeTextEntry1] : Right Foot 2nd Digit Distal AMP - Incision Line - Sutures Intact  [de-identified] : NONE  [de-identified] : Intact  [de-identified] : Cleansed with Normal Saline \par Toe Sock  [de-identified] : Every Other Suture Removed by DPM [FreeTextEntry7] : Left Foot 2nd Digit Distal Amp - Incision Line - Suture Intact   [de-identified] : Intact  [de-identified] : Silver Alginate  [de-identified] : Cleansed with Normal Saline \par Toe Sock  [TWNoteComboBox4] : None [TWNoteComboBox6] : Surgical [de-identified] : 3x Weekly [de-identified] : Surgical [de-identified] : None [de-identified] : 3x Weekly

## 2021-02-25 ENCOUNTER — NON-APPOINTMENT (OUTPATIENT)
Age: 71
End: 2021-02-25

## 2021-02-25 ENCOUNTER — APPOINTMENT (OUTPATIENT)
Dept: HYPERBARIC MEDICINE | Facility: HOSPITAL | Age: 71
End: 2021-02-25

## 2021-02-25 NOTE — ADDENDUM
[FreeTextEntry1] : PT GIVEN OFF- LOADING MEASURES PRE HBOT \par PT OBSERVED CORRECTLY PLACING EAR PLANES PRE- HBOT \par PT DESCENDED TO 2.4 YOUNG @ 2.2 PSI/MIN WITHOUT INCIDENT IN CHAMBER #4\par PT RESTING AT TX DEPTH WITH VISIBLE CHEST RISE AND FALL OBSERVED CHAMBER SIDE \par PT ASCENDED FROM TX DEPTH WITHOUT INCIDENT. PT TOLERATED TX WELL.

## 2021-02-25 NOTE — PROCEDURE
[Outpatient] : Outpatient [Ambulatory] : Patient is ambulatory. [Cane] : cane [THIS CHAMBER HAS BEEN CLEANED / DISINFECTED] : This chamber has been cleaned / disinfected according to local and hospital policy and procedure prior to this treatment. [____] : Pre-Dive: Time - [unfilled] [___] : Pre-Dive: Value - [unfilled] mg/dL [Patient demonstrated and verbalized proper technique for using air break mask] : Patient demonstrated and verbalized proper technique for using air break mask [Patient educated on the risks of SMOKING prior to HBOT with understanding] : Patient educated on the risks of SMOKING prior to HBOT with understanding [Patient educated on the risks of CONSUMING ALCOHOL prior to HBOT with understanding] : Patient educated on the risks of CONSUMING ALCOHOL prior to HBOT with understanding [100% Cotton] : 100% cotton [Empty all pockets] : empty all pockets [No hair oils, wigs, hairpieces, pins] : no hair oils, wigs, hairpieces, pins  [Pre tx medications] : pre tx medications  [No make-up, creams] : no make-up, creams  [No jewelry] : no jewelry  [No matches, cigarettes, lighters] : no matches, cigarettes, lighters  [Hearing aid removed] : hearing aid removed [Dentures removed] : dentures removed [Ground bracelet on pt's wrist] : ground bracelet on pt's wrist  [Contacts removed] : contacts removed  [Remove nail polish] : remove nail polish  [No reading material] : no reading material  [Bra, undergarments removed] : bra, undergarments removed  [No contraindicated dressings] : no contraindicated dressings [Ground Wire - VISUAL Verification - Intact/Free of Obstruction] : Ground Wire - VISUAL Verification - Intact/Free of Obstruction  [Ground Continuity - Verified < 1 ohm w/ Wrist Strap Denny] : Ground Continuity - Verified < 1 ohm w/ Wrist Strap Denny [Number: ___] : Number: [unfilled] [Diagnosis: ___] : Diagnosis: [unfilled] [Clear all fields] : clear all fields [] : No [FreeTextEntry4] : 100 [FreeTextEntry6] : 5388 [FreeTextEntry8] : 9217 [de-identified] : 0124 [de-identified] : 0713 [de-identified] : 2958 [de-identified] : 1907 [de-identified] : 2993 [de-identified] : 7889 [de-identified] : 120 MIN

## 2021-02-26 ENCOUNTER — APPOINTMENT (OUTPATIENT)
Dept: HYPERBARIC MEDICINE | Facility: HOSPITAL | Age: 71
End: 2021-02-26
Payer: MEDICARE

## 2021-02-26 ENCOUNTER — APPOINTMENT (OUTPATIENT)
Dept: OTOLARYNGOLOGY | Facility: CLINIC | Age: 71
End: 2021-02-26

## 2021-02-26 ENCOUNTER — NON-APPOINTMENT (OUTPATIENT)
Age: 71
End: 2021-02-26

## 2021-02-26 ENCOUNTER — OUTPATIENT (OUTPATIENT)
Dept: OUTPATIENT SERVICES | Facility: HOSPITAL | Age: 71
LOS: 1 days | Discharge: HOME | End: 2021-02-26
Payer: MEDICARE

## 2021-02-26 VITALS
SYSTOLIC BLOOD PRESSURE: 114 MMHG | OXYGEN SATURATION: 100 % | RESPIRATION RATE: 18 BRPM | HEART RATE: 72 BPM | TEMPERATURE: 97.6 F | DIASTOLIC BLOOD PRESSURE: 73 MMHG

## 2021-02-26 VITALS
DIASTOLIC BLOOD PRESSURE: 71 MMHG | OXYGEN SATURATION: 99 % | SYSTOLIC BLOOD PRESSURE: 116 MMHG | HEART RATE: 78 BPM | TEMPERATURE: 96.7 F | RESPIRATION RATE: 18 BRPM

## 2021-02-26 DIAGNOSIS — Z89.421 ACQUIRED ABSENCE OF OTHER RIGHT TOE(S): Chronic | ICD-10-CM

## 2021-02-26 PROCEDURE — 99183 HYPERBARIC OXYGEN THERAPY: CPT

## 2021-02-26 PROCEDURE — G0277: CPT

## 2021-02-26 NOTE — ADDENDUM
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 to attempt to recognize and modify her behavior. \par The pt further states that ENT advised the pt against use of nasal decongestant spray/AFRIN, stating this was due to pt's 's hx of dependence on same. \par \par \par The pt states she forgot her EARPLANES in her car. The pt declined new pair of EARPLANES. The pt refused to utilize EARPLANES for this HBOT tx. \par \par The pt noted discoloration on L plantar heel to CHT during pre-dive screening.\par DPM was advised of same. \par DPM observed discoloration and advised pt to off-load, encouraged less restrictive footwear, and advised patient to continue monitoring. \par Pt verbalized understanding. \par \par The pt was provided off-loading/elevation wedge to comfort prior to HBOT.\par The pt descended @ 2.0 PSI/min to Rx'd tx depth of 2.4 YOUNG in chamber # 1 without incident. \par The pt was observed with visible chest motion and without incident for the duration of HBOT.\par The pt was administered and received intermittent med. air over course of HBOT without incident.\par Pt ascended from tx depth without incident \par Pt received wound care by LPN POST HBOT \par Pt tolerated tx well

## 2021-02-26 NOTE — PROCEDURE
[Outpatient] : Outpatient [Ambulatory] : Patient is ambulatory. [Cane] : cane [THIS CHAMBER HAS BEEN CLEANED / DISINFECTED] : This chamber has been cleaned / disinfected according to local and hospital policy and procedure prior to this treatment. [Patient demonstrated and verbalized proper technique for using air break mask] : Patient demonstrated and verbalized proper technique for using air break mask [Patient educated on the risks of SMOKING prior to HBOT with understanding] : Patient educated on the risks of SMOKING prior to HBOT with understanding [Patient educated on the risks of CONSUMING ALCOHOL prior to HBOT with understanding] : Patient educated on the risks of CONSUMING ALCOHOL prior to HBOT with understanding [100% Cotton] : 100% cotton [Empty all pockets] : empty all pockets [No hair oils, wigs, hairpieces, pins] : no hair oils, wigs, hairpieces, pins  [Pre tx medications] : pre tx medications  [No make-up, creams] : no make-up, creams  [No jewelry] : no jewelry  [No matches, cigarettes, lighters] : no matches, cigarettes, lighters  [Hearing aid removed] : hearing aid removed [Dentures removed] : dentures removed [Ground bracelet on pt's wrist] : ground bracelet on pt's wrist  [Contacts removed] : contacts removed  [Remove nail polish] : remove nail polish  [No reading material] : no reading material  [Bra, undergarments removed] : bra, undergarments removed  [No contraindicated dressings] : no contraindicated dressings [Ground Wire - VISUAL Verification - Intact/Free of Obstruction] : Ground Wire - VISUAL Verification - Intact/Free of Obstruction  [Ground Continuity - Verified < 1 ohm w/ Wrist Strap Denny] : Ground Continuity - Verified < 1 ohm w/ Wrist Strap Denny [Clear all fields] : clear all fields [Number: ___] : Number: [unfilled] [Diagnosis: ___] : Diagnosis: [unfilled] [____] : Post-Dive: Time - [unfilled] [___] : Post-Dive: Value - [unfilled] mg/dL [] : No [FreeTextEntry4] : 100 [FreeTextEntry6] : 12 : 55 [FreeTextEntry8] : 13 : 05 [de-identified] : 13 : 35 [de-identified] : 13 : 40 [de-identified] : 14 : 10 [de-identified] : 14 : 15 [de-identified] : 14 : 45 [de-identified] : 14 : 55 [de-identified] : 120 minutes [de-identified] : [facility], 2/25/21

## 2021-02-26 NOTE — ADDENDUM
[FreeTextEntry1] : PT DESCENDED TO 2.4 YOUNG @ 2.2 PSI/MIN WITHOUT INCIDENT IN CHAMBER #1\par PT RESTING AT TX DEPTH WITH VISIBLE CHEST RISE AND FALL OBSERVED CHAMBERSIDE \par The patient ascended from depth to surface without incident.\par The pt tolerated HBOT without incident. \par The pt received bandage change x LPN post-HBOT.

## 2021-02-26 NOTE — PROCEDURE
[Outpatient] : Outpatient [Ambulatory] : Patient is ambulatory. [Cane] : cane [THIS CHAMBER HAS BEEN CLEANED / DISINFECTED] : This chamber has been cleaned / disinfected according to local and hospital policy and procedure prior to this treatment. [Patient demonstrated and verbalized proper technique for using air break mask] : Patient demonstrated and verbalized proper technique for using air break mask [Patient educated on the risks of SMOKING prior to HBOT with understanding] : Patient educated on the risks of SMOKING prior to HBOT with understanding [Patient educated on the risks of CONSUMING ALCOHOL prior to HBOT with understanding] : Patient educated on the risks of CONSUMING ALCOHOL prior to HBOT with understanding [100% Cotton] : 100% cotton [Empty all pockets] : empty all pockets [No hair oils, wigs, hairpieces, pins] : no hair oils, wigs, hairpieces, pins  [Pre tx medications] : pre tx medications  [No make-up, creams] : no make-up, creams  [No jewelry] : no jewelry  [No matches, cigarettes, lighters] : no matches, cigarettes, lighters  [Hearing aid removed] : hearing aid removed [Dentures removed] : dentures removed [Ground bracelet on pt's wrist] : ground bracelet on pt's wrist  [Contacts removed] : contacts removed  [Remove nail polish] : remove nail polish  [No reading material] : no reading material  [Bra, undergarments removed] : bra, undergarments removed  [No contraindicated dressings] : no contraindicated dressings [Ground Wire - VISUAL Verification - Intact/Free of Obstruction] : Ground Wire - VISUAL Verification - Intact/Free of Obstruction  [Ground Continuity - Verified < 1 ohm w/ Wrist Strap Denny] : Ground Continuity - Verified < 1 ohm w/ Wrist Strap Denny [Number: ___] : Number: [unfilled] [Diagnosis: ___] : Diagnosis: [unfilled] [____] : Post-Dive: Time - [unfilled] [___] : Post-Dive: Value - [unfilled] mg/dL [Clear all fields] : clear all fields [] : No [FreeTextEntry4] : 100 [FreeTextEntry6] : 13 : 14 [FreeTextEntry8] : 13 : 24 [de-identified] : 13 : 54 [de-identified] : 13 : 59 [de-identified] : 14 : 29 [de-identified] : 14 : 34 [de-identified] : 15 : 04 [de-identified] : 15 : 14 [de-identified] : 120 min.

## 2021-02-27 ENCOUNTER — OUTPATIENT (OUTPATIENT)
Dept: OUTPATIENT SERVICES | Facility: HOSPITAL | Age: 71
LOS: 1 days | Discharge: HOME | End: 2021-02-27
Payer: MEDICARE

## 2021-02-27 ENCOUNTER — APPOINTMENT (OUTPATIENT)
Dept: HYPERBARIC MEDICINE | Facility: HOSPITAL | Age: 71
End: 2021-02-27
Payer: MEDICARE

## 2021-02-27 VITALS
DIASTOLIC BLOOD PRESSURE: 81 MMHG | RESPIRATION RATE: 20 BRPM | HEART RATE: 88 BPM | SYSTOLIC BLOOD PRESSURE: 122 MMHG | TEMPERATURE: 98.1 F | OXYGEN SATURATION: 100 %

## 2021-02-27 VITALS
OXYGEN SATURATION: 100 % | HEART RATE: 87 BPM | RESPIRATION RATE: 20 BRPM | TEMPERATURE: 97 F | SYSTOLIC BLOOD PRESSURE: 134 MMHG | DIASTOLIC BLOOD PRESSURE: 72 MMHG

## 2021-02-27 DIAGNOSIS — Z89.421 ACQUIRED ABSENCE OF OTHER RIGHT TOE(S): Chronic | ICD-10-CM

## 2021-02-27 DIAGNOSIS — T86.828 OTHER COMPLICATIONS OF SKIN GRAFT (ALLOGRAFT) (AUTOGRAFT): ICD-10-CM

## 2021-02-27 DIAGNOSIS — I83.893 VARICOSE VEINS OF BILATERAL LOWER EXTREMITIES WITH OTHER COMPLICATIONS: ICD-10-CM
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
## 2021-02-27 PROCEDURE — 99183 HYPERBARIC OXYGEN THERAPY: CPT

## 2021-02-27 PROCEDURE — 82962 GLUCOSE BLOOD TEST: CPT

## 2021-02-27 PROCEDURE — G0277: CPT

## 2021-02-27 NOTE — PROCEDURE
[Outpatient] : Outpatient [Ambulatory] : Patient is ambulatory. [THIS CHAMBER HAS BEEN CLEANED / DISINFECTED] : This chamber has been cleaned / disinfected according to local and hospital policy and procedure prior to this treatment. [____] : Recheck: Time - [unfilled] [___] : Recheck: Value - [unfilled] mg/dL [Patient demonstrated and verbalized proper technique for using air break mask] : Patient demonstrated and verbalized proper technique for using air break mask [Patient educated on the risks of SMOKING prior to HBOT with understanding] : Patient educated on the risks of SMOKING prior to HBOT with understanding [Patient educated on the risks of CONSUMING ALCOHOL prior to HBOT with understanding] : Patient educated on the risks of CONSUMING ALCOHOL prior to HBOT with understanding [100% Cotton] : 100% cotton [Empty all pockets] : empty all pockets [No hair oils, wigs, hairpieces, pins] : no hair oils, wigs, hairpieces, pins  [Pre tx medications] : pre tx medications  [No make-up, creams] : no make-up, creams  [No jewelry] : no jewelry  [No matches, cigarettes, lighters] : no matches, cigarettes, lighters  [Hearing aid removed] : hearing aid removed [Dentures removed] : dentures removed [Ground bracelet on pt's wrist] : ground bracelet on pt's wrist  [Contacts removed] : contacts removed  [Remove nail polish] : remove nail polish  [No reading material] : no reading material  [Bra, undergarments removed] : bra, undergarments removed  [No contraindicated dressings] : no contraindicated dressings [Ground Wire - VISUAL Verification - Intact/Free of Obstruction] : Ground Wire - VISUAL Verification - Intact/Free of Obstruction  [Ground Continuity - Verified < 1 ohm w/ Wrist Strap Denny] : Ground Continuity - Verified < 1 ohm w/ Wrist Strap Denny [Clear all fields] : clear all fields [Number: ___] : Number: [unfilled] [Diagnosis: ___] : Diagnosis: [unfilled] [] : No [FreeTextEntry4] : 100 [FreeTextEntry6] : 1001 [FreeTextEntry8] : 2382 [de-identified] : 3266 [de-identified] : 2661 [de-identified] : 1124 [de-identified] : 5757 [de-identified] : 7132 [de-identified] : 7490 [de-identified] : 120mins

## 2021-02-27 NOTE — ADDENDUM
[FreeTextEntry1] : Pt descended to 2.4 YOUNG @ 2.2 PSI/MIN without incident in chamber #4.\par Pt resting comfortably @ depth, equal chest rise observed throughout tx.\par Pt tolerated both air breaks well.\par Pt ascended from 2.4 YOUNG @ 2.2 PSI/MIN without incident in chamber #4.\par Pt tolerated treatment well.\par

## 2021-02-27 NOTE — ASSESSMENT

## 2021-03-01 ENCOUNTER — OUTPATIENT (OUTPATIENT)
Dept: OUTPATIENT SERVICES | Facility: HOSPITAL | Age: 71
LOS: 1 days | Discharge: HOME | End: 2021-03-01
Payer: MEDICARE

## 2021-03-01 ENCOUNTER — APPOINTMENT (OUTPATIENT)
Dept: HYPERBARIC MEDICINE | Facility: HOSPITAL | Age: 71
End: 2021-03-01
Payer: MEDICARE

## 2021-03-01 VITALS
HEART RATE: 70 BPM | RESPIRATION RATE: 20 BRPM | SYSTOLIC BLOOD PRESSURE: 133 MMHG | DIASTOLIC BLOOD PRESSURE: 83 MMHG | OXYGEN SATURATION: 100 % | TEMPERATURE: 98.2 F

## 2021-03-01 VITALS
RESPIRATION RATE: 18 BRPM | TEMPERATURE: 97.4 F | HEART RATE: 91 BPM | OXYGEN SATURATION: 99 % | SYSTOLIC BLOOD PRESSURE: 140 MMHG | DIASTOLIC BLOOD PRESSURE: 80 MMHG

## 2021-03-01 DIAGNOSIS — T86.828 OTHER COMPLICATIONS OF SKIN GRAFT (ALLOGRAFT) (AUTOGRAFT): ICD-10-CM

## 2021-03-01 DIAGNOSIS — Z89.421 ACQUIRED ABSENCE OF OTHER RIGHT TOE(S): Chronic | ICD-10-CM
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
## 2021-03-01 LAB — SARS-COV-2 RNA SPEC QL NAA+PROBE: SIGNIFICANT CHANGE UP

## 2021-03-01 PROCEDURE — G0277: CPT

## 2021-03-01 PROCEDURE — 82962 GLUCOSE BLOOD TEST: CPT

## 2021-03-01 PROCEDURE — 99183 HYPERBARIC OXYGEN THERAPY: CPT

## 2021-03-01 PROCEDURE — U0005: CPT

## 2021-03-01 PROCEDURE — U0003: CPT

## 2021-03-02 ENCOUNTER — APPOINTMENT (OUTPATIENT)
Dept: HYPERBARIC MEDICINE | Facility: HOSPITAL | Age: 71
End: 2021-03-02
Payer: MEDICARE

## 2021-03-02 ENCOUNTER — OUTPATIENT (OUTPATIENT)
Dept: OUTPATIENT SERVICES | Facility: HOSPITAL | Age: 71
LOS: 1 days | Discharge: HOME | End: 2021-03-02
Payer: MEDICARE

## 2021-03-02 VITALS
SYSTOLIC BLOOD PRESSURE: 150 MMHG | DIASTOLIC BLOOD PRESSURE: 78 MMHG | HEART RATE: 94 BPM | TEMPERATURE: 98.4 F | RESPIRATION RATE: 18 BRPM | OXYGEN SATURATION: 100 %

## 2021-03-02 VITALS
TEMPERATURE: 96.8 F | OXYGEN SATURATION: 96 % | SYSTOLIC BLOOD PRESSURE: 125 MMHG | HEART RATE: 71 BPM | DIASTOLIC BLOOD PRESSURE: 80 MMHG | RESPIRATION RATE: 18 BRPM

## 2021-03-02 DIAGNOSIS — T86.828 OTHER COMPLICATIONS OF SKIN GRAFT (ALLOGRAFT) (AUTOGRAFT): ICD-10-CM
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
## 2021-03-02 DIAGNOSIS — E11.621 TYPE 2 DIABETES MELLITUS WITH FOOT ULCER: ICD-10-CM

## 2021-03-02 DIAGNOSIS — Z89.421 ACQUIRED ABSENCE OF OTHER RIGHT TOE(S): Chronic | ICD-10-CM

## 2021-03-02 PROCEDURE — 82962 GLUCOSE BLOOD TEST: CPT

## 2021-03-02 PROCEDURE — G0277: CPT

## 2021-03-02 PROCEDURE — 99183 HYPERBARIC OXYGEN THERAPY: CPT

## 2021-03-02 NOTE — PROCEDURE
[Outpatient] : Outpatient [Ambulatory] : Patient is ambulatory. [Cane] : cane [THIS CHAMBER HAS BEEN CLEANED / DISINFECTED] : This chamber has been cleaned / disinfected according to local and hospital policy and procedure prior to this treatment. [Patient demonstrated and verbalized proper technique for using air break mask] : Patient demonstrated and verbalized proper technique for using air break mask [Patient educated on the risks of SMOKING prior to HBOT with understanding] : Patient educated on the risks of SMOKING prior to HBOT with understanding [Patient educated on the risks of CONSUMING ALCOHOL prior to HBOT with understanding] : Patient educated on the risks of CONSUMING ALCOHOL prior to HBOT with understanding [100% Cotton] : 100% cotton [Empty all pockets] : empty all pockets [No hair oils, wigs, hairpieces, pins] : no hair oils, wigs, hairpieces, pins  [Pre tx medications] : pre tx medications  [No make-up, creams] : no make-up, creams  [No jewelry] : no jewelry  [No matches, cigarettes, lighters] : no matches, cigarettes, lighters  [Hearing aid removed] : hearing aid removed [Dentures removed] : dentures removed [Ground bracelet on pt's wrist] : ground bracelet on pt's wrist  [Contacts removed] : contacts removed  [Remove nail polish] : remove nail polish  [No reading material] : no reading material  [Bra, undergarments removed] : bra, undergarments removed  [No contraindicated dressings] : no contraindicated dressings [Ground Wire - VISUAL Verification - Intact/Free of Obstruction] : Ground Wire - VISUAL Verification - Intact/Free of Obstruction  [Ground Continuity - Verified < 1 ohm w/ Wrist Strap Denny] : Ground Continuity - Verified < 1 ohm w/ Wrist Strap Denny [Number: ___] : Number: [unfilled] [Diagnosis: ___] : Diagnosis: [unfilled] [____] : Post-Dive: Time - [unfilled] [___] : Post-Dive: Value - [unfilled] mg/dL [Clear all fields] : clear all fields [] : No [FreeTextEntry4] : 100 [FreeTextEntry6] : 4211 [FreeTextEntry8] : 7646 [de-identified] : 3479 [de-identified] : 6080 [de-identified] : 0465 [de-identified] : 4717 [de-identified] : 7887 [de-identified] : 1529 [de-identified] : 120 minutes

## 2021-03-02 NOTE — ADDENDUM
[FreeTextEntry1] : PTS BGL WAS NOT WITHIN ACCEPTABLE LIMITS FOR HBOT RN NOTIFIED . PT WAS GIVEN 16 G OF SUGAR VIA 2 JUICES. AFTER 1ST INTERVENTION PTS BGL WAS WITHIN ACCEPTABLE LIMITS FOR HBOT.\par PT DECLINED USE OF EAR PLANES, PT STATED "DID NOT NEED THEM."  \par PT DESCENDED TO 2.4 YOUNG @ 2.2 PSI/MIN WITHOUT INCIDENT IN CHAMBER #2\par PT RESTING AT TX DEPTH WITH VISIBLE CHEST RISE AND FALL OBSERVED CHAMBERSIDE \par PT ASCENDED FROM TX DEPTH WITHOUT INCIDENT \par PT RECEIVED WOUND CARE BY RN POST HBOT \par PT RECEIVED COVID 19 PCR SWAB POST HBOT BY RN \par PT TOLERATED TX WELL

## 2021-03-03 ENCOUNTER — OUTPATIENT (OUTPATIENT)
Dept: OUTPATIENT SERVICES | Facility: HOSPITAL | Age: 71
LOS: 1 days | Discharge: HOME | End: 2021-03-03
Payer: MEDICARE

## 2021-03-03 ENCOUNTER — APPOINTMENT (OUTPATIENT)
Dept: HYPERBARIC MEDICINE | Facility: HOSPITAL | Age: 71
End: 2021-03-03
Payer: MEDICARE

## 2021-03-03 VITALS
SYSTOLIC BLOOD PRESSURE: 114 MMHG | RESPIRATION RATE: 18 BRPM | TEMPERATURE: 97.6 F | HEART RATE: 92 BPM | DIASTOLIC BLOOD PRESSURE: 68 MMHG | OXYGEN SATURATION: 95 %

## 2021-03-03 VITALS
RESPIRATION RATE: 18 BRPM | TEMPERATURE: 97.6 F | DIASTOLIC BLOOD PRESSURE: 75 MMHG | HEART RATE: 76 BPM | SYSTOLIC BLOOD PRESSURE: 131 MMHG | OXYGEN SATURATION: 99 %
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
## 2021-03-03 DIAGNOSIS — Z89.421 ACQUIRED ABSENCE OF OTHER RIGHT TOE(S): Chronic | ICD-10-CM

## 2021-03-03 DIAGNOSIS — T86.828 OTHER COMPLICATIONS OF SKIN GRAFT (ALLOGRAFT) (AUTOGRAFT): ICD-10-CM

## 2021-03-03 PROCEDURE — G0277: CPT

## 2021-03-03 PROCEDURE — 99183 HYPERBARIC OXYGEN THERAPY: CPT

## 2021-03-03 PROCEDURE — 82962 GLUCOSE BLOOD TEST: CPT

## 2021-03-03 NOTE — PROCEDURE
[Outpatient] : Outpatient [Ambulatory] : Patient is ambulatory. [Cane] : cane [THIS CHAMBER HAS BEEN CLEANED / DISINFECTED] : This chamber has been cleaned / disinfected according to local and hospital policy and procedure prior to this treatment. [____] : Pre-Dive: Time - [unfilled] [___] : Pre-Dive: Value - [unfilled] mg/dL [Patient demonstrated and verbalized proper technique for using air break mask] : Patient demonstrated and verbalized proper technique for using air break mask [Patient educated on the risks of SMOKING prior to HBOT with understanding] : Patient educated on the risks of SMOKING prior to HBOT with understanding [Patient educated on the risks of CONSUMING ALCOHOL prior to HBOT with understanding] : Patient educated on the risks of CONSUMING ALCOHOL prior to HBOT with understanding [100% Cotton] : 100% cotton [Empty all pockets] : empty all pockets [No hair oils, wigs, hairpieces, pins] : no hair oils, wigs, hairpieces, pins  [Pre tx medications] : pre tx medications  [No make-up, creams] : no make-up, creams  [No jewelry] : no jewelry  [No matches, cigarettes, lighters] : no matches, cigarettes, lighters  [Hearing aid removed] : hearing aid removed [Dentures removed] : dentures removed [Ground bracelet on pt's wrist] : ground bracelet on pt's wrist  [Contacts removed] : contacts removed  [Remove nail polish] : remove nail polish  [No reading material] : no reading material  [Bra, undergarments removed] : bra, undergarments removed  [No contraindicated dressings] : no contraindicated dressings [Ground Wire - VISUAL Verification - Intact/Free of Obstruction] : Ground Wire - VISUAL Verification - Intact/Free of Obstruction  [Ground Continuity - Verified < 1 ohm w/ Wrist Strap Denny] : Ground Continuity - Verified < 1 ohm w/ Wrist Strap Denny [Number: ___] : Number: [unfilled] [Diagnosis: ___] : Diagnosis: [unfilled] [Clear all fields] : clear all fields [] : No [FreeTextEntry4] : 100 [FreeTextEntry3] : 3909 [FreeTextEntry8] : 8627 [de-identified] : 4721 [de-identified] : 6140 [de-identified] : 6489 [de-identified] : 7201 [de-identified] : 3459 [de-identified] : 5936 [de-identified] : 120 MIN

## 2021-03-03 NOTE — ADDENDUM
[FreeTextEntry1] : PT DESCENDED TO 2.4 YOUNG @ 2.2 PSI/MIN WITHOUT INCIDENT IN CHAMBER #4\par PT RESTING AT TX DEPTH WITH VISIBLE CHEST RISE AND FALL OBSERVED CHAMBERSIDE \par PT TOLERATED BOTH AIR BREAKS WELL.\par PT ASCENDED FROM TX DEPTH WITHOUT INCIDENT. PT SENT HOME.

## 2021-03-03 NOTE — ADDENDUM
[FreeTextEntry1] : PT DESCENDED TO 2.4 YOUNG @ 1.75 PSI/MIN WITHOUT INCIDENT IN CHAMBER # 1. SLOWER DESCENT RATE REQUESTED BY PATIENT. SUPERVISING MD MADE AWARE. \par PT'S RESTING AT TX DEPTH WITH CHEST RISE AND FALL OBSERVED CHAMBER SIDE. \par PT TOLERATED AIR BREAKS WELL.\par PT TO RECEIVE WOUND CARE POST HBOT.\par PT ASCENDED FROM TX DEPTH WITHOUT INCIDENT \par PT RECEIVED WOUND CARE BY LPN POST HBOT \par PT TOLERATED TX WELL

## 2021-03-03 NOTE — PROCEDURE
[Outpatient] : Outpatient [Ambulatory] : Patient is ambulatory. [Cane] : cane [THIS CHAMBER HAS BEEN CLEANED / DISINFECTED] : This chamber has been cleaned / disinfected according to local and hospital policy and procedure prior to this treatment. [Patient demonstrated and verbalized proper technique for using air break mask] : Patient demonstrated and verbalized proper technique for using air break mask [Patient educated on the risks of SMOKING prior to HBOT with understanding] : Patient educated on the risks of SMOKING prior to HBOT with understanding [Patient educated on the risks of CONSUMING ALCOHOL prior to HBOT with understanding] : Patient educated on the risks of CONSUMING ALCOHOL prior to HBOT with understanding [100% Cotton] : 100% cotton [Empty all pockets] : empty all pockets [No hair oils, wigs, hairpieces, pins] : no hair oils, wigs, hairpieces, pins  [Pre tx medications] : pre tx medications  [No make-up, creams] : no make-up, creams  [No jewelry] : no jewelry  [No matches, cigarettes, lighters] : no matches, cigarettes, lighters  [Hearing aid removed] : hearing aid removed [Dentures removed] : dentures removed [Ground bracelet on pt's wrist] : ground bracelet on pt's wrist  [Contacts removed] : contacts removed  [Remove nail polish] : remove nail polish  [No reading material] : no reading material  [Bra, undergarments removed] : bra, undergarments removed  [No contraindicated dressings] : no contraindicated dressings [Ground Wire - VISUAL Verification - Intact/Free of Obstruction] : Ground Wire - VISUAL Verification - Intact/Free of Obstruction  [Ground Continuity - Verified < 1 ohm w/ Wrist Strap Denny] : Ground Continuity - Verified < 1 ohm w/ Wrist Strap Denny [Number: ___] : Number: [unfilled] [Diagnosis: ___] : Diagnosis: [unfilled] [____] : Post-Dive: Time - [unfilled] [___] : Post-Dive: Value - [unfilled] mg/dL [Clear all fields] : clear all fields [] : No [FreeTextEntry4] : 100 [FreeTextEntry6] : 5929 [FreeTextEntry8] : 1989 [de-identified] : 9114 [de-identified] : 7772 [de-identified] : 3050 [de-identified] : 9736 [de-identified] : 2494 [de-identified] : 6721 [de-identified] : 122 MIN

## 2021-03-04 ENCOUNTER — APPOINTMENT (OUTPATIENT)
Dept: HYPERBARIC MEDICINE | Facility: HOSPITAL | Age: 71
End: 2021-03-04
Payer: MEDICARE

## 2021-03-04 ENCOUNTER — OUTPATIENT (OUTPATIENT)
Dept: OUTPATIENT SERVICES | Facility: HOSPITAL | Age: 71
LOS: 1 days | Discharge: HOME | End: 2021-03-04
Payer: MEDICARE

## 2021-03-04 VITALS
DIASTOLIC BLOOD PRESSURE: 82 MMHG | HEART RATE: 74 BPM | OXYGEN SATURATION: 98 % | TEMPERATURE: 97.7 F | RESPIRATION RATE: 18 BRPM | SYSTOLIC BLOOD PRESSURE: 137 MMHG

## 2021-03-04 VITALS
SYSTOLIC BLOOD PRESSURE: 128 MMHG | RESPIRATION RATE: 18 BRPM | OXYGEN SATURATION: 97 % | DIASTOLIC BLOOD PRESSURE: 73 MMHG | TEMPERATURE: 97.5 F | HEART RATE: 85 BPM

## 2021-03-04 DIAGNOSIS — T86.828 OTHER COMPLICATIONS OF SKIN GRAFT (ALLOGRAFT) (AUTOGRAFT): ICD-10-CM

## 2021-03-04 DIAGNOSIS — Z89.421 ACQUIRED ABSENCE OF OTHER RIGHT TOE(S): Chronic | ICD-10-CM
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
## 2021-03-04 PROCEDURE — G0277: CPT

## 2021-03-04 PROCEDURE — 99183 HYPERBARIC OXYGEN THERAPY: CPT

## 2021-03-04 PROCEDURE — 82962 GLUCOSE BLOOD TEST: CPT

## 2021-03-05 ENCOUNTER — OUTPATIENT (OUTPATIENT)
Dept: OUTPATIENT SERVICES | Facility: HOSPITAL | Age: 71
LOS: 1 days | Discharge: HOME | End: 2021-03-05
Payer: MEDICARE

## 2021-03-05 ENCOUNTER — APPOINTMENT (OUTPATIENT)
Dept: HYPERBARIC MEDICINE | Facility: HOSPITAL | Age: 71
End: 2021-03-05
Payer: MEDICARE

## 2021-03-05 VITALS
HEART RATE: 69 BPM | TEMPERATURE: 97.4 F | SYSTOLIC BLOOD PRESSURE: 115 MMHG | RESPIRATION RATE: 20 BRPM | DIASTOLIC BLOOD PRESSURE: 75 MMHG | OXYGEN SATURATION: 100 %

## 2021-03-05 VITALS
HEART RATE: 81 BPM | TEMPERATURE: 98.5 F | OXYGEN SATURATION: 96 % | RESPIRATION RATE: 20 BRPM | DIASTOLIC BLOOD PRESSURE: 71 MMHG | SYSTOLIC BLOOD PRESSURE: 120 MMHG

## 2021-03-05 DIAGNOSIS — Z89.421 ACQUIRED ABSENCE OF OTHER RIGHT TOE(S): Chronic | ICD-10-CM

## 2021-03-05 DIAGNOSIS — T86.828 OTHER COMPLICATIONS OF SKIN GRAFT (ALLOGRAFT) (AUTOGRAFT): ICD-10-CM

## 2021-03-05 PROCEDURE — 99183 HYPERBARIC OXYGEN THERAPY: CPT

## 2021-03-05 PROCEDURE — G0277: CPT

## 2021-03-05 PROCEDURE — 82962 GLUCOSE BLOOD TEST: CPT

## 2021-03-05 NOTE — PROCEDURE
[Outpatient] : Outpatient [Ambulatory] : Patient is ambulatory. [Cane] : cane [THIS CHAMBER HAS BEEN CLEANED / DISINFECTED] : This chamber has been cleaned / disinfected according to local and hospital policy and procedure prior to this treatment. [____] : Pre-Dive: Time - [unfilled] [___] : Pre-Dive: Value - [unfilled] mg/dL [Patient demonstrated and verbalized proper technique for using air break mask] : Patient demonstrated and verbalized proper technique for using air break mask [Patient educated on the risks of SMOKING prior to HBOT with understanding] : Patient educated on the risks of SMOKING prior to HBOT with understanding [Patient educated on the risks of CONSUMING ALCOHOL prior to HBOT with understanding] : Patient educated on the risks of CONSUMING ALCOHOL prior to HBOT with understanding [100% Cotton] : 100% cotton [Empty all pockets] : empty all pockets [No hair oils, wigs, hairpieces, pins] : no hair oils, wigs, hairpieces, pins  [Pre tx medications] : pre tx medications  [No make-up, creams] : no make-up, creams  [No jewelry] : no jewelry  [No matches, cigarettes, lighters] : no matches, cigarettes, lighters  [Hearing aid removed] : hearing aid removed [Dentures removed] : dentures removed [Ground bracelet on pt's wrist] : ground bracelet on pt's wrist  [Contacts removed] : contacts removed  [Remove nail polish] : remove nail polish  [No reading material] : no reading material  [Bra, undergarments removed] : bra, undergarments removed  [No contraindicated dressings] : no contraindicated dressings [Ground Wire - VISUAL Verification - Intact/Free of Obstruction] : Ground Wire - VISUAL Verification - Intact/Free of Obstruction  [Ground Continuity - Verified < 1 ohm w/ Wrist Strap Denny] : Ground Continuity - Verified < 1 ohm w/ Wrist Strap Denny [Number: ___] : Number: [unfilled] [Diagnosis: ___] : Diagnosis: [unfilled] [Clear all fields] : clear all fields [] : No [FreeTextEntry4] : 100 [FreeTextEntry6] : 7188 [FreeTextEntry8] : 9147 [de-identified] : 1704 [de-identified] : 5853 [de-identified] : 3377 [de-identified] : 4454 [de-identified] : 4945 [de-identified] : 1500 [de-identified] : 120 MIN

## 2021-03-05 NOTE — ADDENDUM
[FreeTextEntry1] : PT DESCENDED TO 2.4 YOUNG @ 2.2 PSI/MIN WITHOUT INCIDENT IN CHAMBER #1 \par PT RESTING AT TX DEPTH WITH VISIBLE CHEST RISE AND FALL OBSERVED CHAMBERSIDE \par PT TOLERATED AIR BREAKS WELL.\par PT ASCENDED FROM TX DEPTH WITHOUT INCIDENT. PT TOLERATED TX WELL.\par PT SENT HOME.

## 2021-03-06 ENCOUNTER — OUTPATIENT (OUTPATIENT)
Dept: OUTPATIENT SERVICES | Facility: HOSPITAL | Age: 71
LOS: 1 days | Discharge: HOME | End: 2021-03-06
Payer: MEDICARE

## 2021-03-06 ENCOUNTER — APPOINTMENT (OUTPATIENT)
Dept: HYPERBARIC MEDICINE | Facility: HOSPITAL | Age: 71
End: 2021-03-06
Payer: MEDICARE

## 2021-03-06 VITALS
RESPIRATION RATE: 16 BRPM | HEART RATE: 88 BPM | OXYGEN SATURATION: 98 % | SYSTOLIC BLOOD PRESSURE: 129 MMHG | DIASTOLIC BLOOD PRESSURE: 74 MMHG | TEMPERATURE: 98 F

## 2021-03-06 VITALS
TEMPERATURE: 98 F | OXYGEN SATURATION: 98 % | SYSTOLIC BLOOD PRESSURE: 126 MMHG | HEART RATE: 88 BPM | DIASTOLIC BLOOD PRESSURE: 66 MMHG | RESPIRATION RATE: 18 BRPM

## 2021-03-06 DIAGNOSIS — T86.828 OTHER COMPLICATIONS OF SKIN GRAFT (ALLOGRAFT) (AUTOGRAFT): ICD-10-CM
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
## 2021-03-06 DIAGNOSIS — Z89.421 ACQUIRED ABSENCE OF OTHER RIGHT TOE(S): Chronic | ICD-10-CM

## 2021-03-06 PROCEDURE — 99183 HYPERBARIC OXYGEN THERAPY: CPT

## 2021-03-06 PROCEDURE — G0277: CPT

## 2021-03-06 PROCEDURE — 82962 GLUCOSE BLOOD TEST: CPT

## 2021-03-06 NOTE — PROCEDURE
[Outpatient] : Outpatient [Ambulatory] : Patient is ambulatory. [Cane] : cane [THIS CHAMBER HAS BEEN CLEANED / DISINFECTED] : This chamber has been cleaned / disinfected according to local and hospital policy and procedure prior to this treatment. [Patient demonstrated and verbalized proper technique for using air break mask] : Patient demonstrated and verbalized proper technique for using air break mask [Patient educated on the risks of SMOKING prior to HBOT with understanding] : Patient educated on the risks of SMOKING prior to HBOT with understanding [Patient educated on the risks of CONSUMING ALCOHOL prior to HBOT with understanding] : Patient educated on the risks of CONSUMING ALCOHOL prior to HBOT with understanding [100% Cotton] : 100% cotton [Empty all pockets] : empty all pockets [No hair oils, wigs, hairpieces, pins] : no hair oils, wigs, hairpieces, pins  [Pre tx medications] : pre tx medications  [No make-up, creams] : no make-up, creams  [No jewelry] : no jewelry  [No matches, cigarettes, lighters] : no matches, cigarettes, lighters  [Hearing aid removed] : hearing aid removed [Dentures removed] : dentures removed [Ground bracelet on pt's wrist] : ground bracelet on pt's wrist  [Contacts removed] : contacts removed  [Remove nail polish] : remove nail polish  [No reading material] : no reading material  [Bra, undergarments removed] : bra, undergarments removed  [No contraindicated dressings] : no contraindicated dressings [Ground Wire - VISUAL Verification - Intact/Free of Obstruction] : Ground Wire - VISUAL Verification - Intact/Free of Obstruction  [Ground Continuity - Verified < 1 ohm w/ Wrist Strap Denny] : Ground Continuity - Verified < 1 ohm w/ Wrist Strap Denny [Number: ___] : Number: [unfilled] [Diagnosis: ___] : Diagnosis: [unfilled] [____] : Post-Dive: Time - [unfilled] [___] : Post-Dive: Value - [unfilled] mg/dL [Clear all fields] : clear all fields [] : No [FreeTextEntry4] : 100 [FreeTextEntry6] : 7258 [FreeTextEntry8] : 7798 [de-identified] : 8025 [de-identified] : 3483 [de-identified] : 2951 [de-identified] : 2883 [de-identified] : 3983 [de-identified] : 8583 [de-identified] : 120 mins

## 2021-03-06 NOTE — ADDENDUM
[FreeTextEntry1] : PT DESCENDED TO 2.4 YOUNG @ 2.2 PSI/MIN WITHOUT INCIDENT IN CHAMBER #4\par PT RESTING AT TX DEPTH WITH VISIBLE CHEST RISE AND FALL OBSERVED CHAMBERSIDE \par PT TOLERATED BOTH AIR BREAKS WELL. \par PT ASCENDED FROM TX DEPTH TO SURFACE WITHOUT INCIDENT.\par PT TOLERATED HBOT WITHOUT INCIDENT.\par

## 2021-03-06 NOTE — PROCEDURE
From: Hilda Altamirano  To: Jillian Hudson MD  Sent: 11/21/2018 1:43 AM CST  Subject: Upcoming Appointment    I'm sorry to say, but I will need to cancel my appointment for Nov 26th at 2pm. Donald will be there at his 1pm appointment.  I will reschedule for an appointment for myself.    Thank you,  Hilda Altamirano  [phone]   [Outpatient] : Outpatient [Ambulatory] : Patient is ambulatory. [Cane] : cane [THIS CHAMBER HAS BEEN CLEANED / DISINFECTED] : This chamber has been cleaned / disinfected according to local and hospital policy and procedure prior to this treatment. [____] : Post-Dive: Time - [unfilled] [___] : Post-Dive: Value - [unfilled] mg/dL [Patient demonstrated and verbalized proper technique for using air break mask] : Patient demonstrated and verbalized proper technique for using air break mask [Patient educated on the risks of SMOKING prior to HBOT with understanding] : Patient educated on the risks of SMOKING prior to HBOT with understanding [Patient educated on the risks of CONSUMING ALCOHOL prior to HBOT with understanding] : Patient educated on the risks of CONSUMING ALCOHOL prior to HBOT with understanding [100% Cotton] : 100% cotton [Empty all pockets] : empty all pockets [No hair oils, wigs, hairpieces, pins] : no hair oils, wigs, hairpieces, pins  [Pre tx medications] : pre tx medications  [No make-up, creams] : no make-up, creams  [No jewelry] : no jewelry  [No matches, cigarettes, lighters] : no matches, cigarettes, lighters  [Hearing aid removed] : hearing aid removed [Dentures removed] : dentures removed [Ground bracelet on pt's wrist] : ground bracelet on pt's wrist  [Contacts removed] : contacts removed  [Remove nail polish] : remove nail polish  [No reading material] : no reading material  [Bra, undergarments removed] : bra, undergarments removed  [No contraindicated dressings] : no contraindicated dressings [Ground Wire - VISUAL Verification - Intact/Free of Obstruction] : Ground Wire - VISUAL Verification - Intact/Free of Obstruction  [Ground Continuity - Verified < 1 ohm w/ Wrist Strap Denny] : Ground Continuity - Verified < 1 ohm w/ Wrist Strap Denny [Diagnosis: ___] : Diagnosis: [unfilled] [Number: ___] : Number: [unfilled] [Clear all fields] : clear all fields [] : No [FreeTextEntry4] : 100 [FreeTextEntry6] : 5168 [FreeTextEntry8] : 1008 [de-identified] : 2723 [de-identified] : 2029 [de-identified] : 1103 [de-identified] : 6063 [de-identified] : 6250 [de-identified] : 2126 [de-identified] : 120 mins

## 2021-03-06 NOTE — ADDENDUM
[FreeTextEntry1] : PT DESCENDED TO 2.4 YOUNG @ 2.2 PSI/MIN WITHOUT INCIDENT IN CHAMBER #4\par PT RESTING AT TX DEPTH WITH VISIBLE CHEST RISE AND FALL OBSERVED CHAMBERSIDE \par PT ASCENDED FROM TX DEPTH TO SURFACE WITHOUT INCIDENT.\par PT TOLERATED HBOT WITHOUT INCIDENT.\par PT RECEIVED WOUND CARE VIA NURSE POST-HBOT.

## 2021-03-08 ENCOUNTER — OUTPATIENT (OUTPATIENT)
Dept: OUTPATIENT SERVICES | Facility: HOSPITAL | Age: 71
LOS: 1 days | Discharge: HOME | End: 2021-03-08
Payer: MEDICARE

## 2021-03-08 ENCOUNTER — APPOINTMENT (OUTPATIENT)
Dept: HYPERBARIC MEDICINE | Facility: HOSPITAL | Age: 71
End: 2021-03-08
Payer: MEDICARE

## 2021-03-08 VITALS
TEMPERATURE: 97.8 F | BODY MASS INDEX: 35.31 KG/M2 | OXYGEN SATURATION: 98 % | HEART RATE: 87 BPM | HEIGHT: 67 IN | RESPIRATION RATE: 18 BRPM | DIASTOLIC BLOOD PRESSURE: 85 MMHG | WEIGHT: 225 LBS | SYSTOLIC BLOOD PRESSURE: 139 MMHG

## 2021-03-08 DIAGNOSIS — T86.828 OTHER COMPLICATIONS OF SKIN GRAFT (ALLOGRAFT) (AUTOGRAFT): ICD-10-CM

## 2021-03-08 DIAGNOSIS — Z89.421 ACQUIRED ABSENCE OF OTHER RIGHT TOE(S): Chronic | ICD-10-CM

## 2021-03-08 PROCEDURE — U0003: CPT

## 2021-03-08 PROCEDURE — G0463: CPT

## 2021-03-08 PROCEDURE — 99024 POSTOP FOLLOW-UP VISIT: CPT

## 2021-03-08 PROCEDURE — U0005: CPT

## 2021-03-08 NOTE — ASSESSMENT
[Verbal] : Verbal [Demo] : Demo [Patient] : Patient [Good - alert, interested, motivated] : Good - alert, interested, motivated [Verbalizes knowledge/Understanding] : Verbalizes knowledge/understanding [Dressing changes] : dressing changes [Foot Care] : foot care [Skin Care] : skin care [Pressure relief] : pressure relief [Signs and symptoms of infection] : sign and symptoms of infection [How and When to Call] : how and when to call [Hyperbaric Therapy] : hyperbaric therapy [Off-loading] : off-loading [Patient responsibility to plan of care] : patient responsibility to plan of care [] : Yes [Stable] : stable [Home] : Home [Cane] : Cane [Not Applicable - Long Term Care/Home Health Agency] : Long Term Care/Home Health Agency: Not Applicable [Nutrition] : nutrition [Home Health] : home health [FreeTextEntry2] : Infection Prevention\par Localized Wound Care\par Offloading \par  Pressure Relief\par Promote Skin Integrity\par Maintain acceptable pain level with use of pharmacological and nonpharmacological interventions \par HBOT [FreeTextEntry4] : F/U 2 weeks for assessment and daily for HBOT\par Covid 19 PCR obtained today\par

## 2021-03-08 NOTE — PLAN
[FreeTextEntry1] : Patient examined and evaluated at this time.\par Continue local wound care and offloading.\par Sutures from left foot removed and steristrips applied.\par Will continue HBOT at this time.\par Spent 20 minutes for patient care and medical decision making.\par

## 2021-03-08 NOTE — PHYSICAL EXAM
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, multiple partial toe amputations [de-identified] : right partial 2nd digit amputation site healed, left partial 2nd digit amputation site with intact sutures, no proximal streaking, no purulence, no malodor, no probe to bone [FreeTextEntry1] : Right Foot 2nd Digit Distal AMP - closed [de-identified] : Intact  [de-identified] : NONE  [de-identified] : Cleansed with Normal Saline \par  [de-identified] : All Sutures removed by DPM\par Steri strips applied [FreeTextEntry7] : Left Foot 2nd Digit Distal Amp - Incision Line- Sutures CDI [FreeTextEntry8] : 0.1 [FreeTextEntry9] : 3.5 [de-identified] : 0.1 [de-identified] : Intact  [de-identified] : None [de-identified] : Cleansed with Normal Saline \par Toe Sock  [TWNoteComboBox4] : None [TWNoteComboBox5] : No [TWNoteComboBox6] : Surgical [de-identified] : No [de-identified] : Normal [de-identified] : None [de-identified] : None [de-identified] : None [de-identified] : No [de-identified] : 3x Weekly [de-identified] : None [de-identified] : No [de-identified] : Surgical [de-identified] : No [de-identified] : Normal [de-identified] : None [de-identified] : None [de-identified] : 100% [de-identified] : No [de-identified] : 3x Weekly [de-identified] : Primary Dressing

## 2021-03-09 ENCOUNTER — OUTPATIENT (OUTPATIENT)
Dept: OUTPATIENT SERVICES | Facility: HOSPITAL | Age: 71
LOS: 1 days | Discharge: HOME | End: 2021-03-09
Payer: MEDICARE

## 2021-03-09 ENCOUNTER — APPOINTMENT (OUTPATIENT)
Dept: HYPERBARIC MEDICINE | Facility: HOSPITAL | Age: 71
End: 2021-03-09
Payer: MEDICARE

## 2021-03-09 VITALS
OXYGEN SATURATION: 97 % | SYSTOLIC BLOOD PRESSURE: 119 MMHG | HEART RATE: 73 BPM | RESPIRATION RATE: 18 BRPM | DIASTOLIC BLOOD PRESSURE: 72 MMHG | TEMPERATURE: 97.4 F

## 2021-03-09 VITALS
TEMPERATURE: 97.7 F | SYSTOLIC BLOOD PRESSURE: 132 MMHG | HEART RATE: 87 BPM | OXYGEN SATURATION: 95 % | RESPIRATION RATE: 18 BRPM | DIASTOLIC BLOOD PRESSURE: 76 MMHG

## 2021-03-09 DIAGNOSIS — Z89.421 ACQUIRED ABSENCE OF OTHER RIGHT TOE(S): ICD-10-CM

## 2021-03-09 DIAGNOSIS — Z89.422 ACQUIRED ABSENCE OF OTHER LEFT TOE(S): ICD-10-CM

## 2021-03-09 DIAGNOSIS — T86.828 OTHER COMPLICATIONS OF SKIN GRAFT (ALLOGRAFT) (AUTOGRAFT): ICD-10-CM

## 2021-03-09 DIAGNOSIS — Z80.41 FAMILY HISTORY OF MALIGNANT NEOPLASM OF OVARY: ICD-10-CM
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
## 2021-03-09 DIAGNOSIS — M48.00 SPINAL STENOSIS, SITE UNSPECIFIED: ICD-10-CM

## 2021-03-09 DIAGNOSIS — Z90.49 ACQUIRED ABSENCE OF OTHER SPECIFIED PARTS OF DIGESTIVE TRACT: ICD-10-CM

## 2021-03-09 DIAGNOSIS — Z98.890 OTHER SPECIFIED POSTPROCEDURAL STATES: ICD-10-CM

## 2021-03-09 DIAGNOSIS — Z79.899 OTHER LONG TERM (CURRENT) DRUG THERAPY: ICD-10-CM

## 2021-03-09 DIAGNOSIS — E78.00 PURE HYPERCHOLESTEROLEMIA, UNSPECIFIED: ICD-10-CM

## 2021-03-09 DIAGNOSIS — Z79.82 LONG TERM (CURRENT) USE OF ASPIRIN: ICD-10-CM

## 2021-03-09 DIAGNOSIS — I10 ESSENTIAL (PRIMARY) HYPERTENSION: ICD-10-CM

## 2021-03-09 DIAGNOSIS — Z88.0 ALLERGY STATUS TO PENICILLIN: ICD-10-CM

## 2021-03-09 DIAGNOSIS — Z80.52 FAMILY HISTORY OF MALIGNANT NEOPLASM OF BLADDER: ICD-10-CM

## 2021-03-09 DIAGNOSIS — Z84.81 FAMILY HISTORY OF CARRIER OF GENETIC DISEASE: ICD-10-CM

## 2021-03-09 DIAGNOSIS — Z89.421 ACQUIRED ABSENCE OF OTHER RIGHT TOE(S): Chronic | ICD-10-CM

## 2021-03-09 DIAGNOSIS — Z98.1 ARTHRODESIS STATUS: ICD-10-CM

## 2021-03-09 DIAGNOSIS — Z48.02 ENCOUNTER FOR REMOVAL OF SUTURES: ICD-10-CM

## 2021-03-09 LAB — SARS-COV-2 RNA SPEC QL NAA+PROBE: SIGNIFICANT CHANGE UP

## 2021-03-09 PROCEDURE — 82962 GLUCOSE BLOOD TEST: CPT

## 2021-03-09 PROCEDURE — 99183 HYPERBARIC OXYGEN THERAPY: CPT

## 2021-03-09 PROCEDURE — G0277: CPT

## 2021-03-10 ENCOUNTER — APPOINTMENT (OUTPATIENT)
Dept: HYPERBARIC MEDICINE | Facility: HOSPITAL | Age: 71
End: 2021-03-10
Payer: MEDICARE

## 2021-03-10 ENCOUNTER — OUTPATIENT (OUTPATIENT)
Dept: OUTPATIENT SERVICES | Facility: HOSPITAL | Age: 71
LOS: 1 days | Discharge: HOME | End: 2021-03-10
Payer: MEDICARE

## 2021-03-10 VITALS
RESPIRATION RATE: 18 BRPM | TEMPERATURE: 98.7 F | HEART RATE: 98 BPM | OXYGEN SATURATION: 98 % | SYSTOLIC BLOOD PRESSURE: 127 MMHG | DIASTOLIC BLOOD PRESSURE: 69 MMHG

## 2021-03-10 VITALS
OXYGEN SATURATION: 100 % | SYSTOLIC BLOOD PRESSURE: 132 MMHG | TEMPERATURE: 97.8 F | RESPIRATION RATE: 18 BRPM | DIASTOLIC BLOOD PRESSURE: 80 MMHG | HEART RATE: 81 BPM
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
## 2021-03-10 DIAGNOSIS — Z89.421 ACQUIRED ABSENCE OF OTHER RIGHT TOE(S): Chronic | ICD-10-CM

## 2021-03-10 DIAGNOSIS — T86.828 OTHER COMPLICATIONS OF SKIN GRAFT (ALLOGRAFT) (AUTOGRAFT): ICD-10-CM

## 2021-03-10 PROCEDURE — G0277: CPT

## 2021-03-10 PROCEDURE — 82962 GLUCOSE BLOOD TEST: CPT

## 2021-03-10 PROCEDURE — 99183 HYPERBARIC OXYGEN THERAPY: CPT

## 2021-03-10 NOTE — ADDENDUM
[FreeTextEntry1] : PT RECEIVED OFF-LOADING MEASURES PRE HBOT \par PT DESCENDED TO 2.4 YOUNG @ 2.2 PSI/MIN WITHOUT INCIDENT IN CHAMBER #4\par PT RESTING AT TX DEPTH WITH VISIBLE CHEST RISE AND FALL OBSERVED CHAMBERSIDE \par PT TOLERATED BOTH AIR BREAKS WELL \par PT ASCENDED FROM TX DEPTH WITHOUT INCIDENT \par PT TOLERATED TX WELL

## 2021-03-10 NOTE — PROCEDURE
[Outpatient] : Outpatient [Ambulatory] : Patient is ambulatory. [Cane] : cane [THIS CHAMBER HAS BEEN CLEANED / DISINFECTED] : This chamber has been cleaned / disinfected according to local and hospital policy and procedure prior to this treatment. [Patient demonstrated and verbalized proper technique for using air break mask] : Patient demonstrated and verbalized proper technique for using air break mask [Patient educated on the risks of SMOKING prior to HBOT with understanding] : Patient educated on the risks of SMOKING prior to HBOT with understanding [Patient educated on the risks of CONSUMING ALCOHOL prior to HBOT with understanding] : Patient educated on the risks of CONSUMING ALCOHOL prior to HBOT with understanding [100% Cotton] : 100% cotton [Empty all pockets] : empty all pockets [No hair oils, wigs, hairpieces, pins] : no hair oils, wigs, hairpieces, pins  [Pre tx medications] : pre tx medications  [No make-up, creams] : no make-up, creams  [No jewelry] : no jewelry  [No matches, cigarettes, lighters] : no matches, cigarettes, lighters  [Hearing aid removed] : hearing aid removed [Dentures removed] : dentures removed [Ground bracelet on pt's wrist] : ground bracelet on pt's wrist  [Contacts removed] : contacts removed  [Remove nail polish] : remove nail polish  [No reading material] : no reading material  [Bra, undergarments removed] : bra, undergarments removed  [No contraindicated dressings] : no contraindicated dressings [Ground Wire - VISUAL Verification - Intact/Free of Obstruction] : Ground Wire - VISUAL Verification - Intact/Free of Obstruction  [Ground Continuity - Verified < 1 ohm w/ Wrist Strap Denny] : Ground Continuity - Verified < 1 ohm w/ Wrist Strap Denny [Number: ___] : Number: [unfilled] [Diagnosis: ___] : Diagnosis: [unfilled] [____] : Post-Dive: Time - [unfilled] [___] : Post-Dive: Value - [unfilled] mg/dL [Clear all fields] : clear all fields [] : No [FreeTextEntry4] : 100 [FreeTextEnchg6] : 5155 [FreeTextEntry8] : 9853 [de-identified] : 5098 [de-identified] : 9189 [de-identified] : 6562 [de-identified] : 3262 [de-identified] : 8428 [de-identified] : 6608 [de-identified] : 120 minutes

## 2021-03-11 ENCOUNTER — OUTPATIENT (OUTPATIENT)
Dept: OUTPATIENT SERVICES | Facility: HOSPITAL | Age: 71
LOS: 1 days | Discharge: HOME | End: 2021-03-11
Payer: MEDICARE

## 2021-03-11 ENCOUNTER — NON-APPOINTMENT (OUTPATIENT)
Age: 71
End: 2021-03-11

## 2021-03-11 ENCOUNTER — APPOINTMENT (OUTPATIENT)
Dept: HYPERBARIC MEDICINE | Facility: HOSPITAL | Age: 71
End: 2021-03-11
Payer: MEDICARE

## 2021-03-11 VITALS
SYSTOLIC BLOOD PRESSURE: 140 MMHG | HEART RATE: 96 BPM | DIASTOLIC BLOOD PRESSURE: 77 MMHG | TEMPERATURE: 97.5 F | OXYGEN SATURATION: 96 % | RESPIRATION RATE: 18 BRPM

## 2021-03-11 VITALS
SYSTOLIC BLOOD PRESSURE: 147 MMHG | TEMPERATURE: 98.3 F | HEART RATE: 76 BPM | RESPIRATION RATE: 18 BRPM | OXYGEN SATURATION: 100 % | DIASTOLIC BLOOD PRESSURE: 84 MMHG
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
## 2021-03-11 DIAGNOSIS — T86.828 OTHER COMPLICATIONS OF SKIN GRAFT (ALLOGRAFT) (AUTOGRAFT): ICD-10-CM

## 2021-03-11 DIAGNOSIS — Z89.421 ACQUIRED ABSENCE OF OTHER RIGHT TOE(S): Chronic | ICD-10-CM

## 2021-03-11 PROCEDURE — 82962 GLUCOSE BLOOD TEST: CPT

## 2021-03-11 PROCEDURE — 99183 HYPERBARIC OXYGEN THERAPY: CPT

## 2021-03-11 PROCEDURE — G0277: CPT

## 2021-03-11 NOTE — PROCEDURE
[Outpatient] : Outpatient [Ambulatory] : Patient is ambulatory. [Cane] : cane [THIS CHAMBER HAS BEEN CLEANED / DISINFECTED] : This chamber has been cleaned / disinfected according to local and hospital policy and procedure prior to this treatment. [Patient demonstrated and verbalized proper technique for using air break mask] : Patient demonstrated and verbalized proper technique for using air break mask [Patient educated on the risks of SMOKING prior to HBOT with understanding] : Patient educated on the risks of SMOKING prior to HBOT with understanding [Patient educated on the risks of CONSUMING ALCOHOL prior to HBOT with understanding] : Patient educated on the risks of CONSUMING ALCOHOL prior to HBOT with understanding [100% Cotton] : 100% cotton [Empty all pockets] : empty all pockets [No hair oils, wigs, hairpieces, pins] : no hair oils, wigs, hairpieces, pins  [Pre tx medications] : pre tx medications  [No make-up, creams] : no make-up, creams  [No jewelry] : no jewelry  [No matches, cigarettes, lighters] : no matches, cigarettes, lighters  [Hearing aid removed] : hearing aid removed [Dentures removed] : dentures removed [Ground bracelet on pt's wrist] : ground bracelet on pt's wrist  [Contacts removed] : contacts removed  [Remove nail polish] : remove nail polish  [No reading material] : no reading material  [Bra, undergarments removed] : bra, undergarments removed  [No contraindicated dressings] : no contraindicated dressings [Ground Wire - VISUAL Verification - Intact/Free of Obstruction] : Ground Wire - VISUAL Verification - Intact/Free of Obstruction  [Ground Continuity - Verified < 1 ohm w/ Wrist Strap Denny] : Ground Continuity - Verified < 1 ohm w/ Wrist Strap Denny [Number: ___] : Number: [unfilled] [Diagnosis: ___] : Diagnosis: [unfilled] [____] : Post-Dive: Time - [unfilled] [___] : Post-Dive: Value - [unfilled] mg/dL [Clear all fields] : clear all fields [] : No [FreeTextEntry4] : 100 [FreeTextEntry6] : 4461 [FreeTextEntry8] : 0966 [de-identified] : 4885 [de-identified] : 3650 [de-identified] : 0642 [de-identified] : 7378 [de-identified] : 8256 [de-identified] : 9571 [de-identified] : 120 mins

## 2021-03-11 NOTE — ADDENDUM
[FreeTextEntry1] : PT DESCENDED TO 2.4 YOUNG @ 2.2 PSI/MIN WITHOUT INCIDENT IN CHAMBER # 1. PT'S RESTING AT TX DEPTH WITH LEGS ELEVATED. CHEST RISE AND FALL OBSERVED CHAMBER SIDE.\par PT TOLERATED AIR BREAKS WELL.\par PT ASCENDED FROM TX DEPTH TO SURFACE WITHOUT INCIDENT.\par PT TOLERATED HBOT WITHOUT INCIDENT.\par PT RECEIVED DRESSING CHANGE VIA NURSE POST-HBOT

## 2021-03-11 NOTE — PROCEDURE
[Outpatient] : Outpatient [Ambulatory] : Patient is ambulatory. [Cane] : cane [THIS CHAMBER HAS BEEN CLEANED / DISINFECTED] : This chamber has been cleaned / disinfected according to local and hospital policy and procedure prior to this treatment. [____] : Pre-Dive: Time - [unfilled] [___] : Pre-Dive: Value - [unfilled] mg/dL [Patient demonstrated and verbalized proper technique for using air break mask] : Patient demonstrated and verbalized proper technique for using air break mask [Patient educated on the risks of SMOKING prior to HBOT with understanding] : Patient educated on the risks of SMOKING prior to HBOT with understanding [Patient educated on the risks of CONSUMING ALCOHOL prior to HBOT with understanding] : Patient educated on the risks of CONSUMING ALCOHOL prior to HBOT with understanding [100% Cotton] : 100% cotton [Empty all pockets] : empty all pockets [No hair oils, wigs, hairpieces, pins] : no hair oils, wigs, hairpieces, pins  [Pre tx medications] : pre tx medications  [No make-up, creams] : no make-up, creams  [No jewelry] : no jewelry  [No matches, cigarettes, lighters] : no matches, cigarettes, lighters  [Hearing aid removed] : hearing aid removed [Dentures removed] : dentures removed [Ground bracelet on pt's wrist] : ground bracelet on pt's wrist  [Contacts removed] : contacts removed  [Remove nail polish] : remove nail polish  [No reading material] : no reading material  [Bra, undergarments removed] : bra, undergarments removed  [No contraindicated dressings] : no contraindicated dressings [Ground Wire - VISUAL Verification - Intact/Free of Obstruction] : Ground Wire - VISUAL Verification - Intact/Free of Obstruction  [Ground Continuity - Verified < 1 ohm w/ Wrist Strap Denny] : Ground Continuity - Verified < 1 ohm w/ Wrist Strap Denny [Number: ___] : Number: [unfilled] [Diagnosis: ___] : Diagnosis: [unfilled] [Clear all fields] : clear all fields [] : No [FreeTextEntry4] : 100 [FreeTextEntry6] : 6575 [FreeTextEntry8] : 4710 [de-identified] : 3769 [de-identified] : 9508 [de-identified] : 2969 [de-identified] : 4060 [de-identified] : 5810 [de-identified] : 7943 [de-identified] : 120

## 2021-03-11 NOTE — ADDENDUM
[FreeTextEntry1] : PT DESCENDED TO 2.4 YOUNG @ 2.2 PSI/MIN WITHOUT INCIDENT IN CHAMBER # 4. PT'S RESTING AT TX DEPTH WITH CHEST RISE AND FALL OBSERVED CHAMBER SIDE. \par PT TOLERATED AIR BREAKS WELL.\par PT ASCENDED FROM TX DEPTH WITHOUT INCIDENT. PT TOLERATED TX WELL.

## 2021-03-12 ENCOUNTER — APPOINTMENT (OUTPATIENT)
Dept: HYPERBARIC MEDICINE | Facility: HOSPITAL | Age: 71
End: 2021-03-12
Payer: MEDICARE

## 2021-03-12 ENCOUNTER — NON-APPOINTMENT (OUTPATIENT)
Age: 71
End: 2021-03-12

## 2021-03-12 ENCOUNTER — OUTPATIENT (OUTPATIENT)
Dept: OUTPATIENT SERVICES | Facility: HOSPITAL | Age: 71
LOS: 1 days | Discharge: HOME | End: 2021-03-12
Payer: MEDICARE

## 2021-03-12 VITALS
DIASTOLIC BLOOD PRESSURE: 80 MMHG | SYSTOLIC BLOOD PRESSURE: 124 MMHG | RESPIRATION RATE: 18 BRPM | OXYGEN SATURATION: 100 % | TEMPERATURE: 98 F | HEART RATE: 76 BPM

## 2021-03-12 VITALS
HEART RATE: 83 BPM | OXYGEN SATURATION: 98 % | RESPIRATION RATE: 18 BRPM | TEMPERATURE: 98.2 F | DIASTOLIC BLOOD PRESSURE: 72 MMHG | SYSTOLIC BLOOD PRESSURE: 145 MMHG

## 2021-03-12 DIAGNOSIS — T86.828 OTHER COMPLICATIONS OF SKIN GRAFT (ALLOGRAFT) (AUTOGRAFT): ICD-10-CM
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
## 2021-03-12 DIAGNOSIS — Z89.421 ACQUIRED ABSENCE OF OTHER RIGHT TOE(S): Chronic | ICD-10-CM

## 2021-03-12 PROCEDURE — G0277: CPT

## 2021-03-12 PROCEDURE — 99183 HYPERBARIC OXYGEN THERAPY: CPT

## 2021-03-12 PROCEDURE — 82962 GLUCOSE BLOOD TEST: CPT

## 2021-03-15 ENCOUNTER — APPOINTMENT (OUTPATIENT)
Dept: HYPERBARIC MEDICINE | Facility: HOSPITAL | Age: 71
End: 2021-03-15

## 2021-03-15 NOTE — PROCEDURE
[Outpatient] : Outpatient [Ambulatory] : Patient is ambulatory. [Cane] : cane [THIS CHAMBER HAS BEEN CLEANED / DISINFECTED] : This chamber has been cleaned / disinfected according to local and hospital policy and procedure prior to this treatment. [____] : Pre-Dive: Time - [unfilled] [___] : Pre-Dive: Value - [unfilled] mg/dL [Patient demonstrated and verbalized proper technique for using air break mask] : Patient demonstrated and verbalized proper technique for using air break mask [Patient educated on the risks of SMOKING prior to HBOT with understanding] : Patient educated on the risks of SMOKING prior to HBOT with understanding [Patient educated on the risks of CONSUMING ALCOHOL prior to HBOT with understanding] : Patient educated on the risks of CONSUMING ALCOHOL prior to HBOT with understanding [100% Cotton] : 100% cotton [Empty all pockets] : empty all pockets [No hair oils, wigs, hairpieces, pins] : no hair oils, wigs, hairpieces, pins  [Pre tx medications] : pre tx medications  [No make-up, creams] : no make-up, creams  [No jewelry] : no jewelry  [No matches, cigarettes, lighters] : no matches, cigarettes, lighters  [Hearing aid removed] : hearing aid removed [Dentures removed] : dentures removed [Ground bracelet on pt's wrist] : ground bracelet on pt's wrist  [Contacts removed] : contacts removed  [Remove nail polish] : remove nail polish  [No reading material] : no reading material  [Bra, undergarments removed] : bra, undergarments removed  [No contraindicated dressings] : no contraindicated dressings [Ground Wire - VISUAL Verification - Intact/Free of Obstruction] : Ground Wire - VISUAL Verification - Intact/Free of Obstruction  [Ground Continuity - Verified < 1 ohm w/ Wrist Strap Denny] : Ground Continuity - Verified < 1 ohm w/ Wrist Strap Denny [Number: ___] : Number: [unfilled] [Diagnosis: ___] : Diagnosis: [unfilled] [Clear all fields] : clear all fields [] : No [FreeTextEntry4] : 100 [FreeTextEntry6] : 8406 [FreeTextEntry8] : 3350 [de-identified] : 7052 [de-identified] : 2569 [de-identified] : 3823 [de-identified] : 2602 [de-identified] : 7438 [de-identified] : 8478 [de-identified] : 120 MIN

## 2021-03-15 NOTE — ADDENDUM
[FreeTextEntry1] : PT RECEIVED OFF-LOADING MEASURES PRE-HBOT \par PT DESCENDED TO 2.4 YOUNG @ 2.2 PSI/MIN WITHOUT INCIDENT IN CHAMBER #2\par PT RESTING AT TX DEPTH WITH VISIBLE CHEST RISE AND FALL OBSERVED CHAMBERSIDE.\par PT ASCENDED FROM TX DEPTH WITHOUT INCIDENT. PT TOLERATED TX WELL.

## 2021-03-15 NOTE — ASSESSMENT
[Patient undergoing HBO treatment for __________] : Patient undergoing HBO treatment for [unfilled] [Patient descended without problem for 9 minutes] : Patient descended without problem for 9 minutes [No dizziness or thirst] :  No dizziness or thirst [No ear problems] : No ear problems [Vital signs stable] : Vital signs stable [Tolerating dive well] : Tolerating dive well [No Chest Pain, shortness of breath] : No Chest Pain, shortness of breath [Respiratory Rate Stable] : Respiratory Rate Stable [No chest pain, shortness of breath, or ear pain] :  No chest pain, shortness of breath, or ear pain  [Tolerated Ascent well] : Tolerated Ascent well [Vital Signs stable] : Vital Signs stable [A physician was present throughout the entire HBOT] : A physician was present throughout the entire HBOT [No] : No [Clinically Stable] : Clinically stable [Continue Treatment Plan] : Continue treatment plan [0] : 0 out of 10 [FreeTextEntry3] : patient related that she hit her head at 0100 hours today. No LOC but some bleeding noted. Wound examined at 1515 hours. 1.5 cm wound into subcutus. Recommended she be seen in ER and possibly get a staple or 2. Patient declined and recommended bactracin/neosporin and keeping area clean. RICKI Newell

## 2021-03-16 ENCOUNTER — OUTPATIENT (OUTPATIENT)
Dept: OUTPATIENT SERVICES | Facility: HOSPITAL | Age: 71
LOS: 1 days | End: 2021-03-16
Payer: MEDICARE

## 2021-03-16 ENCOUNTER — APPOINTMENT (OUTPATIENT)
Dept: HYPERBARIC MEDICINE | Facility: HOSPITAL | Age: 71
End: 2021-03-16
Payer: MEDICARE

## 2021-03-16 VITALS
HEART RATE: 98 BPM | TEMPERATURE: 98.7 F | DIASTOLIC BLOOD PRESSURE: 72 MMHG | OXYGEN SATURATION: 96 % | SYSTOLIC BLOOD PRESSURE: 113 MMHG | RESPIRATION RATE: 20 BRPM

## 2021-03-16 VITALS
SYSTOLIC BLOOD PRESSURE: 117 MMHG | HEART RATE: 73 BPM | TEMPERATURE: 97.6 F | OXYGEN SATURATION: 99 % | RESPIRATION RATE: 20 BRPM | DIASTOLIC BLOOD PRESSURE: 75 MMHG

## 2021-03-16 DIAGNOSIS — T86.828 OTHER COMPLICATIONS OF SKIN GRAFT (ALLOGRAFT) (AUTOGRAFT): ICD-10-CM

## 2021-03-16 DIAGNOSIS — Z89.421 ACQUIRED ABSENCE OF OTHER RIGHT TOE(S): Chronic | ICD-10-CM

## 2021-03-16 LAB — SARS-COV-2 RNA SPEC QL NAA+PROBE: SIGNIFICANT CHANGE UP

## 2021-03-16 PROCEDURE — 99183 HYPERBARIC OXYGEN THERAPY: CPT

## 2021-03-16 PROCEDURE — U0003: CPT

## 2021-03-16 PROCEDURE — U0005: CPT

## 2021-03-16 PROCEDURE — 82962 GLUCOSE BLOOD TEST: CPT

## 2021-03-17 ENCOUNTER — APPOINTMENT (OUTPATIENT)
Dept: HYPERBARIC MEDICINE | Facility: HOSPITAL | Age: 71
End: 2021-03-17

## 2021-03-17 NOTE — ADDENDUM
[FreeTextEntry1] : PT WAS GIVEN OFF LOADING MEASURES PRE HBOT \par PT WAS GIVEN NASAL SPRAY PRE HBOT AND WAS OBSERVED CORRECTLY USING \par PT DESCENDED TO 2.4 YOUNG @ 2.2 PSI/MIN WITHOUT INCIDENT IN CHAMBER #4\par PT RESTING AT TX DEPTH WITH VISIBLE CHEST RISE AND FALL OBSERVED CHAMBERSIDE \par PT TOLERATED AIRBREAKS WITHOUT INCIDENT.\par PT ASCENDED FROM TX DEPTH TO SURFACE WITHOUT INCIDENT.\par PT TOLERATED HBOT WITHOUT INCIDENT.\par PT RECEIVED DRESSING CHANGE POST-HBOT VIA NURSE

## 2021-03-17 NOTE — PROCEDURE
[Outpatient] : Outpatient [Ambulatory] : Patient is ambulatory. [Cane] : cane [THIS CHAMBER HAS BEEN CLEANED / DISINFECTED] : This chamber has been cleaned / disinfected according to local and hospital policy and procedure prior to this treatment. [Patient demonstrated and verbalized proper technique for using air break mask] : Patient demonstrated and verbalized proper technique for using air break mask [Patient educated on the risks of SMOKING prior to HBOT with understanding] : Patient educated on the risks of SMOKING prior to HBOT with understanding [Patient educated on the risks of CONSUMING ALCOHOL prior to HBOT with understanding] : Patient educated on the risks of CONSUMING ALCOHOL prior to HBOT with understanding [100% Cotton] : 100% cotton [Empty all pockets] : empty all pockets [No hair oils, wigs, hairpieces, pins] : no hair oils, wigs, hairpieces, pins  [Pre tx medications] : pre tx medications  [No make-up, creams] : no make-up, creams  [No jewelry] : no jewelry  [No matches, cigarettes, lighters] : no matches, cigarettes, lighters  [Hearing aid removed] : hearing aid removed [Dentures removed] : dentures removed [Ground bracelet on pt's wrist] : ground bracelet on pt's wrist  [Contacts removed] : contacts removed  [Remove nail polish] : remove nail polish  [No reading material] : no reading material  [Bra, undergarments removed] : bra, undergarments removed  [No contraindicated dressings] : no contraindicated dressings [Ground Wire - VISUAL Verification - Intact/Free of Obstruction] : Ground Wire - VISUAL Verification - Intact/Free of Obstruction  [Ground Continuity - Verified < 1 ohm w/ Wrist Strap Denny] : Ground Continuity - Verified < 1 ohm w/ Wrist Strap Denny [Number: ___] : Number: [unfilled] [Diagnosis: ___] : Diagnosis: [unfilled] [____] : Post-Dive: Time - [unfilled] [___] : Post-Dive: Value - [unfilled] mg/dL [Clear all fields] : clear all fields [] : No [FreeTextEntry4] : 100 [FreeTextEntry6] : 4464 [FreeTextEntry8] : 7046 [de-identified] : 6738 [de-identified] : 1142 [de-identified] : 8461 [de-identified] : 5931 [de-identified] : 6776 [de-identified] : 5587 [de-identified] : 120 mins

## 2021-03-18 ENCOUNTER — OUTPATIENT (OUTPATIENT)
Dept: OUTPATIENT SERVICES | Facility: HOSPITAL | Age: 71
LOS: 1 days | Discharge: HOME | End: 2021-03-18
Payer: MEDICARE

## 2021-03-18 ENCOUNTER — APPOINTMENT (OUTPATIENT)
Dept: HYPERBARIC MEDICINE | Facility: HOSPITAL | Age: 71
End: 2021-03-18
Payer: MEDICARE

## 2021-03-18 VITALS
TEMPERATURE: 98.7 F | HEART RATE: 77 BPM | SYSTOLIC BLOOD PRESSURE: 117 MMHG | DIASTOLIC BLOOD PRESSURE: 71 MMHG | RESPIRATION RATE: 18 BRPM | OXYGEN SATURATION: 98 %

## 2021-03-18 VITALS
OXYGEN SATURATION: 97 % | SYSTOLIC BLOOD PRESSURE: 128 MMHG | RESPIRATION RATE: 20 BRPM | HEART RATE: 88 BPM | TEMPERATURE: 98.1 F | DIASTOLIC BLOOD PRESSURE: 72 MMHG

## 2021-03-18 DIAGNOSIS — Z89.421 ACQUIRED ABSENCE OF OTHER RIGHT TOE(S): Chronic | ICD-10-CM

## 2021-03-18 DIAGNOSIS — T86.828 OTHER COMPLICATIONS OF SKIN GRAFT (ALLOGRAFT) (AUTOGRAFT): ICD-10-CM
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
## 2021-03-18 PROCEDURE — G0277: CPT

## 2021-03-18 PROCEDURE — 99183 HYPERBARIC OXYGEN THERAPY: CPT

## 2021-03-18 PROCEDURE — 82962 GLUCOSE BLOOD TEST: CPT

## 2021-03-19 ENCOUNTER — OUTPATIENT (OUTPATIENT)
Dept: OUTPATIENT SERVICES | Facility: HOSPITAL | Age: 71
LOS: 1 days | Discharge: HOME | End: 2021-03-19
Payer: MEDICARE

## 2021-03-19 ENCOUNTER — APPOINTMENT (OUTPATIENT)
Dept: HYPERBARIC MEDICINE | Facility: HOSPITAL | Age: 71
End: 2021-03-19
Payer: MEDICARE

## 2021-03-19 VITALS
DIASTOLIC BLOOD PRESSURE: 73 MMHG | TEMPERATURE: 98.3 F | HEART RATE: 74 BPM | OXYGEN SATURATION: 99 % | SYSTOLIC BLOOD PRESSURE: 126 MMHG | RESPIRATION RATE: 18 BRPM

## 2021-03-19 VITALS
DIASTOLIC BLOOD PRESSURE: 64 MMHG | SYSTOLIC BLOOD PRESSURE: 133 MMHG | TEMPERATURE: 98.6 F | RESPIRATION RATE: 18 BRPM | OXYGEN SATURATION: 98 % | HEART RATE: 86 BPM

## 2021-03-19 DIAGNOSIS — Z89.421 ACQUIRED ABSENCE OF OTHER RIGHT TOE(S): Chronic | ICD-10-CM

## 2021-03-19 DIAGNOSIS — T86.828 OTHER COMPLICATIONS OF SKIN GRAFT (ALLOGRAFT) (AUTOGRAFT): ICD-10-CM

## 2021-03-19 PROCEDURE — G0277: CPT

## 2021-03-19 PROCEDURE — 99183 HYPERBARIC OXYGEN THERAPY: CPT

## 2021-03-19 PROCEDURE — 82962 GLUCOSE BLOOD TEST: CPT

## 2021-03-19 NOTE — PROCEDURE
[Outpatient] : Outpatient [Ambulatory] : Patient is ambulatory. [Cane] : cane [THIS CHAMBER HAS BEEN CLEANED / DISINFECTED] : This chamber has been cleaned / disinfected according to local and hospital policy and procedure prior to this treatment. [___] : Post-Dive: Value - [unfilled] mg/dL [Patient demonstrated and verbalized proper technique for using air break mask] : Patient demonstrated and verbalized proper technique for using air break mask [Patient educated on the risks of SMOKING prior to HBOT with understanding] : Patient educated on the risks of SMOKING prior to HBOT with understanding [Patient educated on the risks of CONSUMING ALCOHOL prior to HBOT with understanding] : Patient educated on the risks of CONSUMING ALCOHOL prior to HBOT with understanding [100% Cotton] : 100% cotton [Empty all pockets] : empty all pockets [No hair oils, wigs, hairpieces, pins] : no hair oils, wigs, hairpieces, pins  [Pre tx medications] : pre tx medications  [No make-up, creams] : no make-up, creams  [No jewelry] : no jewelry  [No matches, cigarettes, lighters] : no matches, cigarettes, lighters  [Hearing aid removed] : hearing aid removed [Dentures removed] : dentures removed [Ground bracelet on pt's wrist] : ground bracelet on pt's wrist  [Contacts removed] : contacts removed  [Remove nail polish] : remove nail polish  [No reading material] : no reading material  [Bra, undergarments removed] : bra, undergarments removed  [No contraindicated dressings] : no contraindicated dressings [Ground Wire - VISUAL Verification - Intact/Free of Obstruction] : Ground Wire - VISUAL Verification - Intact/Free of Obstruction  [Ground Continuity - Verified < 1 ohm w/ Wrist Strap Denny] : Ground Continuity - Verified < 1 ohm w/ Wrist Strap Denny [Clear all fields] : clear all fields [Number: ___] : Number: [unfilled] [Diagnosis: ___] : Diagnosis: [unfilled] [____] : Post-Dive: Time - [unfilled] [] : No [FreeTextEntry4] : 100 [FreeTextEntry6] : 1059 [FreeTextEntry8] : 1503 [de-identified] : 2501 [de-identified] : 5491 [de-identified] : 1644 [de-identified] : 2636 [de-identified] : 8040 [de-identified] : 150 [de-identified] : 120 mins

## 2021-03-19 NOTE — ADDENDUM
[FreeTextEntry1] : Pt descended to 2.4 YOUNG @ 2.2 PSI/MIN without incident in chamber #4. \par Pt's resting with head and legs elevated at tx depth with visible chest rise and fall observed chamberside.\par Pt tolerated airbreaks without incident\par Pt ascended from tx depth to surface without incident.\par Pt tolerated tx well.

## 2021-03-20 DIAGNOSIS — T86.828 OTHER COMPLICATIONS OF SKIN GRAFT (ALLOGRAFT) (AUTOGRAFT): ICD-10-CM
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
## 2021-03-22 ENCOUNTER — OUTPATIENT (OUTPATIENT)
Dept: OUTPATIENT SERVICES | Facility: HOSPITAL | Age: 71
LOS: 1 days | Discharge: HOME | End: 2021-03-22
Payer: MEDICARE

## 2021-03-22 ENCOUNTER — APPOINTMENT (OUTPATIENT)
Dept: HYPERBARIC MEDICINE | Facility: HOSPITAL | Age: 71
End: 2021-03-22
Payer: MEDICARE

## 2021-03-22 VITALS
OXYGEN SATURATION: 94 % | HEIGHT: 67 IN | TEMPERATURE: 98.7 F | RESPIRATION RATE: 18 BRPM | BODY MASS INDEX: 34.53 KG/M2 | DIASTOLIC BLOOD PRESSURE: 76 MMHG | SYSTOLIC BLOOD PRESSURE: 131 MMHG | WEIGHT: 220 LBS | HEART RATE: 86 BPM

## 2021-03-22 DIAGNOSIS — Z89.421 ACQUIRED ABSENCE OF OTHER RIGHT TOE(S): Chronic | ICD-10-CM

## 2021-03-22 DIAGNOSIS — T86.828 OTHER COMPLICATIONS OF SKIN GRAFT (ALLOGRAFT) (AUTOGRAFT): ICD-10-CM

## 2021-03-22 LAB — SARS-COV-2 RNA SPEC QL NAA+PROBE: SIGNIFICANT CHANGE UP

## 2021-03-22 PROCEDURE — G0463: CPT

## 2021-03-22 PROCEDURE — U0005: CPT

## 2021-03-22 PROCEDURE — U0003: CPT

## 2021-03-22 PROCEDURE — 99024 POSTOP FOLLOW-UP VISIT: CPT

## 2021-03-23 ENCOUNTER — APPOINTMENT (OUTPATIENT)
Dept: HYPERBARIC MEDICINE | Facility: HOSPITAL | Age: 71
End: 2021-03-23

## 2021-03-23 DIAGNOSIS — Z89.422 ACQUIRED ABSENCE OF OTHER LEFT TOE(S): ICD-10-CM

## 2021-03-23 DIAGNOSIS — Z90.49 ACQUIRED ABSENCE OF OTHER SPECIFIED PARTS OF DIGESTIVE TRACT: ICD-10-CM

## 2021-03-23 DIAGNOSIS — M48.00 SPINAL STENOSIS, SITE UNSPECIFIED: ICD-10-CM

## 2021-03-23 DIAGNOSIS — E78.00 PURE HYPERCHOLESTEROLEMIA, UNSPECIFIED: ICD-10-CM
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
## 2021-03-23 DIAGNOSIS — I10 ESSENTIAL (PRIMARY) HYPERTENSION: ICD-10-CM

## 2021-03-23 DIAGNOSIS — Z80.52 FAMILY HISTORY OF MALIGNANT NEOPLASM OF BLADDER: ICD-10-CM

## 2021-03-23 DIAGNOSIS — Z89.421 ACQUIRED ABSENCE OF OTHER RIGHT TOE(S): ICD-10-CM

## 2021-03-23 DIAGNOSIS — Z98.890 OTHER SPECIFIED POSTPROCEDURAL STATES: ICD-10-CM

## 2021-03-23 DIAGNOSIS — Z80.41 FAMILY HISTORY OF MALIGNANT NEOPLASM OF OVARY: ICD-10-CM

## 2021-03-23 DIAGNOSIS — Z84.81 FAMILY HISTORY OF CARRIER OF GENETIC DISEASE: ICD-10-CM

## 2021-03-23 DIAGNOSIS — Z98.1 ARTHRODESIS STATUS: ICD-10-CM

## 2021-03-23 DIAGNOSIS — Z79.899 OTHER LONG TERM (CURRENT) DRUG THERAPY: ICD-10-CM

## 2021-03-23 DIAGNOSIS — Z88.0 ALLERGY STATUS TO PENICILLIN: ICD-10-CM

## 2021-03-23 DIAGNOSIS — T86.828 OTHER COMPLICATIONS OF SKIN GRAFT (ALLOGRAFT) (AUTOGRAFT): ICD-10-CM

## 2021-03-23 DIAGNOSIS — Z79.82 LONG TERM (CURRENT) USE OF ASPIRIN: ICD-10-CM

## 2021-03-23 DIAGNOSIS — Z20.822 CONTACT WITH AND (SUSPECTED) EXPOSURE TO COVID-19: ICD-10-CM

## 2021-03-23 NOTE — PLAN
[FreeTextEntry1] : Patient examined and evaluated at this time.\par Continue local wound care and offloading.\par Will continue HBOT at this time.\par Spent 20 minutes for patient care and medical decision making.\par Pt to follow up in 2 weeks.

## 2021-03-23 NOTE — ASSESSMENT
[Verbal] : Verbal [Demo] : Demo [Patient] : Patient [Good - alert, interested, motivated] : Good - alert, interested, motivated [Verbalizes knowledge/Understanding] : Verbalizes knowledge/understanding [Foot Care] : foot care [Skin Care] : skin care [Signs and symptoms of infection] : sign and symptoms of infection [Nutrition] : nutrition [How and When to Call] : how and when to call [Patient responsibility to plan of care] : patient responsibility to plan of care [Stable] : stable [Home] : Home [Cane] : Cane [Not Applicable - Long Term Care/Home Health Agency] : Long Term Care/Home Health Agency: Not Applicable [Glycemic Control] : glycemic control [] : No [FreeTextEntry2] : Maintain Skin Integrity\par Glucose Control  [FreeTextEntry4] : DPM stated Pt's wounds are resolved & Pt is completed at 26 of 30 HBOT. HBOT Unit Clerk aware. Pt to F/U to [facility] in 2 weeks for assessment.\par Performed Covid 19 Nasopharynx Swab at today [facility] visit. Swab sent to [facility]. Pt tolerated well.

## 2021-03-23 NOTE — PHYSICAL EXAM
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, multiple partial toe amputations [de-identified] : right partial 2nd digit amputation site healed, left partial 2nd digit amputation site healed [FreeTextEntry1] : Right Foot 2nd Digit Distal AMP - closed [de-identified] : Intact  [de-identified] : NONE  [de-identified] : Cleansed with Normal Saline \par  [de-identified] : All Sutures removed by DPM\par Steri strips applied [FreeTextEntry7] : Left Foot 2nd Digit Distal Amp - Incision Line- Sutures CDI [FreeTextEntry8] : 0.1 [FreeTextEntry9] : 3.5 [de-identified] : 0.1 [de-identified] : Intact  [de-identified] : None [de-identified] : Cleansed with Normal Saline \par Toe Sock  [TWNoteComboBox4] : None [TWNoteComboBox5] : No [TWNoteComboBox6] : Surgical [de-identified] : No [de-identified] : Normal [de-identified] : None [de-identified] : None [de-identified] : None [de-identified] : No [de-identified] : None [de-identified] : No [de-identified] : Surgical [de-identified] : No [de-identified] : Normal [de-identified] : None [de-identified] : None [de-identified] : 100% [de-identified] : No [de-identified] : 3x Weekly [de-identified] : Primary Dressing

## 2021-03-23 NOTE — ADDENDUM
[FreeTextEntry1] : PT DESCENDED TO 2.4 YOUNG @ 2.2 PSI/MIN WITHOUT INCIDENT IN CHAMBER # 2. PT'S RESTING AT TX DEPTH WITH CHEST RISE AND FALL OBSERVED CHAMBER SIDE.\par PT TOLERATED AIR BREAKS WELL.\par PT ASCENDED FROM TX DEPTH WITHOUT INCIDENT. PT TOLERATED TX WELL.

## 2021-03-23 NOTE — REVIEW OF SYSTEMS
[Skin Wound] : skin wound [Fever] : no fever [Eye Pain] : no eye pain [Earache] : no earache [Chest Pain] : no chest pain [Shortness Of Breath] : no shortness of breath [Cough] : no cough [Abdominal Pain] : no abdominal pain [FreeTextEntry9] : right and left 2nd digit partial amputations, multiple partial toe amputations prior [de-identified] : right and left 2nd digit amputation sites

## 2021-03-23 NOTE — PROCEDURE
[Outpatient] : Outpatient [Ambulatory] : Patient is ambulatory. [THIS CHAMBER HAS BEEN CLEANED / DISINFECTED] : This chamber has been cleaned / disinfected according to local and hospital policy and procedure prior to this treatment. [____] : Pre-Dive: Time - [unfilled] [___] : Pre-Dive: Value - [unfilled] mg/dL [Patient demonstrated and verbalized proper technique for using air break mask] : Patient demonstrated and verbalized proper technique for using air break mask [Patient educated on the risks of SMOKING prior to HBOT with understanding] : Patient educated on the risks of SMOKING prior to HBOT with understanding [Patient educated on the risks of CONSUMING ALCOHOL prior to HBOT with understanding] : Patient educated on the risks of CONSUMING ALCOHOL prior to HBOT with understanding [100% Cotton] : 100% cotton [Empty all pockets] : empty all pockets [No hair oils, wigs, hairpieces, pins] : no hair oils, wigs, hairpieces, pins  [Pre tx medications] : pre tx medications  [No make-up, creams] : no make-up, creams  [No jewelry] : no jewelry  [No matches, cigarettes, lighters] : no matches, cigarettes, lighters  [Hearing aid removed] : hearing aid removed [Dentures removed] : dentures removed [Ground bracelet on pt's wrist] : ground bracelet on pt's wrist  [Contacts removed] : contacts removed  [Remove nail polish] : remove nail polish  [No reading material] : no reading material  [Bra, undergarments removed] : bra, undergarments removed  [No contraindicated dressings] : no contraindicated dressings [Ground Wire - VISUAL Verification - Intact/Free of Obstruction] : Ground Wire - VISUAL Verification - Intact/Free of Obstruction  [Ground Continuity - Verified < 1 ohm w/ Wrist Strap Denny] : Ground Continuity - Verified < 1 ohm w/ Wrist Strap Denny [Number: ___] : Number: [unfilled] [Diagnosis: ___] : Diagnosis: [unfilled] [Clear all fields] : clear all fields [] : No [FreeTextEntry4] : 100 [FreeTextEntry6] : 2359 [FreeTextEntry8] : 1655 [de-identified] : 7670 [de-identified] : 6734 [de-identified] : 9113 [de-identified] : 4549 [de-identified] : 5675 [de-identified] : 8046 [de-identified] : 120 MIN

## 2021-03-24 ENCOUNTER — APPOINTMENT (OUTPATIENT)
Dept: HYPERBARIC MEDICINE | Facility: HOSPITAL | Age: 71
End: 2021-03-24

## 2021-03-25 ENCOUNTER — APPOINTMENT (OUTPATIENT)
Dept: HYPERBARIC MEDICINE | Facility: HOSPITAL | Age: 71
End: 2021-03-25

## 2021-03-26 ENCOUNTER — APPOINTMENT (OUTPATIENT)
Dept: HYPERBARIC MEDICINE | Facility: HOSPITAL | Age: 71
End: 2021-03-26

## 2021-04-05 ENCOUNTER — APPOINTMENT (OUTPATIENT)
Dept: WOUND CARE | Facility: HOSPITAL | Age: 71
End: 2021-04-05
Payer: MEDICARE

## 2021-04-05 ENCOUNTER — OUTPATIENT (OUTPATIENT)
Dept: OUTPATIENT SERVICES | Facility: HOSPITAL | Age: 71
LOS: 1 days | Discharge: HOME | End: 2021-04-05
Payer: MEDICARE

## 2021-04-05 VITALS
OXYGEN SATURATION: 98 % | RESPIRATION RATE: 20 BRPM | SYSTOLIC BLOOD PRESSURE: 143 MMHG | TEMPERATURE: 97.7 F | HEART RATE: 91 BPM | DIASTOLIC BLOOD PRESSURE: 74 MMHG | WEIGHT: 220 LBS | BODY MASS INDEX: 34.53 KG/M2 | HEIGHT: 67 IN

## 2021-04-05 DIAGNOSIS — T86.828 OTHER COMPLICATIONS OF SKIN GRAFT (ALLOGRAFT) (AUTOGRAFT): ICD-10-CM

## 2021-04-05 DIAGNOSIS — Z89.421 ACQUIRED ABSENCE OF OTHER RIGHT TOE(S): Chronic | ICD-10-CM

## 2021-04-05 PROCEDURE — G0463: CPT

## 2021-04-05 PROCEDURE — 99024 POSTOP FOLLOW-UP VISIT: CPT

## 2021-04-05 NOTE — ASSESSMENT
[Verbal] : Verbal [Demo] : Demo [Patient] : Patient [Good - alert, interested, motivated] : Good - alert, interested, motivated [Verbalizes knowledge/Understanding] : Verbalizes knowledge/understanding [Foot Care] : foot care [Skin Care] : skin care [Signs and symptoms of infection] : sign and symptoms of infection [Nutrition] : nutrition [How and When to Call] : how and when to call [Patient responsibility to plan of care] : patient responsibility to plan of care [Stable] : stable [Home] : Home [Cane] : Cane [Not Applicable - Long Term Care/Home Health Agency] : Long Term Care/Home Health Agency: Not Applicable [] : Yes [FreeTextEntry2] : Promote skin integrity\par Infection Prevention\par  [FreeTextEntry4] : Pt is Discharged from [facility] at this time

## 2021-04-05 NOTE — PLAN
[FreeTextEntry1] : Patient happy with outcome of treatment.\par Patient to be discharged at this time.\par All questions answered to satisfaction and patient verbalized understanding.\par Patient to return to outside podiatrist at this time.\par

## 2021-04-05 NOTE — PHYSICAL EXAM
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, multiple partial toe amputations [de-identified] : right partial 2nd digit amputation site healed, left partial 2nd digit amputation site healed [FreeTextEntry1] : Right Foot 2nd Digit Distal AMP - closed [de-identified] : Intact  [de-identified] : NONE  [de-identified] : Cleansed with Normal Saline \par  [FreeTextEntry7] : Left Foot 2nd Digit Distal Amp - Closed [de-identified] : Intact  [de-identified] : None [de-identified] : Cleansed with Normal Saline \par  [TWNoteComboBox4] : None [TWNoteComboBox5] : No [TWNoteComboBox6] : Surgical [de-identified] : No [de-identified] : Normal [de-identified] : None [de-identified] : None [de-identified] : None [de-identified] : No [de-identified] : None [de-identified] : No [de-identified] : Surgical [de-identified] : No [de-identified] : Normal [de-identified] : None [de-identified] : None [de-identified] : None [de-identified] : No [de-identified] : 3x Weekly [de-identified] : Primary Dressing

## 2021-04-05 NOTE — REVIEW OF SYSTEMS
[Fever] : no fever [Eye Pain] : no eye pain [Earache] : no earache [Chest Pain] : no chest pain [Shortness Of Breath] : no shortness of breath [Cough] : no cough [Abdominal Pain] : no abdominal pain [Skin Wound] : skin wound [FreeTextEntry9] : right and left 2nd digit partial amputations, multiple partial toe amputations prior [de-identified] : right and left 2nd digit amputation sites

## 2021-04-05 NOTE — HISTORY OF PRESENT ILLNESS
[FreeTextEntry1] : Patient seen s/p right and left 2nd digit partial amputations. Denies any other complaints.

## 2021-04-06 DIAGNOSIS — Z98.1 ARTHRODESIS STATUS: ICD-10-CM

## 2021-04-06 DIAGNOSIS — Z80.52 FAMILY HISTORY OF MALIGNANT NEOPLASM OF BLADDER: ICD-10-CM

## 2021-04-06 DIAGNOSIS — Z89.421 ACQUIRED ABSENCE OF OTHER RIGHT TOE(S): ICD-10-CM

## 2021-04-06 DIAGNOSIS — Z79.82 LONG TERM (CURRENT) USE OF ASPIRIN: ICD-10-CM

## 2021-04-06 DIAGNOSIS — Z79.899 OTHER LONG TERM (CURRENT) DRUG THERAPY: ICD-10-CM

## 2021-04-06 DIAGNOSIS — M48.00 SPINAL STENOSIS, SITE UNSPECIFIED: ICD-10-CM

## 2021-04-06 DIAGNOSIS — Z89.422 ACQUIRED ABSENCE OF OTHER LEFT TOE(S): ICD-10-CM

## 2021-04-06 DIAGNOSIS — Z80.41 FAMILY HISTORY OF MALIGNANT NEOPLASM OF OVARY: ICD-10-CM

## 2021-04-06 DIAGNOSIS — E78.00 PURE HYPERCHOLESTEROLEMIA, UNSPECIFIED: ICD-10-CM

## 2021-04-06 DIAGNOSIS — Z98.890 OTHER SPECIFIED POSTPROCEDURAL STATES: ICD-10-CM

## 2021-04-06 DIAGNOSIS — I10 ESSENTIAL (PRIMARY) HYPERTENSION: ICD-10-CM
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
## 2021-04-06 DIAGNOSIS — Z88.0 ALLERGY STATUS TO PENICILLIN: ICD-10-CM

## 2021-04-06 DIAGNOSIS — T86.828 OTHER COMPLICATIONS OF SKIN GRAFT (ALLOGRAFT) (AUTOGRAFT): ICD-10-CM

## 2021-04-06 DIAGNOSIS — Z84.81 FAMILY HISTORY OF CARRIER OF GENETIC DISEASE: ICD-10-CM

## 2021-04-06 DIAGNOSIS — Z90.49 ACQUIRED ABSENCE OF OTHER SPECIFIED PARTS OF DIGESTIVE TRACT: ICD-10-CM

## 2021-06-18 ENCOUNTER — INPATIENT (INPATIENT)
Facility: HOSPITAL | Age: 71
LOS: 5 days | Discharge: HOME | DRG: 504 | End: 2021-06-24
Attending: FAMILY MEDICINE | Admitting: FAMILY MEDICINE
Payer: MEDICARE

## 2021-06-18 VITALS
SYSTOLIC BLOOD PRESSURE: 124 MMHG | HEIGHT: 67 IN | DIASTOLIC BLOOD PRESSURE: 78 MMHG | RESPIRATION RATE: 16 BRPM | HEART RATE: 68 BPM | OXYGEN SATURATION: 98 % | WEIGHT: 229.94 LBS | TEMPERATURE: 98 F

## 2021-06-18 DIAGNOSIS — L03.90 CELLULITIS, UNSPECIFIED: ICD-10-CM

## 2021-06-18 DIAGNOSIS — Z89.421 ACQUIRED ABSENCE OF OTHER RIGHT TOE(S): Chronic | ICD-10-CM

## 2021-06-18 LAB
ALBUMIN SERPL ELPH-MCNC: 3.7 G/DL — SIGNIFICANT CHANGE UP (ref 3.3–5)
ALP SERPL-CCNC: 108 U/L — SIGNIFICANT CHANGE UP (ref 40–120)
ALT FLD-CCNC: 28 U/L — SIGNIFICANT CHANGE UP (ref 12–78)
ANION GAP SERPL CALC-SCNC: 4 MMOL/L — LOW (ref 5–17)
APPEARANCE UR: CLEAR — SIGNIFICANT CHANGE UP
APTT BLD: 35.1 SEC — SIGNIFICANT CHANGE UP (ref 27.5–35.5)
AST SERPL-CCNC: 17 U/L — SIGNIFICANT CHANGE UP (ref 15–37)
BASOPHILS # BLD AUTO: 0.03 K/UL — SIGNIFICANT CHANGE UP (ref 0–0.2)
BASOPHILS NFR BLD AUTO: 0.5 % — SIGNIFICANT CHANGE UP (ref 0–2)
BILIRUB SERPL-MCNC: 0.4 MG/DL — SIGNIFICANT CHANGE UP (ref 0.2–1.2)
BILIRUB UR-MCNC: NEGATIVE — SIGNIFICANT CHANGE UP
BUN SERPL-MCNC: 17 MG/DL — SIGNIFICANT CHANGE UP (ref 7–23)
CALCIUM SERPL-MCNC: 9.3 MG/DL — SIGNIFICANT CHANGE UP (ref 8.5–10.1)
CHLORIDE SERPL-SCNC: 103 MMOL/L — SIGNIFICANT CHANGE UP (ref 96–108)
CO2 SERPL-SCNC: 33 MMOL/L — HIGH (ref 22–31)
COLOR SPEC: YELLOW — SIGNIFICANT CHANGE UP
CREAT SERPL-MCNC: 0.87 MG/DL — SIGNIFICANT CHANGE UP (ref 0.5–1.3)
DIFF PNL FLD: NEGATIVE — SIGNIFICANT CHANGE UP
EOSINOPHIL # BLD AUTO: 0.33 K/UL — SIGNIFICANT CHANGE UP (ref 0–0.5)
EOSINOPHIL NFR BLD AUTO: 5.3 % — SIGNIFICANT CHANGE UP (ref 0–6)
ERYTHROCYTE [SEDIMENTATION RATE] IN BLOOD: 49 MM/HR — HIGH (ref 0–20)
ERYTHROCYTE [SEDIMENTATION RATE] IN BLOOD: 55 MM/HR — HIGH (ref 0–20)
GLUCOSE SERPL-MCNC: 133 MG/DL — HIGH (ref 70–99)
GLUCOSE UR QL: NEGATIVE — SIGNIFICANT CHANGE UP
HCT VFR BLD CALC: 38.7 % — SIGNIFICANT CHANGE UP (ref 34.5–45)
HGB BLD-MCNC: 12 G/DL — SIGNIFICANT CHANGE UP (ref 11.5–15.5)
IMM GRANULOCYTES NFR BLD AUTO: 0.5 % — SIGNIFICANT CHANGE UP (ref 0–1.5)
INR BLD: 1.14 RATIO — SIGNIFICANT CHANGE UP (ref 0.88–1.16)
KETONES UR-MCNC: NEGATIVE — SIGNIFICANT CHANGE UP
LACTATE SERPL-SCNC: 1.2 MMOL/L — SIGNIFICANT CHANGE UP (ref 0.7–2)
LACTATE SERPL-SCNC: 2.3 MMOL/L — HIGH (ref 0.7–2)
LEUKOCYTE ESTERASE UR-ACNC: NEGATIVE — SIGNIFICANT CHANGE UP
LYMPHOCYTES # BLD AUTO: 1.44 K/UL — SIGNIFICANT CHANGE UP (ref 1–3.3)
LYMPHOCYTES # BLD AUTO: 23.1 % — SIGNIFICANT CHANGE UP (ref 13–44)
MCHC RBC-ENTMCNC: 28.2 PG — SIGNIFICANT CHANGE UP (ref 27–34)
MCHC RBC-ENTMCNC: 31 GM/DL — LOW (ref 32–36)
MCV RBC AUTO: 91.1 FL — SIGNIFICANT CHANGE UP (ref 80–100)
MONOCYTES # BLD AUTO: 0.49 K/UL — SIGNIFICANT CHANGE UP (ref 0–0.9)
MONOCYTES NFR BLD AUTO: 7.9 % — SIGNIFICANT CHANGE UP (ref 2–14)
NEUTROPHILS # BLD AUTO: 3.92 K/UL — SIGNIFICANT CHANGE UP (ref 1.8–7.4)
NEUTROPHILS NFR BLD AUTO: 62.7 % — SIGNIFICANT CHANGE UP (ref 43–77)
NITRITE UR-MCNC: NEGATIVE — SIGNIFICANT CHANGE UP
NRBC # BLD: 0 /100 WBCS — SIGNIFICANT CHANGE UP (ref 0–0)
PH UR: 5 — SIGNIFICANT CHANGE UP (ref 5–8)
PLATELET # BLD AUTO: 234 K/UL — SIGNIFICANT CHANGE UP (ref 150–400)
POTASSIUM SERPL-MCNC: 4.1 MMOL/L — SIGNIFICANT CHANGE UP (ref 3.5–5.3)
POTASSIUM SERPL-SCNC: 4.1 MMOL/L — SIGNIFICANT CHANGE UP (ref 3.5–5.3)
PROT SERPL-MCNC: 8.3 G/DL — SIGNIFICANT CHANGE UP (ref 6–8.3)
PROT UR-MCNC: NEGATIVE — SIGNIFICANT CHANGE UP
PROTHROM AB SERPL-ACNC: 13.2 SEC — SIGNIFICANT CHANGE UP (ref 10.6–13.6)
RBC # BLD: 4.25 M/UL — SIGNIFICANT CHANGE UP (ref 3.8–5.2)
RBC # FLD: 13.8 % — SIGNIFICANT CHANGE UP (ref 10.3–14.5)
SARS-COV-2 RNA SPEC QL NAA+PROBE: SIGNIFICANT CHANGE UP
SODIUM SERPL-SCNC: 140 MMOL/L — SIGNIFICANT CHANGE UP (ref 135–145)
SP GR SPEC: 1.01 — SIGNIFICANT CHANGE UP (ref 1.01–1.02)
UROBILINOGEN FLD QL: NEGATIVE — SIGNIFICANT CHANGE UP
WBC # BLD: 6.24 K/UL — SIGNIFICANT CHANGE UP (ref 3.8–10.5)
WBC # FLD AUTO: 6.24 K/UL — SIGNIFICANT CHANGE UP (ref 3.8–10.5)

## 2021-06-18 PROCEDURE — 99285 EMERGENCY DEPT VISIT HI MDM: CPT

## 2021-06-18 PROCEDURE — 73630 X-RAY EXAM OF FOOT: CPT | Mod: 26,RT

## 2021-06-18 PROCEDURE — 93010 ELECTROCARDIOGRAM REPORT: CPT

## 2021-06-18 PROCEDURE — 93971 EXTREMITY STUDY: CPT | Mod: 26,RT

## 2021-06-18 PROCEDURE — 99221 1ST HOSP IP/OBS SF/LOW 40: CPT

## 2021-06-18 PROCEDURE — 71045 X-RAY EXAM CHEST 1 VIEW: CPT | Mod: 26

## 2021-06-18 RX ORDER — POTASSIUM CHLORIDE 20 MEQ
10 PACKET (EA) ORAL DAILY
Refills: 0 | Status: DISCONTINUED | OUTPATIENT
Start: 2021-06-18 | End: 2021-06-21

## 2021-06-18 RX ORDER — LACTOBACILLUS ACIDOPHILUS 100MM CELL
1 CAPSULE ORAL THREE TIMES A DAY
Refills: 0 | Status: DISCONTINUED | OUTPATIENT
Start: 2021-06-18 | End: 2021-06-21

## 2021-06-18 RX ORDER — BACLOFEN 100 %
10 POWDER (GRAM) MISCELLANEOUS
Refills: 0 | Status: DISCONTINUED | OUTPATIENT
Start: 2021-06-18 | End: 2021-06-21

## 2021-06-18 RX ORDER — SODIUM CHLORIDE 9 MG/ML
1900 INJECTION INTRAMUSCULAR; INTRAVENOUS; SUBCUTANEOUS ONCE
Refills: 0 | Status: COMPLETED | OUTPATIENT
Start: 2021-06-18 | End: 2021-06-18

## 2021-06-18 RX ORDER — PANTOPRAZOLE SODIUM 20 MG/1
40 TABLET, DELAYED RELEASE ORAL
Refills: 0 | Status: DISCONTINUED | OUTPATIENT
Start: 2021-06-18 | End: 2021-06-21

## 2021-06-18 RX ORDER — AMITRIPTYLINE HCL 25 MG
100 TABLET ORAL AT BEDTIME
Refills: 0 | Status: DISCONTINUED | OUTPATIENT
Start: 2021-06-18 | End: 2021-06-21

## 2021-06-18 RX ORDER — SIMVASTATIN 20 MG/1
40 TABLET, FILM COATED ORAL AT BEDTIME
Refills: 0 | Status: DISCONTINUED | OUTPATIENT
Start: 2021-06-18 | End: 2021-06-21

## 2021-06-18 RX ORDER — VANCOMYCIN HCL 1 G
1000 VIAL (EA) INTRAVENOUS EVERY 12 HOURS
Refills: 0 | Status: DISCONTINUED | OUTPATIENT
Start: 2021-06-19 | End: 2021-06-19

## 2021-06-18 RX ORDER — VANCOMYCIN HCL 1 G
1000 VIAL (EA) INTRAVENOUS ONCE
Refills: 0 | Status: COMPLETED | OUTPATIENT
Start: 2021-06-18 | End: 2021-06-18

## 2021-06-18 RX ORDER — GABAPENTIN 400 MG/1
400 CAPSULE ORAL DAILY
Refills: 0 | Status: DISCONTINUED | OUTPATIENT
Start: 2021-06-18 | End: 2021-06-19

## 2021-06-18 RX ORDER — HEPARIN SODIUM 5000 [USP'U]/ML
5000 INJECTION INTRAVENOUS; SUBCUTANEOUS EVERY 12 HOURS
Refills: 0 | Status: DISCONTINUED | OUTPATIENT
Start: 2021-06-18 | End: 2021-06-19

## 2021-06-18 RX ORDER — LOSARTAN POTASSIUM 100 MG/1
25 TABLET, FILM COATED ORAL DAILY
Refills: 0 | Status: DISCONTINUED | OUTPATIENT
Start: 2021-06-18 | End: 2021-06-19

## 2021-06-18 RX ORDER — LANOLIN ALCOHOL/MO/W.PET/CERES
5 CREAM (GRAM) TOPICAL AT BEDTIME
Refills: 0 | Status: DISCONTINUED | OUTPATIENT
Start: 2021-06-18 | End: 2021-06-21

## 2021-06-18 RX ORDER — ROPINIROLE 8 MG/1
1 TABLET, FILM COATED, EXTENDED RELEASE ORAL DAILY
Refills: 0 | Status: DISCONTINUED | OUTPATIENT
Start: 2021-06-18 | End: 2021-06-21

## 2021-06-18 RX ORDER — CARVEDILOL PHOSPHATE 80 MG/1
6.25 CAPSULE, EXTENDED RELEASE ORAL EVERY 12 HOURS
Refills: 0 | Status: DISCONTINUED | OUTPATIENT
Start: 2021-06-18 | End: 2021-06-19

## 2021-06-18 RX ORDER — SUCRALFATE 1 G
1 TABLET ORAL
Refills: 0 | Status: DISCONTINUED | OUTPATIENT
Start: 2021-06-18 | End: 2021-06-21

## 2021-06-18 RX ORDER — FUROSEMIDE 40 MG
40 TABLET ORAL DAILY
Refills: 0 | Status: DISCONTINUED | OUTPATIENT
Start: 2021-06-18 | End: 2021-06-21

## 2021-06-18 RX ORDER — OXYCODONE HYDROCHLORIDE 5 MG/1
30 TABLET ORAL THREE TIMES A DAY
Refills: 0 | Status: DISCONTINUED | OUTPATIENT
Start: 2021-06-18 | End: 2021-06-21

## 2021-06-18 RX ORDER — DULOXETINE HYDROCHLORIDE 30 MG/1
30 CAPSULE, DELAYED RELEASE ORAL DAILY
Refills: 0 | Status: DISCONTINUED | OUTPATIENT
Start: 2021-06-18 | End: 2021-06-21

## 2021-06-18 RX ORDER — ACETAMINOPHEN 500 MG
650 TABLET ORAL EVERY 6 HOURS
Refills: 0 | Status: DISCONTINUED | OUTPATIENT
Start: 2021-06-18 | End: 2021-06-18

## 2021-06-18 RX ORDER — ACETAMINOPHEN 500 MG
650 TABLET ORAL EVERY 6 HOURS
Refills: 0 | Status: DISCONTINUED | OUTPATIENT
Start: 2021-06-18 | End: 2021-06-21

## 2021-06-18 RX ORDER — LEVOTHYROXINE SODIUM 125 MCG
200 TABLET ORAL DAILY
Refills: 0 | Status: DISCONTINUED | OUTPATIENT
Start: 2021-06-18 | End: 2021-06-21

## 2021-06-18 RX ADMIN — DULOXETINE HYDROCHLORIDE 30 MILLIGRAM(S): 30 CAPSULE, DELAYED RELEASE ORAL at 21:54

## 2021-06-18 RX ADMIN — HEPARIN SODIUM 5000 UNIT(S): 5000 INJECTION INTRAVENOUS; SUBCUTANEOUS at 18:58

## 2021-06-18 RX ADMIN — Medication 1000 MILLIGRAM(S): at 16:26

## 2021-06-18 RX ADMIN — Medication 250 MILLIGRAM(S): at 15:26

## 2021-06-18 RX ADMIN — SODIUM CHLORIDE 1900 MILLILITER(S): 9 INJECTION INTRAMUSCULAR; INTRAVENOUS; SUBCUTANEOUS at 16:35

## 2021-06-18 RX ADMIN — Medication 650 MILLIGRAM(S): at 22:04

## 2021-06-18 RX ADMIN — Medication 10 MILLIGRAM(S): at 18:58

## 2021-06-18 RX ADMIN — SIMVASTATIN 40 MILLIGRAM(S): 20 TABLET, FILM COATED ORAL at 21:54

## 2021-06-18 RX ADMIN — Medication 100 MILLIGRAM(S): at 22:04

## 2021-06-18 RX ADMIN — ROPINIROLE 1 MILLIGRAM(S): 8 TABLET, FILM COATED, EXTENDED RELEASE ORAL at 22:04

## 2021-06-18 RX ADMIN — Medication 1 TABLET(S): at 21:54

## 2021-06-18 RX ADMIN — CARVEDILOL PHOSPHATE 6.25 MILLIGRAM(S): 80 CAPSULE, EXTENDED RELEASE ORAL at 18:58

## 2021-06-18 RX ADMIN — GABAPENTIN 400 MILLIGRAM(S): 400 CAPSULE ORAL at 21:54

## 2021-06-18 RX ADMIN — Medication 1 GRAM(S): at 18:59

## 2021-06-18 RX ADMIN — SODIUM CHLORIDE 1900 MILLILITER(S): 9 INJECTION INTRAMUSCULAR; INTRAVENOUS; SUBCUTANEOUS at 15:26

## 2021-06-18 RX ADMIN — Medication 250 MILLIGRAM(S): at 23:09

## 2021-06-18 RX ADMIN — Medication 5 MILLIGRAM(S): at 21:54

## 2021-06-18 RX ADMIN — Medication 650 MILLIGRAM(S): at 22:34

## 2021-06-18 NOTE — ED PROVIDER NOTE - PHYSICAL EXAMINATION
Constitutional: Awake, Alert, non-toxic. NAD. Well appearing, well nourished.   HEAD: Normocephalic, atraumatic.   EYES: EOM intact, conjunctiva and sclera are clear bilaterally.   ENT: No rhinorrhea, patent, mucous membranes pink/moist, no drooling or stridor.   NECK: Supple, non-tender  CARDIOVASCULAR: Normal S1, S2; regular rate and rhythm.  RESPIRATORY: Normal respiratory effort; breath sounds CTAB, no wheezes, rhonchi, or rales. Speaking in full sentences. No accessory muscle use.   EXTREMITIES: Full passive and active ROM in all extremities; non-tender to palpation; distal pulses palpable and symmetric  SKIN: Warm, dry; good skin turgor, (+) right 2nd toe amputation, (+) right distal 3rd oe yellow discoloration and 1.5 cm ulcer under 3rd toe, no ecchymosis, brisk capillary refill.  NEURO: A&O x3. Sensory and motor functions are grossly intact. Speech is normal. Appearance and judgement seem appropriate for gender and age.

## 2021-06-18 NOTE — ED PROVIDER NOTE - ATTENDING CONTRIBUTION TO CARE
pt sent in from wound care center for right 3rd toe infection.  pt with previous 2nd digit amputation by Dr. Gray.  pt states she returned from [place] with initial blister/redness.  pain and wound increasing.    right toe: erythema, ulceration to the base of the toe      septic labs, US r/o infectious etiology, osteo, dvt

## 2021-06-18 NOTE — ED PROVIDER NOTE - CLINICAL SUMMARY MEDICAL DECISION MAKING FREE TEXT BOX
sent by Dr. Gray due to right 3rd digit toe infection. pt reports she got back from [place] yesterday and noted a blister/redness to right third toe. pt reports she had right 2nd toe amputation with Dr. Gray. pt admits to right lower extremity swelling. plan includes labs, sepsis workup, IVF, IV abx, xray r/o OM, doppler RLE r/o DVT, re-assess.

## 2021-06-18 NOTE — ED ADULT NURSE REASSESSMENT NOTE - NS ED NURSE REASSESS COMMENT FT1
Tylenol ordered for fever. Patient requested Tylenol for pain . Discussed with Dr. Sims who is covering for Dr. Michael, Patient may receive Tylenol for pain level 1-3, 3-6 every 6 hours PRN. Order modified. Kaylin MEJÍA

## 2021-06-18 NOTE — CONSULT NOTE ADULT - SUBJECTIVE AND OBJECTIVE BOX
70 y/o F pmhx spinal stenosis, neuropathy, HTN, HLD, hypothyroidism, and multiple partial ray amps,  presents to [facility] ED c/o redness to her right 3rd toe. Pt states she noticed a wound/ulcer prompting her to visit the ED. At this time pt states due to her neuropathy can not feel pain. Pt states has a hx of partial ray amputations of b/l feet. PT denies chest pain, SOB, palpitations, fevers, chills , melena or hematochezia. Of note pt recent travel to [place].     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 May take a morning dose, if necessary  doxepin 50 mg oral capsule: 1 cap(s) orally once a day (in the evening)  Dulcolax Stool Softener 100 mg oral capsule: 2 cap(s) orally once a day (in the morning) and then 1 cap(s) in the evening  furosemide 40 mg oral tablet: 1 tab(s) orally once a day (in the morning)  gabapentin 400 mg oral capsule: 4 cap(s) orally once a day. Not to exceed 4 capsules per day  Klor-Con 10 oral tablet, extended release: 1 tab(s) orally once a day (in the morning)  lactobacillus acidophilus oral capsule: 1  orally once a day  levothyroxine 200 mcg (0.2 mg) oral tablet: 1 tab(s) orally once a day  Melatonin 10 mg oral capsule: 1 cap(s) orally once a day (at bedtime)  multivitamin: 1 tab(s) orally once a day  naloxegol 25 mg oral tablet: 1 tab(s) orally 2 times a day, As Needed  naloxegol 25 mg oral tablet: 1 tab(s) orally 2 times a day, As Needed    *New Rx, pt previously has taken this medication.   Nucynta 75 mg oral tablet: 1 tab(s) orally 3 times a day, As Needed  olmesartan 20 mg oral tablet: 1 tab(s) orally once a day  oxyCODONE 30 mg oral tablet, extended release: 1 tab(s) orally every 8 hours, As Needed  pantoprazole 40 mg oral delayed release tablet: 1 tab(s) orally 2 times a day  rOPINIRole 1 mg oral tablet: 1-3 tab(s) orally once a day (in the evening) for spasms.    *Pt states typically taking 2 tab(s) in the evening.  simvastatin 40 mg oral tablet: 1 tab(s) orally once a day (at bedtime)  sucralfate 1 g/10 mL oral suspension: 10 milliliter(s) orally 2 times a day, As Needed  Tylenol 325 mg oral tablet: 1 tab(s) orally once a day, As Needed  ZTlido 1.8% topical film: Apply topically to affected area once a day, As Needed (Lidoderm replacement)      Family History:  FAMILY HISTORY:  No pertinent family history in first degree relatives        ROS:  Constitutional: Denies fever, fatigue or weight loss.  Skin: Denies rash.  Eyes: Denies recent vision problems or eye pain.  ENT: Denies congestion, ear pain, or sore throat.  Endocrine: Denies thyroid problems.  Cardiovascular: Denies chest pain or palpation.  Respiratory: Denies cough, shortness of breath, congestion, or wheezing.  Gastrointestinal: Denies abdominal pain, nausea, vomiting, or diarrhea.  Genitourinary: Denies dysuria.  Extremities: SEE HPI  Musculoskeletal: Denies joint swelling.  Neurologic: Denies headache.      Physical Examination:  GENERAL: No acute distress, well-developed  EXTREMITIES: nonpalpable DP/PT pulses b/l, +doppler DP/PT b/l, with 2+ edema b/ lower extremity, Right 3rd digit plantar ulcer.  Necrotic base.  1.0 x 1.0 x 0.75cm  NEUROLOGY: A&O x 3, no focal deficits    Data:                        12.0   6.24  )-----------( 234      ( 18 Jun 2021 15:28 )             38.7     06-18    140  |  103  |  17  ----------------------------<  133<H>  4.1   |  33<H>  |  0.87    Ca    9.3      18 Jun 2021 15:28    TPro  8.3  /  Alb  3.7  /  TBili  0.4  /  DBili  x   /  AST  17  /  ALT  28  /  AlkPhos  108  06-18      LIVER FUNCTIONS - ( 18 Jun 2021 15:28 )  Alb: 3.7 g/dL / Pro: 8.3 g/dL / ALK PHOS: 108 U/L / ALT: 28 U/L / AST: 17 U/L / GGT: x             Radiology:  < from: US Duplex Venous Lower Ext Ltd, Right (06.18.21 @ 16:51) >    EXAM:  US DPLX LWR EXT VEINS LTD RT                            PROCEDURE DATE:  06/18/2021          INTERPRETATION:  CLINICAL INFORMATION: Right leg swelling.    COMPARISON: Doppler venous sonogram 9/19/2015.    TECHNIQUE: Duplex sonography of the RIGHT LOWER extremity veins with color and spectral Doppler, with and without compression.    FINDINGS:    There is normal compressibility of the right common femoral, femoral and popliteal veins.  The contralateral common femoral vein is patent.  Doppler examination shows normal spontaneous and phasic flow.    No calf vein thrombosis is detected.    IMPRESSION:    No evidence of right lower extremity deep venous thrombosis.      WEI HILLMAN M.D., ATTENDING RADIOLOGIST  This document has been electronically signed. Jun 18 2021  4:56PM    < end of copied text >    Assessment:   70 y/o F pmhx htn, hld, hypothyroidism, s/p multiple b/l partial ray amps, with RLE 3rd digit ulcer,     Plan:  - f/u vascular studies, further recommendations pending results  - cont daily dressing changes and wound care per podiatry   - pain control, supportive care   - following 72 y/o F pmhx spinal stenosis, neuropathy, HTN, HLD, hypothyroidism, and multiple partial ray amps,  presents to [facility] ED c/o redness to her right 3rd toe. Pt states she noticed a wound/ulcer prompting her to visit the ED. At this time pt states due to her neuropathy can not feel pain. Pt states has a hx of partial ray amputations of b/l feet. PT denies chest pain, SOB, palpitations, fevers, chills , melena or hematochezia. Of note pt recent travel to [place].     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 May take a morning dose, if necessary  doxepin 50 mg oral capsule: 1 cap(s) orally once a day (in the evening)  Dulcolax Stool Softener 100 mg oral capsule: 2 cap(s) orally once a day (in the morning) and then 1 cap(s) in the evening  furosemide 40 mg oral tablet: 1 tab(s) orally once a day (in the morning)  gabapentin 400 mg oral capsule: 4 cap(s) orally once a day. Not to exceed 4 capsules per day  Klor-Con 10 oral tablet, extended release: 1 tab(s) orally once a day (in the morning)  lactobacillus acidophilus oral capsule: 1  orally once a day  levothyroxine 200 mcg (0.2 mg) oral tablet: 1 tab(s) orally once a day  Melatonin 10 mg oral capsule: 1 cap(s) orally once a day (at bedtime)  multivitamin: 1 tab(s) orally once a day  naloxegol 25 mg oral tablet: 1 tab(s) orally 2 times a day, As Needed  naloxegol 25 mg oral tablet: 1 tab(s) orally 2 times a day, As Needed    *New Rx, pt previously has taken this medication.   Nucynta 75 mg oral tablet: 1 tab(s) orally 3 times a day, As Needed  olmesartan 20 mg oral tablet: 1 tab(s) orally once a day  oxyCODONE 30 mg oral tablet, extended release: 1 tab(s) orally every 8 hours, As Needed  pantoprazole 40 mg oral delayed release tablet: 1 tab(s) orally 2 times a day  rOPINIRole 1 mg oral tablet: 1-3 tab(s) orally once a day (in the evening) for spasms.    *Pt states typically taking 2 tab(s) in the evening.  simvastatin 40 mg oral tablet: 1 tab(s) orally once a day (at bedtime)  sucralfate 1 g/10 mL oral suspension: 10 milliliter(s) orally 2 times a day, As Needed  Tylenol 325 mg oral tablet: 1 tab(s) orally once a day, As Needed  ZTlido 1.8% topical film: Apply topically to affected area once a day, As Needed (Lidoderm replacement)      Family History:  FAMILY HISTORY:  No pertinent family history in first degree relatives        ROS:  Constitutional: Denies fever, fatigue or weight loss.  Skin: Denies rash.  Eyes: Denies recent vision problems or eye pain.  ENT: Denies congestion, ear pain, or sore throat.  Endocrine: Denies thyroid problems.  Cardiovascular: Denies chest pain or palpation.  Respiratory: Denies cough, shortness of breath, congestion, or wheezing.  Gastrointestinal: Denies abdominal pain, nausea, vomiting, or diarrhea.  Genitourinary: Denies dysuria.  Extremities: SEE HPI  Musculoskeletal: Denies joint swelling.  Neurologic: Denies headache.      Physical Examination:  GENERAL: No acute distress, well-developed  EXTREMITIES: nonpalpable DP/PT pulses b/l, +doppler DP/PT b/l, with 2+ edema b/ lower extremity, Right 3rd digit plantar ulcer.  Necrotic base.  1.0 x 1.0 x 0.75cm  NEUROLOGY: A&O x 3, no focal deficits    Data:                        12.0   6.24  )-----------( 234      ( 18 Jun 2021 15:28 )             38.7     06-18    140  |  103  |  17  ----------------------------<  133<H>  4.1   |  33<H>  |  0.87    Ca    9.3      18 Jun 2021 15:28    TPro  8.3  /  Alb  3.7  /  TBili  0.4  /  DBili  x   /  AST  17  /  ALT  28  /  AlkPhos  108  06-18      LIVER FUNCTIONS - ( 18 Jun 2021 15:28 )  Alb: 3.7 g/dL / Pro: 8.3 g/dL / ALK PHOS: 108 U/L / ALT: 28 U/L / AST: 17 U/L / GGT: x             Radiology:  < from: US Duplex Venous Lower Ext Ltd, Right (06.18.21 @ 16:51) >    EXAM:  US DPLX LWR EXT VEINS LTD RT                            PROCEDURE DATE:  06/18/2021          INTERPRETATION:  CLINICAL INFORMATION: Right leg swelling.    COMPARISON: Doppler venous sonogram 9/19/2015.    TECHNIQUE: Duplex sonography of the RIGHT LOWER extremity veins with color and spectral Doppler, with and without compression.    FINDINGS:    There is normal compressibility of the right common femoral, femoral and popliteal veins.  The contralateral common femoral vein is patent.  Doppler examination shows normal spontaneous and phasic flow.    No calf vein thrombosis is detected.    IMPRESSION:    No evidence of right lower extremity deep venous thrombosis.      WEI HILLMAN M.D., ATTENDING RADIOLOGIST  This document has been electronically signed. Jun 18 2021  4:56PM    < end of copied text >    Assessment:   72 y/o F pmhx htn, hld, hypothyroidism, s/p multiple b/l partial ray amps, with RLE 3rd digit ulcer,     Plan:  - f/u vascular studies  - may benefit from angio, planning for next tuesday pending studies   - cont daily dressing changes and wound care per podiatry   - pain control, supportive care   - following

## 2021-06-18 NOTE — H&P ADULT - HISTORY OF PRESENT ILLNESS
MILAN SHRESTHA is a 72 YO female sent by Dr. Gray due to right 3rd toe infection. pt reports she got back from [place] yesterday and noted a blister/redness to right third toe. pt reports she had right 2nd toe amputation with Dr. Gray. pt admits to right lower extremity swelling. pt denies pain to toe, fever, cp, sob, discharge, or any other complaints. MILAN SHRESTHA is a 70 YO female sent by Dr. Gray due to right 3rd toe infection. Patient reports she got back from [place] yesterday and noted a blister and redness to right third toe. Patient reports she had right 2nd toe amputation with Dr. Gray. Patient admits to right lower extremity swelling. Patient denies pain to toe, fever, cp, sob, discharge, or any other complaints.

## 2021-06-18 NOTE — ED PROVIDER NOTE - OBJECTIVE STATEMENT
72 y/o female with PMHx HTN and HLD sent by Dr. Gray due to right 3rd digit toe infection. pt reports she got back from [place] yesterday and noted a blister/redness to right third toe. pt reports she had right 2nd toe amputation with Dr. Gray. pt admits to right lower extremity swelling. pt denies pain to toe, fever, cp, sob, discharge, or any other complaints.

## 2021-06-18 NOTE — PROGRESS NOTE ADULT - ASSESSMENT
Right 3rd digit ulcer    PROBLEMS/RECOMMENDATIONS:     Pt evaluated and chart reviewed  WCx obtained right 3rd digit  Right foot dressed with DSD  Right foot X-ray ordered  Adup and NIVS ordered RLE  BoneScan ordered:  No MRI due to spinal stimulator implanted device  Vascular consulted  Recommend pt admitted for IV Abx   Pt is a high risk for more proximal amputation, sepsis and death  Planned Procedure:  Right 3rd ray partial resection - pending imaging, vascular studies, vascular recommendations  Discussed with pt partial 3rd ray amputation  All of pt's questions were answered to satisfaction  Pt stated he understood all discussed  Podiatry will follow pt while in house Right 3rd digit ulcer    PROBLEMS/RECOMMENDATIONS:     Pt evaluated and chart reviewed  WCx obtained right 3rd digit  Right foot dressed with DSD  Right foot X-ray ordered  Adup and NIVS ordered RLE  BoneScan ordered:  No MRI due to spinal stimulator implanted device  Vascular consulted  Recommend pt admitted for IV Abx   Pt advised she is a high risk for more proximal amputation, sepsis and death  Planned Procedure:  Right 3rd ray partial resection -  booked 6/21/21 1300 hours w/ Dr Gray  Request medical optimization and risk stratification  Discussed with pt partial 3rd ray amputation  All of pt's questions were answered to satisfaction  Pt stated he understood all discussed  Podiatry will follow pt while in house

## 2021-06-18 NOTE — PROGRESS NOTE ADULT - SUBJECTIVE AND OBJECTIVE BOX
71y year old Female seen at \[facility]\"" ED for right foot 3rd digit ulcer.  Pt said about one week ago she noticed a wound on the plantar aspect of her right 3rd toe.  Pt said she did not know the cause and did not treat the wound.  Pt said her 3rd toe became red/swollen and she drove from [place] over the past 2 days to be treated by Dr Gray.  Pt is an established \[facility]\"" wound care pt.  Denies any fever, chills, nausea, vomiting, chest pain, shortness of breath, or calf pain at this time.    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 CRT <3 seconds.  2+ edema right foot and leg.  Erythema right 2, 3rd digits.   No ecchymosis b/l  Neurological: Light touch not intact to foot b/l.  Gross sensation intact to foot b/l  Musculoskeletal: 5/5 strength in all quadrants bilaterally, AJ & STJ ROM intact  No POP wounds  Dermatological: Wounds (1)  1.  Right 3rd digit plantar ulcer.  Necrotic base.  1.0 x 1.0 x 0.75cm.  Periwound intact.  PTB(+).  SOI(+).  Tunneling center of wound to tip of distal phalanx. No undermining.  Serous drainage.  No purulence. MIld malodor          Labs pending      Imaging: ----------  Pending

## 2021-06-18 NOTE — H&P ADULT - NSHPLABSRESULTS_GEN_ALL_CORE
12.0   6.24  )-----------( 234      ( 18 Jun 2021 15:28 )             38.7     18 Jun 2021 15:28    140    |  103    |  17     ----------------------------<  133    4.1     |  33     |  0.87     Ca    9.3        18 Jun 2021 15:28    TPro  8.3    /  Alb  3.7    /  TBili  0.4    /  DBili  x      /  AST  17     /  ALT  28     /  AlkPhos  108    18 Jun 2021 15:28    LIVER FUNCTIONS - ( 18 Jun 2021 15:28 )  Alb: 3.7 g/dL / Pro: 8.3 g/dL / ALK PHOS: 108 U/L / ALT: 28 U/L / AST: 17 U/L / GGT: x           PT/INR - ( 18 Jun 2021 15:28 )   PT: 13.2 sec;   INR: 1.14 ratio         PTT - ( 18 Jun 2021 15:28 )  PTT:35.1 sec  CAPILLARY BLOOD GLUCOSE      < from: Xray Foot AP + Lateral + Oblique, Right (06.18.21 @ 15:57) >      IMPRESSION:  Digit soft tissue swelling without radiographic evidence of osteomyelitis.  Mid foot Charcot joint    < end of copied text >    < from: Xray Chest 1 View AP/PA (06.18.21 @ 15:57) >    IMPRESSION: No evidence for focal infiltrate or lobar consolidation.    < end of copied text >

## 2021-06-18 NOTE — ED ADULT NURSE NOTE - NS TRANSFER EYEGLASSES PAIRS
Pt was seen in [address] on 11/18/18  CC: Vaginal Itch  DX: Vaginitis  Pt unavailable   Will try back
1 pair

## 2021-06-19 DIAGNOSIS — L03.90 CELLULITIS, UNSPECIFIED: ICD-10-CM

## 2021-06-19 DIAGNOSIS — E78.5 HYPERLIPIDEMIA, UNSPECIFIED: ICD-10-CM

## 2021-06-19 DIAGNOSIS — E03.9 HYPOTHYROIDISM, UNSPECIFIED: ICD-10-CM

## 2021-06-19 DIAGNOSIS — I10 ESSENTIAL (PRIMARY) HYPERTENSION: ICD-10-CM

## 2021-06-19 DIAGNOSIS — L08.9 LOCAL INFECTION OF THE SKIN AND SUBCUTANEOUS TISSUE, UNSPECIFIED: ICD-10-CM

## 2021-06-19 DIAGNOSIS — L03.031 CELLULITIS OF RIGHT TOE: ICD-10-CM

## 2021-06-19 LAB
-  COAGULASE NEGATIVE STAPHYLOCOCCUS, METHICILLIN RESISTANT: SIGNIFICANT CHANGE UP
COVID-19 SPIKE DOMAIN AB INTERP: POSITIVE
COVID-19 SPIKE DOMAIN ANTIBODY RESULT: >250 U/ML — HIGH
CRP SERPL-MCNC: 56 MG/L — HIGH
CRP SERPL-MCNC: 62 MG/L — HIGH
CULTURE RESULTS: SIGNIFICANT CHANGE UP
GRAM STN FLD: SIGNIFICANT CHANGE UP
METHOD TYPE: SIGNIFICANT CHANGE UP
SARS-COV-2 IGG+IGM SERPL QL IA: >250 U/ML — HIGH
SARS-COV-2 IGG+IGM SERPL QL IA: POSITIVE
SPECIMEN SOURCE: SIGNIFICANT CHANGE UP
SPECIMEN SOURCE: SIGNIFICANT CHANGE UP

## 2021-06-19 PROCEDURE — 99222 1ST HOSP IP/OBS MODERATE 55: CPT

## 2021-06-19 PROCEDURE — 99232 SBSQ HOSP IP/OBS MODERATE 35: CPT | Mod: 57

## 2021-06-19 RX ORDER — HEPARIN SODIUM 5000 [USP'U]/ML
5000 INJECTION INTRAVENOUS; SUBCUTANEOUS EVERY 8 HOURS
Refills: 0 | Status: DISCONTINUED | OUTPATIENT
Start: 2021-06-19 | End: 2021-06-21

## 2021-06-19 RX ORDER — GABAPENTIN 400 MG/1
400 CAPSULE ORAL
Refills: 0 | Status: DISCONTINUED | OUTPATIENT
Start: 2021-06-19 | End: 2021-06-21

## 2021-06-19 RX ORDER — VANCOMYCIN HCL 1 G
1250 VIAL (EA) INTRAVENOUS EVERY 12 HOURS
Refills: 0 | Status: DISCONTINUED | OUTPATIENT
Start: 2021-06-19 | End: 2021-06-21

## 2021-06-19 RX ORDER — LIDOCAINE 4 G/100G
1 CREAM TOPICAL EVERY 24 HOURS
Refills: 0 | Status: DISCONTINUED | OUTPATIENT
Start: 2021-06-19 | End: 2021-06-21

## 2021-06-19 RX ORDER — FLUTICASONE PROPIONATE 50 MCG
1 SPRAY, SUSPENSION NASAL
Refills: 0 | Status: DISCONTINUED | OUTPATIENT
Start: 2021-06-19 | End: 2021-06-21

## 2021-06-19 RX ORDER — LORATADINE 10 MG/1
10 TABLET ORAL DAILY
Refills: 0 | Status: DISCONTINUED | OUTPATIENT
Start: 2021-06-19 | End: 2021-06-21

## 2021-06-19 RX ORDER — CARVEDILOL PHOSPHATE 80 MG/1
6.25 CAPSULE, EXTENDED RELEASE ORAL EVERY 12 HOURS
Refills: 0 | Status: DISCONTINUED | OUTPATIENT
Start: 2021-06-19 | End: 2021-06-21

## 2021-06-19 RX ORDER — LOSARTAN POTASSIUM 100 MG/1
25 TABLET, FILM COATED ORAL DAILY
Refills: 0 | Status: DISCONTINUED | OUTPATIENT
Start: 2021-06-19 | End: 2021-06-21

## 2021-06-19 RX ADMIN — CARVEDILOL PHOSPHATE 6.25 MILLIGRAM(S): 80 CAPSULE, EXTENDED RELEASE ORAL at 17:10

## 2021-06-19 RX ADMIN — LOSARTAN POTASSIUM 25 MILLIGRAM(S): 100 TABLET, FILM COATED ORAL at 05:59

## 2021-06-19 RX ADMIN — Medication 650 MILLIGRAM(S): at 23:47

## 2021-06-19 RX ADMIN — Medication 10 MILLIEQUIVALENT(S): at 11:06

## 2021-06-19 RX ADMIN — Medication 1 TABLET(S): at 13:45

## 2021-06-19 RX ADMIN — Medication 650 MILLIGRAM(S): at 17:10

## 2021-06-19 RX ADMIN — HEPARIN SODIUM 5000 UNIT(S): 5000 INJECTION INTRAVENOUS; SUBCUTANEOUS at 06:00

## 2021-06-19 RX ADMIN — OXYCODONE HYDROCHLORIDE 30 MILLIGRAM(S): 5 TABLET ORAL at 15:28

## 2021-06-19 RX ADMIN — Medication 650 MILLIGRAM(S): at 17:37

## 2021-06-19 RX ADMIN — Medication 200 MICROGRAM(S): at 06:00

## 2021-06-19 RX ADMIN — CARVEDILOL PHOSPHATE 6.25 MILLIGRAM(S): 80 CAPSULE, EXTENDED RELEASE ORAL at 06:00

## 2021-06-19 RX ADMIN — DULOXETINE HYDROCHLORIDE 30 MILLIGRAM(S): 30 CAPSULE, DELAYED RELEASE ORAL at 11:06

## 2021-06-19 RX ADMIN — HEPARIN SODIUM 5000 UNIT(S): 5000 INJECTION INTRAVENOUS; SUBCUTANEOUS at 21:36

## 2021-06-19 RX ADMIN — OXYCODONE HYDROCHLORIDE 30 MILLIGRAM(S): 5 TABLET ORAL at 01:08

## 2021-06-19 RX ADMIN — SIMVASTATIN 40 MILLIGRAM(S): 20 TABLET, FILM COATED ORAL at 21:36

## 2021-06-19 RX ADMIN — LIDOCAINE 1 PATCH: 4 CREAM TOPICAL at 21:34

## 2021-06-19 RX ADMIN — GABAPENTIN 400 MILLIGRAM(S): 400 CAPSULE ORAL at 11:06

## 2021-06-19 RX ADMIN — Medication 40 MILLIGRAM(S): at 05:59

## 2021-06-19 RX ADMIN — LIDOCAINE 1 PATCH: 4 CREAM TOPICAL at 15:28

## 2021-06-19 RX ADMIN — Medication 10 MILLIGRAM(S): at 05:59

## 2021-06-19 RX ADMIN — Medication 1 TABLET(S): at 06:57

## 2021-06-19 RX ADMIN — LORATADINE 10 MILLIGRAM(S): 10 TABLET ORAL at 13:45

## 2021-06-19 RX ADMIN — Medication 166.67 MILLIGRAM(S): at 11:12

## 2021-06-19 RX ADMIN — Medication 1 GRAM(S): at 06:00

## 2021-06-19 RX ADMIN — HEPARIN SODIUM 5000 UNIT(S): 5000 INJECTION INTRAVENOUS; SUBCUTANEOUS at 13:45

## 2021-06-19 RX ADMIN — Medication 1 TABLET(S): at 11:06

## 2021-06-19 RX ADMIN — Medication 5 MILLIGRAM(S): at 21:36

## 2021-06-19 RX ADMIN — PANTOPRAZOLE SODIUM 40 MILLIGRAM(S): 20 TABLET, DELAYED RELEASE ORAL at 06:55

## 2021-06-19 RX ADMIN — Medication 100 MILLIGRAM(S): at 21:36

## 2021-06-19 RX ADMIN — Medication 10 MILLIGRAM(S): at 17:10

## 2021-06-19 RX ADMIN — ROPINIROLE 1 MILLIGRAM(S): 8 TABLET, FILM COATED, EXTENDED RELEASE ORAL at 11:05

## 2021-06-19 RX ADMIN — OXYCODONE HYDROCHLORIDE 30 MILLIGRAM(S): 5 TABLET ORAL at 15:43

## 2021-06-19 RX ADMIN — GABAPENTIN 400 MILLIGRAM(S): 400 CAPSULE ORAL at 17:10

## 2021-06-19 RX ADMIN — Medication 1 GRAM(S): at 17:10

## 2021-06-19 RX ADMIN — Medication 166.67 MILLIGRAM(S): at 23:49

## 2021-06-19 RX ADMIN — Medication 1 TABLET(S): at 21:36

## 2021-06-19 NOTE — PROGRESS NOTE ADULT - ASSESSMENT
Right 3rd digit ulcer    PROBLEMS/RECOMMENDATIONS:     Pt evaluated and chart reviewed  Right foot dressed with DSD  Adup and NIVS ordered RLE  BoneScan ordered:  No MRI due to spinal stimulator implanted device  Vascular consult appreciated   Pt advised she is a high risk for more proximal amputation, sepsis and death  Planned Procedure:  Right 3rd ray partial resection -  booked 6/21/21 following 1300 hours cases w/ Dr Gray  Request medical optimization and risk stratification  Discussed with pt partial 3rd ray amputation  All of pt's questions were answered to satisfaction  Pt stated he understood all discussed  Podiatry will follow pt while in house

## 2021-06-19 NOTE — PHARMACOTHERAPY INTERVENTION NOTE - COMMENTS
Patient currently ordered for vancomycin 1g q12h empirically. Weight 104.3 kg, CrCl 71.3 mL/min. Discussed dosing with ID Dr. Maddison Ng, recommended adjusting to vancomycin 1250 mg Q12h based on weight, accepted. Pre-fourth trough timed for 6/20 @ 22:00

## 2021-06-19 NOTE — CONSULT NOTE ADULT - ASSESSMENT
Patient is a 71 year old female with PMH of neuropathy, spinal stenosis, HTN, HLD, hypothyroidism who was sent by Dr. Gray due to right 3rd toe infection.    Right 3rd toe ulcer infection, rule out OM  H/o multiple b/l partial ray amputations in the past, neuropathy   - R foot x-ray with digit soft tissue swelling, mild foot charcot joint, no evidence of OM   - LE DVT study negative    - Vascular surgery following, may need angiogram - possibly Tuesday pending studies   - Podiatry following - booked for 6/21 for partial 3rd ray amputation - please send bone cx and biopsy    - follow bone scan to rule out OM (unable to get MRI d/t spinal stimulator)   - follow blood and wound cultures   - continue on vancomycin - dosing per pharmacy protocol, goal trough 15-20   - monitor temps and WBC      Maddison Ng M.D.  [facility], Division of Infectious Diseases  [phone]  After 5pm on weekdays and all day on weekends - please call [phone]     Patient is a 71 year old female with PMH of neuropathy, spinal stenosis, HTN, HLD, hypothyroidism who was sent by Dr. Gray due to right 3rd toe infection.    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, neuropathy   - R foot x-ray with digit soft tissue swelling, mild foot charcot joint, no evidence of OM   - LE DVT study negative    - Vascular surgery following, may need angiogram - possibly Tuesday pending studies   - Podiatry following - booked for 6/21 for partial 3rd ray amputation - please send bone cx and biopsy    - follow bone scan to rule out OM (unable to get MRI d/t spinal stimulator)   - follow blood and wound cultures   - continue on vancomycin - dosing per pharmacy protocol, goal trough 15-20   - monitor temps and WBC      Maddison Ng M.D.  [facility], Division of Infectious Diseases  [phone]  After 5pm on weekdays and all day on weekends - please call [phone]

## 2021-06-19 NOTE — CHART NOTE - NSCHARTNOTEFT_GEN_A_CORE
Critical value received Gram (+) cocci in clusters in aerobic bottles . Patient is already on Vancomycin. Further management per primary team. RN to call with changes.

## 2021-06-19 NOTE — PROGRESS NOTE ADULT - SUBJECTIVE AND OBJECTIVE BOX
MILAN SHRESTHA  MRN-[id number] 71y    VASCULAR SURGERY    MEDICATIONS  (STANDING):  amitriptyline 100 milliGRAM(s) Oral at bedtime  baclofen 10 milliGRAM(s) Oral two times a day  carvedilol 6.25 milliGRAM(s) Oral every 12 hours  DULoxetine 30 milliGRAM(s) Oral daily  furosemide    Tablet 40 milliGRAM(s) Oral daily  gabapentin 400 milliGRAM(s) Oral two times a day  heparin   Injectable 5000 Unit(s) SubCutaneous every 8 hours  lactobacillus acidophilus 1 Tablet(s) Oral three times a day  levothyroxine 200 MICROGram(s) Oral daily  lidocaine   Patch 1 Patch Transdermal every 24 hours  loratadine 10 milliGRAM(s) Oral daily  losartan 25 milliGRAM(s) Oral daily  melatonin 5 milliGRAM(s) Oral at bedtime  multivitamin 1 Tablet(s) Oral daily  pantoprazole    Tablet 40 milliGRAM(s) Oral before breakfast  potassium chloride    Tablet ER 10 milliEquivalent(s) Oral daily  rOPINIRole 1 milliGRAM(s) Oral daily  simvastatin 40 milliGRAM(s) Oral at bedtime  sucralfate suspension 1 Gram(s) Oral two times a day  vancomycin  IVPB 1250 milliGRAM(s) IV Intermittent every 12 hours    MEDICATIONS  (PRN):  acetaminophen   Tablet .. 650 milliGRAM(s) Oral every 6 hours PRN Mild Pain (1 - 3), Moderate Pain (4 - 6)  oxyCODONE    IR 30 milliGRAM(s) Oral three times a day PRN Severe Pain (7 - 10)      Vital Signs Last 24 Hrs  T(C): 37.1 (19 Jun 2021 11:56), Max: 37.1 (19 Jun 2021 11:56)  T(F): 98.8 (19 Jun 2021 11:56), Max: 98.8 (19 Jun 2021 11:56)  HR: 76 (19 Jun 2021 11:56) (76 - 97)  BP: 114/71 (19 Jun 2021 11:56) (114/71 - 161/79)  RR: 16 (19 Jun 2021 11:56) (16 - 16)  SpO2: 95% (19 Jun 2021 11:56) (90% - 100%)      06-19-21 @ 07:01  -  06-19-21 @ 19:31  --------------------------------------------------------  IN: 540 mL / OUT: 0 mL / NET: 540 mL    RIGHT LOWER EXT: + 1 PITTING EDEMA , NO CALF TENDERNESS  RIGHT FOOT: + 1 EDEMA.  2ND AND 4TH DIGIT TIP AMP NOTED WITH SOME ERYTHEMA  3RD DIGIT WITH ERYTHEMA, EDEMA INVOLVING THE ENTIRE DIGIT AND A IRREGULAR APPROXIMATELY 1X1 CM PLANTAR ULCER  WITH SOME SEROUS DRAINAGE   NO PUS/ BLEEDING   PALPABLE DP, PT                            12.0   6.24  )-----------( 234      ( 18 Jun 2021 15:28 )             38.7      06-18    140  |  103  |  17  ----------------------------<  133<H>  4.1   |  33<H>  |  0.87    Ca    9.3      18 Jun 2021 15:28    TPro  8.3  /  Alb  3.7  /  TBili  0.4  /  DBili  x   /  AST  17  /  ALT  28  /  AlkPhos  108  06-18      EXAM:  US DPLX LWR EXT VEINS LTD RT                      PROCEDURE DATE:  06/18/2021        INTERPRETATION:  CLINICAL INFORMATION: Right leg swelling.    COMPARISON: Doppler venous sonogram 9/19/2015.    TECHNIQUE: Duplex sonography of the RIGHT LOWER extremity veins with color and spectral Doppler, with and without compression.    FINDINGS:    There is normal compressibility of the right common femoral, femoral and popliteal veins.  The contralateral common femoral vein is patent.  Doppler examination shows normal spontaneous and phasic flow.    No calf vein thrombosis is detected.    IMPRESSION:    No evidence of right lower extremity deep venous thrombosis.    WEI HILLMAN M.D., ATTENDING RADIOLOGIST                        ASSESSMENT &  PLAN:      RIGHT FOOT 3RD DIGIT CELLULITIS WITH PLANTAR ULCER       CONTINUE PODIATRY CARE  CONTINUE VANCOMYCIN  F/U VASCULAR STUDIES   SURGICAL TEAM WILL FOLLOW UP   MEDICAL MANAGEMENT AS PER PRIMARY TEAM

## 2021-06-19 NOTE — CONSULT NOTE ADULT - ATTENDING COMMENTS
Awaiting non invasive vascular studies to provide final recommendation.
Seen/examined. agree with above.   no active cardiac complaints or issues  ekg unremarkable and echo in 2021 with normal LV function  cont current bp medications  no cardiac contraindication to proceeding with podiatric surgery.  will follow with you post-operatively

## 2021-06-19 NOTE — PROGRESS NOTE ADULT - SUBJECTIVE AND OBJECTIVE BOX
71y year old Female seen at [facility] ED for right foot 3rd digit ulcer.  Pt seen during AM rounds AAOx3 with NAD.  Dressing were clean, dry and intact.  Pt had no new pedal complaints.  Denies any fever, chills, nausea, vomiting, chest pain, shortness of breath, or calf pain at this time.        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 CRT <3 seconds.  2+ edema right foot and leg.  Erythema right 2, 3rd digits.   No ecchymosis b/l  Neurological: Light touch not intact to foot b/l.  Gross sensation intact to foot b/l  Musculoskeletal: 5/5 strength in all quadrants bilaterally, AJ & STJ ROM intact  No POP wounds  Dermatological: Wounds (1)  1.  Right 3rd digit plantar ulcer.  Necrotic base.  1.0 x 1.0 x 0.75cm.  Periwound intact.  PTB(+).  SOI(+).  Tunneling center of wound to tip of distal phalanx. No undermining.  Serous drainage.  No purulence. MIld malodor          Vital Signs Last 24 Hrs  T(C): 37.1 (19 Jun 2021 11:56), Max: 37.1 (19 Jun 2021 11:56)  T(F): 98.8 (19 Jun 2021 11:56), Max: 98.8 (19 Jun 2021 11:56)  HR: 76 (19 Jun 2021 11:56) (68 - 97)  BP: 114/71 (19 Jun 2021 11:56) (114/71 - 161/79)  BP(mean): --  RR: 16 (19 Jun 2021 11:56) (16 - 16)  SpO2: 95% (19 Jun 2021 11:56) (90% - 100%)                          12.0   6.24  )-----------( 234      ( 18 Jun 2021 15:28 )             38.7   06-18    140  |  103  |  17  ----------------------------<  133<H>  4.1   |  33<H>  |  0.87    Ca    9.3      18 Jun 2021 15:28    TPro  8.3  /  Alb  3.7  /  TBili  0.4  /  DBili  x   /  AST  17  /  ALT  28  /  AlkPhos  108  06-18        Imaging: ----------  Pending

## 2021-06-19 NOTE — PROGRESS NOTE ADULT - SUBJECTIVE AND OBJECTIVE BOX
Patient is a 71y old  Female who presents with a chief complaint of toe infection    INTERVAL /OVERNIGHT EVENTS: feels OK    MEDICATIONS  (STANDING):  amitriptyline 100 milliGRAM(s) Oral at bedtime  baclofen 10 milliGRAM(s) Oral two times a day  carvedilol 6.25 milliGRAM(s) Oral every 12 hours  DULoxetine 30 milliGRAM(s) Oral daily  furosemide    Tablet 40 milliGRAM(s) Oral daily  gabapentin 400 milliGRAM(s) Oral two times a day  heparin   Injectable 5000 Unit(s) SubCutaneous every 8 hours  lactobacillus acidophilus 1 Tablet(s) Oral three times a day  levothyroxine 200 MICROGram(s) Oral daily  lidocaine   Patch 1 Patch Transdermal every 24 hours  loratadine 10 milliGRAM(s) Oral daily  losartan 25 milliGRAM(s) Oral daily  melatonin 5 milliGRAM(s) Oral at bedtime  multivitamin 1 Tablet(s) Oral daily  pantoprazole    Tablet 40 milliGRAM(s) Oral before breakfast  potassium chloride    Tablet ER 10 milliEquivalent(s) Oral daily  rOPINIRole 1 milliGRAM(s) Oral daily  simvastatin 40 milliGRAM(s) Oral at bedtime  sucralfate suspension 1 Gram(s) Oral two times a day  vancomycin  IVPB 1250 milliGRAM(s) IV Intermittent every 12 hours    MEDICATIONS  (PRN):  acetaminophen   Tablet .. 650 milliGRAM(s) Oral every 6 hours PRN Mild Pain (1 - 3), Moderate Pain (4 - 6)  oxyCODONE    IR 30 milliGRAM(s) Oral three times a day PRN Severe Pain (7 - 10)      Allergies    latex (Rash)  penicillin (Hives)    Intolerances        REVIEW OF SYSTEMS:  CONSTITUTIONAL: No fever, weight loss, or fatigue  EYES: No eye pain, visual disturbances, or discharge  ENMT:  No difficulty hearing, tinnitus, vertigo; No sinus or throat pain  NECK: No pain or stiffness  RESPIRATORY: No cough, wheezing, chills or hemoptysis; No shortness of breath  CARDIOVASCULAR: No chest pain, palpitations, dizziness, or leg swelling  GASTROINTESTINAL: No abdominal or epigastric pain. No nausea, vomiting, or hematemesis; No diarrhea or constipation. No melena or hematochezia.  GENITOURINARY: No dysuria, frequency, hematuria, or incontinence  NEUROLOGICAL: No headaches, memory loss, loss of strength, numbness, or tremors  SKIN: No itching, burning, rashes, or lesions   LYMPH NODES: No enlarged glands  ENDOCRINE: No heat or cold intolerance; No hair loss; No polydipsia or polyuria  MUSCULOSKELETAL: No joint pain or swelling; No muscle, back, or extremity pain  PSYCHIATRIC: No depression, anxiety, mood swings, or difficulty sleeping  HEME/LYMPH: No easy bruising, or bleeding gums  ALLERGY AND IMMUNOLOGIC: No hives or eczema    Vital Signs Last 24 Hrs  T(C): 37.1 (2021 11:56), Max: 37.1 (2021 11:56)  T(F): 98.8 (2021 11:56), Max: 98.8 (2021 11:56)  HR: 76 (2021 11:56) (70 - 97)  BP: 114/71 (2021 11:56) (114/71 - 161/79)  BP(mean): --  RR: 16 (2021 11:56) (16 - 16)  SpO2: 95% (2021 11:56) (90% - 100%)    PHYSICAL EXAM:  GENERAL: NAD, well-groomed, well-developed  HEAD:  Atraumatic, Normocephalic  EYES: EOMI, PERRLA, conjunctiva and sclera clear  ENMT: No tonsillar erythema, exudates, or enlargement; Moist mucous membranes, Good dentition, No lesions  NECK: Supple, No JVD, Normal thyroid  NERVOUS SYSTEM:  Alert & Oriented X3, Good concentration; Motor Strength 5/5 B/L upper and lower extremities; DTRs 2+ intact and symmetric  CHEST/LUNG: Clear to auscultation bilaterally; No rales, rhonchi, wheezing, or rubs  HEART: Regular rate and rhythm; No murmurs, rubs, or gallops  ABDOMEN: Soft, Nontender, Nondistended; Bowel sounds present  EXTREMITIES:  2+ Peripheral Pulses, No clubbing, cyanosis, or edema  LYMPH: No lymphadenopathy noted  SKIN: No rashes or lesions    LABS:      Ca    9.3        2021 15:28      PT/INR - ( 2021 15:28 )   PT: 13.2 sec;   INR: 1.14 ratio         PTT - ( 2021 15:28 )  PTT:35.1 sec  Urinalysis Basic - ( 2021 20:01 )    Color: Yellow / Appearance: Clear / S.010 / pH: x  Gluc: x / Ketone: Negative  / Bili: Negative / Urobili: Negative   Blood: x / Protein: Negative / Nitrite: Negative   Leuk Esterase: Negative / RBC: x / WBC x   Sq Epi: x / Non Sq Epi: x / Bacteria: x      CAPILLARY BLOOD GLUCOSE          RADIOLOGY & ADDITIONAL TESTS:    Notes Reviewed:  [x ] YES  [ ] NO    Care Discussed with Consultants/Other Providers [ x] YES  [ ] NO

## 2021-06-19 NOTE — CONSULT NOTE ADULT - ASSESSMENT
71 year old female with HTN, HLD, neuropathy, and spinal stenosis sp partial ray resection of second toe January 2021 now presenting Right toe ulcer 3rd digit    cardiac optimization  -seen by podiatry for Right 3rd digit ulcer, planned for Right 3rd digit partial resection 6/21  -EKG NSR 76 bpm  -Echo 2/2021 revealing normal LV systolic function ef 55%, trace MR, trace TR   - Abx per Primary   -No signs of ischemia, ADHF, clinical exam not consistent with severe stenotic valvular disease, no unstable arrhythmias noted. Therefore able to proceed with this low risk podiatry surgery without any further cardiac workup. Routine hemodynamic monitoring is suggested during the procedure.     HTN  - 114/71  - Continue Coreg, Losartan, and Lasix    HLD  - Continue home statin    DVT ppx  - Per Primary    Will follow perioperatively with you   Monitor and replete electrolytes. Keep K>4.0 and Mg>2.0.    Tracy Flores FNP-C  Cardiology NP  [id number] [phone]   71 year old female with HTN, HLD, neuropathy, and spinal stenosis sp partial ray resection of second toe January 2021 now presenting Right toe ulcer 3rd digit    cardiac optimization  - seen by podiatry for Right 3rd digit ulcer, planned for Right 3rd digit partial resection 6/21  - EKG NSR 76 bpm  - Echo 2/2021 revealing normal LV systolic function ef 55%, trace MR, trace TR   - Abx per Primary   - No signs of ischemia, ADHF, clinical exam not consistent with severe stenotic valvular disease, no unstable arrhythmias noted. Therefore able to proceed with this low risk podiatry surgery without any further cardiac workup. Routine hemodynamic monitoring is suggested during the procedure.     HTN  - 114/71  - Continue Coreg, Losartan, and Lasix  - pain control    HLD  - Continue home statin    DVT ppx  - Per Primary    Will follow perioperatively with you   Monitor and replete electrolytes. Keep K>4.0 and Mg>2.0.    Tracy Flores FNP-C  Cardiology NP  [facility] [phone]

## 2021-06-19 NOTE — CONSULT NOTE ADULT - SUBJECTIVE AND OBJECTIVE BOX
[facility] - Jayne Aguila, Ricco, Max, Marita, Miguel Ng  Office Number: [phone]    Initial Consult Note    CHIEF COMPLAINT: Patient is a 71y old  Female who presents with a chief complaint of toe infection       HPI:    71 year old female with HTN, HLD, neuropathy, and spinal stenosis sp partial ray resection of second toe january 2021 now presenting with toe infection. Reports she was sent by her podiatrist for non healing ulcer. Denies any exertional chest pain dizziness palpitations or shortness of breath. No recent fever chills n/v/d  follows with Dr Santana    PAST MEDICAL & SURGICAL HISTORY:  Spinal stenosis    Neuropathy    HTN (hypertension)    HLD (hyperlipidemia)    Hypothyroid    Constipation    Toe amputation status, right  tip of right 3rd toe        SOCIAL HISTORY:  No tobacco, ethanol, or drug abuse.    FAMILY HISTORY:  No pertinent family history in first degree relatives      No family history of acute MI or sudden cardiac death.    MEDICATIONS  (STANDING):  amitriptyline 100 milliGRAM(s) Oral at bedtime  baclofen 10 milliGRAM(s) Oral two times a day  carvedilol 6.25 milliGRAM(s) Oral every 12 hours  DULoxetine 30 milliGRAM(s) Oral daily  furosemide    Tablet 40 milliGRAM(s) Oral daily  gabapentin 400 milliGRAM(s) Oral two times a day  heparin   Injectable 5000 Unit(s) SubCutaneous every 8 hours  lactobacillus acidophilus 1 Tablet(s) Oral three times a day  levothyroxine 200 MICROGram(s) Oral daily  lidocaine   Patch 1 Patch Transdermal every 24 hours  loratadine 10 milliGRAM(s) Oral daily  losartan 25 milliGRAM(s) Oral daily  melatonin 5 milliGRAM(s) Oral at bedtime  multivitamin 1 Tablet(s) Oral daily  pantoprazole    Tablet 40 milliGRAM(s) Oral before breakfast  potassium chloride    Tablet ER 10 milliEquivalent(s) Oral daily  rOPINIRole 1 milliGRAM(s) Oral daily  simvastatin 40 milliGRAM(s) Oral at bedtime  sucralfate suspension 1 Gram(s) Oral two times a day  vancomycin  IVPB 1250 milliGRAM(s) IV Intermittent every 12 hours    MEDICATIONS  (PRN):  acetaminophen   Tablet .. 650 milliGRAM(s) Oral every 6 hours PRN Mild Pain (1 - 3), Moderate Pain (4 - 6)  oxyCODONE    IR 30 milliGRAM(s) Oral three times a day PRN Severe Pain (7 - 10)      Allergies    latex (Rash)  penicillin (Hives)    Intolerances        REVIEW OF SYSTEMS:    CONSTITUTIONAL: No weakness, fevers or chills  EYES/ENT: No visual changes;  No vertigo or throat pain   NECK: No pain or stiffness  RESPIRATORY: No cough, wheezing, hemoptysis; No shortness of breath  CARDIOVASCULAR: No chest pain or palpitations  GASTROINTESTINAL: No abdominal pain. No nausea, vomiting, or hematemesis; No diarrhea or constipation. No melena or hematochezia.  GENITOURINARY: No dysuria, frequency or hematuria  NEUROLOGICAL: No numbness or weakness  SKIN: No itching or rash  All other review of systems is negative unless indicated above    VITAL SIGNS:   Vital Signs Last 24 Hrs  T(C): 37.1 (19 Jun 2021 11:56), Max: 37.1 (19 Jun 2021 11:56)  T(F): 98.8 (19 Jun 2021 11:56), Max: 98.8 (19 Jun 2021 11:56)  HR: 76 (19 Jun 2021 11:56) (70 - 97)  BP: 114/71 (19 Jun 2021 11:56) (114/71 - 161/79)  BP(mean): --  RR: 16 (19 Jun 2021 11:56) (16 - 16)  SpO2: 95% (19 Jun 2021 11:56) (90% - 100%)    I&O's Summary    19 Jun 2021 07:01  -  19 Jun 2021 16:36  --------------------------------------------------------  IN: 420 mL / OUT: 0 mL / NET: 420 mL        On Exam:  Constitutional: NAD, alert and oriented x 3  Lungs:  Non-labored, breath sounds are clear bilaterally, No wheezing, rales or rhonchi  Cardiovascular: RRR.  S1 and S2 positive.  No murmurs, rubs, gallops or clicks  Gastrointestinal: Bowel Sounds present, soft, nontender.   Lymph: + edema trace   Neurological: Alert, no focal deficits  Skin: No rashes or ulcers   Psych:  Mood & affect appropriate.    LABS: All Labs Reviewed:                        12.0   6.24  )-----------( 234      ( 18 Jun 2021 15:28 )             38.7     18 Jun 2021 15:28    140    |  103    |  17     ----------------------------<  133    4.1     |  33     |  0.87     Ca    9.3        18 Jun 2021 15:28    TPro  8.3    /  Alb  3.7    /  TBili  0.4    /  DBili  x      /  AST  17     /  ALT  28     /  AlkPhos  108    18 Jun 2021 15:28    PT/INR - ( 18 Jun 2021 15:28 )   PT: 13.2 sec;   INR: 1.14 ratio         PTT - ( 18 Jun 2021 15:28 )  PTT:35.1 sec      Blood Culture:         RADIOLOGY:    EKG:< from: 12 Lead ECG (06.18.21 @ 15:18) >    Ventricular Rate 76 BPM    Atrial Rate 76 BPM    P-R Interval 198 ms    QRS Duration 100 ms    Q-T Interval 396 ms    QTC Calculation(Bazett) 445 ms    P Axis 61 degrees    R Axis 45 degrees    T Axis 65 degrees    Diagnosis Line Normal sinus rhythm  Normal ECG    < end of copied text >  < from: TTE Echo Complete w/o Contrast w/ Doppler (02.02.21 @ 07:59) >    OBSERVATIONS:  Technically difficult study  Mitral Valve: normal, trace physiologic MR.  Aortic Valve/Aorta: normal trileaflet aortic valve.  Tricuspid Valve: normal with trace TR.  Pulmonic Valve: Not well-visualized  Left Atrium: normal  Right Atrium: Not well-visualized  Left Ventricle: normal LV size and systolic function, estimated LVEF of 55%. Endocardium is not well-visualized, cannot rule out segmental dysfunction  Right Ventricle: Grossly normal size and systolic function.  Pericardium/Pleura: normal, no significant pericardial effusion.  LV diastolic dysfunction is present      Conclusion:  Technically difficult study  Normal left ventricular internal dimensions and systolic function, estimated LVEF of 55%.  Grossly normal RV size and systolic function.  Normal trileaflet aortic valve, without AI.  Trace physiologic MR and TR.  No significant pericardial effusion.

## 2021-06-19 NOTE — CONSULT NOTE ADULT - SUBJECTIVE AND OBJECTIVE BOX
[facility], Division of Infectious Diseases  YOKO Carlin, RENU Galarza  [phone]  ([phone] - weekdays after 5pm and weekends)    MILAN SHRESTHA  71y, Female  [id number]    HPI:  Patient is a 71 year old female sent by Dr. Gray due to right 3rd toe infection. pt reports she got back from [place] yesterday and noted a blister/redness to right third toe. pt reports she had right 2nd toe amputation with Dr. Gray. pt admits to right lower extremity swelling. pt denies pain to toe, fever, cp, sob, discharge, or any other complaints. (2021 19:37)  Patient seen and examined at bedside this morning. Patient reports she has neuropathy and and had noticed a callus on her right third toe a few weeks ago. States she recently cam back from [place] and noticed her right third  toe was swollen and red; denies any drainage from wound and does not feel pain due to neuropathy. She has noticed mild erythema on her left shin area. She denies fever, chills, chest pain, cough, dyspnea, abdominal pain, nausea, vomiting, diarrhea. She has history of partial ray amputations of b/l feet.   ROS: 14 point review of systems completed, pertinent positives and negatives as per HPI.    Allergies: latex (Rash)  penicillin (Hives)    PMH -- Spinal stenosis  Neuropathy  HTN (hypertension)  HLD (hyperlipidemia)  Hypothyroid  Constipation  PSH -- Toe amputation status, right  FH -- No pertinent family history in first degree relatives  Social History -- denies tobacco, alcohol or illicit drug use    Physical Exam--  Vital Signs Last 24 Hrs  T(F): 98.1 (2021 04:45), Max: 98.6 (2021 23:01)  HR: 81 (2021 04:45) (68 - 97)  BP: 115/72 (2021 04:45) (115/72 - 161/79)  RR: 16 (2021 04:45) (16 - 16)  SpO2: 90% (2021 04:45) (90% - 100%)  General: nontoxic-appearing, no acute distress  HEENT: NC/AT, EOMI, anicteric, conjunctiva pink and moist, neck supple  Lungs: Clear bilaterally without rales, wheezing or rhonchi  Heart: Regular rate and rhythm. No murmur, rub or gallop.  Abdomen: Soft. Nondistended. Nontender. BS present.   Neuro: AAOx3, no obvious focal deficits   Extremities: No cyanosis. RLE/foot edema  Skin: Warm. Dry. R 3rd toe with plantar ulcer with necrotic base surrounding erythema with no expressible drainage, slight erythema on L shin area  Psychiatric: Appropriate affect and mood for situation.   Lines: PIV    Laboratory & Imaging Data--  CBC:                       12.0   6.24  )-----------( 234      ( 2021 15:28 )             38.7     CMP:     140  |  103  |  17  ----------------------------<  133<H>  4.1   |  33<H>  |  0.87    Ca    9.3      2021 15:28    TPro  8.3  /  Alb  3.7  /  TBili  0.4  /  DBili  x   /  AST  17  /  ALT  28  /  AlkPhos  108  18    LIVER FUNCTIONS - ( 2021 15:28 )  Alb: 3.7 g/dL / Pro: 8.3 g/dL / ALK PHOS: 108 U/L / ALT: 28 U/L / AST: 17 U/L / GGT: x           Urinalysis Basic - ( 2021 20:01 )  Color: Yellow / Appearance: Clear / S.010 / pH: x  Gluc: x / Ketone: Negative  / Bili: Negative / Urobili: Negative   Blood: x / Protein: Negative / Nitrite: Negative   Leuk Esterase: Negative / RBC: x / WBC x   Sq Epi: x / Non Sq Epi: x / Bacteria: x    Microbiology:    - COVID-19 PCR - negative    Radiology--  US Duplex Venous Lower Ext Ltd, Right (21 @ 16:51) > IMPRESSION: No evidence of right lower extremity deep venous thrombosis.  Xray Foot AP + Lateral + Oblique, Right (21 @ 15:57) > IMPRESSION: Digit soft tissue swelling without radiographic evidence of osteomyelitis. Mid foot Charcot joint  Xray Chest 1 View AP/PA (21 @ 15:57) > IMPRESSION: No evidence for focal infiltrate or lobar consolidation.    Active Medications--  acetaminophen   Tablet .. 650 milliGRAM(s) Oral every 6 hours PRN  amitriptyline 100 milliGRAM(s) Oral at bedtime  baclofen 10 milliGRAM(s) Oral two times a day  carvedilol 6.25 milliGRAM(s) Oral every 12 hours  DULoxetine 30 milliGRAM(s) Oral daily  furosemide    Tablet 40 milliGRAM(s) Oral daily  gabapentin 400 milliGRAM(s) Oral daily  heparin   Injectable 5000 Unit(s) SubCutaneous every 12 hours  lactobacillus acidophilus 1 Tablet(s) Oral three times a day  levothyroxine 200 MICROGram(s) Oral daily  losartan 25 milliGRAM(s) Oral daily  melatonin 5 milliGRAM(s) Oral at bedtime  multivitamin 1 Tablet(s) Oral daily  oxyCODONE    IR 30 milliGRAM(s) Oral three times a day PRN  pantoprazole    Tablet 40 milliGRAM(s) Oral before breakfast  potassium chloride    Tablet ER 10 milliEquivalent(s) Oral daily  rOPINIRole 1 milliGRAM(s) Oral daily  simvastatin 40 milliGRAM(s) Oral at bedtime  sucralfate suspension 1 Gram(s) Oral two times a day    Antimicrobials:   Vancomycin - started

## 2021-06-19 NOTE — PROGRESS NOTE ADULT - ATTENDING COMMENTS
Seen and examined  Patient with multilevel lumber radiculopathy and lower extremity severe sensory neuropathy.   Has neuropathic ulcers on the digits of both feet.  She denies intermittent claudication.  Pedal pulses strongly palpable bilaterally  No evidence of clinically significant PAD  No vascular intervention warranted  May proceed with any planned podiatric interventions Seen and examined  Patient with multilevel lumbosacral radiculopathy and lower extremity severe sensory neuropathy.   Has neuropathic ulcers on the digits of both feet.  She denies intermittent claudication.  Pedal pulses strongly palpable bilaterally  No evidence of clinically significant PAD  No vascular intervention warranted  May proceed with any planned podiatric interventions

## 2021-06-20 ENCOUNTER — TRANSCRIPTION ENCOUNTER (OUTPATIENT)
Age: 71
End: 2021-06-20

## 2021-06-20 LAB
-  AMPICILLIN/SULBACTAM: SIGNIFICANT CHANGE UP
-  CEFAZOLIN: SIGNIFICANT CHANGE UP
-  CLINDAMYCIN: SIGNIFICANT CHANGE UP
-  ERYTHROMYCIN: SIGNIFICANT CHANGE UP
-  GENTAMICIN: SIGNIFICANT CHANGE UP
-  OXACILLIN: SIGNIFICANT CHANGE UP
-  PENICILLIN: SIGNIFICANT CHANGE UP
-  RIFAMPIN: SIGNIFICANT CHANGE UP
-  TETRACYCLINE: SIGNIFICANT CHANGE UP
-  TRIMETHOPRIM/SULFAMETHOXAZOLE: SIGNIFICANT CHANGE UP
-  VANCOMYCIN: SIGNIFICANT CHANGE UP
CULTURE RESULTS: SIGNIFICANT CHANGE UP
CULTURE RESULTS: SIGNIFICANT CHANGE UP
METHOD TYPE: SIGNIFICANT CHANGE UP
ORGANISM # SPEC MICROSCOPIC CNT: SIGNIFICANT CHANGE UP
SARS-COV-2 RNA SPEC QL NAA+PROBE: SIGNIFICANT CHANGE UP
SPECIMEN SOURCE: SIGNIFICANT CHANGE UP
SPECIMEN SOURCE: SIGNIFICANT CHANGE UP
VANCOMYCIN TROUGH SERPL-MCNC: 19 UG/ML — SIGNIFICANT CHANGE UP (ref 10–20)
VANCOMYCIN TROUGH SERPL-MCNC: 19.7 UG/ML — SIGNIFICANT CHANGE UP (ref 10–20)

## 2021-06-20 PROCEDURE — 99232 SBSQ HOSP IP/OBS MODERATE 35: CPT

## 2021-06-20 RX ADMIN — Medication 1 GRAM(S): at 06:44

## 2021-06-20 RX ADMIN — Medication 10 MILLIGRAM(S): at 06:42

## 2021-06-20 RX ADMIN — LIDOCAINE 1 PATCH: 4 CREAM TOPICAL at 06:43

## 2021-06-20 RX ADMIN — SIMVASTATIN 40 MILLIGRAM(S): 20 TABLET, FILM COATED ORAL at 22:08

## 2021-06-20 RX ADMIN — Medication 5 MILLIGRAM(S): at 22:08

## 2021-06-20 RX ADMIN — ROPINIROLE 1 MILLIGRAM(S): 8 TABLET, FILM COATED, EXTENDED RELEASE ORAL at 11:13

## 2021-06-20 RX ADMIN — GABAPENTIN 400 MILLIGRAM(S): 400 CAPSULE ORAL at 17:04

## 2021-06-20 RX ADMIN — HEPARIN SODIUM 5000 UNIT(S): 5000 INJECTION INTRAVENOUS; SUBCUTANEOUS at 06:15

## 2021-06-20 RX ADMIN — Medication 10 MILLIEQUIVALENT(S): at 11:13

## 2021-06-20 RX ADMIN — Medication 1 TABLET(S): at 11:13

## 2021-06-20 RX ADMIN — Medication 100 MILLIGRAM(S): at 22:08

## 2021-06-20 RX ADMIN — HEPARIN SODIUM 5000 UNIT(S): 5000 INJECTION INTRAVENOUS; SUBCUTANEOUS at 22:08

## 2021-06-20 RX ADMIN — GABAPENTIN 400 MILLIGRAM(S): 400 CAPSULE ORAL at 06:42

## 2021-06-20 RX ADMIN — Medication 40 MILLIGRAM(S): at 06:42

## 2021-06-20 RX ADMIN — Medication 1 TABLET(S): at 16:39

## 2021-06-20 RX ADMIN — LOSARTAN POTASSIUM 25 MILLIGRAM(S): 100 TABLET, FILM COATED ORAL at 06:42

## 2021-06-20 RX ADMIN — CARVEDILOL PHOSPHATE 6.25 MILLIGRAM(S): 80 CAPSULE, EXTENDED RELEASE ORAL at 06:42

## 2021-06-20 RX ADMIN — HEPARIN SODIUM 5000 UNIT(S): 5000 INJECTION INTRAVENOUS; SUBCUTANEOUS at 16:39

## 2021-06-20 RX ADMIN — Medication 1 GRAM(S): at 17:04

## 2021-06-20 RX ADMIN — Medication 1 TABLET(S): at 22:08

## 2021-06-20 RX ADMIN — Medication 650 MILLIGRAM(S): at 06:43

## 2021-06-20 RX ADMIN — LORATADINE 10 MILLIGRAM(S): 10 TABLET ORAL at 11:13

## 2021-06-20 RX ADMIN — PANTOPRAZOLE SODIUM 40 MILLIGRAM(S): 20 TABLET, DELAYED RELEASE ORAL at 06:42

## 2021-06-20 RX ADMIN — DULOXETINE HYDROCHLORIDE 30 MILLIGRAM(S): 30 CAPSULE, DELAYED RELEASE ORAL at 11:12

## 2021-06-20 RX ADMIN — Medication 1 TABLET(S): at 06:42

## 2021-06-20 RX ADMIN — Medication 166.67 MILLIGRAM(S): at 11:53

## 2021-06-20 RX ADMIN — CARVEDILOL PHOSPHATE 6.25 MILLIGRAM(S): 80 CAPSULE, EXTENDED RELEASE ORAL at 17:04

## 2021-06-20 RX ADMIN — Medication 200 MICROGRAM(S): at 06:14

## 2021-06-20 RX ADMIN — Medication 10 MILLIGRAM(S): at 17:04

## 2021-06-20 NOTE — PROGRESS NOTE ADULT - SUBJECTIVE AND OBJECTIVE BOX
[facility] -- Jayne Aguila, Ricco, Max, Hernandez Kirkland Savella  Office # [phone]      Follow Up:    htn hld     Subjective/Observations:   No events overnight resting comfortably in bed.  No complaints of chest pain, dyspnea, or palpitations reported. No signs of orthopnea or PND.      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        MEDICATIONS  (STANDING):  amitriptyline 100 milliGRAM(s) Oral at bedtime  baclofen 10 milliGRAM(s) Oral two times a day  carvedilol 6.25 milliGRAM(s) Oral every 12 hours  DULoxetine 30 milliGRAM(s) Oral daily  furosemide    Tablet 40 milliGRAM(s) Oral daily  gabapentin 400 milliGRAM(s) Oral two times a day  heparin   Injectable 5000 Unit(s) SubCutaneous every 8 hours  lactobacillus acidophilus 1 Tablet(s) Oral three times a day  levothyroxine 200 MICROGram(s) Oral daily  lidocaine   Patch 1 Patch Transdermal every 24 hours  loratadine 10 milliGRAM(s) Oral daily  losartan 25 milliGRAM(s) Oral daily  melatonin 5 milliGRAM(s) Oral at bedtime  multivitamin 1 Tablet(s) Oral daily  pantoprazole    Tablet 40 milliGRAM(s) Oral before breakfast  potassium chloride    Tablet ER 10 milliEquivalent(s) Oral daily  rOPINIRole 1 milliGRAM(s) Oral daily  simvastatin 40 milliGRAM(s) Oral at bedtime  sucralfate suspension 1 Gram(s) Oral two times a day  vancomycin  IVPB 1250 milliGRAM(s) IV Intermittent every 12 hours    MEDICATIONS  (PRN):  acetaminophen   Tablet .. 650 milliGRAM(s) Oral every 6 hours PRN Mild Pain (1 - 3), Moderate Pain (4 - 6)  fluticasone propionate 50 MICROgram(s)/spray Nasal Spray 1 Spray(s) Both Nostrils two times a day PRN congestion  guaiFENesin Oral Liquid (Sugar-Free) 100 milliGRAM(s) Oral once PRN Cough  oxyCODONE    IR 30 milliGRAM(s) Oral three times a day PRN Severe Pain (7 - 10)      Allergies    latex (Rash)  penicillin (Hives)    Intolerances        Vital Signs Last 24 Hrs  T(C): 36.3 (20 Jun 2021 05:26), Max: 37.1 (19 Jun 2021 11:56)  T(F): 97.4 (20 Jun 2021 05:26), Max: 98.8 (19 Jun 2021 11:56)  HR: 70 (20 Jun 2021 05:26) (70 - 76)  BP: 133/81 (20 Jun 2021 05:26) (114/69 - 133/81)  BP(mean): --  RR: 17 (20 Jun 2021 05:26) (16 - 17)  SpO2: 94% (20 Jun 2021 05:26) (92% - 95%)    I&O's Summary    19 Jun 2021 07:01  -  20 Jun 2021 07:00  --------------------------------------------------------  IN: 540 mL / OUT: 0 mL / NET: 540 mL          PHYSICAL EXAM:  TELE: not on tele   Constitutional: NAD, awake and alert, obese   HEENT: Moist Mucous Membranes, Anicteric  Pulmonary: Non-labored, breath sounds are clear bilaterally, No wheezing, crackles or rhonchi  Cardiovascular: Regular, S1 and S2 nl, No murmurs, rubs, gallops or clicks  Gastrointestinal: Bowel Sounds present, soft, nontender.   Lymph: No lymphadenopathy. No peripheral edema.  Skin: No visible rashes or ulcers.  Psych:  Mood & affect appropriate    LABS: All Labs Reviewed:                        12.0   6.24  )-----------( 234      ( 18 Jun 2021 15:28 )             38.7     18 Jun 2021 15:28    140    |  103    |  17     ----------------------------<  133    4.1     |  33     |  0.87     Ca    9.3        18 Jun 2021 15:28    TPro  8.3    /  Alb  3.7    /  TBili  0.4    /  DBili  x      /  AST  17     /  ALT  28     /  AlkPhos  108    18 Jun 2021 15:28    PT/INR - ( 18 Jun 2021 15:28 )   PT: 13.2 sec;   INR: 1.14 ratio         PTT - ( 18 Jun 2021 15:28 )  PTT:35.1 sec         < from: 12 Lead ECG (06.18.21 @ 15:18) >    Ventricular Rate 76 BPM    Atrial Rate 76 BPM    P-R Interval 198 ms    QRS Duration 100 ms    Q-T Interval 396 ms    QTC Calculation(Bazett) 445 ms    P Axis 61 degrees    R Axis 45 degrees    T Axis 65 degrees    Diagnosis Line Normal sinus rhythm  Normal ECG  When compared with ECG of 28-JAN-2021 16:17,  No significant change was found

## 2021-06-20 NOTE — PROGRESS NOTE ADULT - ASSESSMENT
71 year old female with HTN, HLD, neuropathy, and spinal stenosis sp partial ray resection of second toe January 2021 now presenting Right toe ulcer 3rd digit    cardiac optimization  - seen by podiatry for Right 3rd digit ulcer, planned for Right 3rd digit partial resection 6/21  -as per vascular notes for angiogram tuesday   - EKG NSR 76 bpm  - Echo 2/2021 revealing normal LV systolic function ef 55%, trace MR, trace TR   - Abx per Primary + gram+ cocci fu id recs    - No signs of ischemia, ADHF, clinical exam not consistent with severe stenotic valvular disease, no unstable arrhythmias noted. Therefore able to proceed with this low risk podiatry surgery without any further cardiac workup. Routine hemodynamic monitoring is suggested during the procedure.     HTN  - 133/87  - Continue Coreg, Losartan, and Lasix  - pain control    HLD  - Continue home statin    DVT ppx  - Per Primary    Will follow perioperatively with you   Monitor and replete electrolytes. Keep K>4.0 and Mg>2.0.    Tracy Flores FNP-C  Cardiology NP  [id number] [phone]   71 year old female with HTN, HLD, neuropathy, and spinal stenosis sp partial ray resection of second toe January 2021 now presenting Right toe ulcer 3rd digit    cardiac optimization  - seen by podiatry for Right 3rd digit ulcer, planned for Right 3rd digit partial resection 6/21  - as per vascular notes for angiogram tuesday   - EKG NSR 76 bpm  - Echo 2/2021 revealing normal LV systolic function ef 55%, trace MR, trace TR   - Abx per Primary + gram+ cocci fu id recs    - No signs of ischemia, ADHF, clinical exam not consistent with severe stenotic valvular disease, no unstable arrhythmias noted. Therefore able to proceed with this low risk podiatry surgery without any further cardiac workup. Routine hemodynamic monitoring is suggested during the procedure.     HTN  - 133/87  - Continue Coreg, Losartan, and Lasix  - pain control    HLD  - Continue home statin    DVT ppx  - Per Primary    Will follow perioperatively with you   Monitor and replete electrolytes. Keep K>4.0 and Mg>2.0.    Tracy Flores FNP-C  Cardiology NP  [facility] [phone]

## 2021-06-20 NOTE — PROGRESS NOTE ADULT - SUBJECTIVE AND OBJECTIVE BOX
Patient is a 71y old  Female who presents with a chief complaint of toe infection    INTERVAL /OVERNIGHT EVENTS: feels OK    T(C): 36.7 (06-20-21 @ 20:57), Max: 36.7 (06-20-21 @ 12:34)  HR: 81 (06-20-21 @ 20:57) (71 - 81)  BP: 136/76 (06-20-21 @ 20:57) (126/74 - 136/76)  RR: 17 (06-20-21 @ 20:57) (17 - 18)  SpO2: 95% (06-20-21 @ 20:57) (93% - 95%)    Allergies    latex (Rash)  penicillin (Hives)    Intolerances        REVIEW OF SYSTEMS:  CONSTITUTIONAL: No fever, weight loss, or fatigue  EYES: No eye pain, visual disturbances, or discharge  ENMT:  No difficulty hearing, tinnitus, vertigo; No sinus or throat pain  NECK: No pain or stiffness  RESPIRATORY: No cough, wheezing, chills or hemoptysis; No shortness of breath  CARDIOVASCULAR: No chest pain, palpitations, dizziness, or leg swelling  GASTROINTESTINAL: No abdominal or epigastric pain. No nausea, vomiting, or hematemesis; No diarrhea or constipation. No melena or hematochezia.  GENITOURINARY: No dysuria, frequency, hematuria, or incontinence  NEUROLOGICAL: No headaches, memory loss, loss of strength, numbness, or tremors  SKIN: No itching, burning, rashes, or lesions   LYMPH NODES: No enlarged glands  ENDOCRINE: No heat or cold intolerance; No hair loss; No polydipsia or polyuria  MUSCULOSKELETAL: No joint pain or swelling; No muscle, back, or extremity pain  PSYCHIATRIC: No depression, anxiety, mood swings, or difficulty sleeping  HEME/LYMPH: No easy bruising, or bleeding gums  ALLERGY AND IMMUNOLOGIC: No hives or eczema        PHYSICAL EXAM:  GENERAL: NAD, well-groomed, well-developed  HEAD:  Atraumatic, Normocephalic  EYES: EOMI, PERRLA, conjunctiva and sclera clear  NECK: Supple, No JVD, Normal thyroid  NERVOUS SYSTEM:  Alert & Oriented X3, Good concentration; Motor Strength 5/5 B/L upper and lower extremities; DTRs 2+ intact and symmetric  CHEST/LUNG: Clear to auscultation bilaterally; No rales, rhonchi, wheezing, or rubs  HEART: Regular rate and rhythm; No murmurs, rubs, or gallops  ABDOMEN: Soft, Nontender, Nondistended; Bowel sounds present  EXTREMITIES:  2+ Peripheral Pulses, No clubbing, cyanosis, or edema  LYMPH: No lymphadenopathy noted  SKIN: No rashes or lesions      CAPILLARY BLOOD GLUCOSE          RADIOLOGY & ADDITIONAL TESTS:    Notes Reviewed:  [x ] YES  [ ] NO    Care Discussed with Consultants/Other Providers [ x] YES  [ ] NO

## 2021-06-20 NOTE — PROGRESS NOTE ADULT - ASSESSMENT
Patient is a 71 year old female with PMH of neuropathy, spinal stenosis, HTN, HLD, hypothyroidism who was sent by Dr. Gray due to right 3rd toe infection.    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, neuropathy   - R foot x-ray with digit soft tissue swelling, mild foot charcot joint, no evidence of OM   - LE DVT study negative    - Vascular surgery following, plan for angiogram likely Tuesday   - Podiatry following - booked for 6/21 for partial 3rd ray amputation - please send bone cx and biopsy    - follow bone scan to r/o OM (unable to get MRI d/t spinal stimulator)   - blood culture 1/2 with CoNS - likely contaminant    - follow repeat blood cultures 6/20   - wound culture from R toe with Staph aureus - follow for sensitivities    - continue on vancomycin - dosing per pharmacy protocol, goal trough 15-20   - monitor temps and WBC      Dr. Garcia to resume care from the morning.   Maddison Ng M.D.  [facility], Division of Infectious Diseases  [phone]  After 5pm on weekdays and all day on weekends - please call [phone]

## 2021-06-20 NOTE — PROGRESS NOTE ADULT - SUBJECTIVE AND OBJECTIVE BOX
SUBJECTIVE:  Patient seen and examined at bedside.  No overnight events.  Patient with no new complaints at this time, states that she feels well and that the pain to her right ankle has resolved.    Vital Signs Last 24 Hrs  T(C): 36.3 (20 Jun 2021 05:26), Max: 37.1 (19 Jun 2021 11:56)  T(F): 97.4 (20 Jun 2021 05:26), Max: 98.8 (19 Jun 2021 11:56)  HR: 70 (20 Jun 2021 05:26) (70 - 76)  BP: 133/81 (20 Jun 2021 05:26) (114/69 - 133/81)  BP(mean): --  RR: 17 (20 Jun 2021 05:26) (16 - 17)  SpO2: 94% (20 Jun 2021 05:26) (92% - 95%)    PHYSICAL EXAM:  GENERAL: No acute distress, well-developed  HEAD:  Atraumatic, Normocephalic  LOWER EXTREMITIES: pitting edema and erythema present circumferentially to R ankle, no ttp, mild warmth; DP & AT revealed via Doppler bilaterally. Motor, strength and sensation intact. Unable to assess DP due to dressing per podiatry bilaterally C/D/I  NEUROLOGY: A&O x 3, no focal deficits    I&O's Summary    19 Jun 2021 07:01  -  20 Jun 2021 07:00  --------------------------------------------------------  IN: 540 mL / OUT: 0 mL / NET: 540 mL    MEDICATIONS  (STANDING):  amitriptyline 100 milliGRAM(s) Oral at bedtime  baclofen 10 milliGRAM(s) Oral two times a day  carvedilol 6.25 milliGRAM(s) Oral every 12 hours  DULoxetine 30 milliGRAM(s) Oral daily  furosemide    Tablet 40 milliGRAM(s) Oral daily  gabapentin 400 milliGRAM(s) Oral two times a day  heparin   Injectable 5000 Unit(s) SubCutaneous every 8 hours  lactobacillus acidophilus 1 Tablet(s) Oral three times a day  levothyroxine 200 MICROGram(s) Oral daily  lidocaine   Patch 1 Patch Transdermal every 24 hours  loratadine 10 milliGRAM(s) Oral daily  losartan 25 milliGRAM(s) Oral daily  melatonin 5 milliGRAM(s) Oral at bedtime  multivitamin 1 Tablet(s) Oral daily  pantoprazole    Tablet 40 milliGRAM(s) Oral before breakfast  potassium chloride    Tablet ER 10 milliEquivalent(s) Oral daily  rOPINIRole 1 milliGRAM(s) Oral daily  simvastatin 40 milliGRAM(s) Oral at bedtime  sucralfate suspension 1 Gram(s) Oral two times a day  vancomycin  IVPB 1250 milliGRAM(s) IV Intermittent every 12 hours    MEDICATIONS  (PRN):  acetaminophen   Tablet .. 650 milliGRAM(s) Oral every 6 hours PRN Mild Pain (1 - 3), Moderate Pain (4 - 6)  fluticasone propionate 50 MICROgram(s)/spray Nasal Spray 1 Spray(s) Both Nostrils two times a day PRN congestion  guaiFENesin Oral Liquid (Sugar-Free) 100 milliGRAM(s) Oral once PRN Cough  oxyCODONE    IR 30 milliGRAM(s) Oral three times a day PRN Severe Pain (7 - 10)    LABS: no new labs    RADIOLOGY & ADDITIONAL STUDIES: pending studies

## 2021-06-20 NOTE — PROGRESS NOTE ADULT - SUBJECTIVE AND OBJECTIVE BOX
[facility], Division of Infectious Diseases  YOKO Carlin Y. Patel, S. Shah  [phone]  ([phone] - weekdays after 5pm and weekends)    Name: MILAN SHRESTHA  Age/Gender: 71y Female  MRN: [id number]    Interval History:  Patient seen and examined at bedside this morning  No new complaints, denies fever, chills or any pain.   Notes reviewed. No concerning overnight events  Afebrile     Allergies: latex (Rash)  penicillin (Hives)    Objective:  Vitals:   T(F): 97.4 (21 @ 05:26), Max: 98.8 (21 @ 11:56)  HR: 70 (21 @ 05:26) (70 - 76)  BP: 133/81 (21 @ 05:26) (114/69 - 133/81)  RR: 17 (21 @ 05:26) (16 - 17)  SpO2: 94% (21 @ 05:26) (92% - 95%)  Physical Examination:  General: no acute distress  HEENT: NC/AT, anicteric, neck supple  Respiratory: no acc muscle use, breathing comfortably  Cardiovascular: S1 and S2 present  Gastrointestinal: normal appearing, nondistended  Extremities: Rt foot in dressing, RLE edema, no cyanosis  Skin: RLE shin/ankle area with mild erythema    Laboratory Studies:  CBC:                       12.0   6.24  )-----------( 234      ( 2021 15:28 )             38.7     WBC Trend:  6.24 21 @ 15:28    CMP:     140  |  103  |  17  ----------------------------<  133<H>  4.1   |  33<H>  |  0.87    Ca    9.3      2021 15:28    TPro  8.3  /  Alb  3.7  /  TBili  0.4  /  DBili  x   /  AST  17  /  ALT  28  /  AlkPhos  108  18    LIVER FUNCTIONS - ( 2021 15:28 )  Alb: 3.7 g/dL / Pro: 8.3 g/dL / ALK PHOS: 108 U/L / ALT: 28 U/L / AST: 17 U/L / GGT: x           Urinalysis Basic - ( 2021 20:01 )  Color: Yellow / Appearance: Clear / S.010 / pH: x  Gluc: x / Ketone: Negative  / Bili: Negative / Urobili: Negative   Blood: x / Protein: Negative / Nitrite: Negative   Leuk Esterase: Negative / RBC: x / WBC x   Sq Epi: x / Non Sq Epi: x / Bacteria: x    Microbiology: reviewed   Culture - Urine (collected 21 @ 00:34)  Source: .Urine Clean Catch (Midstream)  Final Report (21 @ 20:33):    <10,000 CFU/mL Normal Urogenital Dom    Culture - Other (collected 21 @ 21:15)  Source: .Other right 3rd digit  Preliminary Report (21 @ 20:54):    Numerous Staphylococcus aureus    Normal skin dom isolated    Culture - Blood (collected 21 @ 21:12)  Source: .Blood Blood-Peripheral  Gram Stain (21 @ 21:31):    Growth in aerobic bottle: Gram Positive Cocci in Clusters  Preliminary Report (21 @ 21:31):    Growth in aerobic bottle: Gram Positive Cocci in Clusters    ***Blood Panel PCR results on this specimen are available    approximately 3 hours after the Gram stain result.***    Gram stain, PCR, and/or culture results may not always    correspond due to difference in methodologies.    ************************************************************    This PCR assay was performed by multiplex PCR. This    Assay tests for 66 bacterial and resistance gene targets.    Please refer to the [facility] test directory    at [email or web address] for details.  Organism: Blood Culture PCR (21 @ 22:43)  Organism: Blood Culture PCR (21 @ 22:43)      -  Coagulase negative Staphylococcus, Methicillin resistant: Detec      Method Type: PCR    Culture - Blood (collected 21 @ 21:12)  Source: .Blood Blood-Peripheral  Preliminary Report (21 @ 22:01):    No growth to date.    Radiology: reviewed   US Duplex Venous Lower Ext Ltd, Right (21 @ 16:51) > IMPRESSION: No evidence of right lower extremity deep venous thrombosis.  Xray Foot AP + Lateral + Oblique, Right (21 @ 15:57) > IMPRESSION: Digit soft tissue swelling without radiographic evidence of osteomyelitis. Mid foot Charcot joint  Xray Chest 1 View AP/PA (21 @ 15:57) > IMPRESSION: No evidence for focal infiltrate or lobar consolidation.    Medications:  acetaminophen   Tablet .. 650 milliGRAM(s) Oral every 6 hours PRN  amitriptyline 100 milliGRAM(s) Oral at bedtime  baclofen 10 milliGRAM(s) Oral two times a day  carvedilol 6.25 milliGRAM(s) Oral every 12 hours  DULoxetine 30 milliGRAM(s) Oral daily  fluticasone propionate 50 MICROgram(s)/spray Nasal Spray 1 Spray(s) Both Nostrils two times a day PRN  furosemide    Tablet 40 milliGRAM(s) Oral daily  gabapentin 400 milliGRAM(s) Oral two times a day  guaiFENesin Oral Liquid (Sugar-Free) 100 milliGRAM(s) Oral once PRN  heparin   Injectable 5000 Unit(s) SubCutaneous every 8 hours  lactobacillus acidophilus 1 Tablet(s) Oral three times a day  levothyroxine 200 MICROGram(s) Oral daily  lidocaine   Patch 1 Patch Transdermal every 24 hours  loratadine 10 milliGRAM(s) Oral daily  losartan 25 milliGRAM(s) Oral daily  melatonin 5 milliGRAM(s) Oral at bedtime  multivitamin 1 Tablet(s) Oral daily  oxyCODONE    IR 30 milliGRAM(s) Oral three times a day PRN  pantoprazole    Tablet 40 milliGRAM(s) Oral before breakfast  potassium chloride    Tablet ER 10 milliEquivalent(s) Oral daily  rOPINIRole 1 milliGRAM(s) Oral daily  simvastatin 40 milliGRAM(s) Oral at bedtime  sucralfate suspension 1 Gram(s) Oral two times a day  vancomycin  IVPB 1250 milliGRAM(s) IV Intermittent every 12 hours    Antimicrobials:  vancomycin  IVPB 1250 milliGRAM(s) IV Intermittent every 12 hours - started

## 2021-06-21 ENCOUNTER — RESULT REVIEW (OUTPATIENT)
Age: 71
End: 2021-06-21

## 2021-06-21 LAB
ALBUMIN SERPL ELPH-MCNC: 3.3 G/DL — SIGNIFICANT CHANGE UP (ref 3.3–5)
ALBUMIN SERPL ELPH-MCNC: 3.4 G/DL — SIGNIFICANT CHANGE UP (ref 3.3–5)
ALP SERPL-CCNC: 95 U/L — SIGNIFICANT CHANGE UP (ref 40–120)
ALP SERPL-CCNC: 98 U/L — SIGNIFICANT CHANGE UP (ref 40–120)
ALT FLD-CCNC: 23 U/L — SIGNIFICANT CHANGE UP (ref 12–78)
ALT FLD-CCNC: 29 U/L — SIGNIFICANT CHANGE UP (ref 12–78)
ANION GAP SERPL CALC-SCNC: 4 MMOL/L — LOW (ref 5–17)
ANION GAP SERPL CALC-SCNC: 9 MMOL/L — SIGNIFICANT CHANGE UP (ref 5–17)
AST SERPL-CCNC: 17 U/L — SIGNIFICANT CHANGE UP (ref 15–37)
AST SERPL-CCNC: 20 U/L — SIGNIFICANT CHANGE UP (ref 15–37)
BILIRUB SERPL-MCNC: 0.3 MG/DL — SIGNIFICANT CHANGE UP (ref 0.2–1.2)
BILIRUB SERPL-MCNC: 0.3 MG/DL — SIGNIFICANT CHANGE UP (ref 0.2–1.2)
BUN SERPL-MCNC: 19 MG/DL — SIGNIFICANT CHANGE UP (ref 7–23)
BUN SERPL-MCNC: 19 MG/DL — SIGNIFICANT CHANGE UP (ref 7–23)
CALCIUM SERPL-MCNC: 9 MG/DL — SIGNIFICANT CHANGE UP (ref 8.5–10.1)
CALCIUM SERPL-MCNC: 9.1 MG/DL — SIGNIFICANT CHANGE UP (ref 8.5–10.1)
CHLORIDE SERPL-SCNC: 103 MMOL/L — SIGNIFICANT CHANGE UP (ref 96–108)
CHLORIDE SERPL-SCNC: 104 MMOL/L — SIGNIFICANT CHANGE UP (ref 96–108)
CO2 SERPL-SCNC: 28 MMOL/L — SIGNIFICANT CHANGE UP (ref 22–31)
CO2 SERPL-SCNC: 32 MMOL/L — HIGH (ref 22–31)
CREAT SERPL-MCNC: 0.84 MG/DL — SIGNIFICANT CHANGE UP (ref 0.5–1.3)
CREAT SERPL-MCNC: 0.93 MG/DL — SIGNIFICANT CHANGE UP (ref 0.5–1.3)
GLUCOSE SERPL-MCNC: 102 MG/DL — HIGH (ref 70–99)
GLUCOSE SERPL-MCNC: 143 MG/DL — HIGH (ref 70–99)
HCT VFR BLD CALC: 33.9 % — LOW (ref 34.5–45)
HCT VFR BLD CALC: 35.9 % — SIGNIFICANT CHANGE UP (ref 34.5–45)
HGB BLD-MCNC: 11 G/DL — LOW (ref 11.5–15.5)
HGB BLD-MCNC: 11.4 G/DL — LOW (ref 11.5–15.5)
MCHC RBC-ENTMCNC: 28.1 PG — SIGNIFICANT CHANGE UP (ref 27–34)
MCHC RBC-ENTMCNC: 28.7 PG — SIGNIFICANT CHANGE UP (ref 27–34)
MCHC RBC-ENTMCNC: 31.8 GM/DL — LOW (ref 32–36)
MCHC RBC-ENTMCNC: 32.4 GM/DL — SIGNIFICANT CHANGE UP (ref 32–36)
MCV RBC AUTO: 88.4 FL — SIGNIFICANT CHANGE UP (ref 80–100)
MCV RBC AUTO: 88.5 FL — SIGNIFICANT CHANGE UP (ref 80–100)
NRBC # BLD: 0 /100 WBCS — SIGNIFICANT CHANGE UP (ref 0–0)
NRBC # BLD: 0 /100 WBCS — SIGNIFICANT CHANGE UP (ref 0–0)
PLATELET # BLD AUTO: 231 K/UL — SIGNIFICANT CHANGE UP (ref 150–400)
PLATELET # BLD AUTO: 239 K/UL — SIGNIFICANT CHANGE UP (ref 150–400)
POTASSIUM SERPL-MCNC: 4.3 MMOL/L — SIGNIFICANT CHANGE UP (ref 3.5–5.3)
POTASSIUM SERPL-MCNC: 4.4 MMOL/L — SIGNIFICANT CHANGE UP (ref 3.5–5.3)
POTASSIUM SERPL-SCNC: 4.3 MMOL/L — SIGNIFICANT CHANGE UP (ref 3.5–5.3)
POTASSIUM SERPL-SCNC: 4.4 MMOL/L — SIGNIFICANT CHANGE UP (ref 3.5–5.3)
PROT SERPL-MCNC: 7.3 G/DL — SIGNIFICANT CHANGE UP (ref 6–8.3)
PROT SERPL-MCNC: 7.4 G/DL — SIGNIFICANT CHANGE UP (ref 6–8.3)
RBC # BLD: 3.83 M/UL — SIGNIFICANT CHANGE UP (ref 3.8–5.2)
RBC # BLD: 4.06 M/UL — SIGNIFICANT CHANGE UP (ref 3.8–5.2)
RBC # FLD: 13.3 % — SIGNIFICANT CHANGE UP (ref 10.3–14.5)
RBC # FLD: 13.3 % — SIGNIFICANT CHANGE UP (ref 10.3–14.5)
SODIUM SERPL-SCNC: 140 MMOL/L — SIGNIFICANT CHANGE UP (ref 135–145)
SODIUM SERPL-SCNC: 140 MMOL/L — SIGNIFICANT CHANGE UP (ref 135–145)
WBC # BLD: 5.52 K/UL — SIGNIFICANT CHANGE UP (ref 3.8–10.5)
WBC # BLD: 5.9 K/UL — SIGNIFICANT CHANGE UP (ref 3.8–10.5)
WBC # FLD AUTO: 5.52 K/UL — SIGNIFICANT CHANGE UP (ref 3.8–10.5)
WBC # FLD AUTO: 5.9 K/UL — SIGNIFICANT CHANGE UP (ref 3.8–10.5)

## 2021-06-21 PROCEDURE — 88305 TISSUE EXAM BY PATHOLOGIST: CPT | Mod: 26

## 2021-06-21 PROCEDURE — 73630 X-RAY EXAM OF FOOT: CPT | Mod: 26,RT

## 2021-06-21 PROCEDURE — 99232 SBSQ HOSP IP/OBS MODERATE 35: CPT

## 2021-06-21 PROCEDURE — 88311 DECALCIFY TISSUE: CPT | Mod: 26

## 2021-06-21 PROCEDURE — 28820 AMPUTATION OF TOE: CPT | Mod: T7

## 2021-06-21 RX ORDER — ONDANSETRON 8 MG/1
4 TABLET, FILM COATED ORAL ONCE
Refills: 0 | Status: DISCONTINUED | OUTPATIENT
Start: 2021-06-21 | End: 2021-06-21

## 2021-06-21 RX ORDER — CARVEDILOL PHOSPHATE 80 MG/1
6.25 CAPSULE, EXTENDED RELEASE ORAL EVERY 12 HOURS
Refills: 0 | Status: DISCONTINUED | OUTPATIENT
Start: 2021-06-21 | End: 2021-06-24

## 2021-06-21 RX ORDER — LORATADINE 10 MG/1
10 TABLET ORAL DAILY
Refills: 0 | Status: DISCONTINUED | OUTPATIENT
Start: 2021-06-21 | End: 2021-06-24

## 2021-06-21 RX ORDER — ROPINIROLE 8 MG/1
1 TABLET, FILM COATED, EXTENDED RELEASE ORAL DAILY
Refills: 0 | Status: DISCONTINUED | OUTPATIENT
Start: 2021-06-21 | End: 2021-06-24

## 2021-06-21 RX ORDER — VANCOMYCIN HCL 1 G
1000 VIAL (EA) INTRAVENOUS EVERY 12 HOURS
Refills: 0 | Status: DISCONTINUED | OUTPATIENT
Start: 2021-06-21 | End: 2021-06-21

## 2021-06-21 RX ORDER — SODIUM CHLORIDE 9 MG/ML
1000 INJECTION, SOLUTION INTRAVENOUS
Refills: 0 | Status: DISCONTINUED | OUTPATIENT
Start: 2021-06-21 | End: 2021-06-21

## 2021-06-21 RX ORDER — OXYCODONE HYDROCHLORIDE 5 MG/1
5 TABLET ORAL ONCE
Refills: 0 | Status: DISCONTINUED | OUTPATIENT
Start: 2021-06-21 | End: 2021-06-21

## 2021-06-21 RX ORDER — SUCRALFATE 1 G
1 TABLET ORAL
Refills: 0 | Status: DISCONTINUED | OUTPATIENT
Start: 2021-06-21 | End: 2021-06-24

## 2021-06-21 RX ORDER — LIDOCAINE 4 G/100G
1 CREAM TOPICAL EVERY 24 HOURS
Refills: 0 | Status: DISCONTINUED | OUTPATIENT
Start: 2021-06-21 | End: 2021-06-24

## 2021-06-21 RX ORDER — LEVOTHYROXINE SODIUM 125 MCG
200 TABLET ORAL DAILY
Refills: 0 | Status: DISCONTINUED | OUTPATIENT
Start: 2021-06-21 | End: 2021-06-24

## 2021-06-21 RX ORDER — LACTOBACILLUS ACIDOPHILUS 100MM CELL
1 CAPSULE ORAL THREE TIMES A DAY
Refills: 0 | Status: DISCONTINUED | OUTPATIENT
Start: 2021-06-21 | End: 2021-06-24

## 2021-06-21 RX ORDER — AMITRIPTYLINE HCL 25 MG
100 TABLET ORAL AT BEDTIME
Refills: 0 | Status: DISCONTINUED | OUTPATIENT
Start: 2021-06-21 | End: 2021-06-24

## 2021-06-21 RX ORDER — PANTOPRAZOLE SODIUM 20 MG/1
40 TABLET, DELAYED RELEASE ORAL
Refills: 0 | Status: DISCONTINUED | OUTPATIENT
Start: 2021-06-21 | End: 2021-06-24

## 2021-06-21 RX ORDER — BACLOFEN 100 %
10 POWDER (GRAM) MISCELLANEOUS
Refills: 0 | Status: DISCONTINUED | OUTPATIENT
Start: 2021-06-21 | End: 2021-06-24

## 2021-06-21 RX ORDER — VANCOMYCIN HCL 1 G
1000 VIAL (EA) INTRAVENOUS EVERY 12 HOURS
Refills: 0 | Status: DISCONTINUED | OUTPATIENT
Start: 2021-06-21 | End: 2021-06-22

## 2021-06-21 RX ORDER — LOSARTAN POTASSIUM 100 MG/1
25 TABLET, FILM COATED ORAL DAILY
Refills: 0 | Status: DISCONTINUED | OUTPATIENT
Start: 2021-06-21 | End: 2021-06-24

## 2021-06-21 RX ORDER — DULOXETINE HYDROCHLORIDE 30 MG/1
30 CAPSULE, DELAYED RELEASE ORAL DAILY
Refills: 0 | Status: DISCONTINUED | OUTPATIENT
Start: 2021-06-21 | End: 2021-06-24

## 2021-06-21 RX ORDER — POTASSIUM CHLORIDE 20 MEQ
10 PACKET (EA) ORAL DAILY
Refills: 0 | Status: DISCONTINUED | OUTPATIENT
Start: 2021-06-21 | End: 2021-06-24

## 2021-06-21 RX ORDER — OXYCODONE HYDROCHLORIDE 5 MG/1
30 TABLET ORAL THREE TIMES A DAY
Refills: 0 | Status: DISCONTINUED | OUTPATIENT
Start: 2021-06-21 | End: 2021-06-24

## 2021-06-21 RX ORDER — FLUTICASONE PROPIONATE 50 MCG
1 SPRAY, SUSPENSION NASAL
Refills: 0 | Status: DISCONTINUED | OUTPATIENT
Start: 2021-06-21 | End: 2021-06-24

## 2021-06-21 RX ORDER — GABAPENTIN 400 MG/1
400 CAPSULE ORAL
Refills: 0 | Status: DISCONTINUED | OUTPATIENT
Start: 2021-06-21 | End: 2021-06-24

## 2021-06-21 RX ORDER — FUROSEMIDE 40 MG
40 TABLET ORAL DAILY
Refills: 0 | Status: DISCONTINUED | OUTPATIENT
Start: 2021-06-21 | End: 2021-06-24

## 2021-06-21 RX ORDER — HYDROMORPHONE HYDROCHLORIDE 2 MG/ML
0.5 INJECTION INTRAMUSCULAR; INTRAVENOUS; SUBCUTANEOUS
Refills: 0 | Status: DISCONTINUED | OUTPATIENT
Start: 2021-06-21 | End: 2021-06-21

## 2021-06-21 RX ORDER — LANOLIN ALCOHOL/MO/W.PET/CERES
5 CREAM (GRAM) TOPICAL AT BEDTIME
Refills: 0 | Status: DISCONTINUED | OUTPATIENT
Start: 2021-06-21 | End: 2021-06-24

## 2021-06-21 RX ORDER — SIMVASTATIN 20 MG/1
40 TABLET, FILM COATED ORAL AT BEDTIME
Refills: 0 | Status: DISCONTINUED | OUTPATIENT
Start: 2021-06-21 | End: 2021-06-24

## 2021-06-21 RX ADMIN — Medication 1 TABLET(S): at 06:36

## 2021-06-21 RX ADMIN — Medication 1 TABLET(S): at 21:34

## 2021-06-21 RX ADMIN — OXYCODONE HYDROCHLORIDE 30 MILLIGRAM(S): 5 TABLET ORAL at 23:30

## 2021-06-21 RX ADMIN — LIDOCAINE 1 PATCH: 4 CREAM TOPICAL at 17:16

## 2021-06-21 RX ADMIN — CARVEDILOL PHOSPHATE 6.25 MILLIGRAM(S): 80 CAPSULE, EXTENDED RELEASE ORAL at 17:16

## 2021-06-21 RX ADMIN — Medication 40 MILLIGRAM(S): at 06:37

## 2021-06-21 RX ADMIN — Medication 166.67 MILLIGRAM(S): at 11:22

## 2021-06-21 RX ADMIN — OXYCODONE HYDROCHLORIDE 30 MILLIGRAM(S): 5 TABLET ORAL at 04:10

## 2021-06-21 RX ADMIN — Medication 1 GRAM(S): at 06:36

## 2021-06-21 RX ADMIN — OXYCODONE HYDROCHLORIDE 30 MILLIGRAM(S): 5 TABLET ORAL at 22:45

## 2021-06-21 RX ADMIN — SIMVASTATIN 40 MILLIGRAM(S): 20 TABLET, FILM COATED ORAL at 21:34

## 2021-06-21 RX ADMIN — Medication 650 MILLIGRAM(S): at 06:44

## 2021-06-21 RX ADMIN — Medication 166.67 MILLIGRAM(S): at 00:06

## 2021-06-21 RX ADMIN — LIDOCAINE 1 PATCH: 4 CREAM TOPICAL at 19:37

## 2021-06-21 RX ADMIN — Medication 100 MILLIGRAM(S): at 21:35

## 2021-06-21 RX ADMIN — Medication 1 GRAM(S): at 17:16

## 2021-06-21 RX ADMIN — Medication 200 MICROGRAM(S): at 05:33

## 2021-06-21 RX ADMIN — Medication 250 MILLIGRAM(S): at 12:43

## 2021-06-21 RX ADMIN — CARVEDILOL PHOSPHATE 6.25 MILLIGRAM(S): 80 CAPSULE, EXTENDED RELEASE ORAL at 06:36

## 2021-06-21 RX ADMIN — GABAPENTIN 400 MILLIGRAM(S): 400 CAPSULE ORAL at 06:42

## 2021-06-21 RX ADMIN — LOSARTAN POTASSIUM 25 MILLIGRAM(S): 100 TABLET, FILM COATED ORAL at 06:36

## 2021-06-21 RX ADMIN — PANTOPRAZOLE SODIUM 40 MILLIGRAM(S): 20 TABLET, DELAYED RELEASE ORAL at 06:36

## 2021-06-21 RX ADMIN — Medication 5 MILLIGRAM(S): at 21:34

## 2021-06-21 RX ADMIN — SODIUM CHLORIDE 75 MILLILITER(S): 9 INJECTION, SOLUTION INTRAVENOUS at 15:28

## 2021-06-21 RX ADMIN — GABAPENTIN 400 MILLIGRAM(S): 400 CAPSULE ORAL at 17:16

## 2021-06-21 RX ADMIN — Medication 10 MILLIGRAM(S): at 06:36

## 2021-06-21 RX ADMIN — Medication 10 MILLIGRAM(S): at 17:16

## 2021-06-21 RX ADMIN — Medication 250 MILLIGRAM(S): at 22:46

## 2021-06-21 NOTE — PROGRESS NOTE ADULT - SUBJECTIVE AND OBJECTIVE BOX
[facility] -- Jayne Aguila, Max Chacko, Hernandez Kirkland Savella, [place]  Office # [phone]    Follow Up:  Preop Optimization    Subjective/Observations: Sleeping, comfortable on rA.  Arousable to name-calling.  Denies any respiratory or cardiac discomfort.  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    MEDICATIONS  (STANDING):  amitriptyline 100 milliGRAM(s) Oral at bedtime  baclofen 10 milliGRAM(s) Oral two times a day  carvedilol 6.25 milliGRAM(s) Oral every 12 hours  DULoxetine 30 milliGRAM(s) Oral daily  furosemide    Tablet 40 milliGRAM(s) Oral daily  gabapentin 400 milliGRAM(s) Oral two times a day  heparin   Injectable 5000 Unit(s) SubCutaneous every 8 hours  lactobacillus acidophilus 1 Tablet(s) Oral three times a day  levothyroxine 200 MICROGram(s) Oral daily  lidocaine   Patch 1 Patch Transdermal every 24 hours  loratadine 10 milliGRAM(s) Oral daily  losartan 25 milliGRAM(s) Oral daily  melatonin 5 milliGRAM(s) Oral at bedtime  multivitamin 1 Tablet(s) Oral daily  pantoprazole    Tablet 40 milliGRAM(s) Oral before breakfast  potassium chloride    Tablet ER 10 milliEquivalent(s) Oral daily  rOPINIRole 1 milliGRAM(s) Oral daily  simvastatin 40 milliGRAM(s) Oral at bedtime  sucralfate suspension 1 Gram(s) Oral two times a day  vancomycin  IVPB 1250 milliGRAM(s) IV Intermittent every 12 hours    MEDICATIONS  (PRN):  acetaminophen   Tablet .. 650 milliGRAM(s) Oral every 6 hours PRN Mild Pain (1 - 3), Moderate Pain (4 - 6)  fluticasone propionate 50 MICROgram(s)/spray Nasal Spray 1 Spray(s) Both Nostrils two times a day PRN congestion  guaiFENesin Oral Liquid (Sugar-Free) 100 milliGRAM(s) Oral once PRN Cough  oxyCODONE    IR 30 milliGRAM(s) Oral three times a day PRN Severe Pain (7 - 10)    Allergies    latex (Rash)  penicillin (Hives)    Intolerances    Vital Signs Last 24 Hrs  T(C): 36.7 (21 Jun 2021 04:16), Max: 36.7 (20 Jun 2021 12:34)  T(F): 98.1 (21 Jun 2021 04:16), Max: 98.1 (21 Jun 2021 04:16)  HR: 73 (21 Jun 2021 04:16) (71 - 81)  BP: 133/74 (21 Jun 2021 04:16) (126/74 - 136/76)  BP(mean): --  RR: 18 (21 Jun 2021 04:16) (17 - 18)  SpO2: 94% (21 Jun 2021 04:16) (93% - 95%)  I&O's Summary    20 Jun 2021 07:01  -  21 Jun 2021 07:00  --------------------------------------------------------  IN: 980 mL / OUT: 0 mL / NET: 980 mL     PHYSICAL EXAM:  TELE: Not on tele  Constitutional: NAD, awake and alert, obese  HEENT: Moist Mucous Membranes, Anicteric  Pulmonary: Non-labored, breath sounds are clear bilaterally, No wheezing, rales or rhonchi  Cardiovascular: Regular, S1 and S2, No murmurs, rubs, gallops or clicks  Gastrointestinal: Bowel Sounds present, soft, nontender.   Lymph: No peripheral edema. No lymphadenopathy.  Skin: No visible rashes.  Right 3rd toe ulcer with dressing dry and intact  Psych:  Mood & affect appropriate  LABS: All Labs Reviewed:                        11.4   5.52  )-----------( 239      ( 21 Jun 2021 08:42 )             35.9                         11.0   5.90  )-----------( 231      ( 21 Jun 2021 00:41 )             33.9                         12.0   6.24  )-----------( 234      ( 18 Jun 2021 15:28 )             38.7     21 Jun 2021 08:42    140    |  103    |  19     ----------------------------<  102    4.4     |  28     |  0.93   21 Jun 2021 00:41    140    |  104    |  19     ----------------------------<  143    4.3     |  32     |  0.84   18 Jun 2021 15:28    140    |  103    |  17     ----------------------------<  133    4.1     |  33     |  0.87     Ca    9.0        21 Jun 2021 08:42  Ca    9.1        21 Jun 2021 00:41  Ca    9.3        18 Jun 2021 15:28    TPro  7.4    /  Alb  3.4    /  TBili  0.3    /  DBili  x      /  AST  20     /  ALT  23     /  AlkPhos  98     21 Jun 2021 08:42  TPro  7.3    /  Alb  3.3    /  TBili  0.3    /  DBili  x      /  AST  17     /  ALT  29     /  AlkPhos  95     21 Jun 2021 00:41  TPro  8.3    /  Alb  3.7    /  TBili  0.4    /  DBili  x      /  AST  17     /  ALT  28     /  AlkPhos  108    18 Jun 2021 15:28       EXAM:  ECHO TTE WO CON COMP W DOPP         PROCEDURE DATE:  02/02/2021        INTERPRETATION:  INDICATION: Murmur  Sonographer KL    Blood Pressure 107/62    Height 175.3cm     Weight 102.1kg       BSA 2.17msq    Dimensions:  LA 3.4       Normal Values: 2.0 - 4.0 cm  Ao 3.2        Normal Values: 2.0 - 3.8 cm  SEPTUM 0.9       Normal Values: 0.6 - 1.2 cm  PWT 1.0       Normal Values: 0.6 - 1.1 cm  LVIDd 4.7         Normal Values: 3.0 - 5.6 cm  LVIDs 3.4         Normal Values: 1.8 - 4.0 cm    OBSERVATIONS:  Technically difficult study  Mitral Valve: normal, trace physiologic MR.  Aortic Valve/Aorta: normal trileaflet aortic valve.  Tricuspid Valve: normal with trace TR.  Pulmonic Valve: Not well-visualized  Left Atrium: normal  Right Atrium: Not well-visualized  Left Ventricle: normal LV size and systolic function, estimated LVEF of 55%. Endocardium is not well-visualized, cannot rule out segmental dysfunction  Right Ventricle: Grossly normal size and systolic function.  Pericardium/Pleura: normal, no significant pericardial effusion.  LV diastolic dysfunction is present    Conclusion:  Technically difficult study  Normal left ventricular internal dimensions and systolic function, estimated LVEF of 55%.  Grossly normal RV size and systolic function.  Normal trileaflet aortic valve, without AI.  Trace physiologic MR and TR.  No significant pericardial effusion.      BRAYAN HAMILTON MD; Attending Cardiologist  This document has been electronically signed. Feb  3 [id number]    EXAM:  XR CHEST AP OR PA 1V                          PROCEDURE DATE:  06/18/2021      INTERPRETATION:  INDICATION: Sepsis    PRIORS: 1/28/2021.    VIEWS: Portable AP radiography of the chest performed.    FINDINGS: Heart size appears within normal limits. No hilar or superior mediastinal abnormalities identified. Allowing for shallow inspiration no evidence for focal infiltrate, lobar consolidation or pulmonary edema. No pleural effusion. No pneumothorax. No mediastinal shift. No acute osseous fractures. Note is made of indwelling neurostimulator leads.    IMPRESSION: No evidence for focal infiltrate or lobar consolidation.    EDGAR VALENZUELA MD; Attending Radiologist  This document has been electronically signed. Jun 18 20214:06PM    Ventricular Rate 76 BPM    Atrial Rate 76 BPM    P-R Interval 198 ms    QRS Duration 100 ms    Q-T Interval 396 ms    QTC Calculation(Bazett) 445 ms    P Axis 61 degrees    R Axis 45 degrees    T Axis 65 degrees    Diagnosis Line Normal sinus rhythm  Normal ECG  When compared with ECG of 28-JAN-2021 16:17,  No significant change was found  Confirmed by huber Rivera ([id number]) on 6/19/2021 3:12:24 PM

## 2021-06-21 NOTE — PROGRESS NOTE ADULT - SUBJECTIVE AND OBJECTIVE BOX
[facility], Division of Infectious Diseases  YOKO Carlin Y. Patel, S. Shah  [phone]    Name: MILAN SHRESTHA  Age: 71y  Gender: Female  MRN: [id number]    Interval History:  Patient seen and examined at bedside this morning  No acute overnight events.   Notes reviewed    Antibiotics:  vancomycin  IVPB 1250 milliGRAM(s) IV Intermittent every 12 hours      Medications:  acetaminophen   Tablet .. 650 milliGRAM(s) Oral every 6 hours PRN  amitriptyline 100 milliGRAM(s) Oral at bedtime  baclofen 10 milliGRAM(s) Oral two times a day  carvedilol 6.25 milliGRAM(s) Oral every 12 hours  DULoxetine 30 milliGRAM(s) Oral daily  fluticasone propionate 50 MICROgram(s)/spray Nasal Spray 1 Spray(s) Both Nostrils two times a day PRN  furosemide    Tablet 40 milliGRAM(s) Oral daily  gabapentin 400 milliGRAM(s) Oral two times a day  guaiFENesin Oral Liquid (Sugar-Free) 100 milliGRAM(s) Oral once PRN  heparin   Injectable 5000 Unit(s) SubCutaneous every 8 hours  lactobacillus acidophilus 1 Tablet(s) Oral three times a day  levothyroxine 200 MICROGram(s) Oral daily  lidocaine   Patch 1 Patch Transdermal every 24 hours  loratadine 10 milliGRAM(s) Oral daily  losartan 25 milliGRAM(s) Oral daily  melatonin 5 milliGRAM(s) Oral at bedtime  multivitamin 1 Tablet(s) Oral daily  oxyCODONE    IR 30 milliGRAM(s) Oral three times a day PRN  pantoprazole    Tablet 40 milliGRAM(s) Oral before breakfast  potassium chloride    Tablet ER 10 milliEquivalent(s) Oral daily  rOPINIRole 1 milliGRAM(s) Oral daily  simvastatin 40 milliGRAM(s) Oral at bedtime  sucralfate suspension 1 Gram(s) Oral two times a day  vancomycin  IVPB 1250 milliGRAM(s) IV Intermittent every 12 hours      Review of Systems:  A 10-point review of systems was obtained.     Pertinent positives and negatives--  Constitutional: No fevers. No Chills. No Rigors.   Cardiovascular: No chest pain. No palpitations.  Respiratory: No shortness of breath. No cough.  Gastrointestinal: No nausea or vomiting. No diarrhea or constipation.   Psychiatric: Pleasant. Appropriate affect.    Review of systems otherwise negative except as previously noted.    Allergies: latex (Rash)  penicillin (Hives)    For details regarding the patient's past medical history, social history, family history, and other miscellaneous elements, please refer the initial infectious diseases consultation and/or the admitting history and physical examination for this admission.    Objective:  Vitals:   T(C): 36.7 (06-21-21 @ 04:16), Max: 36.7 (06-20-21 @ 12:34)  HR: 73 (06-21-21 @ 04:16) (71 - 81)  BP: 133/74 (06-21-21 @ 04:16) (126/74 - 136/76)  RR: 18 (06-21-21 @ 04:16) (17 - 18)  SpO2: 94% (06-21-21 @ 04:16) (93% - 95%)    Physical Examination:  General: no acute distress  HEENT: NC/AT, EOMI, anicteric, no oral lesions  Neck: supple, no palpable LAD  Cardio: S1, S2 heard, RRR, no murmurs  Resp: breath sounds heard bilaterally, no rales, wheezes or rhonchi  Abd: soft, NT, ND, + bowel sounds  Neuro: no obvious focal deficits  Ext: no edema or cyanosis  Skin: warm, dry, no visible rash      Laboratory Studies:  CBC:                       11.4   5.52  )-----------( 239      ( 21 Jun 2021 08:42 )             35.9     CMP: 06-21    140  |  103  |  19  ----------------------------<  102<H>  4.4   |  28  |  0.93    Ca    9.0      21 Jun 2021 08:42    TPro  7.4  /  Alb  3.4  /  TBili  0.3  /  DBili  x   /  AST  20  /  ALT  23  /  AlkPhos  98  06-21    LIVER FUNCTIONS - ( 21 Jun 2021 08:42 )  Alb: 3.4 g/dL / Pro: 7.4 g/dL / ALK PHOS: 98 U/L / ALT: 23 U/L / AST: 20 U/L / GGT: x               Microbiology: reviewed    Culture - Urine (collected 06-19-21 @ 00:34)  Source: .Urine Clean Catch (Midstream)  Final Report (06-19-21 @ 20:33):    <10,000 CFU/mL Normal Urogenital Dom    Culture - Other (collected 06-18-21 @ 21:15)  Source: .Other right 3rd digit  Final Report (06-20-21 @ 15:01):    Numerous Staphylococcus aureus    Normal skin dom isolated  Organism: Staphylococcus aureus (06-20-21 @ 15:01)  Organism: Staphylococcus aureus (06-20-21 @ 15:01)      -  Ampicillin/Sulbactam: S <=8/4      -  Cefazolin: S <=4      -  Clindamycin: S <=0.25      -  Erythromycin: S <=0.25      -  Gentamicin: S <=1 Should not be used as monotherapy      -  Oxacillin: S <=0.25      -  Penicillin: R 4      -  RIF- Rifampin: S <=1 Should not be used as monotherapy      -  Tetra/Doxy: S <=1      -  Trimethoprim/Sulfamethoxazole: S <=0.5/9.5      -  Vancomycin: S 1      Method Type: LILIANE    Culture - Blood (collected 06-18-21 @ 21:12)  Source: .Blood Blood-Peripheral  Gram Stain (06-19-21 @ 21:31):    Growth in aerobic bottle: Gram Positive Cocci in Clusters  Final Report (06-20-21 @ 19:17):    Growth in aerobic bottle: Staphylococcus hominis    Coag Negative Staphylococcus    Single set isolate, possible contaminant. Contact    Microbiology if susceptibility testing clinically    indicated.    ***Blood Panel PCR results on this specimen are available    approximately 3 hours after the Gram stain result.***    Gram stain, PCR, and/or culture results may not always    correspond due to difference in methodologies.    ************************************************************    This PCR assay was performed by multiplex PCR. This    Assay tests for 66 bacterial and resistance gene targets.    Please refer to the [facility] test directory    at [email or web address] for details.  Organism: Blood Culture PCR (06-20-21 @ 19:17)  Organism: Blood Culture PCR (06-20-21 @ 19:17)      -  Coagulase negative Staphylococcus, Methicillin resistant: Detec      Method Type: PCR    Culture - Blood (collected 06-18-21 @ 21:12)  Source: .Blood Blood-Peripheral  Preliminary Report (06-19-21 @ 22:01):    No growth to date.        Radiology: reviewed       [facility], Division of Infectious Diseases  YOKO Carlin Y. Patel, S. Shah  [phone]    Name: MILAN SHRESTHA  Age: 71y  Gender: Female  MRN: [id number]    Interval History:  Patient seen and examined at bedside this morning  No acute overnight events. Afebrile  Planned for the OR today  Notes reviewed    Antibiotics:  vancomycin  IVPB 1250 milliGRAM(s) IV Intermittent every 12 hours      Medications:  acetaminophen   Tablet .. 650 milliGRAM(s) Oral every 6 hours PRN  amitriptyline 100 milliGRAM(s) Oral at bedtime  baclofen 10 milliGRAM(s) Oral two times a day  carvedilol 6.25 milliGRAM(s) Oral every 12 hours  DULoxetine 30 milliGRAM(s) Oral daily  fluticasone propionate 50 MICROgram(s)/spray Nasal Spray 1 Spray(s) Both Nostrils two times a day PRN  furosemide    Tablet 40 milliGRAM(s) Oral daily  gabapentin 400 milliGRAM(s) Oral two times a day  guaiFENesin Oral Liquid (Sugar-Free) 100 milliGRAM(s) Oral once PRN  heparin   Injectable 5000 Unit(s) SubCutaneous every 8 hours  lactobacillus acidophilus 1 Tablet(s) Oral three times a day  levothyroxine 200 MICROGram(s) Oral daily  lidocaine   Patch 1 Patch Transdermal every 24 hours  loratadine 10 milliGRAM(s) Oral daily  losartan 25 milliGRAM(s) Oral daily  melatonin 5 milliGRAM(s) Oral at bedtime  multivitamin 1 Tablet(s) Oral daily  oxyCODONE    IR 30 milliGRAM(s) Oral three times a day PRN  pantoprazole    Tablet 40 milliGRAM(s) Oral before breakfast  potassium chloride    Tablet ER 10 milliEquivalent(s) Oral daily  rOPINIRole 1 milliGRAM(s) Oral daily  simvastatin 40 milliGRAM(s) Oral at bedtime  sucralfate suspension 1 Gram(s) Oral two times a day  vancomycin  IVPB 1250 milliGRAM(s) IV Intermittent every 12 hours      Review of Systems:  A 10-point review of systems was obtained.     Pertinent positives and negatives--  Constitutional: No fevers. No Chills. No Rigors.   Cardiovascular: No chest pain. No palpitations.  Respiratory: No shortness of breath. No cough.  Gastrointestinal: No nausea or vomiting. No diarrhea or constipation.   Psychiatric: Pleasant. Appropriate affect.    Review of systems otherwise negative except as previously noted.    Allergies: latex (Rash)  penicillin (Hives)    For details regarding the patient's past medical history, social history, family history, and other miscellaneous elements, please refer the initial infectious diseases consultation and/or the admitting history and physical examination for this admission.    Objective:  Vitals:   T(C): 36.7 (06-21-21 @ 04:16), Max: 36.7 (06-20-21 @ 12:34)  HR: 73 (06-21-21 @ 04:16) (71 - 81)  BP: 133/74 (06-21-21 @ 04:16) (126/74 - 136/76)  RR: 18 (06-21-21 @ 04:16) (17 - 18)  SpO2: 94% (06-21-21 @ 04:16) (93% - 95%)    Physical Examination:  General: no acute distress  HEENT: NC/AT, EOMI, anicteric, no oral lesions  Neck: supple, no palpable LAD  Cardio: S1, S2 heard, RRR, no murmurs  Resp: breath sounds heard bilaterally, no rales, wheezes or rhonchi  Abd: soft, NT, ND, + bowel sounds  Neuro: no obvious focal deficits  Ext: no edema or cyanosis  Skin: warm, dry, no visible rash      Laboratory Studies:  CBC:                       11.4   5.52  )-----------( 239      ( 21 Jun 2021 08:42 )             35.9     CMP: 06-21    140  |  103  |  19  ----------------------------<  102<H>  4.4   |  28  |  0.93    Ca    9.0      21 Jun 2021 08:42    TPro  7.4  /  Alb  3.4  /  TBili  0.3  /  DBili  x   /  AST  20  /  ALT  23  /  AlkPhos  98  06-21    LIVER FUNCTIONS - ( 21 Jun 2021 08:42 )  Alb: 3.4 g/dL / Pro: 7.4 g/dL / ALK PHOS: 98 U/L / ALT: 23 U/L / AST: 20 U/L / GGT: x               Microbiology: reviewed    Culture - Urine (collected 06-19-21 @ 00:34)  Source: .Urine Clean Catch (Midstream)  Final Report (06-19-21 @ 20:33):    <10,000 CFU/mL Normal Urogenital Dom    Culture - Other (collected 06-18-21 @ 21:15)  Source: .Other right 3rd digit  Final Report (06-20-21 @ 15:01):    Numerous Staphylococcus aureus    Normal skin dom isolated  Organism: Staphylococcus aureus (06-20-21 @ 15:01)  Organism: Staphylococcus aureus (06-20-21 @ 15:01)      -  Ampicillin/Sulbactam: S <=8/4      -  Cefazolin: S <=4      -  Clindamycin: S <=0.25      -  Erythromycin: S <=0.25      -  Gentamicin: S <=1 Should not be used as monotherapy      -  Oxacillin: S <=0.25      -  Penicillin: R 4      -  RIF- Rifampin: S <=1 Should not be used as monotherapy      -  Tetra/Doxy: S <=1      -  Trimethoprim/Sulfamethoxazole: S <=0.5/9.5      -  Vancomycin: S 1      Method Type: LILIANE    Culture - Blood (collected 06-18-21 @ 21:12)  Source: .Blood Blood-Peripheral  Gram Stain (06-19-21 @ 21:31):    Growth in aerobic bottle: Gram Positive Cocci in Clusters  Final Report (06-20-21 @ 19:17):    Growth in aerobic bottle: Staphylococcus hominis    Coag Negative Staphylococcus    Single set isolate, possible contaminant. Contact    Microbiology if susceptibility testing clinically    indicated.    ***Blood Panel PCR results on this specimen are available    approximately 3 hours after the Gram stain result.***    Gram stain, PCR, and/or culture results may not always    correspond due to difference in methodologies.    ************************************************************    This PCR assay was performed by multiplex PCR. This    Assay tests for 66 bacterial and resistance gene targets.    Please refer to the [facility] test directory    at [email or web address] for details.  Organism: Blood Culture PCR (06-20-21 @ 19:17)  Organism: Blood Culture PCR (06-20-21 @ 19:17)      -  Coagulase negative Staphylococcus, Methicillin resistant: Detec      Method Type: PCR    Culture - Blood (collected 06-18-21 @ 21:12)  Source: .Blood Blood-Peripheral  Preliminary Report (06-19-21 @ 22:01):    No growth to date.        Radiology: reviewed

## 2021-06-21 NOTE — PROGRESS NOTE ADULT - ATTENDING COMMENTS
I personally saw and examined the patient in detail.  I have spoken to the above provider regarding the assessment and plan of care.  I reviewed the above assessment and plan of care, and agree.  I have made changes in the body of the note where appropriate.  Optimized for the OR from a cardiac point of view with no evidence of active ischemic heart disease, decompensated heart failure, severe obstructive valvular disease, or uncontrolled arrhythmia.

## 2021-06-21 NOTE — PROGRESS NOTE ADULT - ASSESSMENT
Patient is a 71 year old female with PMH of neuropathy, spinal stenosis, HTN, HLD, hypothyroidism who was sent by Dr. Gray due to right 3rd toe infection.    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, neuropathy   - R foot x-ray with digit soft tissue swelling, mild foot charcot joint, no evidence of OM   - LE DVT study negative    - Vascular surgery following, plan for angiogram likely Tuesday   - Podiatry following - booked for 6/21 for partial 3rd ray amputation - please send bone cx and biopsy    - follow bone scan to r/o OM (unable to get MRI d/t spinal stimulator)   - blood culture 1/2 with CoNS - likely contaminant    - follow repeat blood cultures 6/20 - pending   - wound culture from R toe with MSSA   - d/c vancomycin   -    - monitor temps and WBC    Infectious Diseases will continue to follow. Please call with any questions.   Angeli Garcia M.D.  [facility], Division of Infectious Diseases [phone]  For over the weekend and after hours, please call [phone]     Patient is a 71 year old female with PMH of neuropathy, spinal stenosis, HTN, HLD, hypothyroidism who was sent by Dr. Gray due to right 3rd toe infection.    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, neuropathy   - R foot x-ray with digit soft tissue swelling, mild foot charcot joint, no evidence of OM   - LE DVT study negative    - Vascular surgery following, plan for angiogram likely Tuesday   - Podiatry following - booked for 6/21 for partial 3rd ray amputation - please send bone cx and biopsy    - follow bone scan to r/o OM (unable to get MRI d/t spinal stimulator)   - blood culture 1/2 with CoNS - likely contaminant    - follow repeat blood cultures 6/20 - pending   - wound culture from R toe with MSSA   - c/w vancomycin, goal trough 15-20 per pharmacy dosing protocol   - monitor temps and WBC    Penicillin Allergy  - reviewed w/ pt--doesnt recall, will ask her older sister and let us know    Infectious Diseases will continue to follow. Please call with any questions.   Angeli Garcia M.D.  [facility], Division of Infectious Diseases [phone]  For over the weekend and after hours, please call [phone]

## 2021-06-21 NOTE — PROGRESS NOTE ADULT - SUBJECTIVE AND OBJECTIVE BOX
SURGERY  Spectralink [id number]    Pt seen and examined. Pt denies any overnight events. Pt scheduled to undergo debridement of Right 3rd toe ulcer    T(C): 36.6 (06-21-21 @ 14:43), Max: 36.7 (06-20-21 @ 20:57)  HR: 63 (06-21-21 @ 14:58) (63 - 81)  BP: 109/47 (06-21-21 @ 14:58) (101/52 - 147/77)  RR: 12 (06-21-21 @ 14:58) (12 - 18)  SpO2: 94% (06-21-21 @ 14:58) (94% - 100%)  Wt(kg): --  ICU Vital Signs Last 24 Hrs  T(C): 36.6 (21 Jun 2021 14:43), Max: 36.7 (20 Jun 2021 20:57)  T(F): 97.9 (21 Jun 2021 14:43), Max: 98.1 (21 Jun 2021 04:16)  HR: 63 (21 Jun 2021 14:58) (63 - 81)  BP: 109/47 (21 Jun 2021 14:58) (101/52 - 147/77)  BP(mean): --  ABP: --  ABP(mean): --  RR: 12 (21 Jun 2021 14:58) (12 - 18)  SpO2: 94% (21 Jun 2021 14:58) (94% - 100%)    CAPILLARY BLOOD GLUCOSE        I&O's Detail    20 Jun 2021 07:01  -  21 Jun 2021 07:00  --------------------------------------------------------  IN:    IV PiggyBack: 250 mL    Oral Fluid: 730 mL  Total IN: 980 mL    OUT:  Total OUT: 0 mL    Total NET: 980 mL    Physical exam: Pt sitting comfortably in bed in NAD  RLE- diffuse pedal edema  Faintly palpable DP pulse  Sensation intact to light touch  5/5-TA/GS/EHL    LABS:                        11.4   5.52  )-----------( 239      ( 21 Jun 2021 08:42 )             35.9     06-21    140  |  103  |  19  ----------------------------<  102<H>  4.4   |  28  |  0.93    Ca    9.0      21 Jun 2021 08:42    TPro  7.4  /  Alb  3.4  /  TBili  0.3  /  DBili  x   /  AST  20  /  ALT  23  /  AlkPhos  98  06-21          Culture - Blood (collected 20 Jun 2021 11:19)  Source: .Blood Blood-Peripheral  Preliminary Report (21 Jun 2021 12:01):    No growth to date.    Culture - Blood (collected 20 Jun 2021 11:19)  Source: .Blood Blood-Peripheral  Preliminary Report (21 Jun 2021 12:01):    No growth to date.    Culture - Urine (collected 19 Jun 2021 00:34)  Source: .Urine Clean Catch (Midstream)  Final Report (19 Jun 2021 20:33):    <10,000 CFU/mL Normal Urogenital Dom    Culture - Other (collected 18 Jun 2021 21:15)  Source: .Other right 3rd digit  Final Report (20 Jun 2021 15:01):    Numerous Staphylococcus aureus    Normal skin dom isolated  Organism: Staphylococcus aureus (20 Jun 2021 15:01)  Organism: Staphylococcus aureus (20 Jun 2021 15:01)    Culture - Blood (collected 18 Jun 2021 21:12)  Source: .Blood Blood-Peripheral  Gram Stain (19 Jun 2021 21:31):    Growth in aerobic bottle: Gram Positive Cocci in Clusters  Final Report (20 Jun 2021 19:17):    Growth in aerobic bottle: Staphylococcus hominis    Coag Negative Staphylococcus    Single set isolate, possible contaminant. Contact    Microbiology if susceptibility testing clinically    indicated.    ***Blood Panel PCR results on this specimen are available    approximately 3 hours after the Gram stain result.***    Gram stain, PCR, and/or culture results may not always    correspond due to difference in methodologies.    ************************************************************    This PCR assay was performed by multiplex PCR. This    Assay tests for 66 bacterial and resistance gene targets.    Please refer to the [facility] test directory    at [email or web address] for details.  Organism: Blood Culture PCR (20 Jun 2021 19:17)  Organism: Blood Culture PCR (20 Jun 2021 19:17)    Culture - Blood (collected 18 Jun 2021 21:12)  Source: .Blood Blood-Peripheral  Preliminary Report (19 Jun 2021 22:01):    No growth to date.    RADIOLOGY & ADDITIONAL STUDIES:    72 YO female sent by Dr. Gray due to right 3rd toe infection    -Awaiting arterial studies prior to podiatric intervention, however vascular studies not completed today and to be completed tomorrow, pt already underwent amputation of 3rd toe per podiatry  -Continue IV Antibiotics  -Continue Incentive Spirometry  -Continue analgesia  --f/u arterial dopler 6/22  -Above discussed with Dr. Osman

## 2021-06-21 NOTE — PROGRESS NOTE ADULT - SUBJECTIVE AND OBJECTIVE BOX
Patient is a 71y old  Female who presents with a chief complaint of Right foot cellulitis. (21 Jun 2021 15:52)      INTERVAL /OVERNIGHT EVENTS: denies pain    MEDICATIONS  (STANDING):  amitriptyline 100 milliGRAM(s) Oral at bedtime  baclofen 10 milliGRAM(s) Oral two times a day  carvedilol 6.25 milliGRAM(s) Oral every 12 hours  DULoxetine 30 milliGRAM(s) Oral daily  furosemide    Tablet 40 milliGRAM(s) Oral daily  gabapentin 400 milliGRAM(s) Oral two times a day  lactobacillus acidophilus 1 Tablet(s) Oral three times a day  levothyroxine 200 MICROGram(s) Oral daily  lidocaine   Patch 1 Patch Transdermal every 24 hours  loratadine 10 milliGRAM(s) Oral daily  losartan 25 milliGRAM(s) Oral daily  melatonin 5 milliGRAM(s) Oral at bedtime  multivitamin 1 Tablet(s) Oral daily  pantoprazole    Tablet 40 milliGRAM(s) Oral before breakfast  potassium chloride    Tablet ER 10 milliEquivalent(s) Oral daily  rOPINIRole 1 milliGRAM(s) Oral daily  simvastatin 40 milliGRAM(s) Oral at bedtime  sucralfate suspension 1 Gram(s) Oral two times a day  vancomycin  IVPB 1000 milliGRAM(s) IV Intermittent every 12 hours    MEDICATIONS  (PRN):  fluticasone propionate 50 MICROgram(s)/spray Nasal Spray 1 Spray(s) Both Nostrils two times a day PRN congestion  guaiFENesin Oral Liquid (Sugar-Free) 100 milliGRAM(s) Oral once PRN Cough  oxyCODONE    IR 30 milliGRAM(s) Oral three times a day PRN Severe Pain (7 - 10)      Allergies    latex (Rash)  penicillin (Hives)    Intolerances        REVIEW OF SYSTEMS:  CONSTITUTIONAL: No fever, weight loss, or fatigue  EYES: No eye pain, visual disturbances, or discharge  ENMT:  No difficulty hearing, tinnitus, vertigo; No sinus or throat pain  NECK: No pain or stiffness  RESPIRATORY: No cough, wheezing, chills or hemoptysis; No shortness of breath  CARDIOVASCULAR: No chest pain, palpitations, dizziness, or leg swelling  GASTROINTESTINAL: No abdominal or epigastric pain. No nausea, vomiting, or hematemesis; No diarrhea or constipation. No melena or hematochezia.  GENITOURINARY: No dysuria, frequency, hematuria, or incontinence  NEUROLOGICAL: No headaches, memory loss, loss of strength, numbness, or tremors  SKIN: No itching, burning, rashes, or lesions   LYMPH NODES: No enlarged glands  ENDOCRINE: No heat or cold intolerance; No hair loss; No polydipsia or polyuria  MUSCULOSKELETAL: No joint pain or swelling; No muscle, back, or extremity pain  PSYCHIATRIC: No depression, anxiety, mood swings, or difficulty sleeping  HEME/LYMPH: No easy bruising, or bleeding gums  ALLERGY AND IMMUNOLOGIC: No hives or eczema    Vital Signs Last 24 Hrs  T(C): 36.6 (21 Jun 2021 16:11), Max: 36.7 (20 Jun 2021 20:57)  T(F): 97.9 (21 Jun 2021 16:11), Max: 98.1 (21 Jun 2021 04:16)  HR: 68 (21 Jun 2021 16:11) (63 - 81)  BP: 111/66 (21 Jun 2021 16:11) (101/52 - 147/77)  BP(mean): --  RR: 11 (21 Jun 2021 16:11) (11 - 18)  SpO2: 95% (21 Jun 2021 16:11) (94% - 100%)    PHYSICAL EXAM:  GENERAL: NAD, well-groomed, well-developed  HEAD:  Atraumatic, Normocephalic  EYES: EOMI, PERRLA, conjunctiva and sclera clear  ENMT: No tonsillar erythema, exudates, or enlargement; Moist mucous membranes, Good dentition, No lesions  NECK: Supple, No JVD, Normal thyroid  NERVOUS SYSTEM:  Alert & Oriented X3, Good concentration; Motor Strength 5/5 B/L upper and lower extremities; DTRs 2+ intact and symmetric  CHEST/LUNG: Clear to auscultation bilaterally; No rales, rhonchi, wheezing, or rubs  HEART: Regular rate and rhythm; No murmurs, rubs, or gallops  ABDOMEN: Soft, Nontender, Nondistended; Bowel sounds present  EXTREMITIES:  2+ Peripheral Pulses, No clubbing, cyanosis, or edema  LYMPH: No lymphadenopathy noted  SKIN: No rashes or lesions    LABS:                        11.4   5.52  )-----------( 239      ( 21 Jun 2021 08:42 )             35.9     21 Jun 2021 08:42    140    |  103    |  19     ----------------------------<  102    4.4     |  28     |  0.93     Ca    9.0        21 Jun 2021 08:42    TPro  7.4    /  Alb  3.4    /  TBili  0.3    /  DBili  x      /  AST  20     /  ALT  23     /  AlkPhos  98     21 Jun 2021 08:42        CAPILLARY BLOOD GLUCOSE          RADIOLOGY & ADDITIONAL TESTS:    Notes Reviewed:  [x ] YES  [ ] NO    Care Discussed with Consultants/Other Providers [x ] YES  [ ] NO

## 2021-06-21 NOTE — PROGRESS NOTE ADULT - ATTENDING COMMENTS
Patient evaluated at the bedside. Awaiting Non invasive vascular studies to give final recommendations.

## 2021-06-21 NOTE — PROGRESS NOTE ADULT - ASSESSMENT
71 year old female with HTN, HLD, neuropathy, and spinal stenosis sp partial ray resection of second toe January 2021 now presenting Right toe ulcer 3rd digit    Right 3rd toe ulcer  - Planned for Right 3rd digit partial resection today  - Planned angiogram in am  - No signs of ischemia, ADHF, clinical exam not consistent with severe stenotic valvular disease, no unstable arrhythmias noted.  She remains stable to proceed with this low risk podiatry surgery without any further cardiac workup. Routine hemodynamic monitoring is recommended   - Pain  control     HTN  - BP stable at systolic 120-130  - Continue home BB, ARB, and Lasix  - Echo 2/2021 revealing normal LV systolic function ef 55%, trace MR, trace TR     HLD  - Resume home statin    DVT ppx  - Per Primary    Monitor and replete electrolytes. Keep K>4.0 and Mg>2.0.    Will continue to follow    Olga Lidia Garcia DNP, NP-C  Cardiology   Spectra #[phone]

## 2021-06-22 ENCOUNTER — TRANSCRIPTION ENCOUNTER (OUTPATIENT)
Age: 71
End: 2021-06-22

## 2021-06-22 LAB
GRAM STN FLD: SIGNIFICANT CHANGE UP
SPECIMEN SOURCE: SIGNIFICANT CHANGE UP
VANCOMYCIN TROUGH SERPL-MCNC: 21.6 UG/ML — HIGH (ref 10–20)

## 2021-06-22 PROCEDURE — 93926 LOWER EXTREMITY STUDY: CPT | Mod: 26,RT

## 2021-06-22 PROCEDURE — 99222 1ST HOSP IP/OBS MODERATE 55: CPT

## 2021-06-22 PROCEDURE — 99231 SBSQ HOSP IP/OBS SF/LOW 25: CPT

## 2021-06-22 PROCEDURE — 99232 SBSQ HOSP IP/OBS MODERATE 35: CPT

## 2021-06-22 PROCEDURE — 93922 UPR/L XTREMITY ART 2 LEVELS: CPT | Mod: 26

## 2021-06-22 RX ORDER — FLUTICASONE PROPIONATE 50 MCG
1 SPRAY, SUSPENSION NASAL
Qty: 0 | Refills: 0 | DISCHARGE
Start: 2021-06-22

## 2021-06-22 RX ORDER — CEFAZOLIN SODIUM 1 G
2000 VIAL (EA) INJECTION ONCE
Refills: 0 | Status: COMPLETED | OUTPATIENT
Start: 2021-06-22 | End: 2021-06-22

## 2021-06-22 RX ORDER — BENZOCAINE AND MENTHOL 5; 1 G/100ML; G/100ML
1 LIQUID ORAL
Refills: 0 | Status: DISCONTINUED | OUTPATIENT
Start: 2021-06-22 | End: 2021-06-24

## 2021-06-22 RX ORDER — LORATADINE 10 MG/1
1 TABLET ORAL
Qty: 0 | Refills: 0 | DISCHARGE
Start: 2021-06-22

## 2021-06-22 RX ORDER — ACETAMINOPHEN 500 MG
650 TABLET ORAL ONCE
Refills: 0 | Status: COMPLETED | OUTPATIENT
Start: 2021-06-22 | End: 2021-06-22

## 2021-06-22 RX ORDER — CEFAZOLIN SODIUM 1 G
2000 VIAL (EA) INJECTION EVERY 8 HOURS
Refills: 0 | Status: DISCONTINUED | OUTPATIENT
Start: 2021-06-22 | End: 2021-06-24

## 2021-06-22 RX ORDER — CEFAZOLIN SODIUM 1 G
VIAL (EA) INJECTION
Refills: 0 | Status: DISCONTINUED | OUTPATIENT
Start: 2021-06-22 | End: 2021-06-24

## 2021-06-22 RX ORDER — HEPARIN SODIUM 5000 [USP'U]/ML
5000 INJECTION INTRAVENOUS; SUBCUTANEOUS EVERY 12 HOURS
Refills: 0 | Status: DISCONTINUED | OUTPATIENT
Start: 2021-06-22 | End: 2021-06-24

## 2021-06-22 RX ADMIN — LIDOCAINE 1 PATCH: 4 CREAM TOPICAL at 19:35

## 2021-06-22 RX ADMIN — LORATADINE 10 MILLIGRAM(S): 10 TABLET ORAL at 11:55

## 2021-06-22 RX ADMIN — BENZOCAINE AND MENTHOL 1 LOZENGE: 5; 1 LIQUID ORAL at 21:21

## 2021-06-22 RX ADMIN — Medication 10 MILLIGRAM(S): at 17:26

## 2021-06-22 RX ADMIN — ROPINIROLE 1 MILLIGRAM(S): 8 TABLET, FILM COATED, EXTENDED RELEASE ORAL at 11:55

## 2021-06-22 RX ADMIN — Medication 100 MILLIGRAM(S): at 21:25

## 2021-06-22 RX ADMIN — CARVEDILOL PHOSPHATE 6.25 MILLIGRAM(S): 80 CAPSULE, EXTENDED RELEASE ORAL at 17:24

## 2021-06-22 RX ADMIN — Medication 200 MICROGRAM(S): at 06:29

## 2021-06-22 RX ADMIN — GABAPENTIN 400 MILLIGRAM(S): 400 CAPSULE ORAL at 17:23

## 2021-06-22 RX ADMIN — Medication 40 MILLIGRAM(S): at 06:29

## 2021-06-22 RX ADMIN — DULOXETINE HYDROCHLORIDE 30 MILLIGRAM(S): 30 CAPSULE, DELAYED RELEASE ORAL at 11:55

## 2021-06-22 RX ADMIN — Medication 100 MILLIGRAM(S): at 11:54

## 2021-06-22 RX ADMIN — Medication 5 MILLIGRAM(S): at 21:23

## 2021-06-22 RX ADMIN — PANTOPRAZOLE SODIUM 40 MILLIGRAM(S): 20 TABLET, DELAYED RELEASE ORAL at 06:29

## 2021-06-22 RX ADMIN — Medication 10 MILLIGRAM(S): at 06:29

## 2021-06-22 RX ADMIN — CARVEDILOL PHOSPHATE 6.25 MILLIGRAM(S): 80 CAPSULE, EXTENDED RELEASE ORAL at 06:29

## 2021-06-22 RX ADMIN — GABAPENTIN 400 MILLIGRAM(S): 400 CAPSULE ORAL at 06:31

## 2021-06-22 RX ADMIN — Medication 1 TABLET(S): at 06:29

## 2021-06-22 RX ADMIN — LIDOCAINE 1 PATCH: 4 CREAM TOPICAL at 17:24

## 2021-06-22 RX ADMIN — Medication 1 TABLET(S): at 11:55

## 2021-06-22 RX ADMIN — Medication 10 MILLIEQUIVALENT(S): at 11:55

## 2021-06-22 RX ADMIN — Medication 1 TABLET(S): at 13:51

## 2021-06-22 RX ADMIN — SIMVASTATIN 40 MILLIGRAM(S): 20 TABLET, FILM COATED ORAL at 21:22

## 2021-06-22 RX ADMIN — Medication 1 TABLET(S): at 21:22

## 2021-06-22 RX ADMIN — Medication 100 MILLIGRAM(S): at 21:23

## 2021-06-22 RX ADMIN — Medication 1 GRAM(S): at 06:29

## 2021-06-22 RX ADMIN — LOSARTAN POTASSIUM 25 MILLIGRAM(S): 100 TABLET, FILM COATED ORAL at 06:29

## 2021-06-22 RX ADMIN — LIDOCAINE 1 PATCH: 4 CREAM TOPICAL at 05:00

## 2021-06-22 RX ADMIN — Medication 1 GRAM(S): at 17:25

## 2021-06-22 RX ADMIN — HEPARIN SODIUM 5000 UNIT(S): 5000 INJECTION INTRAVENOUS; SUBCUTANEOUS at 17:24

## 2021-06-22 NOTE — PROGRESS NOTE ADULT - SUBJECTIVE AND OBJECTIVE BOX
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    MEDICATIONS  (STANDING):  amitriptyline 100 milliGRAM(s) Oral at bedtime  baclofen 10 milliGRAM(s) Oral two times a day  carvedilol 6.25 milliGRAM(s) Oral every 12 hours  DULoxetine 30 milliGRAM(s) Oral daily  furosemide    Tablet 40 milliGRAM(s) Oral daily  gabapentin 400 milliGRAM(s) Oral two times a day  lactobacillus acidophilus 1 Tablet(s) Oral three times a day  levothyroxine 200 MICROGram(s) Oral daily  lidocaine   Patch 1 Patch Transdermal every 24 hours  loratadine 10 milliGRAM(s) Oral daily  losartan 25 milliGRAM(s) Oral daily  melatonin 5 milliGRAM(s) Oral at bedtime  multivitamin 1 Tablet(s) Oral daily  pantoprazole    Tablet 40 milliGRAM(s) Oral before breakfast  potassium chloride    Tablet ER 10 milliEquivalent(s) Oral daily  rOPINIRole 1 milliGRAM(s) Oral daily  simvastatin 40 milliGRAM(s) Oral at bedtime  sucralfate suspension 1 Gram(s) Oral two times a day  vancomycin  IVPB 1000 milliGRAM(s) IV Intermittent every 12 hours    MEDICATIONS  (PRN):  fluticasone propionate 50 MICROgram(s)/spray Nasal Spray 1 Spray(s) Both Nostrils two times a day PRN congestion  guaiFENesin Oral Liquid (Sugar-Free) 100 milliGRAM(s) Oral once PRN Cough  oxyCODONE    IR 30 milliGRAM(s) Oral three times a day PRN Severe Pain (7 - 10)    Allergies    latex (Rash)  penicillin (Hives)    Intolerances      Vital Signs Last 24 Hrs  T(C): 36.6 (22 Jun 2021 05:05), Max: 36.6 (21 Jun 2021 14:43)  T(F): 97.9 (22 Jun 2021 05:05), Max: 97.9 (21 Jun 2021 14:43)  HR: 72 (22 Jun 2021 05:05) (63 - 75)  BP: 135/79 (22 Jun 2021 05:05) (101/52 - 147/77)  BP(mean): --  RR: 16 (22 Jun 2021 05:05) (11 - 17)  SpO2: 91% (22 Jun 2021 05:05) (91% - 100%)  I&O's Summary    21 Jun 2021 07:01  -  22 Jun 2021 07:00  --------------------------------------------------------  IN: 565 mL / OUT: 0 mL / NET: 565 mL      Weight (kg): 104.3 (06-21 @ 13:45)    PHYSICAL EXAM:  TELE: Not on tele  Constitutional: NAD, awake and alert, obese  HEENT: Moist Mucous Membranes, Anicteric  Pulmonary: Non-labored, breath sounds are clear bilaterally, No wheezing, rales or rhonchi  Cardiovascular: Regular, S1 and S2, No murmurs, rubs, gallops or clicks  Gastrointestinal: Bowel Sounds present, soft, nontender.   Lymph: No peripheral edema. No lymphadenopathy.  Skin: No visible rashes.  Right foot with dressing dry and intact  Psych:  Mood & affect appropriate      LABS: All Labs Reviewed:                        11.4   5.52  )-----------( 239      ( 21 Jun 2021 08:42 )             35.9                         11.0   5.90  )-----------( 231      ( 21 Jun 2021 00:41 )             33.9     21 Jun 2021 08:42    140    |  103    |  19     ----------------------------<  102    4.4     |  28     |  0.93   21 Jun 2021 00:41    140    |  104    |  19     ----------------------------<  143    4.3     |  32     |  0.84     Ca    9.0        21 Jun 2021 08:42  Ca    9.1        21 Jun 2021 00:41    TPro  7.4    /  Alb  3.4    /  TBili  0.3    /  DBili  x      /  AST  20     /  ALT  23     /  AlkPhos  98     21 Jun 2021 08:42  TPro  7.3    /  Alb  3.3    /  TBili  0.3    /  DBili  x      /  AST  17     /  ALT  29     /  AlkPhos  95     21 Jun 2021 00:41     EXAM:  ECHO TTE WO CON COMP W DOPP         PROCEDURE DATE:  02/02/2021        INTERPRETATION:  INDICATION: Murmur  Sonographer KL    Blood Pressure 107/62    Height 175.3cm     Weight 102.1kg       BSA 2.17msq    Dimensions:  LA 3.4       Normal Values: 2.0 - 4.0 cm  Ao 3.2        Normal Values: 2.0 - 3.8 cm  SEPTUM 0.9       Normal Values: 0.6 - 1.2 cm  PWT 1.0       Normal Values: 0.6 - 1.1 cm  LVIDd 4.7         Normal Values: 3.0 - 5.6 cm  LVIDs 3.4         Normal Values: 1.8 - 4.0 cm    OBSERVATIONS:  Technically difficult study  Mitral Valve: normal, trace physiologic MR.  Aortic Valve/Aorta: normal trileaflet aortic valve.  Tricuspid Valve: normal with trace TR.  Pulmonic Valve: Not well-visualized  Left Atrium: normal  Right Atrium: Not well-visualized  Left Ventricle: normal LV size and systolic function, estimated LVEF of 55%. Endocardium is not well-visualized, cannot rule out segmental dysfunction  Right Ventricle: Grossly normal size and systolic function.  Pericardium/Pleura: normal, no significant pericardial effusion.  LV diastolic dysfunction is present    Conclusion:  Technically difficult study  Normal left ventricular internal dimensions and systolic function, estimated LVEF of 55%.  Grossly normal RV size and systolic function.  Normal trileaflet aortic valve, without AI.  Trace physiologic MR and TR.  No significant pericardial effusion.      BRAYAN HAMILTON MD; Attending Cardiologist  This document has been electronically signed. Feb  3 [id number]    EXAM:  XR CHEST AP OR PA 1V                          PROCEDURE DATE:  06/18/2021      INTERPRETATION:  INDICATION: Sepsis    PRIORS: 1/28/2021.    VIEWS: Portable AP radiography of the chest performed.    FINDINGS: Heart size appears within normal limits. No hilar or superior mediastinal abnormalities identified. Allowing for shallow inspiration no evidence for focal infiltrate, lobar consolidation or pulmonary edema. No pleural effusion. No pneumothorax. No mediastinal shift. No acute osseous fractures. Note is made of indwelling neurostimulator leads.    IMPRESSION: No evidence for focal infiltrate or lobar consolidation.    EDGAR VALENZUELA MD; Attending Radiologist  This document has been electronically signed. Jun 18 20214:06PM    Ventricular Rate 76 BPM    Atrial Rate 76 BPM    P-R Interval 198 ms    QRS Duration 100 ms    Q-T Interval 396 ms    QTC Calculation(Bazett) 445 ms    P Axis 61 degrees    R Axis 45 degrees    T Axis 65 degrees    Diagnosis Line Normal sinus rhythm  Normal ECG  When compared with ECG of 28-JAN-2021 16:17,  No significant change was found  Confirmed by huber Rivera [id number]) on 6/19/2021 3:12:24 PM              [facility] -- Jayne Aguila, Max Chacko, Hernandez Kirkland Savella, Goodger  Office # [phone]    Follow Up:   HTN     Subjective/Observations: Patient seen and examined. Events noted. Resting comfortably in bed. No complaints of chest pain, dyspnea, or palpitations reported. No signs of orthopnea or PND.     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    MEDICATIONS  (STANDING):  amitriptyline 100 milliGRAM(s) Oral at bedtime  baclofen 10 milliGRAM(s) Oral two times a day  carvedilol 6.25 milliGRAM(s) Oral every 12 hours  DULoxetine 30 milliGRAM(s) Oral daily  furosemide    Tablet 40 milliGRAM(s) Oral daily  gabapentin 400 milliGRAM(s) Oral two times a day  lactobacillus acidophilus 1 Tablet(s) Oral three times a day  levothyroxine 200 MICROGram(s) Oral daily  lidocaine   Patch 1 Patch Transdermal every 24 hours  loratadine 10 milliGRAM(s) Oral daily  losartan 25 milliGRAM(s) Oral daily  melatonin 5 milliGRAM(s) Oral at bedtime  multivitamin 1 Tablet(s) Oral daily  pantoprazole    Tablet 40 milliGRAM(s) Oral before breakfast  potassium chloride    Tablet ER 10 milliEquivalent(s) Oral daily  rOPINIRole 1 milliGRAM(s) Oral daily  simvastatin 40 milliGRAM(s) Oral at bedtime  sucralfate suspension 1 Gram(s) Oral two times a day  vancomycin  IVPB 1000 milliGRAM(s) IV Intermittent every 12 hours    MEDICATIONS  (PRN):  fluticasone propionate 50 MICROgram(s)/spray Nasal Spray 1 Spray(s) Both Nostrils two times a day PRN congestion  guaiFENesin Oral Liquid (Sugar-Free) 100 milliGRAM(s) Oral once PRN Cough  oxyCODONE    IR 30 milliGRAM(s) Oral three times a day PRN Severe Pain (7 - 10)    Allergies    latex (Rash)  penicillin (Hives)    Intolerances      Vital Signs Last 24 Hrs  T(C): 36.6 (22 Jun 2021 05:05), Max: 36.6 (21 Jun 2021 14:43)  T(F): 97.9 (22 Jun 2021 05:05), Max: 97.9 (21 Jun 2021 14:43)  HR: 72 (22 Jun 2021 05:05) (63 - 75)  BP: 135/79 (22 Jun 2021 05:05) (101/52 - 147/77)  BP(mean): --  RR: 16 (22 Jun 2021 05:05) (11 - 17)  SpO2: 91% (22 Jun 2021 05:05) (91% - 100%)  I&O's Summary    21 Jun 2021 07:01  -  22 Jun 2021 07:00  --------------------------------------------------------  IN: 565 mL / OUT: 0 mL / NET: 565 mL      Weight (kg): 104.3 (06-21 @ 13:45)    PHYSICAL EXAM:  TELE: Not on tele  Constitutional: NAD, awake and alert, obese  HEENT: Moist Mucous Membranes, Anicteric  Pulmonary: Decreased breath sounds b/l. No rales, crackles or wheeze appreciated.   Cardiovascular: Regular, S1 and S2, No murmurs, rubs, gallops or clicks  Gastrointestinal: Bowel Sounds present, soft, nontender.   Lymph: No peripheral edema. No lymphadenopathy.  Skin: No visible rashes.  Right foot with dressing dry and intact  Psych:  Mood & affect appropriate      LABS: All Labs Reviewed:                        11.4   5.52  )-----------( 239      ( 21 Jun 2021 08:42 )             35.9                         11.0   5.90  )-----------( 231      ( 21 Jun 2021 00:41 )             33.9     21 Jun 2021 08:42    140    |  103    |  19     ----------------------------<  102    4.4     |  28     |  0.93   21 Jun 2021 00:41    140    |  104    |  19     ----------------------------<  143    4.3     |  32     |  0.84     Ca    9.0        21 Jun 2021 08:42  Ca    9.1        21 Jun 2021 00:41    TPro  7.4    /  Alb  3.4    /  TBili  0.3    /  DBili  x      /  AST  20     /  ALT  23     /  AlkPhos  98     21 Jun 2021 08:42  TPro  7.3    /  Alb  3.3    /  TBili  0.3    /  DBili  x      /  AST  17     /  ALT  29     /  AlkPhos  95     21 Jun 2021 00:41     EXAM:  ECHO TTE WO CON COMP W DOPP         PROCEDURE DATE:  02/02/2021        INTERPRETATION:  INDICATION: Murmur  Sonographer KL    Blood Pressure 107/62    Height 175.3cm     Weight 102.1kg       BSA 2.17msq    Dimensions:  LA 3.4       Normal Values: 2.0 - 4.0 cm  Ao 3.2        Normal Values: 2.0 - 3.8 cm  SEPTUM 0.9       Normal Values: 0.6 - 1.2 cm  PWT 1.0       Normal Values: 0.6 - 1.1 cm  LVIDd 4.7         Normal Values: 3.0 - 5.6 cm  LVIDs 3.4         Normal Values: 1.8 - 4.0 cm    OBSERVATIONS:  Technically difficult study  Mitral Valve: normal, trace physiologic MR.  Aortic Valve/Aorta: normal trileaflet aortic valve.  Tricuspid Valve: normal with trace TR.  Pulmonic Valve: Not well-visualized  Left Atrium: normal  Right Atrium: Not well-visualized  Left Ventricle: normal LV size and systolic function, estimated LVEF of 55%. Endocardium is not well-visualized, cannot rule out segmental dysfunction  Right Ventricle: Grossly normal size and systolic function.  Pericardium/Pleura: normal, no significant pericardial effusion.  LV diastolic dysfunction is present    Conclusion:  Technically difficult study  Normal left ventricular internal dimensions and systolic function, estimated LVEF of 55%.  Grossly normal RV size and systolic function.  Normal trileaflet aortic valve, without AI.  Trace physiologic MR and TR.  No significant pericardial effusion.      BRAYAN HAMILTON MD; Attending Cardiologist  This document has been electronically signed. Feb  3 [id number]    EXAM:  XR CHEST AP OR PA 1V                          PROCEDURE DATE:  06/18/2021      INTERPRETATION:  INDICATION: Sepsis    PRIORS: 1/28/2021.    VIEWS: Portable AP radiography of the chest performed.    FINDINGS: Heart size appears within normal limits. No hilar or superior mediastinal abnormalities identified. Allowing for shallow inspiration no evidence for focal infiltrate, lobar consolidation or pulmonary edema. No pleural effusion. No pneumothorax. No mediastinal shift. No acute osseous fractures. Note is made of indwelling neurostimulator leads.    IMPRESSION: No evidence for focal infiltrate or lobar consolidation.    EDGAR VALENZUELA MD; Attending Radiologist  This document has been electronically signed. Jun 18 20214:06PM    Ventricular Rate 76 BPM    Atrial Rate 76 BPM    P-R Interval 198 ms    QRS Duration 100 ms    Q-T Interval 396 ms    QTC Calculation(Bazett) 445 ms    P Axis 61 degrees    R Axis 45 degrees    T Axis 65 degrees    Diagnosis Line Normal sinus rhythm  Normal ECG  When compared with ECG of 28-JAN-2021 16:17,  No significant change was found  Confirmed by huber Rivera ([id number]) on 6/19/2021 3:12:24 PM

## 2021-06-22 NOTE — PROGRESS NOTE ADULT - SUBJECTIVE AND OBJECTIVE BOX
Postoperative Day #: 1    71y Female admitted with Cellulitis  S/P Right 3rd toe amputation      Patient seen and examined bedside resting comfortably.  States she has some discomfort in right toe.  States the boot her right foot is in is very uncomfortable. Tolerating diet.  No other complaints.  No overnight events.     T(F): 97.9 (06-22-21 @ 05:05), Max: 97.9 (06-21-21 @ 14:43)  HR: 72 (06-22-21 @ 05:05) (63 - 75)  BP: 135/79 (06-22-21 @ 05:05) (101/52 - 135/79)  RR: 16 (06-22-21 @ 05:05) (11 - 17)  SpO2: 91% (06-22-21 @ 05:05) (91% - 100%)  Wt(kg): --  CAPILLARY BLOOD GLUCOSE          PHYSICAL EXAM:  General: NAD  Neuro:  Alert & oriented x 3  Extremities: right foot- dressing in place, wrapped in ace wrap in surgical shoe.     LABS:                        11.4   5.52  )-----------( 239      ( 21 Jun 2021 08:42 )             35.9     06-21    140  |  103  |  19  ----------------------------<  102<H>  4.4   |  28  |  0.93    Ca    9.0      21 Jun 2021 08:42    TPro  7.4  /  Alb  3.4  /  TBili  0.3  /  DBili  x   /  AST  20  /  ALT  23  /  AlkPhos  98  06-21      I&O's Detail    21 Jun 2021 07:01  -  22 Jun 2021 07:00  --------------------------------------------------------  IN:    Lactated Ringers: 225 mL    Oral Fluid: 340 mL  Total IN: 565 mL    OUT:  Total OUT: 0 mL    Total NET: 565 mL

## 2021-06-22 NOTE — DISCHARGE NOTE PROVIDER - NSDCMRMEDTOKEN_GEN_ALL_CORE_FT
amitriptyline 100 mg oral tablet: 1 tab(s) orally once a day (at bedtime)  aspirin 81 mg oral tablet: 1 tab(s) orally once a day  baclofen 10 mg oral tablet: 1 tab(s) orally 2 times a day  carvedilol 6.25 mg oral tablet: 1 tab(s) orally 2 times a day  Cymbalta 60 mg oral delayed release capsule: 1 cap(s) orally once a day (at bedtime). May take a morning dose, if necessary  doxepin 50 mg oral capsule: 1 cap(s) orally once a day (in the evening)  Dulcolax Stool Softener 100 mg oral capsule: 2 cap(s) orally once a day (in the morning) and then 1 cap(s) in the evening  fluticasone 50 mcg/inh nasal spray: 1 spray(s) nasal 2 times a day, As needed, congestion  furosemide 40 mg oral tablet: 1 tab(s) orally once a day (in the morning)  gabapentin 400 mg oral capsule: 4 cap(s) orally once a day. Not to exceed 4 capsules per day  Klor-Con 10 oral tablet, extended release: 1 tab(s) orally once a day (in the morning)  lactobacillus acidophilus oral capsule: 1  orally once a day  levothyroxine 200 mcg (0.2 mg) oral tablet: 1 tab(s) orally once a day  loratadine 10 mg oral tablet: 1 tab(s) orally once a day  Melatonin 10 mg oral capsule: 1 cap(s) orally once a day (at bedtime)  multivitamin: 1 tab(s) orally once a day  naloxegol 25 mg oral tablet: 1 tab(s) orally 2 times a day, As Needed  naloxegol 25 mg oral tablet: 1 tab(s) orally 2 times a day, As Needed    *New Rx, pt previously has taken this medication.   Nucynta 75 mg oral tablet: 1 tab(s) orally 3 times a day, As Needed  olmesartan 20 mg oral tablet: 1 tab(s) orally once a day  oxyCODONE 30 mg oral tablet, extended release: 1 tab(s) orally every 8 hours, As Needed  pantoprazole 40 mg oral delayed release tablet: 1 tab(s) orally 2 times a day  rOPINIRole 1 mg oral tablet: 1-3 tab(s) orally once a day (in the evening) for spasms.    *Pt states typically taking 2 tab(s) in the evening.  simvastatin 40 mg oral tablet: 1 tab(s) orally once a day (at bedtime)  sucralfate 1 g/10 mL oral suspension: 10 milliliter(s) orally 2 times a day, As Needed  Tylenol 325 mg oral tablet: 1 tab(s) orally once a day, As Needed  ZTlido 1.8% topical film: Apply topically to affected area once a day, As Needed (Lidoderm replacement)   amitriptyline 100 mg oral tablet: 1 tab(s) orally once a day (at bedtime)  aspirin 81 mg oral tablet: 1 tab(s) orally once a day  baclofen 10 mg oral tablet: 1 tab(s) orally 2 times a day  carvedilol 6.25 mg oral tablet: 1 tab(s) orally 2 times a day  cephalexin 500 mg oral capsule: 1 cap(s) orally 4 times a day  Cymbalta 60 mg oral delayed release capsule: 1 cap(s) orally once a day (at bedtime). May take a morning dose, if necessary  doxepin 50 mg oral capsule: 1 cap(s) orally once a day (in the evening)  Dulcolax Stool Softener 100 mg oral capsule: 2 cap(s) orally once a day (in the morning) and then 1 cap(s) in the evening  fluticasone 50 mcg/inh nasal spray: 1 spray(s) nasal 2 times a day, As needed, congestion  furosemide 40 mg oral tablet: 1 tab(s) orally once a day (in the morning)  gabapentin 400 mg oral capsule: 4 cap(s) orally once a day. Not to exceed 4 capsules per day  Klor-Con 10 oral tablet, extended release: 1 tab(s) orally once a day (in the morning)  lactobacillus acidophilus oral capsule: 1  orally once a day  levothyroxine 200 mcg (0.2 mg) oral tablet: 1 tab(s) orally once a day  loratadine 10 mg oral tablet: 1 tab(s) orally once a day  Melatonin 10 mg oral capsule: 1 cap(s) orally once a day (at bedtime)  multivitamin: 1 tab(s) orally once a day  naloxegol 25 mg oral tablet: 1 tab(s) orally 2 times a day, As Needed  naloxegol 25 mg oral tablet: 1 tab(s) orally 2 times a day, As Needed    *New Rx, pt previously has taken this medication.   Nucynta 75 mg oral tablet: 1 tab(s) orally 3 times a day, As Needed  olmesartan 20 mg oral tablet: 1 tab(s) orally once a day  oxyCODONE 30 mg oral tablet, extended release: 1 tab(s) orally every 8 hours, As Needed  pantoprazole 40 mg oral delayed release tablet: 1 tab(s) orally 2 times a day  rOPINIRole 1 mg oral tablet: 1-3 tab(s) orally once a day (in the evening) for spasms.    *Pt states typically taking 2 tab(s) in the evening.  simvastatin 40 mg oral tablet: 1 tab(s) orally once a day (at bedtime)  sucralfate 1 g/10 mL oral suspension: 10 milliliter(s) orally 2 times a day, As Needed  Tylenol 325 mg oral tablet: 1 tab(s) orally once a day, As Needed  ZTlido 1.8% topical film: Apply topically to affected area once a day, As Needed (Lidoderm replacement)

## 2021-06-22 NOTE — PROGRESS NOTE ADULT - ATTENDING COMMENTS
Non invasive vascular studies reviewed. No evidence of arterial insuf on ABIs or US. No intervention or follow up needed.

## 2021-06-22 NOTE — PROGRESS NOTE ADULT - ASSESSMENT
Right 3rd digit ulcer    PROBLEMS/RECOMMENDATIONS:     Pt evaluated and chart reviewed  Right foot dressed with DSD  No further podiatry surgical intervention  All of pt's questions were answered to satisfaction  Pt stated he understood all discussed  Podiatry will follow pt while in house]    Wound Care Instructions  1.  Keep dressing clean, dry and intact until clinic visit  2.  Make appointment to be seen by Dr. Love or Dr. Gray at [facility] w/in 3-5 days of d/c  3.  If symptoms of nausea, vomiting, fever, chills, shortness of breath, chest pain, increasing pain foot or posterior leg pain develop - go to the emergency department

## 2021-06-22 NOTE — DISCHARGE NOTE PROVIDER - PROVIDER TOKENS
PROVIDER:[TOKEN:[[id number]]],PROVIDER:[TOKEN:[[id number]]],PROVIDER:[TOKEN:[[id number]]]

## 2021-06-22 NOTE — DISCHARGE NOTE PROVIDER - CARE PROVIDERS DIRECT ADDRESSES
[email or web address]

## 2021-06-22 NOTE — DISCHARGE NOTE PROVIDER - HOSPITAL COURSE
admitted for RLE cellulitis with 3rd toe infection  ABX per ID  underwent right 3rd toe ray amputation  DC after podiatry and ID clearance

## 2021-06-22 NOTE — PROGRESS NOTE ADULT - ASSESSMENT
71 year old female with HTN, HLD, neuropathy, and spinal stenosis sp partial ray resection of second toe January 2021 now presenting Right toe ulcer 3rd digit    Right 3rd toe ulcer  - s/p Right 3rd toe amputation, tolerated procedure well  - Vascular following.  Vascular studies to be completed  - No signs of ischemia, ADHF, clinical exam not consistent with severe stenotic valvular disease, no unstable arrhythmias noted.  She remains stable to proceed with this low risk podiatry surgery without any further cardiac workup. Routine hemodynamic monitoring is recommended   - Pain  control     HTN  - BP stable at systolic 110-130  - Continue home BB, ARB, and Lasix  - Echo 2/2021 revealing normal LV systolic function ef 55%, trace MR, trace TR.  No need to repeat    HLD  - Resume home statin    DVT ppx  - Per Primary    Monitor and replete electrolytes. Keep K>4.0 and Mg>2.0.    Will continue to follow    Olga Lidia Garcia DNP, NP-C  Cardiology   Spectra #[phone]   71 year old female with HTN, HLD, neuropathy, and spinal stenosis sp partial ray resection of second toe January 2021 now presenting Right toe ulcer 3rd digit    Right 3rd toe ulcer  - s/p Right 3rd toe amputation, tolerated procedure well  - Vascular following.  Vascular studies to be completed  - Pain  control     HTN  - BP stable at systolic 110-130  - Continue home BB, ARB, and Lasix  - Echo 2/2021 revealing normal LV systolic function ef 55%, trace MR, trace TR.  No need to repeat    HLD  - Resume home statin    DVT ppx  - Per Primary    Monitor and replete electrolytes. Keep K>4.0 and Mg>2.0.    Will continue to follow    Olga Lidia Garcia DNP, NP-C  Cardiology   Spectra #[phone]

## 2021-06-22 NOTE — PROGRESS NOTE ADULT - SUBJECTIVE AND OBJECTIVE BOX
71y year old Female seen at [facility] ED for right foot 3rd digit ulcer.  S/P left 3rd digit amputation 6/21/21 with Dr Gray.  Pt seen during AM rounds AAOx3 with NAD.  Dressing were clean, dry and intact.  Pt had no new pedal complaints.  Denies any fever, chills, nausea, vomiting, chest pain, shortness of breath, or calf pain at this time.        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 CRT <3 seconds.  2+ edema right foot and leg.  Erythema right 2, 3rd digits.   No ecchymosis b/l  Neurological: Light touch not intact to foot b/l.  Gross sensation intact to foot b/l  Musculoskeletal: 5/5 strength in all quadrants bilaterally, AJ & STJ ROM intact  No POP wounds  Dermatological: Wounds (1)  1.  Right 3rd digit plantar ulcer.  Necrotic base.  1.0 x 1.0 x 0.75cm.  Periwound intact.  PTB(+).  SOI(+).  Tunneling center of wound to tip of distal phalanx. No undermining.  Serous drainage.  No purulence. MIld malodor          Vital Signs Last 24 Hrs  T(C): 37.4 (22 Jun 2021 19:27), Max: 37.4 (22 Jun 2021 19:27)  T(F): 99.3 (22 Jun 2021 19:27), Max: 99.3 (22 Jun 2021 19:27)  HR: 82 (22 Jun 2021 19:27) (72 - 82)  BP: 126/73 (22 Jun 2021 19:27) (126/73 - 135/79)  BP(mean): --  RR: 16 (22 Jun 2021 19:27) (16 - 16)  SpO2: 90% (22 Jun 2021 19:27) (90% - 91%)                          11.4   5.52  )-----------( 239      ( 21 Jun 2021 08:42 )             35.9   06-21    140  |  103  |  19  ----------------------------<  102<H>  4.4   |  28  |  0.93    Ca    9.0      21 Jun 2021 08:42    TPro  7.4  /  Alb  3.4  /  TBili  0.3  /  DBili  x   /  AST  20  /  ALT  23  /  AlkPhos  98  06-21      Imaging: ----------  Pending

## 2021-06-22 NOTE — PROGRESS NOTE ADULT - ATTENDING COMMENTS
I saw and examined the patient personally. Spoke with above provider regarding this case. I reviewed the above findings completely.  I agree with the above history, physical, and plan which I have edited where appropriate.    No signs of significant ischemia or volume overload. cont current care. Further cardiac workup will depend on clinical course.

## 2021-06-22 NOTE — PROGRESS NOTE ADULT - SUBJECTIVE AND OBJECTIVE BOX
[facility], Division of Infectious Diseases  YOKO Carlin Y. Patel, S. Shah  [phone]    Name: MILAN SHRESTHA  Age: 71y  Gender: Female  MRN: [id number]    Interval History:  Patient seen and examined at bedside this morning  No acute overnight events.   Notes reviewed  S/p Amputation, toe, left, single 21-Jun-2021 14:51:47  Rusty Red.    Antibiotics:  vancomycin  IVPB 1000 milliGRAM(s) IV Intermittent every 12 hours      Medications:  amitriptyline 100 milliGRAM(s) Oral at bedtime  baclofen 10 milliGRAM(s) Oral two times a day  carvedilol 6.25 milliGRAM(s) Oral every 12 hours  DULoxetine 30 milliGRAM(s) Oral daily  fluticasone propionate 50 MICROgram(s)/spray Nasal Spray 1 Spray(s) Both Nostrils two times a day PRN  furosemide    Tablet 40 milliGRAM(s) Oral daily  gabapentin 400 milliGRAM(s) Oral two times a day  guaiFENesin Oral Liquid (Sugar-Free) 100 milliGRAM(s) Oral once PRN  heparin   Injectable 5000 Unit(s) SubCutaneous every 12 hours  lactobacillus acidophilus 1 Tablet(s) Oral three times a day  levothyroxine 200 MICROGram(s) Oral daily  lidocaine   Patch 1 Patch Transdermal every 24 hours  loratadine 10 milliGRAM(s) Oral daily  losartan 25 milliGRAM(s) Oral daily  melatonin 5 milliGRAM(s) Oral at bedtime  multivitamin 1 Tablet(s) Oral daily  oxyCODONE    IR 30 milliGRAM(s) Oral three times a day PRN  pantoprazole    Tablet 40 milliGRAM(s) Oral before breakfast  potassium chloride    Tablet ER 10 milliEquivalent(s) Oral daily  rOPINIRole 1 milliGRAM(s) Oral daily  simvastatin 40 milliGRAM(s) Oral at bedtime  sucralfate suspension 1 Gram(s) Oral two times a day  vancomycin  IVPB 1000 milliGRAM(s) IV Intermittent every 12 hours      Review of Systems:  A 10-point review of systems was obtained.   Review of systems otherwise negative except as previously noted.    Allergies: latex (Rash)  penicillin (Hives)    For details regarding the patient's past medical history, social history, family history, and other miscellaneous elements, please refer the initial infectious diseases consultation and/or the admitting history and physical examination for this admission.    Objective:  Vitals:   T(C): 36.6 (06-22-21 @ 05:05), Max: 36.6 (06-21-21 @ 14:43)  HR: 72 (06-22-21 @ 05:05) (63 - 75)  BP: 135/79 (06-22-21 @ 05:05) (101/52 - 135/79)  RR: 16 (06-22-21 @ 05:05) (11 - 17)  SpO2: 91% (06-22-21 @ 05:05) (91% - 100%)    Physical Examination:  General: no acute distress  HEENT: NC/AT, EOMI, anicteric, no oral lesions  Neck: supple, no palpable LAD  Cardio: S1, S2 heard, RRR, no murmurs  Resp: decreased b/l breath sounds  Abd: soft, NT, ND, + bowel sounds  Neuro: no obvious focal deficits  Ext: L foot dressing c/d/i  Skin: warm, dry, no visible rash      Laboratory Studies:  CBC:                       11.4   5.52  )-----------( 239      ( 21 Jun 2021 08:42 )             35.9     CMP: 06-21    140  |  103  |  19  ----------------------------<  102<H>  4.4   |  28  |  0.93    Ca    9.0      21 Jun 2021 08:42    TPro  7.4  /  Alb  3.4  /  TBili  0.3  /  DBili  x   /  AST  20  /  ALT  23  /  AlkPhos  98  06-21    LIVER FUNCTIONS - ( 21 Jun 2021 08:42 )  Alb: 3.4 g/dL / Pro: 7.4 g/dL / ALK PHOS: 98 U/L / ALT: 23 U/L / AST: 20 U/L / GGT: x               Microbiology: reviewed    Culture - Tissue with Gram Stain (collected 06-22-21 @ 02:37)  Source: .Tissue Other, 3rd toe right foot  Gram Stain (06-22-21 @ 03:52):    No polymorphonuclear cells seen per low power field    No organisms seen per oil power field    Culture - Blood (collected 06-20-21 @ 11:19)  Source: .Blood Blood-Peripheral  Preliminary Report (06-21-21 @ 12:01):    No growth to date.    Culture - Blood (collected 06-20-21 @ 11:19)  Source: .Blood Blood-Peripheral  Preliminary Report (06-21-21 @ 12:01):    No growth to date.    Culture - Urine (collected 06-19-21 @ 00:34)  Source: .Urine Clean Catch (Midstream)  Final Report (06-19-21 @ 20:33):    <10,000 CFU/mL Normal Urogenital Dom    Culture - Other (collected 06-18-21 @ 21:15)  Source: .Other right 3rd digit  Final Report (06-20-21 @ 15:01):    Numerous Staphylococcus aureus    Normal skin dom isolated  Organism: Staphylococcus aureus (06-20-21 @ 15:01)  Organism: Staphylococcus aureus (06-20-21 @ 15:01)      -  Ampicillin/Sulbactam: S <=8/4      -  Cefazolin: S <=4      -  Clindamycin: S <=0.25      -  Erythromycin: S <=0.25      -  Gentamicin: S <=1 Should not be used as monotherapy      -  Oxacillin: S <=0.25      -  Penicillin: R 4      -  RIF- Rifampin: S <=1 Should not be used as monotherapy      -  Tetra/Doxy: S <=1      -  Trimethoprim/Sulfamethoxazole: S <=0.5/9.5      -  Vancomycin: S 1      Method Type: LILIANE    Culture - Blood (collected 06-18-21 @ 21:12)  Source: .Blood Blood-Peripheral  Gram Stain (06-19-21 @ 21:31):    Growth in aerobic bottle: Gram Positive Cocci in Clusters  Final Report (06-20-21 @ 19:17):    Growth in aerobic bottle: Staphylococcus hominis    Coag Negative Staphylococcus    Single set isolate, possible contaminant. Contact    Microbiology if susceptibility testing clinically    indicated.    ***Blood Panel PCR results on this specimen are available    approximately 3 hours after the Gram stain result.***    Gram stain, PCR, and/or culture results may not always    correspond due to difference in methodologies.    ************************************************************    This PCR assay was performed by multiplex PCR. This    Assay tests for 66 bacterial and resistance gene targets.    Please refer to the [facility] test directory    at [email or web address] for details.  Organism: Blood Culture PCR (06-20-21 @ 19:17)  Organism: Blood Culture PCR (06-20-21 @ 19:17)      -  Coagulase negative Staphylococcus, Methicillin resistant: Detec      Method Type: PCR    Culture - Blood (collected 06-18-21 @ 21:12)  Source: .Blood Blood-Peripheral  Preliminary Report (06-19-21 @ 22:01):    No growth to date.        Radiology: reviewed

## 2021-06-22 NOTE — DISCHARGE NOTE PROVIDER - CARE PROVIDER_API CALL
Karlos Gray (DPM)  Podiatric Medicine and Surgery  [address] [place] [address]  Phone: [phone]  Fax: [phone]  Follow Up Time:     Moses Richards)  Internal Medicine  [address] [place] [address]  Phone: [phone]  Fax: [phone]  Follow Up Time:     Jerry Frost; PhD)  Infectious Disease; Internal Medicine  [address] [place] [address]  Phone: [phone]  Fax: [phone]  Follow Up Time:

## 2021-06-22 NOTE — PROGRESS NOTE ADULT - SUBJECTIVE AND OBJECTIVE BOX
Patient is a 71y old  Female who presents with a chief complaint of Right foot cellulitis. (22 Jun 2021 12:26)      INTERVAL /OVERNIGHT EVENTS: pain tolerable    MEDICATIONS  (STANDING):  amitriptyline 100 milliGRAM(s) Oral at bedtime  baclofen 10 milliGRAM(s) Oral two times a day  carvedilol 6.25 milliGRAM(s) Oral every 12 hours  ceFAZolin   IVPB      ceFAZolin   IVPB 2000 milliGRAM(s) IV Intermittent every 8 hours  DULoxetine 30 milliGRAM(s) Oral daily  furosemide    Tablet 40 milliGRAM(s) Oral daily  gabapentin 400 milliGRAM(s) Oral two times a day  heparin   Injectable 5000 Unit(s) SubCutaneous every 12 hours  lactobacillus acidophilus 1 Tablet(s) Oral three times a day  levothyroxine 200 MICROGram(s) Oral daily  lidocaine   Patch 1 Patch Transdermal every 24 hours  loratadine 10 milliGRAM(s) Oral daily  losartan 25 milliGRAM(s) Oral daily  melatonin 5 milliGRAM(s) Oral at bedtime  multivitamin 1 Tablet(s) Oral daily  pantoprazole    Tablet 40 milliGRAM(s) Oral before breakfast  potassium chloride    Tablet ER 10 milliEquivalent(s) Oral daily  rOPINIRole 1 milliGRAM(s) Oral daily  simvastatin 40 milliGRAM(s) Oral at bedtime  sucralfate suspension 1 Gram(s) Oral two times a day    MEDICATIONS  (PRN):  fluticasone propionate 50 MICROgram(s)/spray Nasal Spray 1 Spray(s) Both Nostrils two times a day PRN congestion  guaiFENesin Oral Liquid (Sugar-Free) 100 milliGRAM(s) Oral once PRN Cough  oxyCODONE    IR 30 milliGRAM(s) Oral three times a day PRN Severe Pain (7 - 10)      Allergies    latex (Rash)  penicillin (Hives)    Intolerances        REVIEW OF SYSTEMS:  CONSTITUTIONAL: No fever, weight loss, or fatigue  EYES: No eye pain, visual disturbances, or discharge  ENMT:  No difficulty hearing, tinnitus, vertigo; No sinus or throat pain  NECK: No pain or stiffness  RESPIRATORY: No cough, wheezing, chills or hemoptysis; No shortness of breath  CARDIOVASCULAR: No chest pain, palpitations, dizziness, or leg swelling  GASTROINTESTINAL: No abdominal or epigastric pain. No nausea, vomiting, or hematemesis; No diarrhea or constipation. No melena or hematochezia.  GENITOURINARY: No dysuria, frequency, hematuria, or incontinence  NEUROLOGICAL: No headaches, memory loss, loss of strength, numbness, or tremors  SKIN: No itching, burning, rashes, or lesions   LYMPH NODES: No enlarged glands  ENDOCRINE: No heat or cold intolerance; No hair loss; No polydipsia or polyuria  MUSCULOSKELETAL: No joint pain or swelling; No muscle, back, or extremity pain  PSYCHIATRIC: No depression, anxiety, mood swings, or difficulty sleeping  HEME/LYMPH: No easy bruising, or bleeding gums  ALLERGY AND IMMUNOLOGIC: No hives or eczema    Vital Signs Last 24 Hrs  T(C): 36.6 (22 Jun 2021 05:05), Max: 36.6 (21 Jun 2021 15:28)  T(F): 97.9 (22 Jun 2021 05:05), Max: 97.9 (21 Jun 2021 15:28)  HR: 72 (22 Jun 2021 05:05) (64 - 75)  BP: 135/79 (22 Jun 2021 05:05) (107/60 - 135/79)  BP(mean): --  RR: 16 (22 Jun 2021 05:05) (11 - 16)  SpO2: 91% (22 Jun 2021 05:05) (91% - 95%)    PHYSICAL EXAM:  GENERAL: NAD, well-groomed, well-developed  HEAD:  Atraumatic, Normocephalic  EYES: EOMI, PERRLA, conjunctiva and sclera clear  ENMT: No tonsillar erythema, exudates, or enlargement; Moist mucous membranes, Good dentition, No lesions  NECK: Supple, No JVD, Normal thyroid  NERVOUS SYSTEM:  Alert & Oriented X3, Good concentration; Motor Strength 5/5 B/L upper and lower extremities; DTRs 2+ intact and symmetric  CHEST/LUNG: Clear to auscultation bilaterally; No rales, rhonchi, wheezing, or rubs  HEART: Regular rate and rhythm; No murmurs, rubs, or gallops  ABDOMEN: Soft, Nontender, Nondistended; Bowel sounds present  EXTREMITIES:  2+ Peripheral Pulses, No clubbing, cyanosis, or edema  LYMPH: No lymphadenopathy noted  SKIN: No rashes or lesions    LABS:      Ca    9.0        21 Jun 2021 08:42          CAPILLARY BLOOD GLUCOSE          RADIOLOGY & ADDITIONAL TESTS:    Notes Reviewed:  [ x] YES  [ ] NO    Care Discussed with Consultants/Other Providers [x ] YES  [ ] NO

## 2021-06-22 NOTE — DISCHARGE NOTE PROVIDER - NSDCCPCAREPLAN_GEN_ALL_CORE_FT
PRINCIPAL DISCHARGE DIAGNOSIS  Diagnosis: Cellulitis  Assessment and Plan of Treatment: follow up with PMD Dr. ABBOTT

## 2021-06-22 NOTE — PROGRESS NOTE ADULT - ASSESSMENT
Patient is a 71 year old female with PMH of neuropathy, spinal stenosis, HTN, HLD, hypothyroidism who was sent by Dr. Gray due to right 3rd toe infection.    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, neuropathy   - R foot x-ray with digit soft tissue swelling, mild foot charcot joint, no evidence of OM   - LE DVT study negative    - Podiatry following - booked for 6/21 for partial 3rd ray amputation - please send bone cx and biopsy    - BCx as below   - wound culture from R toe with MSSA   - pending OR cx/path   - monitor temps and WBC   - d/c vancomycin   - switch to cefazolin 2gm IV q8h    CoNS Bacteremia   - blood culture 1/2 with CoNS - likely contaminant    - repeat negative  No further indication for Abx.     Penicillin Allergy  - reviewed w/ pt--childhood allergy w/ rash only  No reported hx of anaphylaxis. Ok to trial cephalosporins as above    Infectious Diseases will continue to follow. Please call with any questions.   Angeli Garcia M.D.  [facility], Division of Infectious Diseases [phone]  For over the weekend and after hours, please call [phone]     Patient is a 71 year old female with PMH of neuropathy, spinal stenosis, HTN, HLD, hypothyroidism who was sent by Dr. Gray due to right 3rd toe infection.    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, neuropathy   - R foot x-ray with digit soft tissue swelling, mild foot charcot joint, no evidence of OM   - LE DVT study negative    - Podiatry following - booked for 6/21 for partial 3rd ray amputation - please send bone cx and biopsy    - BCx as below   - wound culture from R toe with MSSA   - pending OR cx/path   - monitor temps and WBC   - d/c vancomycin   - switch to cefazolin 2gm IV q8h    CoNS Bacteremia   - blood culture 1/2 with CoNS - likely contaminant    - repeat negative  No further indication for Abx.     Penicillin Allergy  Reviewed w/ pt--childhood allergy w/ rash only  No reported hx of anaphylaxis. Ok to trial cephalosporins as above    Dr. Frost covering the service starting tomorrow.   Infectious Diseases will continue to follow. Please call with any questions.   Angeli Garcia M.D.  [facility], Division of Infectious Diseases [phone]  For over the weekend and after hours, please call [phone]

## 2021-06-22 NOTE — PROGRESS NOTE ADULT - SUBJECTIVE AND OBJECTIVE BOX
The patient was interviewed and evaluated  71y Female s/p amputation left foot 2nd and 3rd toes with GA    T(C): 36.6 (06-22-21 @ 05:05), Max: 36.6 (06-21-21 @ 14:43)  HR: 72 (06-22-21 @ 05:05) (63 - 75)  BP: 135/79 (06-22-21 @ 05:05) (101/52 - 147/77)  RR: 16 (06-22-21 @ 05:05) (11 - 17)  SpO2: 91% (06-22-21 @ 05:05) (91% - 100%)  Wt(kg): --    Pt seen, doing well, no anesthesia complications or complaints noted or reported.       No additional recommendations.

## 2021-06-23 ENCOUNTER — TRANSCRIPTION ENCOUNTER (OUTPATIENT)
Age: 71
End: 2021-06-23

## 2021-06-23 LAB
ALBUMIN SERPL ELPH-MCNC: 3.5 G/DL — SIGNIFICANT CHANGE UP (ref 3.3–5)
ALP SERPL-CCNC: 106 U/L — SIGNIFICANT CHANGE UP (ref 40–120)
ALT FLD-CCNC: 36 U/L — SIGNIFICANT CHANGE UP (ref 12–78)
ANION GAP SERPL CALC-SCNC: 5 MMOL/L — SIGNIFICANT CHANGE UP (ref 5–17)
AST SERPL-CCNC: 32 U/L — SIGNIFICANT CHANGE UP (ref 15–37)
BILIRUB SERPL-MCNC: 0.3 MG/DL — SIGNIFICANT CHANGE UP (ref 0.2–1.2)
BUN SERPL-MCNC: 16 MG/DL — SIGNIFICANT CHANGE UP (ref 7–23)
CALCIUM SERPL-MCNC: 9.4 MG/DL — SIGNIFICANT CHANGE UP (ref 8.5–10.1)
CHLORIDE SERPL-SCNC: 105 MMOL/L — SIGNIFICANT CHANGE UP (ref 96–108)
CO2 SERPL-SCNC: 30 MMOL/L — SIGNIFICANT CHANGE UP (ref 22–31)
CREAT SERPL-MCNC: 1.1 MG/DL — SIGNIFICANT CHANGE UP (ref 0.5–1.3)
CULTURE RESULTS: SIGNIFICANT CHANGE UP
GLUCOSE SERPL-MCNC: 136 MG/DL — HIGH (ref 70–99)
HCT VFR BLD CALC: 38.3 % — SIGNIFICANT CHANGE UP (ref 34.5–45)
HGB BLD-MCNC: 12.3 G/DL — SIGNIFICANT CHANGE UP (ref 11.5–15.5)
MCHC RBC-ENTMCNC: 28.5 PG — SIGNIFICANT CHANGE UP (ref 27–34)
MCHC RBC-ENTMCNC: 32.1 GM/DL — SIGNIFICANT CHANGE UP (ref 32–36)
MCV RBC AUTO: 88.9 FL — SIGNIFICANT CHANGE UP (ref 80–100)
NRBC # BLD: 0 /100 WBCS — SIGNIFICANT CHANGE UP (ref 0–0)
PLATELET # BLD AUTO: 280 K/UL — SIGNIFICANT CHANGE UP (ref 150–400)
POTASSIUM SERPL-MCNC: 4.5 MMOL/L — SIGNIFICANT CHANGE UP (ref 3.5–5.3)
POTASSIUM SERPL-SCNC: 4.5 MMOL/L — SIGNIFICANT CHANGE UP (ref 3.5–5.3)
PROT SERPL-MCNC: 8 G/DL — SIGNIFICANT CHANGE UP (ref 6–8.3)
RBC # BLD: 4.31 M/UL — SIGNIFICANT CHANGE UP (ref 3.8–5.2)
RBC # FLD: 13.6 % — SIGNIFICANT CHANGE UP (ref 10.3–14.5)
SODIUM SERPL-SCNC: 140 MMOL/L — SIGNIFICANT CHANGE UP (ref 135–145)
SPECIMEN SOURCE: SIGNIFICANT CHANGE UP
WBC # BLD: 5.35 K/UL — SIGNIFICANT CHANGE UP (ref 3.8–10.5)
WBC # FLD AUTO: 5.35 K/UL — SIGNIFICANT CHANGE UP (ref 3.8–10.5)

## 2021-06-23 PROCEDURE — 99232 SBSQ HOSP IP/OBS MODERATE 35: CPT

## 2021-06-23 PROCEDURE — 99231 SBSQ HOSP IP/OBS SF/LOW 25: CPT

## 2021-06-23 RX ORDER — POLYETHYLENE GLYCOL 3350 17 G/17G
17 POWDER, FOR SOLUTION ORAL
Refills: 0 | Status: DISCONTINUED | OUTPATIENT
Start: 2021-06-23 | End: 2021-06-24

## 2021-06-23 RX ORDER — DIPHENHYDRAMINE HCL 50 MG
50 CAPSULE ORAL EVERY 8 HOURS
Refills: 0 | Status: DISCONTINUED | OUTPATIENT
Start: 2021-06-23 | End: 2021-06-24

## 2021-06-23 RX ADMIN — LOSARTAN POTASSIUM 25 MILLIGRAM(S): 100 TABLET, FILM COATED ORAL at 06:09

## 2021-06-23 RX ADMIN — Medication 50 MILLIGRAM(S): at 23:19

## 2021-06-23 RX ADMIN — HEPARIN SODIUM 5000 UNIT(S): 5000 INJECTION INTRAVENOUS; SUBCUTANEOUS at 17:33

## 2021-06-23 RX ADMIN — ROPINIROLE 1 MILLIGRAM(S): 8 TABLET, FILM COATED, EXTENDED RELEASE ORAL at 12:07

## 2021-06-23 RX ADMIN — Medication 1 GRAM(S): at 06:09

## 2021-06-23 RX ADMIN — Medication 40 MILLIGRAM(S): at 06:09

## 2021-06-23 RX ADMIN — Medication 5 MILLIGRAM(S): at 21:38

## 2021-06-23 RX ADMIN — Medication 10 MILLIGRAM(S): at 17:32

## 2021-06-23 RX ADMIN — HEPARIN SODIUM 5000 UNIT(S): 5000 INJECTION INTRAVENOUS; SUBCUTANEOUS at 06:09

## 2021-06-23 RX ADMIN — GABAPENTIN 400 MILLIGRAM(S): 400 CAPSULE ORAL at 17:35

## 2021-06-23 RX ADMIN — POLYETHYLENE GLYCOL 3350 17 GRAM(S): 17 POWDER, FOR SOLUTION ORAL at 17:32

## 2021-06-23 RX ADMIN — DULOXETINE HYDROCHLORIDE 30 MILLIGRAM(S): 30 CAPSULE, DELAYED RELEASE ORAL at 12:07

## 2021-06-23 RX ADMIN — Medication 200 MICROGRAM(S): at 05:03

## 2021-06-23 RX ADMIN — Medication 100 MILLIGRAM(S): at 06:11

## 2021-06-23 RX ADMIN — PANTOPRAZOLE SODIUM 40 MILLIGRAM(S): 20 TABLET, DELAYED RELEASE ORAL at 06:09

## 2021-06-23 RX ADMIN — LIDOCAINE 1 PATCH: 4 CREAM TOPICAL at 05:00

## 2021-06-23 RX ADMIN — CARVEDILOL PHOSPHATE 6.25 MILLIGRAM(S): 80 CAPSULE, EXTENDED RELEASE ORAL at 06:09

## 2021-06-23 RX ADMIN — CARVEDILOL PHOSPHATE 6.25 MILLIGRAM(S): 80 CAPSULE, EXTENDED RELEASE ORAL at 17:32

## 2021-06-23 RX ADMIN — Medication 100 MILLIGRAM(S): at 13:14

## 2021-06-23 RX ADMIN — Medication 100 MILLIGRAM(S): at 21:38

## 2021-06-23 RX ADMIN — LORATADINE 10 MILLIGRAM(S): 10 TABLET ORAL at 12:07

## 2021-06-23 RX ADMIN — Medication 1 TABLET(S): at 13:14

## 2021-06-23 RX ADMIN — GABAPENTIN 400 MILLIGRAM(S): 400 CAPSULE ORAL at 06:11

## 2021-06-23 RX ADMIN — Medication 1 GRAM(S): at 17:32

## 2021-06-23 RX ADMIN — Medication 1 TABLET(S): at 21:38

## 2021-06-23 RX ADMIN — Medication 1 TABLET(S): at 12:07

## 2021-06-23 RX ADMIN — Medication 1 TABLET(S): at 06:09

## 2021-06-23 RX ADMIN — Medication 100 MILLIGRAM(S): at 21:39

## 2021-06-23 RX ADMIN — Medication 10 MILLIEQUIVALENT(S): at 12:07

## 2021-06-23 RX ADMIN — Medication 10 MILLIGRAM(S): at 06:09

## 2021-06-23 RX ADMIN — SIMVASTATIN 40 MILLIGRAM(S): 20 TABLET, FILM COATED ORAL at 21:38

## 2021-06-23 NOTE — OCCUPATIONAL THERAPY INITIAL EVALUATION ADULT - PERTINENT HX OF CURRENT PROBLEM, REHAB EVAL
72 YO female sent by Dr. Gray due to right 3rd toe infection. Patient reports she got back from [place] yesterday and noted a blister and redness to right third toe. Patient reports she had right 2nd toe amputation with Dr. Gray.

## 2021-06-23 NOTE — PROGRESS NOTE ADULT - PROBLEM SELECTOR PLAN 1
71y Female p/w R 3rd toe ulcer and R ankle cellulitis. Pt doing clinically well w/ pulses revealed via Doppler b/l  - Plan for angio likely Tuesday  - F/up vascular studies (ordered)  - cont care per podiatry  - Cont IV Vanco  - continue DVT ppx   - Cont care as per primary team
follow bone scan to r/o OM   - blood culture 1/2 with CoNS - likely contaminant    - follow repeat blood cultures 6/20   - wound culture from R toe with Staph aureus - follow for sensitivities
follow bone scan to r/o OM   - blood culture 1/2 with CoNS - likely contaminant    - follow repeat blood cultures 6/20   - wound culture from R toe with Staph aureus - follow for sensitivities
Continue antibiotics  Continue VTE  Awaiting vascular studies- patient went for studies earlier today, awaiting reports  Possible change of surgical shoe  Wound care per podiatry  Care per primary team  Will discuss with Dr. Nash
IV ABX  ID EVAL
follow bone scan to r/o OM   - blood culture 1/2 with CoNS - likely contaminant    - follow repeat blood cultures 6/20   - wound culture from R toe with Staph aureus - follow for sensitivities
follow bone scan to r/o OM   - blood culture 1/2 with CoNS - likely contaminant    - follow repeat blood cultures 6/20   - wound culture from R toe with Staph aureus - follow for sensitivities

## 2021-06-23 NOTE — PROGRESS NOTE ADULT - SUBJECTIVE AND OBJECTIVE BOX
[facility] -- Jayne Aguila, Max Chacko Pannella, Patel, Savella  Office # [phone]      Follow Up:  htn  cardiac optimization     Subjective/Observations:     No events overnight resting comfortably in bed.  No complaints of chest pain, dyspnea, or palpitations reported. No signs of orthopnea or PND.    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        MEDICATIONS  (STANDING):  amitriptyline 100 milliGRAM(s) Oral at bedtime  baclofen 10 milliGRAM(s) Oral two times a day  carvedilol 6.25 milliGRAM(s) Oral every 12 hours  ceFAZolin   IVPB      ceFAZolin   IVPB 2000 milliGRAM(s) IV Intermittent every 8 hours  DULoxetine 30 milliGRAM(s) Oral daily  furosemide    Tablet 40 milliGRAM(s) Oral daily  gabapentin 400 milliGRAM(s) Oral two times a day  heparin   Injectable 5000 Unit(s) SubCutaneous every 12 hours  lactobacillus acidophilus 1 Tablet(s) Oral three times a day  levothyroxine 200 MICROGram(s) Oral daily  lidocaine   Patch 1 Patch Transdermal every 24 hours  loratadine 10 milliGRAM(s) Oral daily  losartan 25 milliGRAM(s) Oral daily  melatonin 5 milliGRAM(s) Oral at bedtime  multivitamin 1 Tablet(s) Oral daily  pantoprazole    Tablet 40 milliGRAM(s) Oral before breakfast  potassium chloride    Tablet ER 10 milliEquivalent(s) Oral daily  rOPINIRole 1 milliGRAM(s) Oral daily  simvastatin 40 milliGRAM(s) Oral at bedtime  sucralfate suspension 1 Gram(s) Oral two times a day    MEDICATIONS  (PRN):  benzocaine 15 mG/menthol 3.6 mG (Sugar-Free) Lozenge 1 Lozenge Oral four times a day PRN Sore Throat  fluticasone propionate 50 MICROgram(s)/spray Nasal Spray 1 Spray(s) Both Nostrils two times a day PRN congestion  guaiFENesin Oral Liquid (Sugar-Free) 100 milliGRAM(s) Oral once PRN Cough  oxyCODONE    IR 30 milliGRAM(s) Oral three times a day PRN Severe Pain (7 - 10)      Allergies    latex (Rash)  penicillin (Hives)    Intolerances        Vital Signs Last 24 Hrs  T(C): 36.6 (23 Jun 2021 04:49), Max: 37.4 (22 Jun 2021 19:27)  T(F): 97.8 (23 Jun 2021 04:49), Max: 99.3 (22 Jun 2021 19:27)  HR: 68 (23 Jun 2021 04:49) (68 - 85)  BP: 154/75 (23 Jun 2021 04:49) (126/73 - 154/75)  BP(mean): --  RR: 17 (23 Jun 2021 04:49) (16 - 17)  SpO2: 92% (23 Jun 2021 04:49) (90% - 95%)    I&O's Summary    22 Jun 2021 07:01  -  23 Jun 2021 07:00  --------------------------------------------------------  IN: 200 mL / OUT: 0 mL / NET: 200 mL          PHYSICAL EXAM:  TELE: not on tele   Constitutional: NAD, awake and alert, well-developed  HEENT: Moist Mucous Membranes, Anicteric  Pulmonary: Non-labored, breath sounds are clear bilaterally, No wheezing, crackles or rhonchi  Cardiovascular: Regular, S1 and S2 nl, No murmurs, rubs, gallops or clicks  Gastrointestinal: Bowel Sounds present, soft, nontender.   Lymph: No lymphadenopathy. No peripheral edema.  Skin: Right foot dressing   Psych:  Mood & affect appropriate    LABS: All Labs Reviewed:                        11.4   5.52  )-----------( 239      ( 21 Jun 2021 08:42 )             35.9                         11.0   5.90  )-----------( 231      ( 21 Jun 2021 00:41 )             33.9     21 Jun 2021 08:42    140    |  103    |  19     ----------------------------<  102    4.4     |  28     |  0.93   21 Jun 2021 00:41    140    |  104    |  19     ----------------------------<  143    4.3     |  32     |  0.84     Ca    9.0        21 Jun 2021 08:42  Ca    9.1        21 Jun 2021 00:41    TPro  7.4    /  Alb  3.4    /  TBili  0.3    /  DBili  x      /  AST  20     /  ALT  23     /  AlkPhos  98     21 Jun 2021 08:42  TPro  7.3    /  Alb  3.3    /  TBili  0.3    /  DBili  x      /  AST  17     /  ALT  29     /  AlkPhos  95     21 Jun 2021 00:41          ECHO TTE WO CON COMP W DOPP         PROCEDURE DATE:  02/02/2021        INTERPRETATION:  INDICATION: Murmur  Sonographer KL    Blood Pressure 107/62    Height 175.3cm     Weight 102.1kg       BSA 2.17msq    Dimensions:  LA 3.4       Normal Values: 2.0 - 4.0 cm  Ao 3.2        Normal Values: 2.0 - 3.8 cm  SEPTUM 0.9       Normal Values: 0.6 - 1.2 cm  PWT 1.0       Normal Values: 0.6 - 1.1 cm  LVIDd 4.7         Normal Values: 3.0 - 5.6 cm  LVIDs 3.4         Normal Values: 1.8 - 4.0 cm    OBSERVATIONS:  Technically difficult study  Mitral Valve: normal, trace physiologic MR.  Aortic Valve/Aorta: normal trileaflet aortic valve.  Tricuspid Valve: normal with trace TR.  Pulmonic Valve: Not well-visualized  Left Atrium: normal  Right Atrium: Not well-visualized  Left Ventricle: normal LV size and systolic function, estimated LVEF of 55%. Endocardium is not well-visualized, cannot rule out segmental dysfunction  Right Ventricle: Grossly normal size and systolic function.  Pericardium/Pleura: normal, no significant pericardial effusion.  LV diastolic dysfunction is present    Conclusion:  Technically difficult study  Normal left ventricular internal dimensions and systolic function, estimated LVEF of 55%.  Grossly normal RV size and systolic function.  Normal trileaflet aortic valve, without AI.  Trace physiologic MR and TR.  No significant pericardial effusion.      BRAYAN HAMILTON MD; Attending Cardiologist  This document has been electronically signed. Feb  3 [id number]    EXAM:  XR CHEST AP OR PA 1V                          PROCEDURE DATE:  06/18/2021      INTERPRETATION:  INDICATION: Sepsis    PRIORS: 1/28/2021.    VIEWS: Portable AP radiography of the chest performed.    FINDINGS: Heart size appears within normal limits. No hilar or superior mediastinal abnormalities identified. Allowing for shallow inspiration no evidence for focal infiltrate, lobar consolidation or pulmonary edema. No pleural effusion. No pneumothorax. No mediastinal shift. No acute osseous fractures. Note is made of indwelling neurostimulator leads.    IMPRESSION: No evidence for focal infiltrate or lobar consolidation.    EDGAR VALENZUELA MD; Attending Radiologist  This document has been electronically signed. Jun 18 20214:06PM    Ventricular Rate 76 BPM    Atrial Rate 76 BPM    P-R Interval 198 ms    QRS Duration 100 ms    Q-T Interval 396 ms    QTC Calculation(Bazett) 445 ms    P Axis 61 degrees    R Axis 45 degrees    T Axis 65 degrees    Diagnosis Line Normal sinus rhythm  Normal ECG  When compared with ECG of 28-JAN-2021 16:17,  No significant change was found  Confirmed by huber Rivera ([id number]) on 6/19/2021 3:12:24 PM

## 2021-06-23 NOTE — PROGRESS NOTE ADULT - ASSESSMENT
71 year old female with HTN, HLD, neuropathy, and spinal stenosis sp partial ray resection of second toe January 2021 now presenting Right toe ulcer 3rd digit    Right 3rd toe ulcer  - s/p Right 3rd toe amputation, tolerated procedure well  - Vascular following.  Vascular studies to be completed  - Pain  control     HTN  - /75  - Continue home BB, Lasix  - Echo 2/2021 revealing normal LV systolic function ef 55%, trace MR, trace TR.  No need to repeat    HLD  - continue statin    DVT ppx  - Per Primary    Monitor and replete electrolytes. Keep K>4.0 and Mg>2.0.  Will continue to follow    Tracy Flores FNP-C  Cardiology NP  [id number] [phone]

## 2021-06-23 NOTE — OCCUPATIONAL THERAPY INITIAL EVALUATION ADULT - NS ASR FOLLOW COMMAND OT EVAL
benefits from simple commands due to distractible/100% of the time/able to follow single-step instructions

## 2021-06-23 NOTE — PROGRESS NOTE ADULT - ASSESSMENT
Patient is a 71 year old female with PMH of neuropathy, spinal stenosis, HTN, HLD, hypothyroidism who was sent by Dr. Gray due to right 3rd toe infection.    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, neuropathy   - R foot x-ray with digit soft tissue swelling, mild foot charcot joint, no evidence of OM   - LE DVT study negative    - Podiatry following - booked for 6/21 for partial 3rd ray amputation - will follow bone cx and biopsy    - BCx as below   - wound culture from R toe with MSSA   - pending OR cx/path   - monitor temps and WBC   - continue on cefazolin 2gm IV q8h    CoNS Bacteremia   - blood culture 1/2 with CoNS - likely contaminant    - repeat negative  No further indication for Abx.     Penicillin Allergy  Reviewed w/ pt--childhood allergy w/ rash only  No reported hx of anaphylaxis. Ok to trial cephalosporins as above

## 2021-06-23 NOTE — OCCUPATIONAL THERAPY INITIAL EVALUATION ADULT - GENERAL OBSERVATIONS, REHAB EVAL
Pt received reclined in chair with surgical shoe and dressing to right foot; NAD; agrees to participate with OT for eval.

## 2021-06-23 NOTE — PROGRESS NOTE ADULT - ASSESSMENT
Right 3rd digit ulcer    PROBLEMS/RECOMMENDATIONS:     Pt evaluated and chart reviewed  Right foot dressed with Aquacel, DSD  No further podiatry surgical intervention  All of pt's questions were answered to satisfaction  Pt stated he understood all discussed  Podiatry will follow pt while in house]    Wound Care Instructions  1.  Keep dressing clean, dry and intact until clinic visit  2.  Make appointment to be seen by Dr. Love or Dr. Gray at [facility] w/in 3-5 days of d/c  3.  If symptoms of nausea, vomiting, fever, chills, shortness of breath, chest pain, increasing pain foot or posterior leg pain develop - go to the emergency department

## 2021-06-23 NOTE — PROGRESS NOTE ADULT - SUBJECTIVE AND OBJECTIVE BOX
[facility] DIVISION of INFECTIOUS DISEASE  Jerry Frost MD PhD, Vero Jones MD, Angeli Garcia MD, Maddison Ng MD  and providing coverage with Katrina Orozco MD and Carol Navarro MD  Providing Infectious Disease Consultations at [facility]'s    Office# [phone] to schedule follow up appointments  Answering Service for urgent calls or New Consults [phone]  Cell# to text for urgent issues Jerry Frost [phone]     infectious diseases progress note:    MILAN SHRESTHA is a 71y y. o. Female patient    No concerning overnight events    Allergies    latex (Rash)  penicillin (Hives)    Intolerances        ANTIBIOTICS/RELEVANT:  antimicrobials  ceFAZolin   IVPB      ceFAZolin   IVPB 2000 milliGRAM(s) IV Intermittent every 8 hours    immunologic:    OTHER:  amitriptyline 100 milliGRAM(s) Oral at bedtime  baclofen 10 milliGRAM(s) Oral two times a day  benzocaine 15 mG/menthol 3.6 mG (Sugar-Free) Lozenge 1 Lozenge Oral four times a day PRN  carvedilol 6.25 milliGRAM(s) Oral every 12 hours  DULoxetine 30 milliGRAM(s) Oral daily  fluticasone propionate 50 MICROgram(s)/spray Nasal Spray 1 Spray(s) Both Nostrils two times a day PRN  furosemide    Tablet 40 milliGRAM(s) Oral daily  gabapentin 400 milliGRAM(s) Oral two times a day  guaiFENesin Oral Liquid (Sugar-Free) 100 milliGRAM(s) Oral once PRN  heparin   Injectable 5000 Unit(s) SubCutaneous every 12 hours  lactobacillus acidophilus 1 Tablet(s) Oral three times a day  levothyroxine 200 MICROGram(s) Oral daily  lidocaine   Patch 1 Patch Transdermal every 24 hours  loratadine 10 milliGRAM(s) Oral daily  losartan 25 milliGRAM(s) Oral daily  melatonin 5 milliGRAM(s) Oral at bedtime  multivitamin 1 Tablet(s) Oral daily  oxyCODONE    IR 30 milliGRAM(s) Oral three times a day PRN  pantoprazole    Tablet 40 milliGRAM(s) Oral before breakfast  potassium chloride    Tablet ER 10 milliEquivalent(s) Oral daily  rOPINIRole 1 milliGRAM(s) Oral daily  simvastatin 40 milliGRAM(s) Oral at bedtime  sucralfate suspension 1 Gram(s) Oral two times a day      Objective:  Vital Signs Last 24 Hrs  T(C): 36.6 (23 Jun 2021 04:49), Max: 37.4 (22 Jun 2021 19:27)  T(F): 97.8 (23 Jun 2021 04:49), Max: 99.3 (22 Jun 2021 19:27)  HR: 68 (23 Jun 2021 04:49) (68 - 85)  BP: 154/75 (23 Jun 2021 04:49) (126/73 - 154/75)  BP(mean): --  RR: 17 (23 Jun 2021 04:49) (16 - 17)  SpO2: 92% (23 Jun 2021 04:49) (90% - 95%)    T(C): 36.6 (06-23-21 @ 04:49), Max: 37.4 (06-22-21 @ 19:27)  T(C): 36.6 (06-23-21 @ 04:49), Max: 37.4 (06-22-21 @ 19:27)  T(C): 36.6 (06-23-21 @ 04:49), Max: 37.4 (06-22-21 @ 19:27)    PHYSICAL EXAM:  HEENT: NC atraumatic  Neck: supple  Respiratory: no accessory muscle use, breathing comfortably  Cardiovascular: distant  Gastrointestinal: normal appearing, nondistended  Extremities: no clubbing, no cyanosis, foot wrapped        LABS:                          12.3   5.35  )-----------( 280      ( 23 Jun 2021 08:47 )             38.3       5.35 06-23 @ 08:47  5.52 06-21 @ 08:42  5.90 06-21 @ 00:41  6.24 06-18 @ 15:28      06-23    140  |  105  |  16  ----------------------------<  136<H>  4.5   |  30  |  1.10    Ca    9.4      23 Jun 2021 08:47    TPro  8.0  /  Alb  3.5  /  TBili  0.3  /  DBili  x   /  AST  32  /  ALT  36  /  AlkPhos  106  06-23      Creatinine, Serum: 1.10 mg/dL (06-23-21 @ 08:47)  Creatinine, Serum: 0.93 mg/dL (06-21-21 @ 08:42)  Creatinine, Serum: 0.84 mg/dL (06-21-21 @ 00:41)  Creatinine, Serum: 0.87 mg/dL (06-18-21 @ 15:28)                INFLAMMATORY MARKERS  Auto Lymphocyte #: 1.44 K/uL (06-18-21 @ 15:28)  Auto Neutrophil #: 3.92 K/uL (06-18-21 @ 15:28)    Lactate, Blood: 1.2 mmol/L (06-18-21 @ 17:45)  Lactate, Blood: 2.3 mmol/L (06-18-21 @ 15:28)      Auto Eosinophil #: 0.33 K/uL (06-18-21 @ 15:28)      Sedimentation Rate, Erythrocyte: 49 mm/hr (06-18-21 @ 19:35)  Sedimentation Rate, Erythrocyte: 55 mm/hr (06-18-21 @ 15:28)                    Activated Partial Thromboplastin Time: 35.1 sec (06-18-21 @ 15:28)  INR: 1.14 ratio (06-18-21 @ 15:28)          MICROBIOLOGY:      Culture - Tissue with Gram Stain (06.22.21 @ 02:37)    Gram Stain:   No polymorphonuclear cells seen per low power field  No organisms seen per oil power field    Specimen Source: .Tissue Other, 3rd toe right foot    Culture Results:   No growth    Culture - Other (06.18.21 @ 21:15)    -  Trimethoprim/Sulfamethoxazole: S <=0.5/9.5    -  Vancomycin: S 1    -  RIF- Rifampin: S <=1 Should not be used as monotherapy    -  Tetra/Doxy: S <=1    -  Ampicillin/Sulbactam: S <=8/4    -  Cefazolin: S <=4    -  Clindamycin: S <=0.25    -  Erythromycin: S <=0.25    -  Gentamicin: S <=1 Should not be used as monotherapy    -  Oxacillin: S <=0.25    -  Penicillin: R 4    Specimen Source: .Other right 3rd digit    Culture Results:   Numerous Staphylococcus aureus  Normal skin dom isolated    Organism Identification: Staphylococcus aureus    Organism: Staphylococcus aureus    Method Type: LILIANE    RADIOLOGY & ADDITIONAL STUDIES:

## 2021-06-23 NOTE — PROGRESS NOTE ADULT - PROBLEM SELECTOR PLAN 2
IV ABX  podiatry eval  check bone scan to r/o OM
Plan as above  ID eval with dr. estrella  Podiatry following  s/p partial toe amputation  follow up path
Plan as above  ID eval  Podiatry following
Plan as above  ID eval with dr. estrella  Podiatry following  s/p partial toe amputation
Plan as above  ID, Podiatry following

## 2021-06-23 NOTE — OCCUPATIONAL THERAPY INITIAL EVALUATION ADULT - ADDITIONAL COMMENTS
Pt is a questionable historian. Pt states there are few steps to enter her home, no rail. Pt able to stay on 1 level. PTA pt was independent with ADLs and used cane for functional mobility. Pt states she owns canes, rollator, has shower chair and grab bars in her stall shower.

## 2021-06-23 NOTE — PROGRESS NOTE ADULT - PROBLEM SELECTOR PROBLEM 1
Cellulitis
Cellulitis of toe of right foot
Cellulitis of toe of right foot
Cellulitis
Cellulitis

## 2021-06-23 NOTE — PROGRESS NOTE ADULT - ATTENDING COMMENTS
Chart reviewed    Patient seen and examined at    Agree with plan as outlined above    71 year old female with HTN, HLD, neuropathy, and spinal stenosis sp partial ray resection of second toe January 2021 now presenting Right toe ulcer 3rd digit    Right 3rd toe ulcer  - s/p Right 3rd toe amputation, tolerated procedure well  - Vascular following.  Vascular studies to be completed  - Pain  control     HTN  - /75  - Continue home BB, Lasix  - Echo 2/2021 revealing normal LV systolic function ef 55%, trace MR, trace TR.  No need to repeat

## 2021-06-23 NOTE — PROGRESS NOTE ADULT - SUBJECTIVE AND OBJECTIVE BOX
Patient is a 71y old  Female who presents with a chief complaint of Right foot cellulitis. (23 Jun 2021 12:16)  no complaints, other than constipation      INTERVAL HPI/OVERNIGHT EVENTS:  T(C): 36.8 (06-23-21 @ 12:24), Max: 37.4 (06-22-21 @ 19:27)  HR: 78 (06-23-21 @ 12:24) (68 - 85)  BP: 131/76 (06-23-21 @ 12:24) (126/73 - 154/75)  RR: 19 (06-23-21 @ 12:24) (16 - 19)  SpO2: 92% (06-23-21 @ 12:24) (90% - 95%)  Wt(kg): --  I&O's Summary    22 Jun 2021 07:01  -  23 Jun 2021 07:00  --------------------------------------------------------  IN: 200 mL / OUT: 0 mL / NET: 200 mL    23 Jun 2021 07:01  -  23 Jun 2021 15:02  --------------------------------------------------------  IN: 360 mL / OUT: 0 mL / NET: 360 mL        LABS:                        12.3   5.35  )-----------( 280      ( 23 Jun 2021 08:47 )             38.3     06-23    140  |  105  |  16  ----------------------------<  136<H>  4.5   |  30  |  1.10    Ca    9.4      23 Jun 2021 08:47    TPro  8.0  /  Alb  3.5  /  TBili  0.3  /  DBili  x   /  AST  32  /  ALT  36  /  AlkPhos  106  06-23        CAPILLARY BLOOD GLUCOSE                MEDICATIONS  (STANDING):  amitriptyline 100 milliGRAM(s) Oral at bedtime  baclofen 10 milliGRAM(s) Oral two times a day  carvedilol 6.25 milliGRAM(s) Oral every 12 hours  ceFAZolin   IVPB      ceFAZolin   IVPB 2000 milliGRAM(s) IV Intermittent every 8 hours  DULoxetine 30 milliGRAM(s) Oral daily  furosemide    Tablet 40 milliGRAM(s) Oral daily  gabapentin 400 milliGRAM(s) Oral two times a day  heparin   Injectable 5000 Unit(s) SubCutaneous every 12 hours  lactobacillus acidophilus 1 Tablet(s) Oral three times a day  levothyroxine 200 MICROGram(s) Oral daily  lidocaine   Patch 1 Patch Transdermal every 24 hours  loratadine 10 milliGRAM(s) Oral daily  losartan 25 milliGRAM(s) Oral daily  melatonin 5 milliGRAM(s) Oral at bedtime  multivitamin 1 Tablet(s) Oral daily  pantoprazole    Tablet 40 milliGRAM(s) Oral before breakfast  polyethylene glycol 3350 17 Gram(s) Oral two times a day  potassium chloride    Tablet ER 10 milliEquivalent(s) Oral daily  rOPINIRole 1 milliGRAM(s) Oral daily  simvastatin 40 milliGRAM(s) Oral at bedtime  sucralfate suspension 1 Gram(s) Oral two times a day    MEDICATIONS  (PRN):  benzocaine 15 mG/menthol 3.6 mG (Sugar-Free) Lozenge 1 Lozenge Oral four times a day PRN Sore Throat  diphenhydrAMINE 50 milliGRAM(s) Oral every 8 hours PRN Rash and/or Itching or allergy symptoms  fluticasone propionate 50 MICROgram(s)/spray Nasal Spray 1 Spray(s) Both Nostrils two times a day PRN congestion  guaiFENesin Oral Liquid (Sugar-Free) 100 milliGRAM(s) Oral once PRN Cough  oxyCODONE    IR 30 milliGRAM(s) Oral three times a day PRN Severe Pain (7 - 10)      REVIEW OF SYSTEMS:  CONSTITUTIONAL: No fever, weight loss, or fatigue  EYES: No eye pain, visual disturbances, or discharge  ENMT:  No difficulty hearing, tinnitus, vertigo; No sinus or throat pain  NECK: No pain or stiffness  RESPIRATORY: No cough, wheezing, chills or hemoptysis; No shortness of breath  CARDIOVASCULAR: No chest pain, palpitations, dizziness, or leg swelling  GASTROINTESTINAL: No abdominal or epigastric pain. No nausea, vomiting, or hematemesis; No diarrhea or constipation. No melena or hematochezia.  GENITOURINARY: No dysuria, frequency, hematuria, or incontinence  NEUROLOGICAL: No headaches, memory loss, loss of strength, numbness, or tremors  SKIN: No itching, burning, rashes, or lesions   LYMPH NODES: No enlarged glands  ENDOCRINE: No heat or cold intolerance; No hair loss  MUSCULOSKELETAL: No joint pain or swelling; No muscle, back, or extremity pain  PSYCHIATRIC: No depression, anxiety, mood swings, or difficulty sleeping  HEME/LYMPH: No easy bruising, or bleeding gums  ALLERY AND IMMUNOLOGIC: No hives or eczema    RADIOLOGY & ADDITIONAL TESTS:    Imaging Personally Reviewed:  [ ] YES  [ ] NO    Consultant(s) Notes Reviewed:  [ ] YES  [ ] NO    PHYSICAL EXAM:  GENERAL: NAD, well-groomed, well-developed  HEAD:  Atraumatic, Normocephalic  EYES: EOMI, PERRLA, conjunctiva and sclera clear  ENMT: No tonsillar erythema, exudates, or enlargement; Moist mucous membranes, Good dentition, No lesions  NECK: Supple, No JVD, Normal thyroid  NERVOUS SYSTEM:  Alert & Oriented X3, Good concentration; Motor Strength 5/5 B/L upper and lower extremities; DTRs 2+ intact and symmetric  CHEST/LUNG: Clear to percussion bilaterally; No rales, rhonchi, wheezing, or rubs  HEART: Regular rate and rhythm; No murmurs, rubs, or gallops  ABDOMEN: Soft, Nontender, Nondistended; Bowel sounds present  EXTREMITIES:  2+ Peripheral Pulses, No clubbing, cyanosis, or edema  LYMPH: No lymphadenopathy noted  SKIN: No rashes or lesions    Care Discussed with Consultants/Other Providers [ ] YES  [ ] NO

## 2021-06-23 NOTE — DISCHARGE NOTE NURSING/CASE MANAGEMENT/SOCIAL WORK - NSDCVIVACCINE_GEN_ALL_CORE_FT
Tdap; 17-Oct-2020 21:32; Marii Newberry ([facility]); [employer]; [id number] (Exp. Date: 03-Jul-2022); IntraMuscular; Deltoid Left.; 0.5 milliLiter(s); VIS (VIS Published: 09-May-2013, VIS Presented: 17-Oct-2020);

## 2021-06-23 NOTE — DISCHARGE NOTE NURSING/CASE MANAGEMENT/SOCIAL WORK - NSDCDMETYPESERV_GEN_ALL_CORE_FT
Rolling Walker Delbert Walker- has been approved and delivered to you at the bedside Rollator- has been approved and will be delivered to your home

## 2021-06-23 NOTE — PROGRESS NOTE ADULT - SUBJECTIVE AND OBJECTIVE BOX
71y year old Female seen at [facility] ED for right foot 3rd digit ulcer.  S/P left 3rd digit amputation 6/21/21 with Dr Gray.  Pt seen during AM rounds AAOx3 with NAD.  Dressing were clean, dry and intact.  Pt had no new pedal complaints.  Denies any fever, chills, nausea, vomiting, chest pain, shortness of breath, or calf pain at this time.        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 CRT <3 seconds.  2+ edema right foot and leg.  Erythema right 2, 3rd digits.   No ecchymosis b/l  Neurological: Light touch not intact to foot b/l.  Gross sensation intact to foot b/l  Musculoskeletal: 5/5 strength in all quadrants bilaterally, AJ & STJ ROM intact  No POP wounds  Dermatological: Wounds (1)  1.  Right 3rd digit plantar ulcer.  Necrotic base.  1.0 x 1.0 x 0.75cm.  Periwound intact.  PTB(+).  SOI(+).  Tunneling center of wound to tip of distal phalanx. No undermining.  Serous drainage.  No purulence. MIld malodor          Vital Signs Last 24 Hrs  T(C): 36.6 (23 Jun 2021 04:49), Max: 37.4 (22 Jun 2021 19:27)  T(F): 97.8 (23 Jun 2021 04:49), Max: 99.3 (22 Jun 2021 19:27)  HR: 68 (23 Jun 2021 04:49) (68 - 85)  BP: 154/75 (23 Jun 2021 04:49) (126/73 - 154/75)  BP(mean): --  RR: 17 (23 Jun 2021 04:49) (16 - 17)  SpO2: 92% (23 Jun 2021 04:49) (90% - 95%)                          12.3   5.35  )-----------( 280      ( 23 Jun 2021 08:47 )             38.3   06-23    140  |  105  |  16  ----------------------------<  136<H>  4.5   |  30  |  1.10    Ca    9.4      23 Jun 2021 08:47    TPro  8.0  /  Alb  3.5  /  TBili  0.3  /  DBili  x   /  AST  32  /  ALT  36  /  AlkPhos  106  06-23        Imaging: ----------    EXAM:  XR FOOT COMP MIN 3 VIEWS RT                            PROCEDURE DATE:  06/18/2021          INTERPRETATION:  RIGHT foot    CLINICAL INFORMATION: Third toe infection.    TECHNIQUE: AP,lateral and oblique views.    FINDINGS:    There is third digit soft tissue swelling without radiographic evidence of ostial myelitis. Third digit osseous structures similar to 1/29/2021 RIGHT foot imaging.  Status post hemiphalangectomy of second digit.  Status post resection of distal phalanx third and fourth digits unchanged from prior exam.  There is marked osteoarthritic sclerosis and narrowing of the tarsal bones and third through fifth metatarsal cuneiform joint articulations concerning for a Charcot joint.    IMPRESSION:  Digit soft tissue swelling without radiographic evidence of osteomyelitis.  Mid foot Charcot joint            MACARENA HANSON MD; Attending Radiologist  This document has been electronically signed. Jun 18 2021  6:04PM

## 2021-06-23 NOTE — DISCHARGE NOTE NURSING/CASE MANAGEMENT/SOCIAL WORK - PATIENT PORTAL LINK FT
You can access the FollowMyHealth Patient Portal offered by [facility] by registering at the following website: [email or web address]. By joining [facility]’s FollowMyHealth portal, you will also be able to view your health information using other applications (apps) compatible with our system.

## 2021-06-24 VITALS
DIASTOLIC BLOOD PRESSURE: 52 MMHG | HEART RATE: 70 BPM | OXYGEN SATURATION: 95 % | SYSTOLIC BLOOD PRESSURE: 92 MMHG | TEMPERATURE: 97 F | RESPIRATION RATE: 17 BRPM

## 2021-06-24 LAB — SURGICAL PATHOLOGY STUDY: SIGNIFICANT CHANGE UP

## 2021-06-24 PROCEDURE — U0005: CPT

## 2021-06-24 PROCEDURE — 87077 CULTURE AEROBIC IDENTIFY: CPT

## 2021-06-24 PROCEDURE — 88305 TISSUE EXAM BY PATHOLOGIST: CPT

## 2021-06-24 PROCEDURE — 86900 BLOOD TYPING SEROLOGIC ABO: CPT

## 2021-06-24 PROCEDURE — 36415 COLL VENOUS BLD VENIPUNCTURE: CPT

## 2021-06-24 PROCEDURE — 71045 X-RAY EXAM CHEST 1 VIEW: CPT

## 2021-06-24 PROCEDURE — 86769 SARS-COV-2 COVID-19 ANTIBODY: CPT

## 2021-06-24 PROCEDURE — 99232 SBSQ HOSP IP/OBS MODERATE 35: CPT

## 2021-06-24 PROCEDURE — 80053 COMPREHEN METABOLIC PANEL: CPT

## 2021-06-24 PROCEDURE — 85027 COMPLETE CBC AUTOMATED: CPT

## 2021-06-24 PROCEDURE — U0003: CPT

## 2021-06-24 PROCEDURE — 86850 RBC ANTIBODY SCREEN: CPT

## 2021-06-24 PROCEDURE — 93926 LOWER EXTREMITY STUDY: CPT

## 2021-06-24 PROCEDURE — 85610 PROTHROMBIN TIME: CPT

## 2021-06-24 PROCEDURE — 97165 OT EVAL LOW COMPLEX 30 MIN: CPT

## 2021-06-24 PROCEDURE — 87186 SC STD MICRODIL/AGAR DIL: CPT

## 2021-06-24 PROCEDURE — 87086 URINE CULTURE/COLONY COUNT: CPT

## 2021-06-24 PROCEDURE — 87150 DNA/RNA AMPLIFIED PROBE: CPT

## 2021-06-24 PROCEDURE — 87070 CULTURE OTHR SPECIMN AEROBIC: CPT

## 2021-06-24 PROCEDURE — 85730 THROMBOPLASTIN TIME PARTIAL: CPT

## 2021-06-24 PROCEDURE — 93923 UPR/LXTR ART STDY 3+ LVLS: CPT

## 2021-06-24 PROCEDURE — 99231 SBSQ HOSP IP/OBS SF/LOW 25: CPT

## 2021-06-24 PROCEDURE — 85652 RBC SED RATE AUTOMATED: CPT

## 2021-06-24 PROCEDURE — 73630 X-RAY EXAM OF FOOT: CPT

## 2021-06-24 PROCEDURE — 80202 ASSAY OF VANCOMYCIN: CPT

## 2021-06-24 PROCEDURE — 81003 URINALYSIS AUTO W/O SCOPE: CPT

## 2021-06-24 PROCEDURE — 99285 EMERGENCY DEPT VISIT HI MDM: CPT

## 2021-06-24 PROCEDURE — 88311 DECALCIFY TISSUE: CPT

## 2021-06-24 PROCEDURE — 86140 C-REACTIVE PROTEIN: CPT

## 2021-06-24 PROCEDURE — 93005 ELECTROCARDIOGRAM TRACING: CPT

## 2021-06-24 PROCEDURE — 93971 EXTREMITY STUDY: CPT

## 2021-06-24 PROCEDURE — 97162 PT EVAL MOD COMPLEX 30 MIN: CPT

## 2021-06-24 PROCEDURE — 87075 CULTR BACTERIA EXCEPT BLOOD: CPT

## 2021-06-24 PROCEDURE — 86901 BLOOD TYPING SEROLOGIC RH(D): CPT

## 2021-06-24 PROCEDURE — 85025 COMPLETE CBC W/AUTO DIFF WBC: CPT

## 2021-06-24 PROCEDURE — 87040 BLOOD CULTURE FOR BACTERIA: CPT

## 2021-06-24 PROCEDURE — 83605 ASSAY OF LACTIC ACID: CPT

## 2021-06-24 RX ORDER — CEPHALEXIN 500 MG
500 CAPSULE ORAL
Refills: 0 | Status: DISCONTINUED | OUTPATIENT
Start: 2021-06-24 | End: 2021-06-24

## 2021-06-24 RX ORDER — CEPHALEXIN 500 MG
1 CAPSULE ORAL
Qty: 28 | Refills: 0
Start: 2021-06-24 | End: 2021-06-30

## 2021-06-24 RX ADMIN — Medication 1 TABLET(S): at 11:30

## 2021-06-24 RX ADMIN — POLYETHYLENE GLYCOL 3350 17 GRAM(S): 17 POWDER, FOR SOLUTION ORAL at 16:37

## 2021-06-24 RX ADMIN — Medication 500 MILLIGRAM(S): at 16:37

## 2021-06-24 RX ADMIN — ROPINIROLE 1 MILLIGRAM(S): 8 TABLET, FILM COATED, EXTENDED RELEASE ORAL at 11:29

## 2021-06-24 RX ADMIN — Medication 40 MILLIGRAM(S): at 05:24

## 2021-06-24 RX ADMIN — HEPARIN SODIUM 5000 UNIT(S): 5000 INJECTION INTRAVENOUS; SUBCUTANEOUS at 16:37

## 2021-06-24 RX ADMIN — Medication 10 MILLIEQUIVALENT(S): at 11:28

## 2021-06-24 RX ADMIN — Medication 1 TABLET(S): at 05:24

## 2021-06-24 RX ADMIN — Medication 10 MILLIGRAM(S): at 16:37

## 2021-06-24 RX ADMIN — Medication 50 MILLIGRAM(S): at 11:28

## 2021-06-24 RX ADMIN — DULOXETINE HYDROCHLORIDE 30 MILLIGRAM(S): 30 CAPSULE, DELAYED RELEASE ORAL at 11:29

## 2021-06-24 RX ADMIN — Medication 10 MILLIGRAM(S): at 05:24

## 2021-06-24 RX ADMIN — CARVEDILOL PHOSPHATE 6.25 MILLIGRAM(S): 80 CAPSULE, EXTENDED RELEASE ORAL at 05:24

## 2021-06-24 RX ADMIN — Medication 1 GRAM(S): at 16:38

## 2021-06-24 RX ADMIN — PANTOPRAZOLE SODIUM 40 MILLIGRAM(S): 20 TABLET, DELAYED RELEASE ORAL at 05:24

## 2021-06-24 RX ADMIN — OXYCODONE HYDROCHLORIDE 30 MILLIGRAM(S): 5 TABLET ORAL at 18:10

## 2021-06-24 RX ADMIN — GABAPENTIN 400 MILLIGRAM(S): 400 CAPSULE ORAL at 05:33

## 2021-06-24 RX ADMIN — GABAPENTIN 400 MILLIGRAM(S): 400 CAPSULE ORAL at 16:37

## 2021-06-24 RX ADMIN — Medication 100 MILLIGRAM(S): at 05:24

## 2021-06-24 RX ADMIN — Medication 1 TABLET(S): at 13:06

## 2021-06-24 RX ADMIN — LOSARTAN POTASSIUM 25 MILLIGRAM(S): 100 TABLET, FILM COATED ORAL at 05:24

## 2021-06-24 RX ADMIN — Medication 1 GRAM(S): at 05:24

## 2021-06-24 RX ADMIN — CARVEDILOL PHOSPHATE 6.25 MILLIGRAM(S): 80 CAPSULE, EXTENDED RELEASE ORAL at 16:36

## 2021-06-24 RX ADMIN — Medication 200 MICROGRAM(S): at 05:24

## 2021-06-24 RX ADMIN — POLYETHYLENE GLYCOL 3350 17 GRAM(S): 17 POWDER, FOR SOLUTION ORAL at 05:24

## 2021-06-24 RX ADMIN — HEPARIN SODIUM 5000 UNIT(S): 5000 INJECTION INTRAVENOUS; SUBCUTANEOUS at 05:24

## 2021-06-24 NOTE — PROGRESS NOTE ADULT - SUBJECTIVE AND OBJECTIVE BOX
71y year old Female seen at [facility] ED for right foot 3rd digit ulcer.  S/P left 3rd digit amputation 6/21/21 with Dr Gray.  Pt seen during AM rounds AAOx3 with NAD.  Dressing were clean, dry and intact.  Pt had no new pedal complaints.  Denies any fever, chills, nausea, vomiting, chest pain, shortness of breath, or calf pain at this time.        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 CRT <3 seconds.  2+ edema right foot and leg.  Erythema right 2, 3rd digits.   No ecchymosis b/l  Neurological: Light touch not intact to foot b/l.  Gross sensation intact to foot b/l  Musculoskeletal: 5/5 strength in all quadrants bilaterally, AJ & STJ ROM intact  No POP wounds  Dermatological: Wounds (1)  1.  Right 3rd digit plantar ulcer.  Necrotic base.  1.0 x 1.0 x 0.75cm.  Periwound intact.  PTB(+).  SOI(+).  Tunneling center of wound to tip of distal phalanx. No undermining.  Serous drainage.  No purulence. MIld malodor          Vital Signs Last 24 Hrs  T(C): 36.7 (24 Jun 2021 10:43), Max: 37.1 (23 Jun 2021 17:23)  T(F): 98.1 (24 Jun 2021 10:43), Max: 98.8 (23 Jun 2021 17:23)  HR: 73 (24 Jun 2021 10:43) (73 - 79)  BP: 121/68 (24 Jun 2021 10:43) (121/68 - 138/70)  BP(mean): --  RR: 17 (24 Jun 2021 10:43) (17 - 19)  SpO2: 94% (24 Jun 2021 10:43) (92% - 94%)                                               12.3   5.35  )-----------( 280      ( 23 Jun 2021 08:47 )             38.3   06-23    140  |  105  |  16  ----------------------------<  136<H>  4.5   |  30  |  1.10    Ca    9.4      23 Jun 2021 08:47    TPro  8.0  /  Alb  3.5  /  TBili  0.3  /  DBili  x   /  AST  32  /  ALT  36  /  AlkPhos  106  06-23        Imaging: ----------    EXAM:  XR FOOT COMP MIN 3 VIEWS RT                            PROCEDURE DATE:  06/18/2021          INTERPRETATION:  RIGHT foot    CLINICAL INFORMATION: Third toe infection.    TECHNIQUE: AP,lateral and oblique views.    FINDINGS:    There is third digit soft tissue swelling without radiographic evidence of ostial myelitis. Third digit osseous structures similar to 1/29/2021 RIGHT foot imaging.  Status post hemiphalangectomy of second digit.  Status post resection of distal phalanx third and fourth digits unchanged from prior exam.  There is marked osteoarthritic sclerosis and narrowing of the tarsal bones and third through fifth metatarsal cuneiform joint articulations concerning for a Charcot joint.    IMPRESSION:  Digit soft tissue swelling without radiographic evidence of osteomyelitis.  Mid foot Charcot joint            MACARENA HANSON MD; Attending Radiologist  This document has been electronically signed. Jun 18 2021  6:04PM

## 2021-06-24 NOTE — PROGRESS NOTE ADULT - REASON FOR ADMISSION
Right foot cellulitis.
toe infection

## 2021-06-24 NOTE — PROGRESS NOTE ADULT - SUBJECTIVE AND OBJECTIVE BOX
[facility] -- Jayne Aguila, Max Chacko, Hernandez Kirkland Savella, [place]  Office # [phone]    Follow Up:  Preop/Postop Optimization    Subjective/Observations: Seen and evaluated, remains comfortable on RA.  No postop pain.  No respiratory or cardiac discomfort     REVIEW OF SYSTEMS: All other review of systems is negative unless indicated above  PAST MEDICAL & SURGICAL HISTORY:  Spinal stenosis    Neuropathy    HTN (hypertension)    HLD (hyperlipidemia)    Hypothyroid    Constipation    Toe amputation status, right  tip of right 3rd toe    MEDICATIONS  (STANDING):  amitriptyline 100 milliGRAM(s) Oral at bedtime  baclofen 10 milliGRAM(s) Oral two times a day  carvedilol 6.25 milliGRAM(s) Oral every 12 hours  cephalexin 500 milliGRAM(s) Oral four times a day  DULoxetine 30 milliGRAM(s) Oral daily  furosemide    Tablet 40 milliGRAM(s) Oral daily  gabapentin 400 milliGRAM(s) Oral two times a day  heparin   Injectable 5000 Unit(s) SubCutaneous every 12 hours  lactobacillus acidophilus 1 Tablet(s) Oral three times a day  levothyroxine 200 MICROGram(s) Oral daily  lidocaine   Patch 1 Patch Transdermal every 24 hours  loratadine 10 milliGRAM(s) Oral daily  losartan 25 milliGRAM(s) Oral daily  melatonin 5 milliGRAM(s) Oral at bedtime  multivitamin 1 Tablet(s) Oral daily  pantoprazole    Tablet 40 milliGRAM(s) Oral before breakfast  polyethylene glycol 3350 17 Gram(s) Oral two times a day  potassium chloride    Tablet ER 10 milliEquivalent(s) Oral daily  rOPINIRole 1 milliGRAM(s) Oral daily  simvastatin 40 milliGRAM(s) Oral at bedtime  sucralfate suspension 1 Gram(s) Oral two times a day    MEDICATIONS  (PRN):  benzocaine 15 mG/menthol 3.6 mG (Sugar-Free) Lozenge 1 Lozenge Oral four times a day PRN Sore Throat  diphenhydrAMINE 50 milliGRAM(s) Oral every 8 hours PRN Rash and/or Itching or allergy symptoms  fluticasone propionate 50 MICROgram(s)/spray Nasal Spray 1 Spray(s) Both Nostrils two times a day PRN congestion  guaiFENesin Oral Liquid (Sugar-Free) 100 milliGRAM(s) Oral once PRN Cough  oxyCODONE    IR 30 milliGRAM(s) Oral three times a day PRN Severe Pain (7 - 10)    Allergies    latex (Rash)  penicillin (Hives)    Intolerances    Vital Signs Last 24 Hrs  T(C): 36.7 (24 Jun 2021 10:43), Max: 37.1 (23 Jun 2021 17:23)  T(F): 98.1 (24 Jun 2021 10:43), Max: 98.8 (23 Jun 2021 17:23)  HR: 73 (24 Jun 2021 10:43) (73 - 79)  BP: 121/68 (24 Jun 2021 10:43) (121/68 - 138/70)  BP(mean): --  RR: 17 (24 Jun 2021 10:43) (17 - 19)  SpO2: 94% (24 Jun 2021 10:43) (92% - 94%)  I&O's Summary    23 Jun 2021 07:01  -  24 Jun 2021 07:00  --------------------------------------------------------  IN: 1200 mL / OUT: 0 mL / NET: 1200 mL    24 Jun 2021 07:01  -  24 Jun 2021 12:46  --------------------------------------------------------  IN: 360 mL / OUT: 0 mL / NET: 360 mL     PHYSICAL EXAM:  TELE: Not on tele  Constitutional: NAD, awake and alert, obese  HEENT: Moist Mucous Membranes, Anicteric  Pulmonary: Non-labored, breath sounds are clear bilaterally, No wheezing, rales or rhonchi  Cardiovascular: Regular, S1 and S2, No murmurs, rubs, gallops or clicks  Gastrointestinal: Bowel Sounds present, soft, nontender.   Lymph: No peripheral edema. No lymphadenopathy.  Skin: No visible rashes.  Right foot dressing dry and intact   Psych:  Mood & affect appropriate  LABS: All Labs Reviewed:                        12.3   5.35  )-----------( 280      ( 23 Jun 2021 08:47 )             38.3     23 Jun 2021 08:47    140    |  105    |  16     ----------------------------<  136    4.5     |  30     |  1.10     Ca    9.4        23 Jun 2021 08:47    TPro  8.0    /  Alb  3.5    /  TBili  0.3    /  DBili  x      /  AST  32     /  ALT  36     /  AlkPhos  106    23 Jun 2021 08:47       EXAM:  ECHO TTE WO CON COMP W DOPP         PROCEDURE DATE:  02/02/2021        INTERPRETATION:  INDICATION: Murmur  Sonographer KL    Blood Pressure 107/62    Height 175.3cm     Weight 102.1kg       BSA 2.17msq    Dimensions:  LA 3.4       Normal Values: 2.0 - 4.0 cm  Ao 3.2        Normal Values: 2.0 - 3.8 cm  SEPTUM 0.9       Normal Values: 0.6 - 1.2 cm  PWT 1.0       Normal Values: 0.6 - 1.1 cm  LVIDd 4.7         Normal Values: 3.0 - 5.6 cm  LVIDs 3.4         Normal Values: 1.8 - 4.0 cm    OBSERVATIONS:  Technically difficult study  Mitral Valve: normal, trace physiologic MR.  Aortic Valve/Aorta: normal trileaflet aortic valve.  Tricuspid Valve: normal with trace TR.  Pulmonic Valve: Not well-visualized  Left Atrium: normal  Right Atrium: Not well-visualized  Left Ventricle: normal LV size and systolic function, estimated LVEF of 55%. Endocardium is not well-visualized, cannot rule out segmental dysfunction  Right Ventricle: Grossly normal size and systolic function.  Pericardium/Pleura: normal, no significant pericardial effusion.  LV diastolic dysfunction is present    Conclusion:  Technically difficult study  Normal left ventricular internal dimensions and systolic function, estimated LVEF of 55%.  Grossly normal RV size and systolic function.  Normal trileaflet aortic valve, without AI.  Trace physiologic MR and TR.  No significant pericardial effusion.      BRAYAN HAMILTON MD; Attending Cardiologist  This document has been electronically signed. Feb  3 [id number]    EXAM:  XR CHEST AP OR PA 1V                          PROCEDURE DATE:  06/18/2021      INTERPRETATION:  INDICATION: Sepsis    PRIORS: 1/28/2021.    VIEWS: Portable AP radiography of the chest performed.    FINDINGS: Heart size appears within normal limits. No hilar or superior mediastinal abnormalities identified. Allowing for shallow inspiration no evidence for focal infiltrate, lobar consolidation or pulmonary edema. No pleural effusion. No pneumothorax. No mediastinal shift. No acute osseous fractures. Note is made of indwelling neurostimulator leads.    IMPRESSION: No evidence for focal infiltrate or lobar consolidation.    EDGAR VALENZUELA MD; Attending Radiologist  This document has been electronically signed. Jun 18 20214:06PM    Ventricular Rate 76 BPM    Atrial Rate 76 BPM    P-R Interval 198 ms    QRS Duration 100 ms    Q-T Interval 396 ms    QTC Calculation(Bazett) 445 ms    P Axis 61 degrees    R Axis 45 degrees    T Axis 65 degrees    Diagnosis Line Normal sinus rhythm  Normal ECG  When compared with ECG of 28-JAN-2021 16:17,  No significant change was found  Confirmed by huber Rivera ([id number]) on 6/19/2021 3:12:24 PM

## 2021-06-24 NOTE — CHART NOTE - NSCHARTNOTEFT_GEN_A_CORE
Do you have Advance Directives (HCP / LV / Organ donation / Documentation of oral advance Directive):   ( x   )  yes    (      )    NO                                                                            Do you have LV - Living will :                                                                                                                                             (  x  )  yes    (      )   No    Do you have HCP - Health Care Proxy:                                                                                                                            (  x   )  yes   (       ) N0    Do you have DNR- Do Not Resuscitate :                                                                                                                           (   x   )  yes  (        )  No    Do you have DNI- Do Not intubate  :                                                                                                                               (  x    )  yes   (       ) No    Do you have MOLST - Medical orders for Life sustaining treatment  :                                                                    (    x  ) yes    (       ) No    Decision Maker :  (    x ) Patient     (      )  HCA   (     ) Public Health Law Surrogate     (      ) Surrogate  (       ) Guardian    Goals of Care :  (   x   )   Complete Care     (       ) No Limitations                              (       )   Comfort Care       (       )  Hospice                               (      )   Limited medical Intervention / s    Medical Interventions :   (  x      )   CPR       (        )  DNR                                               (   x     )  Intubation with MV - Mechanical Ventilation  (    x  ) BIPAP/CPAP    (         )   DNI                                               (      x   )  Artificial Nutrition -  IVF, TPN / PPN, Tube Feeds             (         )   No Feeding Tube                                                (   x     ) Use Antibiotics                         (          ) No Antibiotics                                                (    x     ) Blood and Blood Products     (         )   No Blood or Blood products                                                (    x      )  Dialysis                                    (         )  No Dialysis                                                (    x      )  Medical Management only  (         )  No Invasive Interventions or Surgery  Time spent :                        (       ) upto 30 minutes                       (    x       )   more than 30 minutes  ACP and GOC reviewed and discussed

## 2021-06-24 NOTE — PROGRESS NOTE ADULT - ASSESSMENT
Right 3rd digit ulcer    PROBLEMS/RECOMMENDATIONS:     Pt evaluated and chart reviewed  Right foot dressed with Aquacel, DSD   No further podiatry surgical intervention  All of pt's questions were answered to satisfaction  Pt stated he understood all discussed  Podiatry will follow pt while in house]    Wound Care Instructions  1.  Keep dressing clean, dry and intact until clinic visit  2.  Make appointment to be seen by Dr. Love or Dr. Gray at [facility] w/in 3-5 days of d/c  3.  If symptoms of nausea, vomiting, fever, chills, shortness of breath, chest pain, increasing pain foot or posterior leg pain develop - go to the emergency department

## 2021-06-24 NOTE — PROGRESS NOTE ADULT - PROVIDER SPECIALTY LIST ADULT
Vascular Surgery
Anesthesia
Cardiology
Cardiology
Family Medicine
Infectious Disease
Podiatry
Cardiology
Infectious Disease
Podiatry
Cardiology
Cardiology
Podiatry
Family Medicine
Family Medicine
Infectious Disease
Family Medicine
Hospitalist
Vascular Surgery

## 2021-06-24 NOTE — PROGRESS NOTE ADULT - SUBJECTIVE AND OBJECTIVE BOX
[facility] DIVISION of INFECTIOUS DISEASE  Jerry Frost MD PhD, Vero Jones MD, Angeli Garcia MD, Maddison Ng MD  and providing coverage with Katrina Orozco MD and Carol Navarro MD  Providing Infectious Disease Consultations at [facility]'s    Office# [phone] to schedule follow up appointments  Answering Service for urgent calls or New Consults [phone]  Cell# to text for urgent issues Jerry Frost [phone]     infectious diseases progress note:    MILAN SHRESTHA is a 71y y. o. Female patient    No concerning overnight events    Allergies    latex (Rash)  penicillin (Hives)    Intolerances        ANTIBIOTICS/RELEVANT:  antimicrobials  ceFAZolin   IVPB      ceFAZolin   IVPB 2000 milliGRAM(s) IV Intermittent every 8 hours    immunologic:    OTHER:  amitriptyline 100 milliGRAM(s) Oral at bedtime  baclofen 10 milliGRAM(s) Oral two times a day  benzocaine 15 mG/menthol 3.6 mG (Sugar-Free) Lozenge 1 Lozenge Oral four times a day PRN  carvedilol 6.25 milliGRAM(s) Oral every 12 hours  diphenhydrAMINE 50 milliGRAM(s) Oral every 8 hours PRN  DULoxetine 30 milliGRAM(s) Oral daily  fluticasone propionate 50 MICROgram(s)/spray Nasal Spray 1 Spray(s) Both Nostrils two times a day PRN  furosemide    Tablet 40 milliGRAM(s) Oral daily  gabapentin 400 milliGRAM(s) Oral two times a day  guaiFENesin Oral Liquid (Sugar-Free) 100 milliGRAM(s) Oral once PRN  heparin   Injectable 5000 Unit(s) SubCutaneous every 12 hours  lactobacillus acidophilus 1 Tablet(s) Oral three times a day  levothyroxine 200 MICROGram(s) Oral daily  lidocaine   Patch 1 Patch Transdermal every 24 hours  loratadine 10 milliGRAM(s) Oral daily  losartan 25 milliGRAM(s) Oral daily  melatonin 5 milliGRAM(s) Oral at bedtime  multivitamin 1 Tablet(s) Oral daily  oxyCODONE    IR 30 milliGRAM(s) Oral three times a day PRN  pantoprazole    Tablet 40 milliGRAM(s) Oral before breakfast  polyethylene glycol 3350 17 Gram(s) Oral two times a day  potassium chloride    Tablet ER 10 milliEquivalent(s) Oral daily  rOPINIRole 1 milliGRAM(s) Oral daily  simvastatin 40 milliGRAM(s) Oral at bedtime  sucralfate suspension 1 Gram(s) Oral two times a day      Objective:  Vital Signs Last 24 Hrs  T(C): 36.7 (24 Jun 2021 10:43), Max: 37.1 (23 Jun 2021 17:23)  T(F): 98.1 (24 Jun 2021 10:43), Max: 98.8 (23 Jun 2021 17:23)  HR: 73 (24 Jun 2021 10:43) (73 - 79)  BP: 121/68 (24 Jun 2021 10:43) (121/68 - 138/70)  BP(mean): --  RR: 17 (24 Jun 2021 10:43) (17 - 19)  SpO2: 94% (24 Jun 2021 10:43) (92% - 94%)    T(C): 36.7 (06-24-21 @ 10:43), Max: 37.4 (06-22-21 @ 19:27)  T(C): 36.7 (06-24-21 @ 10:43), Max: 37.4 (06-22-21 @ 19:27)  T(C): 36.7 (06-24-21 @ 10:43), Max: 37.4 (06-22-21 @ 19:27)    PHYSICAL EXAM:  HEENT: NC atraumatic  Neck: supple  Respiratory: no accessory muscle use, breathing comfortably  Cardiovascular: distant  Gastrointestinal: normal appearing, nondistended  Extremities: no clubbing, no cyanosis, some drainage from surgical site and some erythema        LABS:                          12.3   5.35  )-----------( 280      ( 23 Jun 2021 08:47 )             38.3       5.35 06-23 @ 08:47  5.52 06-21 @ 08:42  5.90 06-21 @ 00:41  6.24 06-18 @ 15:28      06-23    140  |  105  |  16  ----------------------------<  136<H>  4.5   |  30  |  1.10    Ca    9.4      23 Jun 2021 08:47    TPro  8.0  /  Alb  3.5  /  TBili  0.3  /  DBili  x   /  AST  32  /  ALT  36  /  AlkPhos  106  06-23      Creatinine, Serum: 1.10 mg/dL (06-23-21 @ 08:47)  Creatinine, Serum: 0.93 mg/dL (06-21-21 @ 08:42)  Creatinine, Serum: 0.84 mg/dL (06-21-21 @ 00:41)  Creatinine, Serum: 0.87 mg/dL (06-18-21 @ 15:28)                INFLAMMATORY MARKERS  Auto Lymphocyte #: 1.44 K/uL (06-18-21 @ 15:28)  Auto Neutrophil #: 3.92 K/uL (06-18-21 @ 15:28)    Lactate, Blood: 1.2 mmol/L (06-18-21 @ 17:45)  Lactate, Blood: 2.3 mmol/L (06-18-21 @ 15:28)    Auto Eosinophil #: 0.33 K/uL (06-18-21 @ 15:28)      Sedimentation Rate, Erythrocyte: 49 mm/hr (06-18-21 @ 19:35)  Sedimentation Rate, Erythrocyte: 55 mm/hr (06-18-21 @ 15:28)                    Activated Partial Thromboplastin Time: 35.1 sec (06-18-21 @ 15:28)  INR: 1.14 ratio (06-18-21 @ 15:28)          MICROBIOLOGY:    Culture - Tissue with Gram Stain (06.22.21 @ 02:37)    Gram Stain:   No polymorphonuclear cells seen per low power field  No organisms seen per oil power field    Specimen Source: .Tissue Other, 3rd toe right foot    Culture Results:   No growth    Culture - Other (06.18.21 @ 21:15)    -  Trimethoprim/Sulfamethoxazole: S <=0.5/9.5    -  Vancomycin: S 1    -  RIF- Rifampin: S <=1 Should not be used as monotherapy    -  Tetra/Doxy: S <=1    -  Ampicillin/Sulbactam: S <=8/4    -  Cefazolin: S <=4    -  Clindamycin: S <=0.25    -  Erythromycin: S <=0.25    -  Gentamicin: S <=1 Should not be used as monotherapy    -  Oxacillin: S <=0.25    -  Penicillin: R 4    Specimen Source: .Other right 3rd digit    Culture Results:   Numerous Staphylococcus aureus  Normal skin dom isolated    Organism Identification: Staphylococcus aureus    Organism: Staphylococcus aureus    Method Type: [facility]        RADIOLOGY & ADDITIONAL STUDIES:  Surgical Pathology Report (06.21.21 @ 13:48)    Surgical Pathology Report:   ACCESSION No:  [id number]    MILAN SHRESTHA                2        Surgical Final Report          Final Diagnosis  3rd toe right foot:  Ulceration and acute inflammation of skin with underlying bone  with acute osteomyelitis.  Viable skin and soft tissue margin.  Bone margin is negative for acute osteomyelitis.    Verified by: Luís Ha MD  (Electronic Signature)  Reported on: 06/24/21 11:27 EDT, [facility], [address] [place] [address]  Phone: [phone]   Fax: [phone]  _________________________________________________________________    Clinical History  Osteomyelitis, gangrene right 3rd tie  Procedure: Amputation right 3rd toe    Specimen(s) Submitted  3rd toe right foot    Gross Description  Received: In formalin labeled "third toe right foot"  Lesion: 1.5 x 1.5 cm ulcer    lateral surface,  0.5 cm cm from  resection margin  Remaining Skin: Light tan  Bone/Soft Tissue Margin: Grossly viable  Inking: Proximal bone margin = green; Soft tissue margin = black  Submitted: Representative sections following fixation and  decalcification in 5 cassettes:  1A: Proximal margin, shave  1B: Lesion to nearest soft tissue margin  1C: Lesion to underlying bone  1D-1E: Lesion to underlying bone, cross section    DEV Christian 06/22/2021 06:34 PM    Perioperative Diagnosis  Gangrene and osteomyelitis right 3rd toe    Postoperative Diagnosis  Same    Disclaimer  In addition to other data that may appear on the specimen  containers, all labels have been inspected to confirm the  presence of the patient's name and date of birth.              MILAN SHRESTHA                2        Surgical Final Report          Histological processing performed at [facility],  Department of Pathology, [address] [place] [address].      Surgical Consult Report          Disclaimer  In addition to other data that may appear on the specimen  containers, all labels have been inspected to confirm the  presence of the patient's name and date of birth.    Histological processing performed at [facility],  Department of Pathology, [address] [place] [address].

## 2021-06-27 LAB
CULTURE RESULTS: SIGNIFICANT CHANGE UP
SPECIMEN SOURCE: SIGNIFICANT CHANGE UP

## 2021-06-29 ENCOUNTER — OUTPATIENT (OUTPATIENT)
Dept: OUTPATIENT SERVICES | Facility: HOSPITAL | Age: 71
LOS: 1 days | Discharge: HOME | End: 2021-06-29
Payer: MEDICARE

## 2021-06-29 ENCOUNTER — APPOINTMENT (OUTPATIENT)
Dept: WOUND CARE | Facility: HOSPITAL | Age: 71
End: 2021-06-29

## 2021-06-29 VITALS
OXYGEN SATURATION: 99 % | WEIGHT: 220 LBS | HEART RATE: 79 BPM | BODY MASS INDEX: 34.53 KG/M2 | HEIGHT: 67 IN | DIASTOLIC BLOOD PRESSURE: 73 MMHG | SYSTOLIC BLOOD PRESSURE: 113 MMHG | RESPIRATION RATE: 18 BRPM | TEMPERATURE: 97.5 F

## 2021-06-29 DIAGNOSIS — E03.9 HYPOTHYROIDISM, UNSPECIFIED: ICD-10-CM

## 2021-06-29 DIAGNOSIS — Z09 ENCOUNTER FOR FOLLOW-UP EXAMINATION AFTER COMPLETED TREATMENT FOR CONDITIONS OTHER THAN MALIGNANT NEOPLASM: ICD-10-CM

## 2021-06-29 DIAGNOSIS — Z89.421 ACQUIRED ABSENCE OF OTHER RIGHT TOE(S): Chronic | ICD-10-CM

## 2021-06-29 PROCEDURE — G0463: CPT

## 2021-06-29 NOTE — PHYSICAL EXAM
[4 x 4] : 4 x 4  [1+] : left 1+ [Varicose Veins Of Lower Extremities] : bilaterally [Ankle Swelling On The Right] : mild [Purpura] : no purpura  [Petechiae] : no petechiae [Skin Ulcer] : no ulcer [Alert] : alert [Oriented to Person] : oriented to person [Oriented to Place] : oriented to place [Calm] : calm [de-identified] : calm [de-identified] : HTN, HLD [de-identified] : s/p amp 3rd toe right , healing well , no complaints  [de-identified] : Neuropathy [FreeTextEntry1] : Foot 3rd Digit Amp Site- sutures intact [de-identified] : scant serosanguineous [de-identified] : Silver Alginate [de-identified] : Cleansed with Normal saline\par  [TWNoteComboBox1] : Right [TWNoteComboBox5] : No [TWNoteComboBox6] : Surgical [de-identified] : No [de-identified] : Normal [de-identified] : None [de-identified] : None [de-identified] : 100% [de-identified] : No [de-identified] : 3x Weekly [de-identified] : Primary Dressing

## 2021-06-29 NOTE — VITALS
[Pain related to present condition?] : The patient's  pain is not related to present condition. [] : No [de-identified] : 0

## 2021-06-29 NOTE — ASSESSMENT
[Verbal] : Verbal [Written] : Written [Demo] : Demo [Patient] : Patient [Fair - mild discomfort, physical impairment, low acceptance] : Fair - mild discomfort, physical impairment, low acceptance [Verbalizes knowledge/Understanding] : Verbalizes knowledge/understanding [Dressing changes] : dressing changes [Foot Care] : foot care [Pressure relief] : pressure relief [Signs and symptoms of infection] : sign and symptoms of infection [How and When to Call] : how and when to call [Patient responsibility to plan of care] : patient responsibility to plan of care [Stable] : stable [Home] : Home [Cane] : Cane [Not Applicable - Long Term Care/Home Health Agency] : Long Term Care/Home Health Agency: Not Applicable [] : No [FreeTextEntry2] : Infection Prevention\par Edema Control\par Restore Skin Integrity\par Home Health\par F/U 1 week\par  [FreeTextEntry3] : Initial visit  [FreeTextEntry4] : DPM escribed Keflex as the patient lost her previous prescription.\par F/U 1 week

## 2021-06-29 NOTE — REVIEW OF SYSTEMS
[Fever] : no fever [Chills] : no chills [Red Eyes] : eyes not red [Loss Of Hearing] : no hearing loss [Shortness Of Breath] : no shortness of breath [Wheezing] : no wheezing [Abdominal Pain] : no abdominal pain [Joint Stiffness] : joint stiffness [Skin Wound] : skin wound [Anxiety] : no anxiety [Easy Bleeding] : no tendency for easy bleeding [Negative] : Endocrine [FreeTextEntry5] : HTN.HLD [de-identified] : s/p amp 3rd toe right foot , sutures intact , no SOI [de-identified] : Neuropathy

## 2021-06-29 NOTE — PLAN
[FreeTextEntry1] : continue wound care and off loading  Spent 20 minutes for patient care and medical decision making.\par

## 2021-06-29 NOTE — HISTORY OF PRESENT ILLNESS
[FreeTextEntry1] : 71 year old female presents to the [facility] with a right foot 3rd digit amp site wound. The patient reports that she drove from [place] to [place] on 6/24/21 and noticed that her right foot 3rd digit was looking worse. She decided to follow up at [facility] for care where she was admitted and surgery was performed to amputate the infected toe. She is being seen at the [facility] for her follow up.

## 2021-06-30 DIAGNOSIS — M48.00 SPINAL STENOSIS, SITE UNSPECIFIED: ICD-10-CM

## 2021-06-30 DIAGNOSIS — Z84.81 FAMILY HISTORY OF CARRIER OF GENETIC DISEASE: ICD-10-CM

## 2021-06-30 DIAGNOSIS — Z80.52 FAMILY HISTORY OF MALIGNANT NEOPLASM OF BLADDER: ICD-10-CM

## 2021-06-30 DIAGNOSIS — Z79.899 OTHER LONG TERM (CURRENT) DRUG THERAPY: ICD-10-CM
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
## 2021-06-30 DIAGNOSIS — Z98.890 OTHER SPECIFIED POSTPROCEDURAL STATES: ICD-10-CM

## 2021-06-30 DIAGNOSIS — Z89.422 ACQUIRED ABSENCE OF OTHER LEFT TOE(S): ICD-10-CM

## 2021-06-30 DIAGNOSIS — E78.00 PURE HYPERCHOLESTEROLEMIA, UNSPECIFIED: ICD-10-CM

## 2021-06-30 DIAGNOSIS — Z88.0 ALLERGY STATUS TO PENICILLIN: ICD-10-CM

## 2021-06-30 DIAGNOSIS — T86.828 OTHER COMPLICATIONS OF SKIN GRAFT (ALLOGRAFT) (AUTOGRAFT): ICD-10-CM

## 2021-06-30 DIAGNOSIS — Z98.1 ARTHRODESIS STATUS: ICD-10-CM

## 2021-06-30 DIAGNOSIS — I10 ESSENTIAL (PRIMARY) HYPERTENSION: ICD-10-CM

## 2021-06-30 DIAGNOSIS — Z80.41 FAMILY HISTORY OF MALIGNANT NEOPLASM OF OVARY: ICD-10-CM

## 2021-06-30 DIAGNOSIS — Z90.49 ACQUIRED ABSENCE OF OTHER SPECIFIED PARTS OF DIGESTIVE TRACT: ICD-10-CM

## 2021-06-30 DIAGNOSIS — Z79.82 LONG TERM (CURRENT) USE OF ASPIRIN: ICD-10-CM

## 2021-06-30 DIAGNOSIS — Z89.421 ACQUIRED ABSENCE OF OTHER RIGHT TOE(S): ICD-10-CM

## 2021-07-06 ENCOUNTER — OUTPATIENT (OUTPATIENT)
Dept: OUTPATIENT SERVICES | Facility: HOSPITAL | Age: 71
LOS: 1 days | Discharge: HOME | End: 2021-07-06
Payer: MEDICARE

## 2021-07-06 ENCOUNTER — APPOINTMENT (OUTPATIENT)
Dept: WOUND CARE | Facility: HOSPITAL | Age: 71
End: 2021-07-06
Payer: MEDICARE

## 2021-07-06 VITALS
HEART RATE: 89 BPM | SYSTOLIC BLOOD PRESSURE: 124 MMHG | DIASTOLIC BLOOD PRESSURE: 72 MMHG | WEIGHT: 220 LBS | RESPIRATION RATE: 18 BRPM | BODY MASS INDEX: 34.53 KG/M2 | HEIGHT: 67 IN | OXYGEN SATURATION: 98 % | TEMPERATURE: 97.4 F

## 2021-07-06 VITALS
WEIGHT: 220 LBS | HEART RATE: 89 BPM | HEIGHT: 67 IN | RESPIRATION RATE: 18 BRPM | SYSTOLIC BLOOD PRESSURE: 124 MMHG | BODY MASS INDEX: 34.53 KG/M2 | TEMPERATURE: 97.4 F | DIASTOLIC BLOOD PRESSURE: 72 MMHG | OXYGEN SATURATION: 98 %

## 2021-07-06 DIAGNOSIS — Z09 ENCOUNTER FOR FOLLOW-UP EXAMINATION AFTER COMPLETED TREATMENT FOR CONDITIONS OTHER THAN MALIGNANT NEOPLASM: ICD-10-CM

## 2021-07-06 DIAGNOSIS — Z89.421 ACQUIRED ABSENCE OF OTHER RIGHT TOE(S): Chronic | ICD-10-CM

## 2021-07-06 PROCEDURE — G0463: CPT

## 2021-07-06 PROCEDURE — 99212 OFFICE O/P EST SF 10 MIN: CPT

## 2021-07-06 NOTE — PLAN
[FreeTextEntry1] : sutures removed and steri-strips applied. continue wound care and off loading  Spent 20 minutes for patient care and medical decision making.\par

## 2021-07-06 NOTE — VITALS
[] : No [FreeTextEntry3] : Right 2nd toe - intermittent "feeling like toe is being yanked."   [FreeTextEntry1] : Gabapentin, oxycontin [FreeTextEntry2] : night time [FreeTextEntry4] : pain medication as needed [FreeTextEntry5] : Cephalexin in progress

## 2021-07-06 NOTE — HISTORY OF PRESENT ILLNESS
[FreeTextEntry1] : Pt seen s/p right 3rd digit amputation, healing well. denies any other complaints at this time.

## 2021-07-06 NOTE — PHYSICAL EXAM
[1+] : left 1+ [Varicose Veins Of Lower Extremities] : bilaterally [Ankle Swelling On The Right] : mild [Purpura] : no purpura  [Petechiae] : no petechiae [Skin Ulcer] : no ulcer [Alert] : alert [Oriented to Person] : oriented to person [Oriented to Place] : oriented to place [Calm] : calm [de-identified] : A&Ox3, NAD [de-identified] : HTN, HLD [de-identified] : s/p amp 3rd toe right , healed well , no complaints  [de-identified] : Neuropathy [de-identified] : DPM removed sutures and applied steri-strips - no open wound [FreeTextEntry1] : Right foot 3rd digit amp site - sutures intact [de-identified] : no dressing [de-identified] : NSC [de-identified] : 3x Weekly

## 2021-07-06 NOTE — REVIEW OF SYSTEMS
[Fever] : no fever [Chills] : no chills [Red Eyes] : eyes not red [Loss Of Hearing] : no hearing loss [Shortness Of Breath] : no shortness of breath [Wheezing] : no wheezing [Abdominal Pain] : no abdominal pain [Joint Stiffness] : joint stiffness [Skin Wound] : skin wound [Anxiety] : no anxiety [Easy Bleeding] : no tendency for easy bleeding [Negative] : Endocrine [FreeTextEntry5] : HTN.HLD [de-identified] : s/p amp 3rd toe right foot , sutures intact , no SOI [de-identified] : Neuropathy

## 2021-07-06 NOTE — ASSESSMENT
[Verbal] : Verbal [Patient] : Patient [Verbalizes knowledge/Understanding] : Verbalizes knowledge/understanding [Foot Care] : foot care [Skin Care] : skin care [Signs and symptoms of infection] : sign and symptoms of infection [How and When to Call] : how and when to call [Off-loading] : off-loading [Patient responsibility to plan of care] : patient responsibility to plan of care [Glycemic Control] : glycemic control [Stable] : stable [Home] : Home [Not Applicable - Long Term Care/Home Health Agency] : Long Term Care/Home Health Agency: Not Applicable [Cane] : Cane [] : No [FreeTextEntry2] : Maintain optimal skin integrity, offloading, infection prevention\par  [FreeTextEntry4] : DPM instructed patient to discontinue surgical shoe and wear a comfortable fitted shoe - white socks - check foot integrity frequently. \par DPM recommended Bag Anderson to clean dry feet daily\par Patient verbalized understanding\par f/u 2 weeks

## 2021-07-07 DIAGNOSIS — Z79.899 OTHER LONG TERM (CURRENT) DRUG THERAPY: ICD-10-CM

## 2021-07-07 DIAGNOSIS — Z89.421 ACQUIRED ABSENCE OF OTHER RIGHT TOE(S): ICD-10-CM

## 2021-07-07 DIAGNOSIS — Z79.82 LONG TERM (CURRENT) USE OF ASPIRIN: ICD-10-CM

## 2021-07-07 DIAGNOSIS — T86.828 OTHER COMPLICATIONS OF SKIN GRAFT (ALLOGRAFT) (AUTOGRAFT): ICD-10-CM

## 2021-07-07 DIAGNOSIS — E78.00 PURE HYPERCHOLESTEROLEMIA, UNSPECIFIED: ICD-10-CM

## 2021-07-07 DIAGNOSIS — Z88.0 ALLERGY STATUS TO PENICILLIN: ICD-10-CM

## 2021-07-07 DIAGNOSIS — Z89.422 ACQUIRED ABSENCE OF OTHER LEFT TOE(S): ICD-10-CM

## 2021-07-07 DIAGNOSIS — I10 ESSENTIAL (PRIMARY) HYPERTENSION: ICD-10-CM

## 2021-07-07 DIAGNOSIS — M48.00 SPINAL STENOSIS, SITE UNSPECIFIED: ICD-10-CM

## 2021-07-07 DIAGNOSIS — Z80.52 FAMILY HISTORY OF MALIGNANT NEOPLASM OF BLADDER: ICD-10-CM

## 2021-07-07 DIAGNOSIS — Z98.1 ARTHRODESIS STATUS: ICD-10-CM

## 2021-07-07 DIAGNOSIS — Z84.81 FAMILY HISTORY OF CARRIER OF GENETIC DISEASE: ICD-10-CM

## 2021-07-07 DIAGNOSIS — Z98.890 OTHER SPECIFIED POSTPROCEDURAL STATES: ICD-10-CM

## 2021-07-07 DIAGNOSIS — Z80.41 FAMILY HISTORY OF MALIGNANT NEOPLASM OF OVARY: ICD-10-CM
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
## 2021-07-20 ENCOUNTER — APPOINTMENT (OUTPATIENT)
Dept: WOUND CARE | Facility: HOSPITAL | Age: 71
End: 2021-07-20
Payer: MEDICARE

## 2021-07-20 ENCOUNTER — OUTPATIENT (OUTPATIENT)
Dept: OUTPATIENT SERVICES | Facility: HOSPITAL | Age: 71
LOS: 1 days | Discharge: HOME | End: 2021-07-20
Payer: MEDICARE

## 2021-07-20 DIAGNOSIS — Z89.421 ACQUIRED ABSENCE OF OTHER RIGHT TOE(S): Chronic | ICD-10-CM

## 2021-07-20 DIAGNOSIS — Z09 ENCOUNTER FOR FOLLOW-UP EXAMINATION AFTER COMPLETED TREATMENT FOR CONDITIONS OTHER THAN MALIGNANT NEOPLASM: ICD-10-CM

## 2021-07-20 PROCEDURE — G0463: CPT

## 2021-07-20 PROCEDURE — 99212 OFFICE O/P EST SF 10 MIN: CPT

## 2021-07-21 DIAGNOSIS — L84 CORNS AND CALLOSITIES: ICD-10-CM

## 2021-07-21 DIAGNOSIS — Z89.422 ACQUIRED ABSENCE OF OTHER LEFT TOE(S): ICD-10-CM

## 2021-07-21 DIAGNOSIS — Z89.421 ACQUIRED ABSENCE OF OTHER RIGHT TOE(S): ICD-10-CM

## 2021-07-21 DIAGNOSIS — I10 ESSENTIAL (PRIMARY) HYPERTENSION: ICD-10-CM

## 2021-07-21 DIAGNOSIS — T86.828 OTHER COMPLICATIONS OF SKIN GRAFT (ALLOGRAFT) (AUTOGRAFT): ICD-10-CM

## 2021-07-21 DIAGNOSIS — Z80.52 FAMILY HISTORY OF MALIGNANT NEOPLASM OF BLADDER: ICD-10-CM

## 2021-07-21 DIAGNOSIS — Z79.82 LONG TERM (CURRENT) USE OF ASPIRIN: ICD-10-CM

## 2021-07-21 DIAGNOSIS — Z98.1 ARTHRODESIS STATUS: ICD-10-CM

## 2021-07-21 DIAGNOSIS — Z84.81 FAMILY HISTORY OF CARRIER OF GENETIC DISEASE: ICD-10-CM

## 2021-07-21 DIAGNOSIS — Z79.899 OTHER LONG TERM (CURRENT) DRUG THERAPY: ICD-10-CM

## 2021-07-21 DIAGNOSIS — Z80.41 FAMILY HISTORY OF MALIGNANT NEOPLASM OF OVARY: ICD-10-CM

## 2021-07-21 DIAGNOSIS — Z98.890 OTHER SPECIFIED POSTPROCEDURAL STATES: ICD-10-CM

## 2021-07-21 DIAGNOSIS — Z88.0 ALLERGY STATUS TO PENICILLIN: ICD-10-CM
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
## 2021-07-21 DIAGNOSIS — Z90.49 ACQUIRED ABSENCE OF OTHER SPECIFIED PARTS OF DIGESTIVE TRACT: ICD-10-CM

## 2021-07-21 DIAGNOSIS — M48.00 SPINAL STENOSIS, SITE UNSPECIFIED: ICD-10-CM

## 2021-07-21 DIAGNOSIS — E78.00 PURE HYPERCHOLESTEROLEMIA, UNSPECIFIED: ICD-10-CM

## 2021-08-24 ENCOUNTER — APPOINTMENT (OUTPATIENT)
Dept: WOUND CARE | Facility: HOSPITAL | Age: 71
End: 2021-08-24
Payer: MEDICARE

## 2021-08-24 ENCOUNTER — OUTPATIENT (OUTPATIENT)
Dept: OUTPATIENT SERVICES | Facility: HOSPITAL | Age: 71
LOS: 1 days | Discharge: HOME | End: 2021-08-24
Payer: MEDICARE

## 2021-08-24 VITALS
RESPIRATION RATE: 18 BRPM | TEMPERATURE: 98 F | SYSTOLIC BLOOD PRESSURE: 119 MMHG | WEIGHT: 230 LBS | HEIGHT: 67 IN | BODY MASS INDEX: 36.1 KG/M2 | OXYGEN SATURATION: 96 % | HEART RATE: 86 BPM | DIASTOLIC BLOOD PRESSURE: 69 MMHG

## 2021-08-24 DIAGNOSIS — Z89.421 ACQUIRED ABSENCE OF OTHER RIGHT TOE(S): Chronic | ICD-10-CM

## 2021-08-24 DIAGNOSIS — Z09 ENCOUNTER FOR FOLLOW-UP EXAMINATION AFTER COMPLETED TREATMENT FOR CONDITIONS OTHER THAN MALIGNANT NEOPLASM: ICD-10-CM

## 2021-08-24 PROCEDURE — 99213 OFFICE O/P EST LOW 20 MIN: CPT

## 2021-08-24 PROCEDURE — G0463: CPT

## 2021-08-24 NOTE — PLAN
[FreeTextEntry1] : continue observation to the left 2nd toe , if irritation persists , patient may need tenotomy , patient and  understands  ptr 1 month  Spent 20 minutes for patient care and medical decision making.\par

## 2021-08-24 NOTE — REVIEW OF SYSTEMS
[Joint Stiffness] : joint stiffness [Skin Wound] : skin wound [Negative] : Endocrine [Fever] : no fever [Chills] : no chills [Red Eyes] : eyes not red [Loss Of Hearing] : no hearing loss [Shortness Of Breath] : no shortness of breath [Wheezing] : no wheezing [Abdominal Pain] : no abdominal pain [Anxiety] : no anxiety [Easy Bleeding] : no tendency for easy bleeding [FreeTextEntry5] : HTN.HLD [de-identified] : s/p amp 3rd toe right foot , sutures intact , no SOI [de-identified] : Neuropathy

## 2021-08-24 NOTE — ASSESSMENT
[Verbal] : Verbal [Patient] : Patient [Verbalizes knowledge/Understanding] : Verbalizes knowledge/understanding [Foot Care] : foot care [Skin Care] : skin care [Signs and symptoms of infection] : sign and symptoms of infection [Nutrition] : nutrition [How and When to Call] : how and when to call [Off-loading] : off-loading [Patient responsibility to plan of care] : patient responsibility to plan of care [Glycemic Control] : glycemic control [] : Yes [Stable] : stable [Home] : Home [Cane] : Cane [Not Applicable - Long Term Care/Home Health Agency] : Long Term Care/Home Health Agency: Not Applicable [Demo] : Demo [FreeTextEntry2] : Promote skin integrity\par Encourage Glycemic Control\par Infection Prevention\par \par  [FreeTextEntry4] : F/U 1 month\par Continue using Bag balm on dry skin and check feet frequently for any changes in skin integrity. F/U with [facility] if any changes are noted as per DPM.\par Due to IT error V/S were not input into note, V/S were within normal limits.

## 2021-08-24 NOTE — HISTORY OF PRESENT ILLNESS
[FreeTextEntry1] : currently no open ulcers , patient complains of redness to the left 2nd toe , which represents dorsal shoe pressure ,

## 2021-08-24 NOTE — REVIEW OF SYSTEMS
[Fever] : no fever [Chills] : no chills [Red Eyes] : eyes not red [Loss Of Hearing] : no hearing loss [Shortness Of Breath] : no shortness of breath [Wheezing] : no wheezing [Abdominal Pain] : no abdominal pain [Joint Stiffness] : joint stiffness [Skin Wound] : skin wound [Anxiety] : no anxiety [Easy Bleeding] : no tendency for easy bleeding [Negative] : Endocrine [FreeTextEntry5] : HTN.HLD [de-identified] : s/p amp 3rd toe right foot , sutures intact , no SOI [de-identified] : Neuropathy

## 2021-08-24 NOTE — PLAN
[FreeTextEntry1] : continue observation of the hallux bilateral , wear appropriate shoe gear  Spent 20 minutes for patient care and medical decision making.\par

## 2021-08-24 NOTE — PHYSICAL EXAM
[1+] : left 1+ [Varicose Veins Of Lower Extremities] : bilaterally [Ankle Swelling On The Right] : mild [Purpura] : no purpura  [Petechiae] : no petechiae [Skin Ulcer] : no ulcer [Alert] : alert [Oriented to Person] : oriented to person [Oriented to Place] : oriented to place [Calm] : calm [de-identified] : A&Ox3, NAD [de-identified] : HTN, HLD [de-identified] : 2nd left toe hammered with dorsal exposure , no open wound  [de-identified] : all amp sites healed well , patient complains of redness to the 2nd left toe dorsal  [de-identified] : Neuropathy [FreeTextEntry1] : Right foot 3rd digit amp site- Resolved [de-identified] : no dressing [de-identified] : none [de-identified] : Cleansed with NS\par  [TWNoteComboBox5] : No [TWNoteComboBox6] : Surgical [de-identified] : No [de-identified] : Normal [de-identified] : None [de-identified] : None [de-identified] : No [de-identified] : None

## 2021-08-24 NOTE — ASSESSMENT
[Verbal] : Verbal [Demo] : Demo [Patient] : Patient [Good - alert, interested, motivated] : Good - alert, interested, motivated [Verbalizes knowledge/Understanding] : Verbalizes knowledge/understanding [Foot Care] : foot care [Skin Care] : skin care [Signs and symptoms of infection] : sign and symptoms of infection [Nutrition] : nutrition [How and When to Call] : how and when to call [Off-loading] : off-loading [Patient responsibility to plan of care] : patient responsibility to plan of care [Glycemic Control] : glycemic control [] : Yes [Stable] : stable [Home] : Home [Cane] : Cane [Not Applicable - Long Term Care/Home Health Agency] : Long Term Care/Home Health Agency: Not Applicable [FreeTextEntry2] : Promote skin integrity\par Encourage Glycemic Control\par Infection Prevention\par \par  [FreeTextEntry4] : F/U 1 month\par

## 2021-08-24 NOTE — HISTORY OF PRESENT ILLNESS
[FreeTextEntry1] : s/p amp 3rd toe right foot , healed well . Patient has distal callus on the hallux bilateral with no open wound

## 2021-08-24 NOTE — PHYSICAL EXAM
[1+] : left 1+ [Varicose Veins Of Lower Extremities] : bilaterally [Ankle Swelling On The Right] : mild [Alert] : alert [Oriented to Person] : oriented to person [Oriented to Place] : oriented to place [Calm] : calm [Purpura] : no purpura  [Petechiae] : no petechiae [Skin Ulcer] : no ulcer [de-identified] : A&Ox3, NAD [de-identified] : HTN, HLD [de-identified] : s/p amp 3rd toe right , healed well , no complaints  [de-identified] : Neuropathy [FreeTextEntry1] : Right foot 3rd digit amp site- small scab [FreeTextEntry2] : 1.1 [FreeTextEntry3] : 0.1 [de-identified] : none [de-identified] : no dressing [de-identified] : Cleansed with NS\par  [TWNoteComboBox5] : No [TWNoteComboBox6] : Surgical [de-identified] : No [de-identified] : Normal [de-identified] : None [de-identified] : None [de-identified] : None [de-identified] : No

## 2021-08-26 DIAGNOSIS — Z98.1 ARTHRODESIS STATUS: ICD-10-CM

## 2021-08-26 DIAGNOSIS — T86.828 OTHER COMPLICATIONS OF SKIN GRAFT (ALLOGRAFT) (AUTOGRAFT): ICD-10-CM

## 2021-08-26 DIAGNOSIS — Z98.890 OTHER SPECIFIED POSTPROCEDURAL STATES: ICD-10-CM

## 2021-08-26 DIAGNOSIS — L84 CORNS AND CALLOSITIES: ICD-10-CM

## 2021-08-26 DIAGNOSIS — E78.00 PURE HYPERCHOLESTEROLEMIA, UNSPECIFIED: ICD-10-CM

## 2021-08-26 DIAGNOSIS — Z89.422 ACQUIRED ABSENCE OF OTHER LEFT TOE(S): ICD-10-CM

## 2021-08-26 DIAGNOSIS — Z80.41 FAMILY HISTORY OF MALIGNANT NEOPLASM OF OVARY: ICD-10-CM

## 2021-08-26 DIAGNOSIS — Z79.899 OTHER LONG TERM (CURRENT) DRUG THERAPY: ICD-10-CM

## 2021-08-26 DIAGNOSIS — Z90.49 ACQUIRED ABSENCE OF OTHER SPECIFIED PARTS OF DIGESTIVE TRACT: ICD-10-CM

## 2021-08-26 DIAGNOSIS — I10 ESSENTIAL (PRIMARY) HYPERTENSION: ICD-10-CM

## 2021-08-26 DIAGNOSIS — M48.00 SPINAL STENOSIS, SITE UNSPECIFIED: ICD-10-CM

## 2021-08-26 DIAGNOSIS — Z89.421 ACQUIRED ABSENCE OF OTHER RIGHT TOE(S): ICD-10-CM

## 2021-08-26 DIAGNOSIS — Z88.0 ALLERGY STATUS TO PENICILLIN: ICD-10-CM

## 2021-08-26 DIAGNOSIS — Z79.82 LONG TERM (CURRENT) USE OF ASPIRIN: ICD-10-CM
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
## 2021-08-26 DIAGNOSIS — Z80.52 FAMILY HISTORY OF MALIGNANT NEOPLASM OF BLADDER: ICD-10-CM

## 2021-08-26 DIAGNOSIS — Z84.81 FAMILY HISTORY OF CARRIER OF GENETIC DISEASE: ICD-10-CM

## 2021-09-21 ENCOUNTER — APPOINTMENT (OUTPATIENT)
Dept: WOUND CARE | Facility: HOSPITAL | Age: 71
End: 2021-09-21

## 2021-09-21 ENCOUNTER — OUTPATIENT (OUTPATIENT)
Dept: OUTPATIENT SERVICES | Facility: HOSPITAL | Age: 71
LOS: 1 days | Discharge: HOME | End: 2021-09-21
Payer: MEDICARE

## 2021-09-21 VITALS
OXYGEN SATURATION: 98 % | DIASTOLIC BLOOD PRESSURE: 79 MMHG | SYSTOLIC BLOOD PRESSURE: 143 MMHG | HEART RATE: 86 BPM | RESPIRATION RATE: 20 BRPM | HEIGHT: 67 IN | TEMPERATURE: 98.2 F | BODY MASS INDEX: 36.1 KG/M2 | WEIGHT: 230 LBS

## 2021-09-21 DIAGNOSIS — Z80.52 FAMILY HISTORY OF MALIGNANT NEOPLASM OF BLADDER: ICD-10-CM

## 2021-09-21 DIAGNOSIS — Z79.82 LONG TERM (CURRENT) USE OF ASPIRIN: ICD-10-CM

## 2021-09-21 DIAGNOSIS — Z89.421 ACQUIRED ABSENCE OF OTHER RIGHT TOE(S): Chronic | ICD-10-CM

## 2021-09-21 DIAGNOSIS — M48.00 SPINAL STENOSIS, SITE UNSPECIFIED: ICD-10-CM

## 2021-09-21 DIAGNOSIS — Z89.422 ACQUIRED ABSENCE OF OTHER LEFT TOE(S): ICD-10-CM
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
## 2021-09-21 DIAGNOSIS — Z98.890 OTHER SPECIFIED POSTPROCEDURAL STATES: ICD-10-CM

## 2021-09-21 DIAGNOSIS — T86.828 OTHER COMPLICATIONS OF SKIN GRAFT (ALLOGRAFT) (AUTOGRAFT): ICD-10-CM

## 2021-09-21 DIAGNOSIS — Z48.1 ENCOUNTER FOR PLANNED POSTPROCEDURAL WOUND CLOSURE: ICD-10-CM

## 2021-09-21 DIAGNOSIS — I10 ESSENTIAL (PRIMARY) HYPERTENSION: ICD-10-CM

## 2021-09-21 DIAGNOSIS — Z88.0 ALLERGY STATUS TO PENICILLIN: ICD-10-CM

## 2021-09-21 DIAGNOSIS — Z09 ENCOUNTER FOR FOLLOW-UP EXAMINATION AFTER COMPLETED TREATMENT FOR CONDITIONS OTHER THAN MALIGNANT NEOPLASM: ICD-10-CM

## 2021-09-21 DIAGNOSIS — Z79.899 OTHER LONG TERM (CURRENT) DRUG THERAPY: ICD-10-CM

## 2021-09-21 DIAGNOSIS — Z80.41 FAMILY HISTORY OF MALIGNANT NEOPLASM OF OVARY: ICD-10-CM

## 2021-09-21 DIAGNOSIS — L84 CORNS AND CALLOSITIES: ICD-10-CM

## 2021-09-21 DIAGNOSIS — Z90.49 ACQUIRED ABSENCE OF OTHER SPECIFIED PARTS OF DIGESTIVE TRACT: ICD-10-CM

## 2021-09-21 DIAGNOSIS — Z89.421 ACQUIRED ABSENCE OF OTHER RIGHT TOE(S): ICD-10-CM

## 2021-09-21 DIAGNOSIS — E78.00 PURE HYPERCHOLESTEROLEMIA, UNSPECIFIED: ICD-10-CM

## 2021-09-21 DIAGNOSIS — Z98.1 ARTHRODESIS STATUS: ICD-10-CM

## 2021-09-21 PROCEDURE — G0463: CPT

## 2021-09-21 NOTE — ASSESSMENT
[Verbal] : Verbal [Demo] : Demo [Patient] : Patient [Good - alert, interested, motivated] : Good - alert, interested, motivated [Verbalizes knowledge/Understanding] : Verbalizes knowledge/understanding [Foot Care] : foot care [Skin Care] : skin care [Nutrition] : nutrition [Signs and symptoms of infection] : sign and symptoms of infection [How and When to Call] : how and when to call [Off-loading] : off-loading [Patient responsibility to plan of care] : patient responsibility to plan of care [Glycemic Control] : glycemic control [] : Yes [Stable] : stable [Home] : Home [Cane] : Cane [Not Applicable - Long Term Care/Home Health Agency] : Long Term Care/Home Health Agency: Not Applicable [Pressure relief] : pressure relief [FreeTextEntry2] : Promote skin integrity\par Encourage Glycemic Control\par Infection Prevention\par \par  [FreeTextEntry3] : no open wounds [FreeTextEntry4] : pt D/C'd from [facility] at this time, instructions provided\par continue using bag balm or other emollient to soften calluses and soothe dry skin. Pt to continue follow up visits with podiatrist for foot care.\par

## 2021-09-21 NOTE — PLAN
[FreeTextEntry1] : Patient happy with the out come , healed . Discharged  Spent 20 minutes for patient care and medical decision making.\par

## 2021-09-21 NOTE — REVIEW OF SYSTEMS
[Fever] : no fever [Chills] : no chills [Red Eyes] : eyes not red [Loss Of Hearing] : no hearing loss [Shortness Of Breath] : no shortness of breath [Wheezing] : no wheezing [Abdominal Pain] : no abdominal pain [Joint Stiffness] : joint stiffness [Skin Wound] : skin wound [Anxiety] : no anxiety [Easy Bleeding] : no tendency for easy bleeding [Negative] : Endocrine [FreeTextEntry5] : HTN.HLD [de-identified] : s/p amp 3rd toe right foot ,  no SOI , healed [de-identified] : Neuropathy

## 2021-09-21 NOTE — PHYSICAL EXAM
[1+] : left 1+ [Varicose Veins Of Lower Extremities] : bilaterally [Ankle Swelling On The Right] : mild [Purpura] : no purpura  [Petechiae] : no petechiae [Skin Ulcer] : no ulcer [Alert] : alert [Oriented to Person] : oriented to person [Oriented to Place] : oriented to place [Calm] : calm [de-identified] : A&Ox3, NAD [de-identified] : HTN, HLD [de-identified] : 2nd left toe hammered with dorsal exposure , no open wound  [de-identified] : all amp sites healed well , [de-identified] : Neuropathy [FreeTextEntry1] : Right foot 3rd digit amp site- Resolved [de-identified] : none [de-identified] : no dressing [de-identified] : Cleansed with NS\par  [TWNoteComboBox5] : No [TWNoteComboBox6] : Surgical [de-identified] : No [de-identified] : Normal [de-identified] : None [de-identified] : None [de-identified] : None [de-identified] : No

## 2021-10-06 PROBLEM — I10 ESSENTIAL HYPERTENSION: Status: ACTIVE | Noted: 2019-10-16

## 2021-12-02 ENCOUNTER — RESULT REVIEW (OUTPATIENT)
Age: 71
End: 2021-12-02

## 2021-12-02 ENCOUNTER — OUTPATIENT (OUTPATIENT)
Dept: OUTPATIENT SERVICES | Facility: HOSPITAL | Age: 71
LOS: 1 days | Discharge: HOME | End: 2021-12-02
Payer: MEDICARE

## 2021-12-02 ENCOUNTER — APPOINTMENT (OUTPATIENT)
Dept: WOUND CARE | Facility: HOSPITAL | Age: 71
End: 2021-12-02
Payer: MEDICARE

## 2021-12-02 VITALS
OXYGEN SATURATION: 99 % | HEIGHT: 67 IN | WEIGHT: 230 LBS | DIASTOLIC BLOOD PRESSURE: 68 MMHG | TEMPERATURE: 97.4 F | SYSTOLIC BLOOD PRESSURE: 118 MMHG | RESPIRATION RATE: 18 BRPM | HEART RATE: 83 BPM | BODY MASS INDEX: 36.1 KG/M2

## 2021-12-02 DIAGNOSIS — T86.828 OTHER COMPLICATIONS OF SKIN GRAFT (ALLOGRAFT) (AUTOGRAFT): ICD-10-CM

## 2021-12-02 DIAGNOSIS — Z89.421 ACQUIRED ABSENCE OF OTHER RIGHT TOE(S): Chronic | ICD-10-CM

## 2021-12-02 PROCEDURE — 73630 X-RAY EXAM OF FOOT: CPT | Mod: 26,RT

## 2021-12-02 PROCEDURE — 99214 OFFICE O/P EST MOD 30 MIN: CPT

## 2021-12-02 PROCEDURE — 73630 X-RAY EXAM OF FOOT: CPT

## 2021-12-02 PROCEDURE — G0463: CPT

## 2021-12-02 NOTE — HISTORY OF PRESENT ILLNESS
[FreeTextEntry1] : Pt seen for new complaint of right hallux swelling. Pt denies any pain at this time but notes that she has noticed intermittent swelling to her right big toe. Denies any redness or any illiciting trauma that she recalls.

## 2021-12-02 NOTE — PHYSICAL EXAM
[1+] : left 1+ [Varicose Veins Of Lower Extremities] : bilaterally [Ankle Swelling On The Right] : mild [Alert] : alert [Oriented to Person] : oriented to person [Oriented to Place] : oriented to place [Calm] : calm [Purpura] : no purpura  [Petechiae] : no petechiae [Skin Ulcer] : no ulcer [de-identified] : A&Ox3, NAD [de-identified] : HTN, HLD [de-identified] : 5/5 strength in all quadrants bilaterally [de-identified] : right hallux mild edema noted, all amp sites healed well [de-identified] : Neuropathy [FreeTextEntry1] : Right foot 3rd digit amp site- Resolved [de-identified] : none [de-identified] : No Treatment  [de-identified] : Cleansed with Normal Saline. \par  [FreeTextEntry7] : Hallux- No open wounds  [de-identified] : mild & edema [de-identified] : No Treatment  [TWNoteComboBox5] : No [TWNoteComboBox6] : Surgical [de-identified] : No [de-identified] : Normal [de-identified] : None [de-identified] : None [de-identified] : None [de-identified] : No [TWNoteComboBox9] : Right [de-identified] : Erythema

## 2021-12-02 NOTE — PLAN
[FreeTextEntry1] : Patient examined and evaluated at this time.\par Discussed possible etiologies with patient.\par Ordered radiographs of the right foot at this time.\par Spent 30 minutes for patient care and medical decision making.\par Patient to follow up in 1 week.\par

## 2021-12-02 NOTE — REVIEW OF SYSTEMS
[Joint Stiffness] : joint stiffness [Skin Wound] : skin wound [Negative] : Endocrine [Fever] : no fever [Chills] : no chills [Red Eyes] : eyes not red [Loss Of Hearing] : no hearing loss [Shortness Of Breath] : no shortness of breath [Wheezing] : no wheezing [Abdominal Pain] : no abdominal pain [Anxiety] : no anxiety [Easy Bleeding] : no tendency for easy bleeding [FreeTextEntry5] : HTN.HLD [de-identified] : s/p amp 3rd toe right foot ,  no SOI , healed [de-identified] : Neuropathy

## 2021-12-02 NOTE — ASSESSMENT
[Verbal] : Verbal [Demo] : Demo [Patient] : Patient [Good - alert, interested, motivated] : Good - alert, interested, motivated [Verbalizes knowledge/Understanding] : Verbalizes knowledge/understanding [Foot Care] : foot care [Skin Care] : skin care [Pressure relief] : pressure relief [Signs and symptoms of infection] : sign and symptoms of infection [Nutrition] : nutrition [How and When to Call] : how and when to call [Off-loading] : off-loading [Patient responsibility to plan of care] : patient responsibility to plan of care [Glycemic Control] : glycemic control [] : Yes [Stable] : stable [Home] : Home [Cane] : Cane [Not Applicable - Long Term Care/Home Health Agency] : Long Term Care/Home Health Agency: Not Applicable [FreeTextEntry2] : \par \par  [FreeTextEntry3] : no open wounds [FreeTextEntry4] : Pt to F/U to [facility] in 1 Week \par DPM sent Pt for an X-ray of Right Hallux following today's visit to [facility].

## 2021-12-04 DIAGNOSIS — Z88.0 ALLERGY STATUS TO PENICILLIN: ICD-10-CM

## 2021-12-04 DIAGNOSIS — Z79.899 OTHER LONG TERM (CURRENT) DRUG THERAPY: ICD-10-CM

## 2021-12-04 DIAGNOSIS — Z80.41 FAMILY HISTORY OF MALIGNANT NEOPLASM OF OVARY: ICD-10-CM

## 2021-12-04 DIAGNOSIS — Z89.421 ACQUIRED ABSENCE OF OTHER RIGHT TOE(S): ICD-10-CM

## 2021-12-04 DIAGNOSIS — Z90.49 ACQUIRED ABSENCE OF OTHER SPECIFIED PARTS OF DIGESTIVE TRACT: ICD-10-CM

## 2021-12-04 DIAGNOSIS — R22.41 LOCALIZED SWELLING, MASS AND LUMP, RIGHT LOWER LIMB: ICD-10-CM

## 2021-12-04 DIAGNOSIS — E78.00 PURE HYPERCHOLESTEROLEMIA, UNSPECIFIED: ICD-10-CM

## 2021-12-04 DIAGNOSIS — Z79.82 LONG TERM (CURRENT) USE OF ASPIRIN: ICD-10-CM

## 2021-12-04 DIAGNOSIS — Z89.422 ACQUIRED ABSENCE OF OTHER LEFT TOE(S): ICD-10-CM

## 2021-12-04 DIAGNOSIS — Z84.81 FAMILY HISTORY OF CARRIER OF GENETIC DISEASE: ICD-10-CM

## 2021-12-04 DIAGNOSIS — Z98.890 OTHER SPECIFIED POSTPROCEDURAL STATES: ICD-10-CM

## 2021-12-04 DIAGNOSIS — I10 ESSENTIAL (PRIMARY) HYPERTENSION: ICD-10-CM

## 2021-12-04 DIAGNOSIS — Z79.891 LONG TERM (CURRENT) USE OF OPIATE ANALGESIC: ICD-10-CM

## 2021-12-04 DIAGNOSIS — Z98.1 ARTHRODESIS STATUS: ICD-10-CM

## 2021-12-04 DIAGNOSIS — M48.00 SPINAL STENOSIS, SITE UNSPECIFIED: ICD-10-CM

## 2021-12-04 DIAGNOSIS — Z80.52 FAMILY HISTORY OF MALIGNANT NEOPLASM OF BLADDER: ICD-10-CM

## 2021-12-09 ENCOUNTER — APPOINTMENT (OUTPATIENT)
Dept: WOUND CARE | Facility: HOSPITAL | Age: 71
End: 2021-12-09
Payer: MEDICARE

## 2021-12-09 ENCOUNTER — OUTPATIENT (OUTPATIENT)
Dept: OUTPATIENT SERVICES | Facility: HOSPITAL | Age: 71
LOS: 1 days | Discharge: HOME | End: 2021-12-09
Payer: MEDICARE

## 2021-12-09 VITALS
WEIGHT: 230 LBS | DIASTOLIC BLOOD PRESSURE: 86 MMHG | HEART RATE: 92 BPM | TEMPERATURE: 97 F | SYSTOLIC BLOOD PRESSURE: 147 MMHG | BODY MASS INDEX: 36.1 KG/M2 | RESPIRATION RATE: 18 BRPM | OXYGEN SATURATION: 97 % | HEIGHT: 67 IN

## 2021-12-09 DIAGNOSIS — Z89.421 ACQUIRED ABSENCE OF OTHER RIGHT TOE(S): Chronic | ICD-10-CM

## 2021-12-09 DIAGNOSIS — T86.828 OTHER COMPLICATIONS OF SKIN GRAFT (ALLOGRAFT) (AUTOGRAFT): ICD-10-CM

## 2021-12-09 PROCEDURE — G0463: CPT

## 2021-12-09 PROCEDURE — 99213 OFFICE O/P EST LOW 20 MIN: CPT

## 2021-12-09 NOTE — ASSESSMENT
[Verbal] : Verbal [Demo] : Demo [Patient] : Patient [Good - alert, interested, motivated] : Good - alert, interested, motivated [Verbalizes knowledge/Understanding] : Verbalizes knowledge/understanding [Foot Care] : foot care [Skin Care] : skin care [Pressure relief] : pressure relief [Signs and symptoms of infection] : sign and symptoms of infection [Nutrition] : nutrition [How and When to Call] : how and when to call [Off-loading] : off-loading [Patient responsibility to plan of care] : patient responsibility to plan of care [Glycemic Control] : glycemic control [] : Yes [Stable] : stable [Home] : Home [Cane] : Cane [Not Applicable - Long Term Care/Home Health Agency] : Long Term Care/Home Health Agency: Not Applicable [FreeTextEntry2] : Infection prevention \par Maintain optimal skin integrity to high pressure areas\par  [FreeTextEntry3] : no open wounds [FreeTextEntry4] : Xray reviewed with Pt. No structural damage of right hallux \par DPM shaved right great toe plantar callus\par Return 2-3 weeks or sooner if any wound related problems arise

## 2021-12-09 NOTE — PLAN
[FreeTextEntry1] : Patient examined and evaluated at this time.\par Radiographs reviewed with patient.\par Will monitor for any changes. Patient advised to return if any new or worsening of symptoms are noted.\par Spent 20 minutes for patient care and medical decision making.\par Patient to follow up in 4 weeks.

## 2021-12-09 NOTE — PHYSICAL EXAM
[1+] : left 1+ [Varicose Veins Of Lower Extremities] : bilaterally [Ankle Swelling On The Right] : mild [Purpura] : no purpura  [Petechiae] : no petechiae [Skin Ulcer] : no ulcer [Alert] : alert [Oriented to Person] : oriented to person [Oriented to Place] : oriented to place [Calm] : calm [de-identified] : A&Ox3, NAD [de-identified] : HTN, HLD [de-identified] : 5/5 strength in all quadrants bilaterally [de-identified] : right hallux mild edema noted, all amp sites healed well [de-identified] : Neuropathy [FreeTextEntry1] : Right Hallux - Resolved [de-identified] : none [de-identified] : No Treatment  [FreeTextEntry7] : Hallux- No open wounds  [de-identified] : mild & edema [de-identified] : No Treatment  [TWNoteComboBox5] : No [TWNoteComboBox6] : Surgical [de-identified] : No [de-identified] : Normal [de-identified] : None [de-identified] : None [de-identified] : None [de-identified] : No [TWNoteComboBox9] : Right [de-identified] : Erythema

## 2021-12-09 NOTE — HISTORY OF PRESENT ILLNESS
[FreeTextEntry1] : Pt seen for follow up of right hallux swelling. Pt relates that she was able to obtain the radiographs as advised. Denies any other complaints at this time.

## 2021-12-09 NOTE — REVIEW OF SYSTEMS
[Fever] : no fever [Chills] : no chills [Red Eyes] : eyes not red [Loss Of Hearing] : no hearing loss [Shortness Of Breath] : no shortness of breath [Wheezing] : no wheezing [Abdominal Pain] : no abdominal pain [Joint Stiffness] : joint stiffness [Skin Wound] : skin wound [Anxiety] : no anxiety [Easy Bleeding] : no tendency for easy bleeding [Negative] : Endocrine [FreeTextEntry5] : HTN.HLD [de-identified] : s/p amp 3rd toe right foot ,  no SOI , healed [de-identified] : Neuropathy

## 2021-12-12 DIAGNOSIS — Z80.52 FAMILY HISTORY OF MALIGNANT NEOPLASM OF BLADDER: ICD-10-CM

## 2021-12-12 DIAGNOSIS — Z80.41 FAMILY HISTORY OF MALIGNANT NEOPLASM OF OVARY: ICD-10-CM

## 2021-12-12 DIAGNOSIS — E78.00 PURE HYPERCHOLESTEROLEMIA, UNSPECIFIED: ICD-10-CM

## 2021-12-12 DIAGNOSIS — Z79.899 OTHER LONG TERM (CURRENT) DRUG THERAPY: ICD-10-CM

## 2021-12-12 DIAGNOSIS — Z79.82 LONG TERM (CURRENT) USE OF ASPIRIN: ICD-10-CM

## 2021-12-12 DIAGNOSIS — I10 ESSENTIAL (PRIMARY) HYPERTENSION: ICD-10-CM

## 2021-12-12 DIAGNOSIS — Z89.422 ACQUIRED ABSENCE OF OTHER LEFT TOE(S): ICD-10-CM

## 2021-12-12 DIAGNOSIS — R22.41 LOCALIZED SWELLING, MASS AND LUMP, RIGHT LOWER LIMB: ICD-10-CM

## 2021-12-12 DIAGNOSIS — Z88.0 ALLERGY STATUS TO PENICILLIN: ICD-10-CM

## 2021-12-12 DIAGNOSIS — Z98.890 OTHER SPECIFIED POSTPROCEDURAL STATES: ICD-10-CM

## 2021-12-12 DIAGNOSIS — E03.9 HYPOTHYROIDISM, UNSPECIFIED: ICD-10-CM

## 2021-12-12 DIAGNOSIS — Z98.1 ARTHRODESIS STATUS: ICD-10-CM

## 2021-12-12 DIAGNOSIS — Z84.81 FAMILY HISTORY OF CARRIER OF GENETIC DISEASE: ICD-10-CM

## 2021-12-12 DIAGNOSIS — Z90.49 ACQUIRED ABSENCE OF OTHER SPECIFIED PARTS OF DIGESTIVE TRACT: ICD-10-CM

## 2021-12-12 DIAGNOSIS — Z89.421 ACQUIRED ABSENCE OF OTHER RIGHT TOE(S): ICD-10-CM

## 2022-01-06 ENCOUNTER — APPOINTMENT (OUTPATIENT)
Dept: WOUND CARE | Facility: HOSPITAL | Age: 72
End: 2022-01-06
Payer: MEDICARE

## 2022-01-06 ENCOUNTER — OUTPATIENT (OUTPATIENT)
Dept: OUTPATIENT SERVICES | Facility: HOSPITAL | Age: 72
LOS: 1 days | Discharge: HOME | End: 2022-01-06
Payer: MEDICARE

## 2022-01-06 VITALS
TEMPERATURE: 97.2 F | HEIGHT: 67 IN | OXYGEN SATURATION: 99 % | BODY MASS INDEX: 36.1 KG/M2 | HEART RATE: 84 BPM | RESPIRATION RATE: 20 BRPM | WEIGHT: 230 LBS | SYSTOLIC BLOOD PRESSURE: 138 MMHG | DIASTOLIC BLOOD PRESSURE: 83 MMHG

## 2022-01-06 DIAGNOSIS — Z89.421 ACQUIRED ABSENCE OF OTHER RIGHT TOE(S): Chronic | ICD-10-CM

## 2022-01-06 DIAGNOSIS — M21.41 FLAT FOOT [PES PLANUS] (ACQUIRED), RIGHT FOOT: ICD-10-CM

## 2022-01-06 DIAGNOSIS — R22.41 LOCALIZED SWELLING, MASS AND LUMP, RIGHT LOWER LIMB: ICD-10-CM

## 2022-01-06 PROCEDURE — 99213 OFFICE O/P EST LOW 20 MIN: CPT

## 2022-01-06 PROCEDURE — G0463: CPT

## 2022-01-06 NOTE — VITALS
[Pain related to present condition?] : The patient's  pain is not related to present condition. [] : No [de-identified] : 0/10

## 2022-01-06 NOTE — REVIEW OF SYSTEMS
[Fever] : no fever [Chills] : no chills [Red Eyes] : eyes not red [Loss Of Hearing] : no hearing loss [Shortness Of Breath] : no shortness of breath [Wheezing] : no wheezing [Abdominal Pain] : no abdominal pain [Joint Stiffness] : joint stiffness [Skin Wound] : skin wound [Anxiety] : no anxiety [Easy Bleeding] : no tendency for easy bleeding [Negative] : Endocrine [FreeTextEntry5] : HTN.HLD [de-identified] : s/p amp 3rd toe right foot ,  no SOI , healed [de-identified] : Neuropathy

## 2022-01-06 NOTE — HISTORY OF PRESENT ILLNESS
[FreeTextEntry1] : Pt seen for s/p multiple toe amputations, healed at this time. Pt relates that she has not been moisturizing as much as she should have. Denies any other complaints at this time.

## 2022-01-06 NOTE — PHYSICAL EXAM
[1+] : left 1+ [Varicose Veins Of Lower Extremities] : bilaterally [Ankle Swelling On The Right] : mild [Purpura] : no purpura  [Petechiae] : no petechiae [Skin Ulcer] : no ulcer [Alert] : alert [Oriented to Person] : oriented to person [Oriented to Place] : oriented to place [Calm] : calm [de-identified] : A&Ox3, NAD [de-identified] : HTN, HLD [de-identified] : 5/5 strength in all quadrants bilaterally [de-identified] : right hallux mild edema resolved, all amp sites healed well [de-identified] : Neuropathy [FreeTextEntry1] : Foot 3rd Digit Amp Site  - Resolved [de-identified] : none [de-identified] : No Treatment  [de-identified] : Cleansed with Normal saline\par  [FreeTextEntry7] : Hallux- small fissure  [de-identified] : callus [de-identified] : No Treatment  [de-identified] : Cleansed with Normal saline\par  [TWNoteComboBox1] : Right [TWNoteComboBox5] : No [TWNoteComboBox6] : Surgical [de-identified] : No [de-identified] : Normal [de-identified] : None [de-identified] : None [de-identified] : None [de-identified] : No [TWNoteComboBox9] : Right [de-identified] : other

## 2022-01-06 NOTE — ASSESSMENT
[Verbal] : Verbal [Demo] : Demo [Patient] : Patient [Good - alert, interested, motivated] : Good - alert, interested, motivated [Verbalizes knowledge/Understanding] : Verbalizes knowledge/understanding [Foot Care] : foot care [Skin Care] : skin care [Pressure relief] : pressure relief [Signs and symptoms of infection] : sign and symptoms of infection [Nutrition] : nutrition [How and When to Call] : how and when to call [Off-loading] : off-loading [Patient responsibility to plan of care] : patient responsibility to plan of care [Glycemic Control] : glycemic control [] : Yes [Stable] : stable [Home] : Home [Cane] : Cane [Not Applicable - Long Term Care/Home Health Agency] : Long Term Care/Home Health Agency: Not Applicable [FreeTextEntry2] : Infection prevention \par Maintain optimal skin integrity to high pressure area\par Vascular studies\par Vascular consult\par F/U with Dr. Osman for a consult after completing vascular studies\par  [FreeTextEntry3] : no open wounds [FreeTextEntry4] : DPM ordered vascular studies, authorization submitted today\par DPM ordered a vascular consult\par F/U with Dr. Woods for a vascular consult after completing vascular studies

## 2022-01-06 NOTE — PLAN
[FreeTextEntry1] : Patient examined and evaluated at this time.\par Will monitor for any changes. Patient advised to return if any new or worsening of symptoms are noted.\par Will auth for vascular studies.\par Spent 20 minutes for patient care and medical decision making.\par

## 2022-01-08 DIAGNOSIS — M21.41 FLAT FOOT [PES PLANUS] (ACQUIRED), RIGHT FOOT: ICD-10-CM

## 2022-01-08 DIAGNOSIS — Z80.41 FAMILY HISTORY OF MALIGNANT NEOPLASM OF OVARY: ICD-10-CM

## 2022-01-08 DIAGNOSIS — Z90.49 ACQUIRED ABSENCE OF OTHER SPECIFIED PARTS OF DIGESTIVE TRACT: ICD-10-CM

## 2022-01-08 DIAGNOSIS — Z79.899 OTHER LONG TERM (CURRENT) DRUG THERAPY: ICD-10-CM

## 2022-01-08 DIAGNOSIS — I10 ESSENTIAL (PRIMARY) HYPERTENSION: ICD-10-CM

## 2022-01-08 DIAGNOSIS — Z89.422 ACQUIRED ABSENCE OF OTHER LEFT TOE(S): ICD-10-CM

## 2022-01-08 DIAGNOSIS — Z98.890 OTHER SPECIFIED POSTPROCEDURAL STATES: ICD-10-CM

## 2022-01-08 DIAGNOSIS — Z79.82 LONG TERM (CURRENT) USE OF ASPIRIN: ICD-10-CM

## 2022-01-08 DIAGNOSIS — Z88.0 ALLERGY STATUS TO PENICILLIN: ICD-10-CM

## 2022-01-08 DIAGNOSIS — E78.00 PURE HYPERCHOLESTEROLEMIA, UNSPECIFIED: ICD-10-CM

## 2022-01-08 DIAGNOSIS — R22.41 LOCALIZED SWELLING, MASS AND LUMP, RIGHT LOWER LIMB: ICD-10-CM

## 2022-01-08 DIAGNOSIS — E03.9 HYPOTHYROIDISM, UNSPECIFIED: ICD-10-CM

## 2022-01-08 DIAGNOSIS — Z89.421 ACQUIRED ABSENCE OF OTHER RIGHT TOE(S): ICD-10-CM

## 2022-01-08 DIAGNOSIS — Z80.52 FAMILY HISTORY OF MALIGNANT NEOPLASM OF BLADDER: ICD-10-CM

## 2022-01-08 DIAGNOSIS — Z98.1 ARTHRODESIS STATUS: ICD-10-CM

## 2022-01-08 DIAGNOSIS — Z84.81 FAMILY HISTORY OF CARRIER OF GENETIC DISEASE: ICD-10-CM

## 2022-01-11 ENCOUNTER — OUTPATIENT (OUTPATIENT)
Dept: OUTPATIENT SERVICES | Facility: HOSPITAL | Age: 72
LOS: 1 days | Discharge: HOME | End: 2022-01-11
Payer: MEDICARE

## 2022-01-11 ENCOUNTER — APPOINTMENT (OUTPATIENT)
Dept: WOUND CARE | Facility: HOSPITAL | Age: 72
End: 2022-01-11
Payer: MEDICARE

## 2022-01-11 VITALS
OXYGEN SATURATION: 99 % | TEMPERATURE: 97 F | SYSTOLIC BLOOD PRESSURE: 138 MMHG | RESPIRATION RATE: 18 BRPM | HEART RATE: 91 BPM | DIASTOLIC BLOOD PRESSURE: 80 MMHG

## 2022-01-11 DIAGNOSIS — Z89.421 ACQUIRED ABSENCE OF OTHER RIGHT TOE(S): Chronic | ICD-10-CM

## 2022-01-11 DIAGNOSIS — R22.41 LOCALIZED SWELLING, MASS AND LUMP, RIGHT LOWER LIMB: ICD-10-CM

## 2022-01-11 PROCEDURE — G0463: CPT

## 2022-01-11 PROCEDURE — 99214 OFFICE O/P EST MOD 30 MIN: CPT

## 2022-01-12 DIAGNOSIS — Z90.49 ACQUIRED ABSENCE OF OTHER SPECIFIED PARTS OF DIGESTIVE TRACT: ICD-10-CM

## 2022-01-12 DIAGNOSIS — Z98.890 OTHER SPECIFIED POSTPROCEDURAL STATES: ICD-10-CM

## 2022-01-12 DIAGNOSIS — G62.9 POLYNEUROPATHY, UNSPECIFIED: ICD-10-CM

## 2022-01-12 DIAGNOSIS — E78.00 PURE HYPERCHOLESTEROLEMIA, UNSPECIFIED: ICD-10-CM

## 2022-01-12 DIAGNOSIS — I10 ESSENTIAL (PRIMARY) HYPERTENSION: ICD-10-CM

## 2022-01-12 DIAGNOSIS — Z80.41 FAMILY HISTORY OF MALIGNANT NEOPLASM OF OVARY: ICD-10-CM

## 2022-01-12 DIAGNOSIS — E03.9 HYPOTHYROIDISM, UNSPECIFIED: ICD-10-CM

## 2022-01-12 DIAGNOSIS — Z98.1 ARTHRODESIS STATUS: ICD-10-CM

## 2022-01-12 DIAGNOSIS — Z84.81 FAMILY HISTORY OF CARRIER OF GENETIC DISEASE: ICD-10-CM

## 2022-01-12 DIAGNOSIS — L89.893 PRESSURE ULCER OF OTHER SITE, STAGE 3: ICD-10-CM

## 2022-01-12 DIAGNOSIS — Z79.899 OTHER LONG TERM (CURRENT) DRUG THERAPY: ICD-10-CM

## 2022-01-12 DIAGNOSIS — Z89.422 ACQUIRED ABSENCE OF OTHER LEFT TOE(S): ICD-10-CM

## 2022-01-12 DIAGNOSIS — Z89.421 ACQUIRED ABSENCE OF OTHER RIGHT TOE(S): ICD-10-CM

## 2022-01-12 DIAGNOSIS — Z79.82 LONG TERM (CURRENT) USE OF ASPIRIN: ICD-10-CM

## 2022-01-12 NOTE — PHYSICAL EXAM
[2 x 2] : 2 x 2  [1+] : left 1+ [Ankle Swelling (On Exam)] : present [Ankle Swelling Bilaterally] : bilaterally  [Ankle Swelling On The Right] : mild [Varicose Veins Of Lower Extremities] : bilaterally [Ankle Swelling On The Left] : moderate [Skin Ulcer] : ulcer [Alert] : alert [Oriented to Person] : oriented to person [Oriented to Place] : oriented to place [Calm] : calm [] : not present [Purpura] : no purpura  [Petechiae] : no petechiae [de-identified] : calm [de-identified] : hx OM and previous amps to toes  [de-identified] : ulcer right great toe probes to bone , with swelling of the toe  [de-identified] : Neuropathy  [FreeTextEntry7] : Right dorsal hallux (New)  [FreeTextEntry8] : 0.5 [FreeTextEntry9] : 0.5 [de-identified] : 0.1 [de-identified] : Serous/sanguinous [de-identified] : Silver alginate [de-identified] : Cleansed with NS\par Cloth tape  [de-identified] : Right plantar hallux (New)  [de-identified] : 0.4 [de-identified] : 0.4 [de-identified] : 0.1 [de-identified] : Serous/sanguinous [de-identified] : Silver alginate [de-identified] : Cleansed with NS\par Cloth tape  [de-identified] : Small [de-identified] : Erythema [de-identified] : None [de-identified] : None [de-identified] : >75% [de-identified] : No [de-identified] : Every other day [de-identified] : Primary Dressing [de-identified] : Small [de-identified] : Erythema [de-identified] : None [de-identified] : None [de-identified] : >75% [de-identified] : No [de-identified] : Every other day [de-identified] : Primary Dressing

## 2022-01-12 NOTE — REVIEW OF SYSTEMS
[Joint Stiffness] : joint stiffness [Skin Wound] : skin wound [Negative] : Endocrine [Fever] : no fever [Chills] : no chills [Eye Pain] : no eye pain [Loss Of Hearing] : no hearing loss [Shortness Of Breath] : no shortness of breath [Wheezing] : no wheezing [Abdominal Pain] : no abdominal pain [Anxiety] : no anxiety [Easy Bleeding] : no tendency for easy bleeding [FreeTextEntry5] : HTN [FreeTextEntry9] : swelling of right great toe with previous amps of toes and hx of OM  [de-identified] : Right great toe ulcer probes to bone  [de-identified] : neuropathy of both feet , insensate

## 2022-01-12 NOTE — ASSESSMENT
[Verbal] : Verbal [Written] : Written [Demo] : Demo [Patient] : Patient [Fair - mild discomfort, physical impairment, low acceptance] : Fair - mild discomfort, physical impairment, low acceptance [Needs reinforcement] : needs reinforcement [Dressing changes] : dressing changes [Foot Care] : foot care [Skin Care] : skin care [Signs and symptoms of infection] : sign and symptoms of infection [Surgery] : surgery [How and When to Call] : how and when to call [Pain Management] : pain management [Off-loading] : off-loading [Patient responsibility to plan of care] : patient responsibility to plan of care [Stable] : stable [Home] : Home [Cane] : Cane [Not Applicable - Long Term Care/Home Health Agency] : Long Term Care/Home Health Agency: Not Applicable [] : No [FreeTextEntry2] : Infection prevention\par Localized wound care \par Goal remaining pain free regarding wounds\par Offloading  [FreeTextEntry3] : Presents with new wounds  [FreeTextEntry4] : Auth submitted for Right distal hallux amp \par cipro E scribed \par Follow up in 1 week

## 2022-01-12 NOTE — HISTORY OF PRESENT ILLNESS
[FreeTextEntry1] : Right hallux swollen with open ulcerations that probes to bone , patient has history of OM and has neuropathy of both feet

## 2022-01-12 NOTE — PLAN
[FreeTextEntry1] : Patient started on antibiotic therapy , discussed distal amp of the right great toe , Patient agrees in light of her past history with OM . All risks benefits discussed , all questions answered and patient agrees  with the plan . Spent 30 minutes for patient care and medical decision making.\par

## 2022-01-13 ENCOUNTER — APPOINTMENT (OUTPATIENT)
Dept: WOUND CARE | Facility: HOSPITAL | Age: 72
End: 2022-01-13
Payer: MEDICARE

## 2022-01-13 ENCOUNTER — OUTPATIENT (OUTPATIENT)
Dept: OUTPATIENT SERVICES | Facility: HOSPITAL | Age: 72
LOS: 1 days | Discharge: HOME | End: 2022-01-13
Payer: MEDICARE

## 2022-01-13 ENCOUNTER — RESULT REVIEW (OUTPATIENT)
Age: 72
End: 2022-01-13

## 2022-01-13 VITALS
TEMPERATURE: 97.9 F | HEART RATE: 97 BPM | RESPIRATION RATE: 20 BRPM | HEIGHT: 67 IN | OXYGEN SATURATION: 98 % | SYSTOLIC BLOOD PRESSURE: 134 MMHG | BODY MASS INDEX: 36.1 KG/M2 | DIASTOLIC BLOOD PRESSURE: 71 MMHG | WEIGHT: 230 LBS

## 2022-01-13 DIAGNOSIS — Z89.421 ACQUIRED ABSENCE OF OTHER RIGHT TOE(S): Chronic | ICD-10-CM

## 2022-01-13 DIAGNOSIS — R22.41 LOCALIZED SWELLING, MASS AND LUMP, RIGHT LOWER LIMB: ICD-10-CM

## 2022-01-13 DIAGNOSIS — L89.893 PRESSURE ULCER OF OTHER SITE, STAGE 3: ICD-10-CM

## 2022-01-13 PROCEDURE — 88311 DECALCIFY TISSUE: CPT | Mod: 26

## 2022-01-13 PROCEDURE — 28825 PARTIAL AMPUTATION OF TOE: CPT | Mod: T5

## 2022-01-13 PROCEDURE — 88304 TISSUE EXAM BY PATHOLOGIST: CPT

## 2022-01-13 PROCEDURE — 88304 TISSUE EXAM BY PATHOLOGIST: CPT | Mod: 26

## 2022-01-13 PROCEDURE — 88311 DECALCIFY TISSUE: CPT

## 2022-01-14 PROBLEM — L89.893: Status: ACTIVE | Noted: 2022-01-12

## 2022-01-17 ENCOUNTER — OUTPATIENT (OUTPATIENT)
Dept: OUTPATIENT SERVICES | Facility: HOSPITAL | Age: 72
LOS: 1 days | Discharge: HOME | End: 2022-01-17
Payer: MEDICARE

## 2022-01-17 ENCOUNTER — APPOINTMENT (OUTPATIENT)
Dept: WOUND CARE | Facility: HOSPITAL | Age: 72
End: 2022-01-17
Payer: MEDICARE

## 2022-01-17 DIAGNOSIS — E78.00 PURE HYPERCHOLESTEROLEMIA, UNSPECIFIED: ICD-10-CM

## 2022-01-17 DIAGNOSIS — Z84.81 FAMILY HISTORY OF CARRIER OF GENETIC DISEASE: ICD-10-CM

## 2022-01-17 DIAGNOSIS — Z80.52 FAMILY HISTORY OF MALIGNANT NEOPLASM OF BLADDER: ICD-10-CM

## 2022-01-17 DIAGNOSIS — L89.893 PRESSURE ULCER OF OTHER SITE, STAGE 3: ICD-10-CM

## 2022-01-17 DIAGNOSIS — Z90.49 ACQUIRED ABSENCE OF OTHER SPECIFIED PARTS OF DIGESTIVE TRACT: ICD-10-CM

## 2022-01-17 DIAGNOSIS — Z89.421 ACQUIRED ABSENCE OF OTHER RIGHT TOE(S): Chronic | ICD-10-CM

## 2022-01-17 DIAGNOSIS — Z89.421 ACQUIRED ABSENCE OF OTHER RIGHT TOE(S): ICD-10-CM

## 2022-01-17 DIAGNOSIS — Z79.899 OTHER LONG TERM (CURRENT) DRUG THERAPY: ICD-10-CM

## 2022-01-17 DIAGNOSIS — R22.41 LOCALIZED SWELLING, MASS AND LUMP, RIGHT LOWER LIMB: ICD-10-CM

## 2022-01-17 DIAGNOSIS — Z88.0 ALLERGY STATUS TO PENICILLIN: ICD-10-CM

## 2022-01-17 DIAGNOSIS — Z80.41 FAMILY HISTORY OF MALIGNANT NEOPLASM OF OVARY: ICD-10-CM

## 2022-01-17 DIAGNOSIS — Z98.1 ARTHRODESIS STATUS: ICD-10-CM

## 2022-01-17 DIAGNOSIS — Z89.422 ACQUIRED ABSENCE OF OTHER LEFT TOE(S): ICD-10-CM

## 2022-01-17 DIAGNOSIS — Z98.890 OTHER SPECIFIED POSTPROCEDURAL STATES: ICD-10-CM

## 2022-01-17 DIAGNOSIS — I10 ESSENTIAL (PRIMARY) HYPERTENSION: ICD-10-CM

## 2022-01-17 DIAGNOSIS — E03.9 HYPOTHYROIDISM, UNSPECIFIED: ICD-10-CM

## 2022-01-17 DIAGNOSIS — G62.9 POLYNEUROPATHY, UNSPECIFIED: ICD-10-CM

## 2022-01-17 DIAGNOSIS — Z79.82 LONG TERM (CURRENT) USE OF ASPIRIN: ICD-10-CM

## 2022-01-17 PROCEDURE — ZZZZZ: CPT

## 2022-01-17 PROCEDURE — G0463: CPT

## 2022-01-18 ENCOUNTER — APPOINTMENT (OUTPATIENT)
Dept: WOUND CARE | Facility: HOSPITAL | Age: 72
End: 2022-01-18
Payer: MEDICARE

## 2022-01-18 ENCOUNTER — OUTPATIENT (OUTPATIENT)
Dept: OUTPATIENT SERVICES | Facility: HOSPITAL | Age: 72
LOS: 1 days | Discharge: HOME | End: 2022-01-18
Payer: MEDICARE

## 2022-01-18 VITALS
OXYGEN SATURATION: 98 % | DIASTOLIC BLOOD PRESSURE: 74 MMHG | BODY MASS INDEX: 36.1 KG/M2 | HEIGHT: 67 IN | WEIGHT: 230 LBS | SYSTOLIC BLOOD PRESSURE: 128 MMHG | RESPIRATION RATE: 20 BRPM | TEMPERATURE: 97.7 F | HEART RATE: 85 BPM

## 2022-01-18 DIAGNOSIS — L89.893 PRESSURE ULCER OF OTHER SITE, STAGE 3: ICD-10-CM

## 2022-01-18 DIAGNOSIS — Z89.421 ACQUIRED ABSENCE OF OTHER RIGHT TOE(S): Chronic | ICD-10-CM

## 2022-01-18 DIAGNOSIS — R22.41 LOCALIZED SWELLING, MASS AND LUMP, RIGHT LOWER LIMB: ICD-10-CM

## 2022-01-18 LAB — SURGICAL PATHOLOGY STUDY: SIGNIFICANT CHANGE UP

## 2022-01-18 PROCEDURE — ZZZZZ: CPT

## 2022-01-18 PROCEDURE — G0463: CPT

## 2022-01-20 ENCOUNTER — NON-APPOINTMENT (OUTPATIENT)
Age: 72
End: 2022-01-20

## 2022-01-21 ENCOUNTER — APPOINTMENT (OUTPATIENT)
Dept: WOUND CARE | Facility: HOSPITAL | Age: 72
End: 2022-01-21
Payer: MEDICARE

## 2022-01-21 ENCOUNTER — OUTPATIENT (OUTPATIENT)
Dept: OUTPATIENT SERVICES | Facility: HOSPITAL | Age: 72
LOS: 1 days | Discharge: HOME | End: 2022-01-21
Payer: MEDICARE

## 2022-01-21 VITALS
SYSTOLIC BLOOD PRESSURE: 156 MMHG | HEART RATE: 91 BPM | OXYGEN SATURATION: 98 % | RESPIRATION RATE: 20 BRPM | WEIGHT: 230 LBS | TEMPERATURE: 97 F | DIASTOLIC BLOOD PRESSURE: 79 MMHG | BODY MASS INDEX: 36.1 KG/M2 | HEIGHT: 67 IN

## 2022-01-21 DIAGNOSIS — Z79.899 OTHER LONG TERM (CURRENT) DRUG THERAPY: ICD-10-CM
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
## 2022-01-21 DIAGNOSIS — Z79.82 LONG TERM (CURRENT) USE OF ASPIRIN: ICD-10-CM

## 2022-01-21 DIAGNOSIS — Z89.421 ACQUIRED ABSENCE OF OTHER RIGHT TOE(S): Chronic | ICD-10-CM

## 2022-01-21 DIAGNOSIS — Z90.49 ACQUIRED ABSENCE OF OTHER SPECIFIED PARTS OF DIGESTIVE TRACT: ICD-10-CM

## 2022-01-21 DIAGNOSIS — I10 ESSENTIAL (PRIMARY) HYPERTENSION: ICD-10-CM

## 2022-01-21 DIAGNOSIS — T87.89 OTHER COMPLICATIONS OF AMPUTATION STUMP: ICD-10-CM

## 2022-01-21 DIAGNOSIS — Z84.81 FAMILY HISTORY OF CARRIER OF GENETIC DISEASE: ICD-10-CM

## 2022-01-21 DIAGNOSIS — Z98.1 ARTHRODESIS STATUS: ICD-10-CM

## 2022-01-21 DIAGNOSIS — E03.9 HYPOTHYROIDISM, UNSPECIFIED: ICD-10-CM

## 2022-01-21 DIAGNOSIS — E78.00 PURE HYPERCHOLESTEROLEMIA, UNSPECIFIED: ICD-10-CM

## 2022-01-21 DIAGNOSIS — Z89.421 ACQUIRED ABSENCE OF OTHER RIGHT TOE(S): ICD-10-CM

## 2022-01-21 DIAGNOSIS — R22.41 LOCALIZED SWELLING, MASS AND LUMP, RIGHT LOWER LIMB: ICD-10-CM

## 2022-01-21 DIAGNOSIS — Z98.890 OTHER SPECIFIED POSTPROCEDURAL STATES: ICD-10-CM

## 2022-01-21 DIAGNOSIS — Z80.41 FAMILY HISTORY OF MALIGNANT NEOPLASM OF OVARY: ICD-10-CM

## 2022-01-21 DIAGNOSIS — L89.893 PRESSURE ULCER OF OTHER SITE, STAGE 3: ICD-10-CM

## 2022-01-21 DIAGNOSIS — Z80.52 FAMILY HISTORY OF MALIGNANT NEOPLASM OF BLADDER: ICD-10-CM

## 2022-01-21 DIAGNOSIS — Z89.422 ACQUIRED ABSENCE OF OTHER LEFT TOE(S): ICD-10-CM

## 2022-01-21 DIAGNOSIS — G62.9 POLYNEUROPATHY, UNSPECIFIED: ICD-10-CM

## 2022-01-21 DIAGNOSIS — Z88.0 ALLERGY STATUS TO PENICILLIN: ICD-10-CM

## 2022-01-21 DIAGNOSIS — Z89.411 ACQUIRED ABSENCE OF RIGHT GREAT TOE: ICD-10-CM

## 2022-01-21 PROCEDURE — 99213 OFFICE O/P EST LOW 20 MIN: CPT

## 2022-01-21 PROCEDURE — G0463: CPT

## 2022-01-21 NOTE — REVIEW OF SYSTEMS
[Joint Stiffness] : joint stiffness [Skin Wound] : skin wound [Negative] : Endocrine [Fever] : no fever [Chills] : no chills [Eye Pain] : no eye pain [Loss Of Hearing] : no hearing loss [Shortness Of Breath] : no shortness of breath [Wheezing] : no wheezing [Abdominal Pain] : no abdominal pain [Anxiety] : no anxiety [Easy Bleeding] : no tendency for easy bleeding [FreeTextEntry5] : HTN [FreeTextEntry9] : swelling of right great toe with previous amps of toes and hx of OM  [de-identified] : Right great toe ulcer probes to bone  [de-identified] : neuropathy of both feet , insensate

## 2022-01-21 NOTE — ASSESSMENT
[Verbal] : Verbal [Written] : Written [Demo] : Demo [Patient] : Patient [Fair - mild discomfort, physical impairment, low acceptance] : Fair - mild discomfort, physical impairment, low acceptance [Needs reinforcement] : needs reinforcement [Dressing changes] : dressing changes [Foot Care] : foot care [Skin Care] : skin care [Signs and symptoms of infection] : sign and symptoms of infection [Surgery] : surgery [How and When to Call] : how and when to call [Pain Management] : pain management [Off-loading] : off-loading [Patient responsibility to plan of care] : patient responsibility to plan of care [Stable] : stable [Home] : Home [Cane] : Cane [Not Applicable - Long Term Care/Home Health Agency] : Long Term Care/Home Health Agency: Not Applicable [] : No [FreeTextEntry2] : Offloading/Pressure Relief \par Elevation \par Distal Amputation\par Infection Prevention\par Restore Optimal Skin Integrity  [FreeTextEntry3] : periwound erythema, swelling, and site is significantly warm to touch [FreeTextEntry4] : F/U 1/24/22 for assessment with /Dr. Gray\par Pathology results reviewed by DPM and discussed with pt\par ID consult today with Dr. Grove, Cipro/Bactrim escribed to pharmacy, F/U with ID on 1/24/22 (consult submitted).\par Pt stated she is to have dental procedure under general anesthesia on 1/25/22 and was given Azithromycin prophylactically, MD aware and said she is cleared to take Antibiotic along with bactrim/cipro.

## 2022-01-21 NOTE — PHYSICAL EXAM
[FreeTextEntry1] : R foot with multiple old amputations, R hallux with partial amputation, sutures on top or wound looks clean, healing but the below sutures there is swelling, warmth and mild fluctuation, no dischrage from sutures.

## 2022-01-21 NOTE — HISTORY OF PRESENT ILLNESS
[FreeTextEntry1] : Pt seen s/p right hallux partial amputation for open ulceration down to skin, subcutaneous tissue, fat, and bone (+OM on pathology with no OM on clean margin). Pt relates that she has been taking abx as advised.

## 2022-01-21 NOTE — REASON FOR VISIT
[Consultation] : a consultation visit [Spouse] : spouse [FreeTextEntry1] : R hallux wound infection

## 2022-01-21 NOTE — HISTORY OF PRESENT ILLNESS
[FreeTextEntry1] : 70yo woman with PMH of HTN, and obesity with history of osteomyelitis or toes, s/p amputation is in wound center for follow up after her surgery. She had R hallux distal phalanx amputation on 1/13. \par She had chronic ulcer in the area that was probing to bone. No culture available. \par Pathology showed clean margines for OM. \par Since 1/17 had swelling and pain with warmth on surgical site. No fever or chills. No dehiscence of the wound. \par No discharge. \par she has been taking doxycycline with some improvement.

## 2022-01-21 NOTE — PHYSICAL EXAM
[1+] : left 1+ [Ankle Swelling (On Exam)] : present [Ankle Swelling Bilaterally] : bilaterally  [Ankle Swelling On The Right] : mild [Varicose Veins Of Lower Extremities] : bilaterally [Ankle Swelling On The Left] : moderate [Skin Ulcer] : ulcer [Alert] : alert [Oriented to Person] : oriented to person [Oriented to Place] : oriented to place [Calm] : calm [4 x 4] : 4 x 4  [] : not present [Purpura] : no purpura  [Petechiae] : no petechiae [de-identified] : calm [de-identified] : HTN , HLD [de-identified] : s/p right hallux partial amputation [de-identified] : right hallux amputation with intact sutures, erythema and edema of the right surgical site, no purulence, no malodor, no streaking, no fluctuance [de-identified] : Neuropathy  [FreeTextEntry1] : Right Hallux distal amp site- sutures CDI [de-identified] : erythema, edema [de-identified] : silver alginate [de-identified] : Cleansed with NS\par Kerlix\par  [de-identified] : \par \par  [TWNoteComboBox4] : None [TWNoteComboBox5] : No [TWNoteComboBox6] : Surgical [de-identified] : No [de-identified] : other [de-identified] : None [de-identified] : None [de-identified] : None [de-identified] : No [de-identified] : Every other day [de-identified] : Primary Dressing [de-identified] : Small [de-identified] : Erythema [de-identified] : None [de-identified] : None [de-identified] : >75% [de-identified] : Yes [de-identified] : False [de-identified] : Ace wraps [de-identified] : 2x Weekly [de-identified] : Small [de-identified] : Erythema [de-identified] : None [de-identified] : None [de-identified] : >75% [de-identified] : No [de-identified] : Biopsy taken and sent for pathology [de-identified] : Ace wraps [de-identified] : 2x Weekly

## 2022-01-21 NOTE — VITALS
[] : No [de-identified] : 0 [FreeTextEntry5] : Azithromycin PO, D/C Doxycycline, and pt started on Bactrim/Cipro

## 2022-01-21 NOTE — ASSESSMENT
[FreeTextEntry1] : Possibly post op infection for which needs to cover MRSA and skin dom, No cultures available so I would cover broadly at this time, with cipro and bactrim. \par If any discharge from wound or dehiscence will send culture to guide us. \par Discussed about side effects. At any time has fever or worsening of infection will go to ED. \par \par Patient was given the opportunity to ask questions and all questions were answered to their satisfaction.\par Counseling included lab results, differential diagnosis, treatment options, risks and benefits, lifestyle changes, current condition, medications and dose adjustments.\par The patient verbalized understanding.\par  [Treatment Education] : treatment education [Treatment Adherence] : treatment adherence [Rx Dose / Side Effects] : Rx dose/side effects [Risk Reduction] : risk reduction [Nutritional / Food Issues] : nutritional/food issues [Universal Precautions] : universal precautions [Drug Interactions / Side Effects] : drug interactions/side effects [Anticipatory Guidance] : anticipatory guidance

## 2022-01-21 NOTE — PLAN
[FreeTextEntry1] : Patient examined and evaluated at this time.\par Continue local wound care and offloading.\par Abx as per Dr. Grove.\par Pt remains a high risk for further amputation, limb loss, sepsis, and death.\par Spent 20 minutes for patient care and medical decision making.\par Patient to follow up in 1 week.\par

## 2022-01-24 ENCOUNTER — OUTPATIENT (OUTPATIENT)
Dept: OUTPATIENT SERVICES | Facility: HOSPITAL | Age: 72
LOS: 1 days | Discharge: HOME | End: 2022-01-24
Payer: MEDICARE

## 2022-01-24 ENCOUNTER — NON-APPOINTMENT (OUTPATIENT)
Age: 72
End: 2022-01-24

## 2022-01-24 ENCOUNTER — APPOINTMENT (OUTPATIENT)
Dept: WOUND CARE | Facility: HOSPITAL | Age: 72
End: 2022-01-24
Payer: MEDICARE

## 2022-01-24 VITALS
WEIGHT: 230 LBS | HEIGHT: 67 IN | OXYGEN SATURATION: 98 % | SYSTOLIC BLOOD PRESSURE: 120 MMHG | HEART RATE: 86 BPM | RESPIRATION RATE: 20 BRPM | TEMPERATURE: 97.4 F | DIASTOLIC BLOOD PRESSURE: 67 MMHG | BODY MASS INDEX: 36.1 KG/M2

## 2022-01-24 DIAGNOSIS — Z89.421 ACQUIRED ABSENCE OF OTHER RIGHT TOE(S): Chronic | ICD-10-CM

## 2022-01-24 DIAGNOSIS — G62.9 POLYNEUROPATHY, UNSPECIFIED: ICD-10-CM

## 2022-01-24 DIAGNOSIS — L89.893 PRESSURE ULCER OF OTHER SITE, STAGE 3: ICD-10-CM

## 2022-01-24 PROCEDURE — G0463: CPT

## 2022-01-24 PROCEDURE — 99212 OFFICE O/P EST SF 10 MIN: CPT

## 2022-01-24 RX ORDER — CIPROFLOXACIN HYDROCHLORIDE 500 MG/1
500 TABLET, FILM COATED ORAL
Qty: 14 | Refills: 0 | Status: DISCONTINUED | COMMUNITY
Start: 2022-01-21 | End: 2022-01-24

## 2022-01-24 NOTE — PHYSICAL EXAM
[4 x 4] : 4 x 4  [1+] : left 1+ [Ankle Swelling (On Exam)] : present [Ankle Swelling Bilaterally] : bilaterally  [Ankle Swelling On The Right] : mild [Varicose Veins Of Lower Extremities] : bilaterally [Ankle Swelling On The Left] : moderate [] : not present [Purpura] : no purpura  [Petechiae] : no petechiae [Skin Ulcer] : ulcer [Alert] : alert [Oriented to Person] : oriented to person [Oriented to Place] : oriented to place [Calm] : calm [de-identified] : calm [de-identified] : HTN , HLD [de-identified] : s/p right hallux partial amputation [de-identified] : right hallux amputation with intact sutures, erythema and edema of the right surgical site resolving, no purulence, no malodor, no streaking, no fluctuance [de-identified] : Neuropathy  [FreeTextEntry1] : Right Hallux distal amp site- sutures CDI [de-identified] : Scant Serous/sanguinous [de-identified] : Mild  [de-identified] : silver alginate [de-identified] : Cleansed with NS\par Kerlix\par  [de-identified] : \par \par  [TWNoteComboBox4] : False [TWNoteComboBox5] : False [TWNoteComboBox6] : False [de-identified] : False [de-identified] : Erythema [de-identified] : None [de-identified] : None [de-identified] : False [de-identified] : No [de-identified] : Every other day [de-identified] : Primary Dressing [de-identified] : >75% [de-identified] : >75%

## 2022-01-24 NOTE — REVIEW OF SYSTEMS
[Fever] : no fever [Chills] : no chills [Eye Pain] : no eye pain [Loss Of Hearing] : no hearing loss [Shortness Of Breath] : no shortness of breath [Wheezing] : no wheezing [Abdominal Pain] : no abdominal pain [Joint Stiffness] : joint stiffness [Skin Wound] : skin wound [Anxiety] : no anxiety [Easy Bleeding] : no tendency for easy bleeding [Negative] : Endocrine [FreeTextEntry5] : HTN [FreeTextEntry9] : swelling of right great toe with previous amps of toes and hx of OM  [de-identified] : right hallux amputation with intact sutures, erythema and edema of the right surgical site resolving, no purulence, no malodor, no streaking, no fluctuance [de-identified] : neuropathy of both feet , insensate

## 2022-01-24 NOTE — PLAN
[FreeTextEntry1] : Patient examined and evaluated at this time.\par Continue local wound care and offloading.\par Abx as per Dr. Grove.\par Pt remains a high risk for further amputation, limb loss, sepsis, and death.\par Spent 20 minutes for patient care and medical decision making.\par Patient to follow up in 3 days for dressing change.\par

## 2022-01-24 NOTE — ASSESSMENT
[Verbal] : Verbal [Written] : Written [Demo] : Demo [Patient] : Patient [Good - alert, interested, motivated] : Good - alert, interested, motivated [Verbalizes knowledge/Understanding] : Verbalizes knowledge/understanding [Dressing changes] : dressing changes [Foot Care] : foot care [Skin Care] : skin care [Signs and symptoms of infection] : sign and symptoms of infection [Nutrition] : nutrition [How and When to Call] : how and when to call [Pain Management] : pain management [Off-loading] : off-loading [Patient responsibility to plan of care] : patient responsibility to plan of care [Stable] : stable [Home] : Home [Cane] : Cane [Not Applicable - Long Term Care/Home Health Agency] : Long Term Care/Home Health Agency: Not Applicable [] : No [FreeTextEntry2] : Infection prevention\par Localized wound care \par Goal remaining pain free regarding wounds\par Offloading  [FreeTextEntry4] : ID follow up consult submitted \par Pt to continue PO antibiotics \par Dressing change 1/27/22. MD to look at site.

## 2022-01-25 DIAGNOSIS — E78.00 PURE HYPERCHOLESTEROLEMIA, UNSPECIFIED: ICD-10-CM

## 2022-01-25 DIAGNOSIS — Z80.41 FAMILY HISTORY OF MALIGNANT NEOPLASM OF OVARY: ICD-10-CM

## 2022-01-25 DIAGNOSIS — T87.89 OTHER COMPLICATIONS OF AMPUTATION STUMP: ICD-10-CM

## 2022-01-25 DIAGNOSIS — Z79.899 OTHER LONG TERM (CURRENT) DRUG THERAPY: ICD-10-CM

## 2022-01-25 DIAGNOSIS — Z79.82 LONG TERM (CURRENT) USE OF ASPIRIN: ICD-10-CM

## 2022-01-25 DIAGNOSIS — I10 ESSENTIAL (PRIMARY) HYPERTENSION: ICD-10-CM

## 2022-01-25 DIAGNOSIS — Z84.81 FAMILY HISTORY OF CARRIER OF GENETIC DISEASE: ICD-10-CM

## 2022-01-25 DIAGNOSIS — G62.9 POLYNEUROPATHY, UNSPECIFIED: ICD-10-CM

## 2022-01-25 DIAGNOSIS — E03.9 HYPOTHYROIDISM, UNSPECIFIED: ICD-10-CM

## 2022-01-25 DIAGNOSIS — Z89.411 ACQUIRED ABSENCE OF RIGHT GREAT TOE: ICD-10-CM

## 2022-01-25 DIAGNOSIS — Z98.1 ARTHRODESIS STATUS: ICD-10-CM
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
## 2022-01-25 DIAGNOSIS — Z90.49 ACQUIRED ABSENCE OF OTHER SPECIFIED PARTS OF DIGESTIVE TRACT: ICD-10-CM

## 2022-01-25 DIAGNOSIS — Z98.890 OTHER SPECIFIED POSTPROCEDURAL STATES: ICD-10-CM

## 2022-01-25 DIAGNOSIS — Z80.52 FAMILY HISTORY OF MALIGNANT NEOPLASM OF BLADDER: ICD-10-CM

## 2022-01-25 DIAGNOSIS — Z89.422 ACQUIRED ABSENCE OF OTHER LEFT TOE(S): ICD-10-CM

## 2022-01-25 DIAGNOSIS — Z88.0 ALLERGY STATUS TO PENICILLIN: ICD-10-CM

## 2022-01-25 DIAGNOSIS — Z89.421 ACQUIRED ABSENCE OF OTHER RIGHT TOE(S): ICD-10-CM

## 2022-01-27 ENCOUNTER — APPOINTMENT (OUTPATIENT)
Dept: WOUND CARE | Facility: HOSPITAL | Age: 72
End: 2022-01-27
Payer: MEDICARE

## 2022-01-27 ENCOUNTER — NON-APPOINTMENT (OUTPATIENT)
Age: 72
End: 2022-01-27

## 2022-01-27 ENCOUNTER — OUTPATIENT (OUTPATIENT)
Dept: OUTPATIENT SERVICES | Facility: HOSPITAL | Age: 72
LOS: 1 days | Discharge: HOME | End: 2022-01-27
Payer: MEDICARE

## 2022-01-27 VITALS
WEIGHT: 230 LBS | BODY MASS INDEX: 36.1 KG/M2 | SYSTOLIC BLOOD PRESSURE: 115 MMHG | OXYGEN SATURATION: 94 % | HEIGHT: 67 IN | RESPIRATION RATE: 20 BRPM | HEART RATE: 109 BPM | DIASTOLIC BLOOD PRESSURE: 74 MMHG | TEMPERATURE: 97.4 F

## 2022-01-27 DIAGNOSIS — Z89.421 ACQUIRED ABSENCE OF OTHER RIGHT TOE(S): Chronic | ICD-10-CM

## 2022-01-27 DIAGNOSIS — L89.893 PRESSURE ULCER OF OTHER SITE, STAGE 3: ICD-10-CM
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
## 2022-01-27 DIAGNOSIS — T87.89 OTHER COMPLICATIONS OF AMPUTATION STUMP: ICD-10-CM

## 2022-01-27 DIAGNOSIS — Z89.411 ACQUIRED ABSENCE OF RIGHT GREAT TOE: ICD-10-CM

## 2022-01-27 PROCEDURE — ZZZZZ: CPT

## 2022-01-27 PROCEDURE — G0463: CPT

## 2022-01-28 NOTE — ASSESSMENT
[FreeTextEntry1] : Will complete the remaining of the antibiotics given for total of one week.\par No further antibiotics needed.\par Will continue wound care and podiatry follow up. \par If any side effects will let us know. \par Patient was given the opportunity to ask questions and all questions were answered to their satisfaction.\par Counseling included lab results, differential diagnosis, treatment options, risks and benefits, lifestyle changes, current condition, medications and dose adjustments.\par The patient verbalized understanding.\par  [Treatment Education] : treatment education [Treatment Adherence] : treatment adherence [Rx Dose / Side Effects] : Rx dose/side effects [Risk Reduction] : risk reduction [Drug Interactions / Side Effects] : drug interactions/side effects [Anticipatory Guidance] : anticipatory guidance

## 2022-01-28 NOTE — PHYSICAL EXAM
[FreeTextEntry1] : R foot with multiple old amputations, R hallux with partial amputation, sutures on top being removed but Dr. Gray today, no more warmth or erythema, swelling impoved as well.

## 2022-01-28 NOTE — HISTORY OF PRESENT ILLNESS
[FreeTextEntry1] : 70yo woman with PMH of HTN, and obesity with history of osteomyelitis or toes, s/p amputation is in wound center for follow up after her surgery. She had R hallux distal phalanx amputation on 1/13. \par She had chronic ulcer in the area that was probing to bone. No culture available. \par Pathology showed clean margines for OM. \par Since 1/17 had swelling and pain with warmth on surgical site. No fever or chills. No dehiscence of the wound. \par No discharge. \par she has been taking doxycycline with some improvement. \par \par 1/27/22\par Patient came for follow up on 1/27, seen by me in wound center. \par Wound looks fine, no sign of erythema or swelling any more. \par Improved compare with the last visit. \par Taking meds regularly (cipro and bactrim)\par No side effects or any any allergy.

## 2022-01-28 NOTE — H&P ADULT - PSYCHIATRIC
Universal Safety Interventions negative Affect and characteristics of appearance, verbalizations, behaviors are appropriate

## 2022-02-02 NOTE — PROCEDURE
[___ ml of 2% Xylocaine] : [unfilled] ml of 2% Xylocaine [Right foot, 1st digit] : first digit of the right foot [Coapted with _____ nylon sutures] :  Once this was accomplished, it was then coapted with [unfilled] nylon sutures in a simple interrupted fashion. [# of sutures used: _____] : Approximately [unfilled] sutures were used in total  [Total incision length___cm] : and the total incision length was approximately [unfilled] cm.   The area was then cleaned with sterile saline.  Both the dorsal and the plantar flap were found to be viable and the patient was dressed in a dry sterile dressing.   [] : No [FreeTextEntry9] : 6292 [de-identified] : OM right hallux , probes to bone , hot swollen  [de-identified] : Marina [FreeTextEntry6] : OM right hallux  [FreeTextEntry7] : same [de-identified] : 10 cc marcaine [de-identified] : 15 cc  [de-identified] : Distal right phalanx , nail and skin , head of proximal phalanx

## 2022-02-02 NOTE — REVIEW OF SYSTEMS
[Joint Stiffness] : joint stiffness [Skin Wound] : skin wound [Negative] : Endocrine [FreeTextEntry1] : 5240 [Fever] : no fever [Chills] : no chills [Eye Pain] : no eye pain [Loss Of Hearing] : no hearing loss [Shortness Of Breath] : no shortness of breath [Wheezing] : no wheezing [Abdominal Pain] : no abdominal pain [Anxiety] : no anxiety [Easy Bleeding] : no tendency for easy bleeding [FreeTextEntry5] : HTN [FreeTextEntry9] : swelling of right great toe with previous amps of toes and hx of OM  [de-identified] : Right great toe ulcer probes to bone , swollen , red and warm  [de-identified] : neuropathy of both feet , insensate

## 2022-02-02 NOTE — REASON FOR VISIT
[Other: _____] : [unfilled] [FreeTextEntry4] : clinical OM of the right hallux , swollen , probes to bone , erythematous  [FreeTextEntry3] : Previous amputations and hx of OM

## 2022-02-02 NOTE — PHYSICAL EXAM
[4 x 4] : 4 x 4  [Abdominal Pad] : Abdominal Pad [1+] : left 1+ [Ankle Swelling (On Exam)] : present [Ankle Swelling Bilaterally] : bilaterally  [Ankle Swelling On The Right] : mild [Varicose Veins Of Lower Extremities] : bilaterally [Ankle Swelling On The Left] : moderate [Skin Ulcer] : ulcer [Skin Induration] : induration [Alert] : alert [Oriented to Person] : oriented to person [Oriented to Place] : oriented to place [Calm] : calm [] : not present [Purpura] : no purpura  [Petechiae] : no petechiae [de-identified] : calm [de-identified] : hx OM and previous amps to toes  [de-identified] : ulcer right great toe probes to bone , with swelling of the toe and erythema [de-identified] : Neuropathy  [FreeTextEntry7] : Right Dorsal Hallux  [FreeTextEntry8] : 0.5 [FreeTextEntry9] : 0.5 [de-identified] : 0.1 [de-identified] : Serous/sanguinous [de-identified] : Bupivacaine 10cc [de-identified] : to secure dressing [de-identified] : Silver Alginate  [de-identified] : Cleansed with Normal Saline. \par Cloth tape \par \par DPM performed Dressing Change at today's Wound Care visit.  [de-identified] : Right Plantar Hallux  [de-identified] : 0.5 [de-identified] : 0.5 [de-identified] : 0.1 [de-identified] : Serous/sanguinous [de-identified] : Bupivacaine 10cc [de-identified] : to secure dressing [de-identified] : Silver Alginate  [de-identified] : Cleansed with Normal Saline. \par Cloth tape \par \par DPM performed Dressing Change at today's Wound Care visit.  [de-identified] : Small [de-identified] : Erythema [de-identified] : None [de-identified] : None [de-identified] : >75% [de-identified] : No [TWNoteComboBox8] : False [de-identified] : Biopsy taken and sent for pathology [de-identified] : Ace wraps [de-identified] : 2x Weekly [de-identified] : Primary Dressing [de-identified] : Small [de-identified] : Erythema [de-identified] : None [de-identified] : None [de-identified] : >75% [de-identified] : No [de-identified] : False [de-identified] : Biopsy taken and sent for pathology [de-identified] : Ace wraps [de-identified] : 2x Weekly

## 2022-02-02 NOTE — ASSESSMENT
[Verbal] : Verbal [Written] : Written [Demo] : Demo [Patient] : Patient [Fair - mild discomfort, physical impairment, low acceptance] : Fair - mild discomfort, physical impairment, low acceptance [Needs reinforcement] : needs reinforcement [Dressing changes] : dressing changes [Foot Care] : foot care [Skin Care] : skin care [Signs and symptoms of infection] : sign and symptoms of infection [Surgery] : surgery [How and When to Call] : how and when to call [Pain Management] : pain management [Off-loading] : off-loading [Patient responsibility to plan of care] : patient responsibility to plan of care [Stable] : stable [Home] : Home [Cane] : Cane [Not Applicable - Long Term Care/Home Health Agency] : Long Term Care/Home Health Agency: Not Applicable [] : No [FreeTextEntry2] : Offloading/Pressure Relief \par Elevation \par Distal Amputation\par Infection Prevention\par Restore Optimal Skin Integrity  [FreeTextEntry3] : Pt had a Distal Amputation at today's visit to [facility] [FreeTextEntry4] : Pt had a Distal Amputation at today's visit to [facility], 3 samples were sent to hospital for Pathology. Pt Tolerated well. \par Pt to F/U to [facility] on Monday for Dressing Change & 1 Week for Assessment

## 2022-02-03 ENCOUNTER — APPOINTMENT (OUTPATIENT)
Dept: WOUND CARE | Facility: HOSPITAL | Age: 72
End: 2022-02-03
Payer: MEDICARE

## 2022-02-03 ENCOUNTER — OUTPATIENT (OUTPATIENT)
Dept: OUTPATIENT SERVICES | Facility: HOSPITAL | Age: 72
LOS: 1 days | Discharge: HOME | End: 2022-02-03
Payer: MEDICARE

## 2022-02-03 VITALS
OXYGEN SATURATION: 96 % | SYSTOLIC BLOOD PRESSURE: 116 MMHG | RESPIRATION RATE: 20 BRPM | BODY MASS INDEX: 36.1 KG/M2 | TEMPERATURE: 97.4 F | HEIGHT: 67 IN | DIASTOLIC BLOOD PRESSURE: 68 MMHG | WEIGHT: 230 LBS | HEART RATE: 86 BPM

## 2022-02-03 DIAGNOSIS — L89.893 PRESSURE ULCER OF OTHER SITE, STAGE 3: ICD-10-CM

## 2022-02-03 DIAGNOSIS — Z89.421 ACQUIRED ABSENCE OF OTHER RIGHT TOE(S): Chronic | ICD-10-CM

## 2022-02-03 PROCEDURE — G0463: CPT

## 2022-02-03 PROCEDURE — 99212 OFFICE O/P EST SF 10 MIN: CPT

## 2022-02-03 NOTE — PLAN
[FreeTextEntry1] : Patient able to return to shoe gear and shower , patient happy with the outcome  PTR 2 weeks   Spent 20 minutes for patient care and medical decision making.\par

## 2022-02-03 NOTE — PHYSICAL EXAM
[1+] : left 1+ [Ankle Swelling (On Exam)] : present [Ankle Swelling Bilaterally] : bilaterally  [Ankle Swelling On The Right] : mild [Varicose Veins Of Lower Extremities] : bilaterally [Ankle Swelling On The Left] : moderate [] : not present [Purpura] : no purpura  [Petechiae] : no petechiae [Skin Ulcer] : no ulcer [Alert] : alert [Oriented to Person] : oriented to person [Oriented to Place] : oriented to place [Calm] : calm [de-identified] : calm [de-identified] : HTN , HLD [de-identified] : s/p right hallux partial amputation [de-identified] : right hallux amputation , closed , no soi  [de-identified] : Neuropathy  [FreeTextEntry1] : Right Great Toe (S/P Distal Amp 1/13/22) [de-identified] : None [de-identified] : NSC\par Toe sock [TWNoteComboBox4] : None [TWNoteComboBox5] : No [TWNoteComboBox6] : Surgical [de-identified] : No [de-identified] : Normal [de-identified] : None [de-identified] : None [de-identified] : 100% [de-identified] : No [de-identified] : Secondary Dressing

## 2022-02-03 NOTE — REVIEW OF SYSTEMS
[Fever] : no fever [Chills] : no chills [Eye Pain] : no eye pain [Loss Of Hearing] : no hearing loss [Shortness Of Breath] : no shortness of breath [Wheezing] : no wheezing [Abdominal Pain] : no abdominal pain [Joint Stiffness] : joint stiffness [Skin Wound] : skin wound [Anxiety] : no anxiety [Easy Bleeding] : no tendency for easy bleeding [Negative] : Endocrine [FreeTextEntry5] : HTN [FreeTextEntry9] : swelling of right great toe with previous amps of toes and hx of OM  [de-identified] : right hallux amputation , closed , no soi  [de-identified] : neuropathy of both feet , insensate

## 2022-02-03 NOTE — ASSESSMENT
[Verbal] : Verbal [Written] : Written [Demo] : Demo [Patient] : Patient [Good - alert, interested, motivated] : Good - alert, interested, motivated [Demonstrates independently] : demonstrates independently [Dressing changes] : dressing changes [Foot Care] : foot care [Skin Care] : skin care [Signs and symptoms of infection] : sign and symptoms of infection [Nutrition] : nutrition [How and When to Call] : how and when to call [Off-loading] : off-loading [Patient responsibility to plan of care] : patient responsibility to plan of care [Stable] : stable [Home] : Home [Cane] : Cane [Not Applicable - Long Term Care/Home Health Agency] : Long Term Care/Home Health Agency: Not Applicable [] : No [FreeTextEntry2] : Educated on infection prevention measures.\par Maintain optimal skin integrity to high pressure areas\par Offloading measures [FreeTextEntry4] : Pt c/o her knees constantly "buckling", c/o balance issues and, "dropping" things out of her hands. Provided Neurologist phone number and practice from [facility]. DPM advised.\par Pt completed ab therapy as prescribed. Pt denies side effects related to antibiotics.\par Pt was authorized for vascular studies in 1/2022. Pt has not completed vascular studies. Pt provided prescription for completion of same. Pt provided MRI department phone # to schedule an appointment\par Pt to begin showering. Pt advised to pat dry the wound and to not rub the wound vigorously.\par F/u in 2 weeks

## 2022-02-05 ENCOUNTER — RESULT REVIEW (OUTPATIENT)
Age: 72
End: 2022-02-05

## 2022-02-05 ENCOUNTER — OUTPATIENT (OUTPATIENT)
Dept: OUTPATIENT SERVICES | Facility: HOSPITAL | Age: 72
LOS: 1 days | End: 2022-02-05
Payer: MEDICARE

## 2022-02-05 DIAGNOSIS — Z89.421 ACQUIRED ABSENCE OF OTHER RIGHT TOE(S): ICD-10-CM

## 2022-02-05 DIAGNOSIS — Z89.421 ACQUIRED ABSENCE OF OTHER RIGHT TOE(S): Chronic | ICD-10-CM

## 2022-02-05 PROCEDURE — 93925 LOWER EXTREMITY STUDY: CPT

## 2022-02-05 PROCEDURE — 93925 LOWER EXTREMITY STUDY: CPT | Mod: 26

## 2022-02-06 DIAGNOSIS — Z98.890 OTHER SPECIFIED POSTPROCEDURAL STATES: ICD-10-CM

## 2022-02-06 DIAGNOSIS — Z80.41 FAMILY HISTORY OF MALIGNANT NEOPLASM OF OVARY: ICD-10-CM

## 2022-02-06 DIAGNOSIS — E03.9 HYPOTHYROIDISM, UNSPECIFIED: ICD-10-CM
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
## 2022-02-06 DIAGNOSIS — I10 ESSENTIAL (PRIMARY) HYPERTENSION: ICD-10-CM

## 2022-02-06 DIAGNOSIS — E78.00 PURE HYPERCHOLESTEROLEMIA, UNSPECIFIED: ICD-10-CM

## 2022-02-06 DIAGNOSIS — Z89.411 ACQUIRED ABSENCE OF RIGHT GREAT TOE: ICD-10-CM

## 2022-02-06 DIAGNOSIS — Z89.422 ACQUIRED ABSENCE OF OTHER LEFT TOE(S): ICD-10-CM

## 2022-02-06 DIAGNOSIS — T87.89 OTHER COMPLICATIONS OF AMPUTATION STUMP: ICD-10-CM

## 2022-02-06 DIAGNOSIS — Z84.81 FAMILY HISTORY OF CARRIER OF GENETIC DISEASE: ICD-10-CM

## 2022-02-06 DIAGNOSIS — Z88.0 ALLERGY STATUS TO PENICILLIN: ICD-10-CM

## 2022-02-06 DIAGNOSIS — Z89.421 ACQUIRED ABSENCE OF OTHER RIGHT TOE(S): ICD-10-CM

## 2022-02-06 DIAGNOSIS — Z79.82 LONG TERM (CURRENT) USE OF ASPIRIN: ICD-10-CM

## 2022-02-06 DIAGNOSIS — Z79.899 OTHER LONG TERM (CURRENT) DRUG THERAPY: ICD-10-CM

## 2022-02-06 DIAGNOSIS — Z80.52 FAMILY HISTORY OF MALIGNANT NEOPLASM OF BLADDER: ICD-10-CM

## 2022-02-06 DIAGNOSIS — G62.9 POLYNEUROPATHY, UNSPECIFIED: ICD-10-CM

## 2022-02-06 DIAGNOSIS — Z90.49 ACQUIRED ABSENCE OF OTHER SPECIFIED PARTS OF DIGESTIVE TRACT: ICD-10-CM

## 2022-02-06 DIAGNOSIS — Z98.1 ARTHRODESIS STATUS: ICD-10-CM

## 2022-02-13 ENCOUNTER — NON-APPOINTMENT (OUTPATIENT)
Age: 72
End: 2022-02-13

## 2022-02-14 ENCOUNTER — OUTPATIENT (OUTPATIENT)
Dept: OUTPATIENT SERVICES | Facility: HOSPITAL | Age: 72
LOS: 1 days | Discharge: HOME | End: 2022-02-14
Payer: MEDICARE

## 2022-02-14 ENCOUNTER — APPOINTMENT (OUTPATIENT)
Dept: WOUND CARE | Facility: HOSPITAL | Age: 72
End: 2022-02-14
Payer: MEDICARE

## 2022-02-14 VITALS
OXYGEN SATURATION: 94 % | RESPIRATION RATE: 20 BRPM | SYSTOLIC BLOOD PRESSURE: 131 MMHG | BODY MASS INDEX: 36.1 KG/M2 | DIASTOLIC BLOOD PRESSURE: 70 MMHG | HEIGHT: 67 IN | WEIGHT: 230 LBS | TEMPERATURE: 98 F | HEART RATE: 84 BPM

## 2022-02-14 DIAGNOSIS — Z89.421 ACQUIRED ABSENCE OF OTHER RIGHT TOE(S): Chronic | ICD-10-CM

## 2022-02-14 DIAGNOSIS — L03.115 CELLULITIS OF RIGHT LOWER LIMB: ICD-10-CM

## 2022-02-14 DIAGNOSIS — T87.89 OTHER COMPLICATIONS OF AMPUTATION STUMP: ICD-10-CM

## 2022-02-14 PROCEDURE — 99214 OFFICE O/P EST MOD 30 MIN: CPT

## 2022-02-14 PROCEDURE — G0463: CPT

## 2022-02-14 RX ORDER — SULFAMETHOXAZOLE AND TRIMETHOPRIM 800; 160 MG/1; MG/1
800-160 TABLET ORAL TWICE DAILY
Qty: 14 | Refills: 0 | Status: DISCONTINUED | COMMUNITY
Start: 2022-01-21 | End: 2022-02-14

## 2022-02-14 RX ORDER — CEPHALEXIN 500 MG/1
500 CAPSULE ORAL 3 TIMES DAILY
Qty: 30 | Refills: 3 | Status: DISCONTINUED | COMMUNITY
Start: 2021-06-29 | End: 2022-02-14

## 2022-02-14 RX ORDER — LEVOFLOXACIN 500 MG/1
500 TABLET, FILM COATED ORAL
Qty: 7 | Refills: 0 | Status: DISCONTINUED | COMMUNITY
Start: 2022-01-24 | End: 2022-02-14

## 2022-02-14 RX ORDER — CIPROFLOXACIN HYDROCHLORIDE 500 MG/1
500 TABLET, FILM COATED ORAL
Qty: 30 | Refills: 2 | Status: DISCONTINUED | COMMUNITY
Start: 2022-01-12 | End: 2022-02-14

## 2022-02-14 RX ORDER — SULFAMETHOXAZOLE AND TRIMETHOPRIM 800; 160 MG/1; MG/1
800-160 TABLET ORAL TWICE DAILY
Refills: 0 | Status: DISCONTINUED | COMMUNITY
End: 2022-02-14

## 2022-02-14 RX ORDER — DOXYCYCLINE HYCLATE 100 MG/1
100 CAPSULE ORAL
Qty: 30 | Refills: 2 | Status: DISCONTINUED | COMMUNITY
Start: 2022-01-11 | End: 2022-02-14

## 2022-02-14 RX ORDER — CIPROFLOXACIN HYDROCHLORIDE 250 MG/1
250 TABLET, FILM COATED ORAL
Qty: 30 | Refills: 1 | Status: DISCONTINUED | COMMUNITY
Start: 2022-01-11 | End: 2022-02-14

## 2022-02-14 RX ORDER — SULFAMETHOXAZOLE AND TRIMETHOPRIM 800; 160 MG/1; MG/1
800-160 TABLET ORAL TWICE DAILY
Qty: 14 | Refills: 0 | Status: COMPLETED | COMMUNITY
Start: 2022-02-14 | End: 2022-02-21

## 2022-02-15 DIAGNOSIS — G62.9 POLYNEUROPATHY, UNSPECIFIED: ICD-10-CM

## 2022-02-15 DIAGNOSIS — Z89.411 ACQUIRED ABSENCE OF RIGHT GREAT TOE: ICD-10-CM

## 2022-02-15 DIAGNOSIS — Z80.41 FAMILY HISTORY OF MALIGNANT NEOPLASM OF OVARY: ICD-10-CM

## 2022-02-15 DIAGNOSIS — E03.9 HYPOTHYROIDISM, UNSPECIFIED: ICD-10-CM

## 2022-02-15 DIAGNOSIS — Z88.0 ALLERGY STATUS TO PENICILLIN: ICD-10-CM

## 2022-02-15 DIAGNOSIS — L03.115 CELLULITIS OF RIGHT LOWER LIMB: ICD-10-CM

## 2022-02-15 DIAGNOSIS — E78.00 PURE HYPERCHOLESTEROLEMIA, UNSPECIFIED: ICD-10-CM

## 2022-02-15 DIAGNOSIS — Z80.52 FAMILY HISTORY OF MALIGNANT NEOPLASM OF BLADDER: ICD-10-CM

## 2022-02-15 DIAGNOSIS — Z90.49 ACQUIRED ABSENCE OF OTHER SPECIFIED PARTS OF DIGESTIVE TRACT: ICD-10-CM

## 2022-02-15 DIAGNOSIS — Z84.81 FAMILY HISTORY OF CARRIER OF GENETIC DISEASE: ICD-10-CM

## 2022-02-15 DIAGNOSIS — Z98.1 ARTHRODESIS STATUS: ICD-10-CM

## 2022-02-15 DIAGNOSIS — Z79.899 OTHER LONG TERM (CURRENT) DRUG THERAPY: ICD-10-CM
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
## 2022-02-15 DIAGNOSIS — I83.893 VARICOSE VEINS OF BILATERAL LOWER EXTREMITIES WITH OTHER COMPLICATIONS: ICD-10-CM

## 2022-02-15 DIAGNOSIS — Z89.422 ACQUIRED ABSENCE OF OTHER LEFT TOE(S): ICD-10-CM

## 2022-02-15 DIAGNOSIS — Z89.421 ACQUIRED ABSENCE OF OTHER RIGHT TOE(S): ICD-10-CM

## 2022-02-15 DIAGNOSIS — Z98.890 OTHER SPECIFIED POSTPROCEDURAL STATES: ICD-10-CM

## 2022-02-15 DIAGNOSIS — T87.44 INFECTION OF AMPUTATION STUMP, LEFT LOWER EXTREMITY: ICD-10-CM

## 2022-02-15 DIAGNOSIS — Z79.82 LONG TERM (CURRENT) USE OF ASPIRIN: ICD-10-CM

## 2022-02-15 DIAGNOSIS — I10 ESSENTIAL (PRIMARY) HYPERTENSION: ICD-10-CM

## 2022-02-17 ENCOUNTER — APPOINTMENT (OUTPATIENT)
Dept: WOUND CARE | Facility: HOSPITAL | Age: 72
End: 2022-02-17

## 2022-02-21 ENCOUNTER — OUTPATIENT (OUTPATIENT)
Dept: OUTPATIENT SERVICES | Facility: HOSPITAL | Age: 72
LOS: 1 days | Discharge: HOME | End: 2022-02-21
Payer: MEDICARE

## 2022-02-21 ENCOUNTER — APPOINTMENT (OUTPATIENT)
Dept: WOUND CARE | Facility: HOSPITAL | Age: 72
End: 2022-02-21
Payer: MEDICARE

## 2022-02-21 VITALS
SYSTOLIC BLOOD PRESSURE: 143 MMHG | OXYGEN SATURATION: 95 % | TEMPERATURE: 97.7 F | DIASTOLIC BLOOD PRESSURE: 70 MMHG | BODY MASS INDEX: 36.1 KG/M2 | RESPIRATION RATE: 20 BRPM | HEIGHT: 67 IN | WEIGHT: 230 LBS | HEART RATE: 91 BPM

## 2022-02-21 DIAGNOSIS — L89.893 PRESSURE ULCER OF OTHER SITE, STAGE 3: ICD-10-CM

## 2022-02-21 DIAGNOSIS — Z89.421 ACQUIRED ABSENCE OF OTHER RIGHT TOE(S): Chronic | ICD-10-CM

## 2022-02-21 PROCEDURE — 99213 OFFICE O/P EST LOW 20 MIN: CPT

## 2022-02-21 PROCEDURE — G0463: CPT

## 2022-02-21 NOTE — PHYSICAL EXAM
[1+] : left 1+ [Ankle Swelling (On Exam)] : present [Ankle Swelling Bilaterally] : bilaterally  [Ankle Swelling On The Right] : mild [Varicose Veins Of Lower Extremities] : present [Ankle Swelling On The Left] : moderate [] : not present [Purpura] : no purpura  [Petechiae] : no petechiae [Skin Ulcer] : no ulcer [Alert] : alert [Oriented to Person] : oriented to person [Oriented to Place] : oriented to place [Calm] : calm [de-identified] : HTN , HLD [de-identified] : A&Ox3, NAD [de-identified] : s/p right hallux partial amputation [de-identified] : right hallux amputation , closed, right hallux stump [de-identified] : Neuropathy  [FreeTextEntry1] : Great Toe (S/P Distal Amp 1/13/22) - no open wound - swelling and mild erythema [de-identified] : No treatment required  [de-identified] : Cleansed with Normal saline\par \par  [TWNoteComboBox1] : Right

## 2022-02-21 NOTE — ASSESSMENT
[Verbal] : Verbal [Written] : Written [Demo] : Demo [Patient] : Patient [Good - alert, interested, motivated] : Good - alert, interested, motivated [Demonstrates independently] : demonstrates independently [Dressing changes] : dressing changes [Foot Care] : foot care [Skin Care] : skin care [Signs and symptoms of infection] : sign and symptoms of infection [Nutrition] : nutrition [How and When to Call] : how and when to call [Off-loading] : off-loading [Patient responsibility to plan of care] : patient responsibility to plan of care [Stable] : stable [Home] : Home [Cane] : Cane [Not Applicable - Long Term Care/Home Health Agency] : Long Term Care/Home Health Agency: Not Applicable [Labs and Tests] : labs and tests [Glycemic Control] : glycemic control [] : No [FreeTextEntry2] : Promote optimal skin integrity, offloading, infection prevention\par  [FreeTextEntry3] : Right great toe - increased swelling and mild erythema [FreeTextEntry4] : \par \par Patient reports that she returned to [facility] today due to c/o increased swelling in right foot and great toe that started over the weekend.  \par DPM escribed Bactrim and instructed patient to not wear tight fitting shoes.\par Noninvasive vascular study results discussed with patient - patient verbalized understanding.\par F/U 1 week\par f/u 1 week\par \par \par

## 2022-02-21 NOTE — REVIEW OF SYSTEMS
[Fever] : no fever [Chills] : no chills [Eye Pain] : no eye pain [Loss Of Hearing] : no hearing loss [Shortness Of Breath] : no shortness of breath [Wheezing] : no wheezing [Abdominal Pain] : no abdominal pain [Joint Stiffness] : joint stiffness [Skin Wound] : skin wound [Anxiety] : no anxiety [Easy Bleeding] : no tendency for easy bleeding [Negative] : Endocrine [FreeTextEntry5] : HTN [FreeTextEntry9] : swelling of right great toe with previous amps of toes and hx of OM  [de-identified] : right hallux amputation , closed [de-identified] : neuropathy of both feet , insensate

## 2022-02-21 NOTE — HISTORY OF PRESENT ILLNESS
[FreeTextEntry1] : Pt seen s/p right hallux distal amp, healed. Pt relates that she has been taking the antibiotics as advised. Denies any fever, chills, nausea, vomiting, chest pain, shortness of breath, calf pain, or other complaints at this time.

## 2022-02-21 NOTE — PLAN
[FreeTextEntry1] : Patient examined and evaluated at this time.\par Patient remains a high risk for further amputation, limb loss, sepsis, and death.\par Orthotist consult at this time.\par Spent 20 minutes for patient care and medical decision making.\par Patient to follow up in 1 week.\par

## 2022-02-21 NOTE — HISTORY OF PRESENT ILLNESS
[FreeTextEntry1] : Pt seen s/p right hallux distal amp, healed but with cellulitic changes to the right hallux stump. Pt relates that she has been walking on the right foot and had a birthday celebration over the weekend. Denies any fever, chills, nausea, vomiting, chest pain, shortness of breath, calf pain, or other complaints at this time.

## 2022-02-21 NOTE — ASSESSMENT
[Verbal] : Verbal [Written] : Written [Demo] : Demo [Patient] : Patient [Good - alert, interested, motivated] : Good - alert, interested, motivated [Demonstrates independently] : demonstrates independently [Dressing changes] : dressing changes [Foot Care] : foot care [Skin Care] : skin care [Signs and symptoms of infection] : sign and symptoms of infection [Nutrition] : nutrition [How and When to Call] : how and when to call [Labs and Tests] : labs and tests [Off-loading] : off-loading [Patient responsibility to plan of care] : patient responsibility to plan of care [Glycemic Control] : glycemic control [Home] : Home [Stable] : stable [Cane] : Cane [Not Applicable - Long Term Care/Home Health Agency] : Long Term Care/Home Health Agency: Not Applicable [] : Yes [FreeTextEntry2] : Promote optimal skin integrity, offloading, infection prevention\par F/U 1 week   [FreeTextEntry4] : DPM recommended patient transition into a shoe with a wide toe box, patient verbalized understanding.\par F/U 1 week \par \par \par

## 2022-02-21 NOTE — VITALS
[Pain related to present condition?] : The patient's  pain is not related to present condition. [] : No [de-identified] : 0/10

## 2022-02-21 NOTE — PHYSICAL EXAM
[1+] : left 1+ [Ankle Swelling (On Exam)] : present [Ankle Swelling Bilaterally] : bilaterally  [Ankle Swelling On The Right] : mild [Varicose Veins Of Lower Extremities] : bilaterally [Ankle Swelling On The Left] : moderate [Alert] : alert [Oriented to Person] : oriented to person [Oriented to Place] : oriented to place [Calm] : calm [Purpura] : no purpura  [] : not present [Petechiae] : no petechiae [Skin Ulcer] : no ulcer [de-identified] : A&Ox3, NAD [de-identified] : HTN , HLD [de-identified] : s/p right hallux partial amputation [de-identified] : right hallux amputation , closed, right hallux stump cellulitic changes noted [de-identified] : Neuropathy  [FreeTextEntry1] : Right Great Toe (S/P Distal Amp 1/13/22) - no open wound - swelling and mild erythema [de-identified] : n [de-identified] : no dresisng [de-identified] : NSC\par  [TWNoteComboBox4] : False [TWNoteComboBox5] : False [TWNoteComboBox6] : False [de-identified] : False [de-identified] : False [de-identified] : False [de-identified] : False [de-identified] : False [de-identified] : False [de-identified] : False

## 2022-02-21 NOTE — PLAN
[FreeTextEntry1] : Patient examined and evaluated at this time.\par Abx sent to pharmacy.\par Patient remains a high risk for further amputation, limb loss, sepsis, and death.\par Spent 30 minutes for patient care and medical decision making.\par Patient to follow up in 1 week.\par

## 2022-02-22 DIAGNOSIS — Z88.0 ALLERGY STATUS TO PENICILLIN: ICD-10-CM

## 2022-02-22 DIAGNOSIS — I83.893 VARICOSE VEINS OF BILATERAL LOWER EXTREMITIES WITH OTHER COMPLICATIONS: ICD-10-CM

## 2022-02-22 DIAGNOSIS — Z80.41 FAMILY HISTORY OF MALIGNANT NEOPLASM OF OVARY: ICD-10-CM

## 2022-02-22 DIAGNOSIS — T87.89 OTHER COMPLICATIONS OF AMPUTATION STUMP: ICD-10-CM

## 2022-02-22 DIAGNOSIS — G62.9 POLYNEUROPATHY, UNSPECIFIED: ICD-10-CM

## 2022-02-22 DIAGNOSIS — E78.00 PURE HYPERCHOLESTEROLEMIA, UNSPECIFIED: ICD-10-CM

## 2022-02-22 DIAGNOSIS — Z89.421 ACQUIRED ABSENCE OF OTHER RIGHT TOE(S): ICD-10-CM

## 2022-02-22 DIAGNOSIS — Z90.49 ACQUIRED ABSENCE OF OTHER SPECIFIED PARTS OF DIGESTIVE TRACT: ICD-10-CM

## 2022-02-22 DIAGNOSIS — E03.9 HYPOTHYROIDISM, UNSPECIFIED: ICD-10-CM

## 2022-02-22 DIAGNOSIS — Z80.52 FAMILY HISTORY OF MALIGNANT NEOPLASM OF BLADDER: ICD-10-CM

## 2022-02-22 DIAGNOSIS — Z89.422 ACQUIRED ABSENCE OF OTHER LEFT TOE(S): ICD-10-CM

## 2022-02-22 DIAGNOSIS — Z98.890 OTHER SPECIFIED POSTPROCEDURAL STATES: ICD-10-CM

## 2022-02-22 DIAGNOSIS — I10 ESSENTIAL (PRIMARY) HYPERTENSION: ICD-10-CM

## 2022-02-22 DIAGNOSIS — Z84.81 FAMILY HISTORY OF CARRIER OF GENETIC DISEASE: ICD-10-CM

## 2022-02-22 DIAGNOSIS — Z79.899 OTHER LONG TERM (CURRENT) DRUG THERAPY: ICD-10-CM

## 2022-02-22 DIAGNOSIS — Z98.1 ARTHRODESIS STATUS: ICD-10-CM

## 2022-02-22 DIAGNOSIS — Z89.411 ACQUIRED ABSENCE OF RIGHT GREAT TOE: ICD-10-CM
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
## 2022-02-22 DIAGNOSIS — Z79.82 LONG TERM (CURRENT) USE OF ASPIRIN: ICD-10-CM

## 2022-02-28 ENCOUNTER — OUTPATIENT (OUTPATIENT)
Dept: OUTPATIENT SERVICES | Facility: HOSPITAL | Age: 72
LOS: 1 days | Discharge: HOME | End: 2022-02-28
Payer: MEDICARE

## 2022-02-28 ENCOUNTER — APPOINTMENT (OUTPATIENT)
Dept: WOUND CARE | Facility: HOSPITAL | Age: 72
End: 2022-02-28
Payer: MEDICARE

## 2022-02-28 VITALS
OXYGEN SATURATION: 94 % | HEART RATE: 91 BPM | DIASTOLIC BLOOD PRESSURE: 79 MMHG | WEIGHT: 230 LBS | SYSTOLIC BLOOD PRESSURE: 127 MMHG | TEMPERATURE: 97.2 F | RESPIRATION RATE: 22 BRPM | HEIGHT: 67 IN | BODY MASS INDEX: 36.1 KG/M2

## 2022-02-28 DIAGNOSIS — L89.893 PRESSURE ULCER OF OTHER SITE, STAGE 3: ICD-10-CM

## 2022-02-28 DIAGNOSIS — Z89.421 ACQUIRED ABSENCE OF OTHER RIGHT TOE(S): Chronic | ICD-10-CM

## 2022-02-28 PROCEDURE — G0463: CPT

## 2022-02-28 PROCEDURE — 99213 OFFICE O/P EST LOW 20 MIN: CPT

## 2022-02-28 NOTE — PHYSICAL EXAM
[1+] : left 1+ [Ankle Swelling (On Exam)] : present [Ankle Swelling Bilaterally] : bilaterally  [Ankle Swelling On The Right] : mild [Varicose Veins Of Lower Extremities] : bilaterally [Ankle Swelling On The Left] : moderate [] : not present [Purpura] : no purpura  [Petechiae] : no petechiae [Skin Ulcer] : no ulcer [Alert] : alert [Oriented to Person] : oriented to person [Oriented to Place] : oriented to place [Calm] : calm [de-identified] : A&Ox3, NAD [de-identified] : HTN , HLD [de-identified] : s/p right hallux partial amputation [de-identified] : right hallux amputation , closed, right hallux stump [de-identified] : Neuropathy  [FreeTextEntry1] : Right great toe  - no open wound  [de-identified] : no treatment  [TWNoteComboBox4] : None [de-identified] : Normal [de-identified] : None [de-identified] : None [de-identified] : No

## 2022-02-28 NOTE — REVIEW OF SYSTEMS
[Fever] : no fever [Chills] : no chills [Eye Pain] : no eye pain [Loss Of Hearing] : no hearing loss [Shortness Of Breath] : no shortness of breath [Wheezing] : no wheezing [Abdominal Pain] : no abdominal pain [Joint Stiffness] : joint stiffness [Skin Wound] : skin wound [Anxiety] : no anxiety [Easy Bleeding] : no tendency for easy bleeding [Negative] : Endocrine [FreeTextEntry5] : HTN [FreeTextEntry9] : swelling of right great toe with previous amps of toes and hx of OM  [de-identified] : right hallux amputation , closed [de-identified] : neuropathy of both feet , insensate

## 2022-02-28 NOTE — HISTORY OF PRESENT ILLNESS
[FreeTextEntry1] : Pt seen s/p right hallux distal amp, healed. Denies any fever, chills, nausea, vomiting, chest pain, shortness of breath, calf pain, or other complaints at this time.

## 2022-02-28 NOTE — VITALS
[] : No [de-identified] : 2/10  [FreeTextEntry3] : Right plantar great toe  [FreeTextEntry1] : wider shoe  [FreeTextEntry2] : Tight shoe  [FreeTextEntry4] : Patient brought shoes in. Went over which ones to use.

## 2022-02-28 NOTE — PLAN
[FreeTextEntry1] : Patient examined and evaluated at this time.\par Patient remains a high risk for further amputation, limb loss, sepsis, and death.\par Spent 20 minutes for patient care and medical decision making.\par Patient to follow up in 2 months.\par

## 2022-02-28 NOTE — ASSESSMENT
[Verbal] : Verbal [Written] : Written [Demo] : Demo [Patient] : Patient [Good - alert, interested, motivated] : Good - alert, interested, motivated [Verbalizes knowledge/Understanding] : Verbalizes knowledge/understanding [Skin Care] : skin care [Signs and symptoms of infection] : sign and symptoms of infection [How and When to Call] : how and when to call [Pain Management] : pain management [Off-loading] : off-loading [Patient responsibility to plan of care] : patient responsibility to plan of care [] : Yes [Stable] : stable [Home] : Home [Cane] : Cane [Not Applicable - Long Term Care/Home Health Agency] : Long Term Care/Home Health Agency: Not Applicable [FreeTextEntry2] : Infection prevention\par Goal remaining pain free  [FreeTextEntry4] : Follow up in 2 months

## 2022-03-02 DIAGNOSIS — E78.00 PURE HYPERCHOLESTEROLEMIA, UNSPECIFIED: ICD-10-CM

## 2022-03-02 DIAGNOSIS — Z89.421 ACQUIRED ABSENCE OF OTHER RIGHT TOE(S): ICD-10-CM

## 2022-03-02 DIAGNOSIS — E03.9 HYPOTHYROIDISM, UNSPECIFIED: ICD-10-CM

## 2022-03-02 DIAGNOSIS — Z79.899 OTHER LONG TERM (CURRENT) DRUG THERAPY: ICD-10-CM

## 2022-03-02 DIAGNOSIS — Z84.81 FAMILY HISTORY OF CARRIER OF GENETIC DISEASE: ICD-10-CM

## 2022-03-02 DIAGNOSIS — I10 ESSENTIAL (PRIMARY) HYPERTENSION: ICD-10-CM

## 2022-03-02 DIAGNOSIS — I83.893 VARICOSE VEINS OF BILATERAL LOWER EXTREMITIES WITH OTHER COMPLICATIONS: ICD-10-CM

## 2022-03-02 DIAGNOSIS — Z89.422 ACQUIRED ABSENCE OF OTHER LEFT TOE(S): ICD-10-CM

## 2022-03-02 DIAGNOSIS — Z89.411 ACQUIRED ABSENCE OF RIGHT GREAT TOE: ICD-10-CM
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
## 2022-03-02 DIAGNOSIS — T87.89 OTHER COMPLICATIONS OF AMPUTATION STUMP: ICD-10-CM

## 2022-03-02 DIAGNOSIS — Z90.49 ACQUIRED ABSENCE OF OTHER SPECIFIED PARTS OF DIGESTIVE TRACT: ICD-10-CM

## 2022-03-02 DIAGNOSIS — Z98.890 OTHER SPECIFIED POSTPROCEDURAL STATES: ICD-10-CM

## 2022-03-02 DIAGNOSIS — Z80.52 FAMILY HISTORY OF MALIGNANT NEOPLASM OF BLADDER: ICD-10-CM

## 2022-03-02 DIAGNOSIS — G62.9 POLYNEUROPATHY, UNSPECIFIED: ICD-10-CM

## 2022-03-02 DIAGNOSIS — Z88.0 ALLERGY STATUS TO PENICILLIN: ICD-10-CM

## 2022-03-02 DIAGNOSIS — Z98.1 ARTHRODESIS STATUS: ICD-10-CM

## 2022-03-02 DIAGNOSIS — Z79.82 LONG TERM (CURRENT) USE OF ASPIRIN: ICD-10-CM

## 2022-03-02 DIAGNOSIS — Z80.41 FAMILY HISTORY OF MALIGNANT NEOPLASM OF OVARY: ICD-10-CM

## 2022-03-27 ENCOUNTER — NON-APPOINTMENT (OUTPATIENT)
Age: 72
End: 2022-03-27

## 2022-03-28 ENCOUNTER — APPOINTMENT (OUTPATIENT)
Dept: WOUND CARE | Facility: HOSPITAL | Age: 72
End: 2022-03-28

## 2022-03-28 ENCOUNTER — APPOINTMENT (OUTPATIENT)
Dept: WOUND CARE | Facility: HOSPITAL | Age: 72
End: 2022-03-28
Payer: MEDICARE

## 2022-03-28 ENCOUNTER — OUTPATIENT (OUTPATIENT)
Dept: OUTPATIENT SERVICES | Facility: HOSPITAL | Age: 72
LOS: 1 days | Discharge: HOME | End: 2022-03-28
Payer: MEDICARE

## 2022-03-28 DIAGNOSIS — Z89.421 ACQUIRED ABSENCE OF OTHER RIGHT TOE(S): Chronic | ICD-10-CM

## 2022-03-28 DIAGNOSIS — E11.621 TYPE 2 DIABETES MELLITUS WITH FOOT ULCER: ICD-10-CM

## 2022-03-28 DIAGNOSIS — T87.89 OTHER COMPLICATIONS OF AMPUTATION STUMP: ICD-10-CM

## 2022-03-28 PROCEDURE — G0463: CPT

## 2022-03-28 PROCEDURE — 99213 OFFICE O/P EST LOW 20 MIN: CPT

## 2022-03-29 DIAGNOSIS — E78.00 PURE HYPERCHOLESTEROLEMIA, UNSPECIFIED: ICD-10-CM

## 2022-03-29 DIAGNOSIS — I10 ESSENTIAL (PRIMARY) HYPERTENSION: ICD-10-CM

## 2022-03-29 DIAGNOSIS — I83.893 VARICOSE VEINS OF BILATERAL LOWER EXTREMITIES WITH OTHER COMPLICATIONS: ICD-10-CM
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
## 2022-03-29 DIAGNOSIS — E03.9 HYPOTHYROIDISM, UNSPECIFIED: ICD-10-CM

## 2022-03-29 DIAGNOSIS — Z88.0 ALLERGY STATUS TO PENICILLIN: ICD-10-CM

## 2022-03-29 DIAGNOSIS — Z79.899 OTHER LONG TERM (CURRENT) DRUG THERAPY: ICD-10-CM

## 2022-03-29 DIAGNOSIS — G62.9 POLYNEUROPATHY, UNSPECIFIED: ICD-10-CM

## 2022-03-29 DIAGNOSIS — Z84.81 FAMILY HISTORY OF CARRIER OF GENETIC DISEASE: ICD-10-CM

## 2022-03-29 DIAGNOSIS — Z89.411 ACQUIRED ABSENCE OF RIGHT GREAT TOE: ICD-10-CM

## 2022-03-29 DIAGNOSIS — Z90.49 ACQUIRED ABSENCE OF OTHER SPECIFIED PARTS OF DIGESTIVE TRACT: ICD-10-CM

## 2022-03-29 DIAGNOSIS — Z80.52 FAMILY HISTORY OF MALIGNANT NEOPLASM OF BLADDER: ICD-10-CM

## 2022-03-29 DIAGNOSIS — Z98.1 ARTHRODESIS STATUS: ICD-10-CM

## 2022-03-29 DIAGNOSIS — Z80.41 FAMILY HISTORY OF MALIGNANT NEOPLASM OF OVARY: ICD-10-CM
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
## 2022-03-29 DIAGNOSIS — Z79.82 LONG TERM (CURRENT) USE OF ASPIRIN: ICD-10-CM

## 2022-03-29 DIAGNOSIS — Z98.890 OTHER SPECIFIED POSTPROCEDURAL STATES: ICD-10-CM

## 2022-03-29 DIAGNOSIS — Z89.422 ACQUIRED ABSENCE OF OTHER LEFT TOE(S): ICD-10-CM

## 2022-03-29 DIAGNOSIS — Z89.421 ACQUIRED ABSENCE OF OTHER RIGHT TOE(S): ICD-10-CM

## 2022-03-29 DIAGNOSIS — T87.89 OTHER COMPLICATIONS OF AMPUTATION STUMP: ICD-10-CM

## 2022-04-12 NOTE — ASSESSMENT
[Verbal] : Verbal [Demo] : Demo [Patient] : Patient [Family member] : Family member [Fair - mild discomfort, physical impairment, low acceptance] : Fair - mild discomfort, physical impairment, low acceptance [Needs reinforcement] : needs reinforcement [Dressing changes] : dressing changes [Skin Care] : skin care [Pressure relief] : pressure relief [Signs and symptoms of infection] : sign and symptoms of infection [Nutrition] : nutrition [How and When to Call] : how and when to call [Pain Management] : pain management [Home Health] : home health [Patient responsibility to plan of care] : patient responsibility to plan of care [] : Yes [Stable] : stable [Home] : Home [Cane] : Cane [Not Applicable - Long Term Care/Home Health Agency] : Long Term Care/Home Health Agency: Not Applicable [FreeTextEntry2] : Infection Prevention\par Promote Skin Integrity\par Pressure relief/Pressure redistribution via shoe inserts and wider shoes\par demonstrates use of both pharmacological and non pharmacological pain management interventions\par maintain acceptable levels of pain\par check feet daily for changes to skin \par  [FreeTextEntry4] : F/U 2 months\par Pt advised to purchase Dr. Smith's gel inserts and to not wear narrow shoes and shoes without inserts as per DPM. Pt understood to check shoes daily for anything that could cause a pressure injury. \par Pt  continues to see pain management for chronic neuropathic pain, pt had epidural performed on Dec 2021\par \par \par V/S were WNL and were viewed by nurse, due to technician error they were not input into EMR. Pt discharged in stable condition.\par

## 2022-04-12 NOTE — HISTORY OF PRESENT ILLNESS
[FreeTextEntry1] : patient has history of OM and previous amps of the toes both feet , currently no open wounds , no soi

## 2022-04-12 NOTE — PHYSICAL EXAM
[1+] : left 1+ [Ankle Swelling (On Exam)] : present [Ankle Swelling Bilaterally] : bilaterally  [Ankle Swelling On The Right] : mild [Varicose Veins Of Lower Extremities] : bilaterally [Ankle Swelling On The Left] : moderate [Alert] : alert [Oriented to Person] : oriented to person [Oriented to Place] : oriented to place [Calm] : calm [] : not present [Purpura] : no purpura  [Petechiae] : no petechiae [Skin Ulcer] : no ulcer [de-identified] : A&Ox3, NAD [de-identified] : HTN , HLD [de-identified] : s/p right hallux partial amputation [de-identified] : right hallux amputation , closed, right hallux stump [de-identified] : Neuropathy  [FreeTextEntry1] : Right Hallux Distal Amp site (1/13/22) NO OPEN WOUND [de-identified] : NONE [de-identified] : Mechanically cleansed with Sterile gauze and 0.9% Normal Saline\par

## 2022-04-12 NOTE — REVIEW OF SYSTEMS
[Joint Stiffness] : joint stiffness [Skin Wound] : skin wound [Negative] : Endocrine [Fever] : no fever [Chills] : no chills [Eye Pain] : no eye pain [Loss Of Hearing] : no hearing loss [Shortness Of Breath] : no shortness of breath [Wheezing] : no wheezing [Abdominal Pain] : no abdominal pain [Anxiety] : no anxiety [Easy Bleeding] : no tendency for easy bleeding [FreeTextEntry5] : HTN [FreeTextEntry9] : swelling of right great toe with previous amps of toes and hx of OM  [de-identified] : right hallux amputation , closed [de-identified] : neuropathy of both feet , insensate

## 2022-04-12 NOTE — VITALS
[Pain related to present condition?] : The patient's  pain is related to present condition. [] : No [de-identified] : pt states she gets shooting, stabbing pain at right foot 2nd digit that, "goes away on its own", DPM aware. [FreeTextEntry4] : Pt advised to wear footwear with wider toe box and to not remove insert from shoes. DPM suggested pt purchase Dr. Scholls gel inserts for shoes, pt understood information provided

## 2022-04-12 NOTE — PLAN
[FreeTextEntry1] : wear appropriate shoe gear , continue monitoring  PTR 2 months   Spent 20 minutes for patient care and medical decision making.\par

## 2022-06-07 ENCOUNTER — OUTPATIENT (OUTPATIENT)
Dept: OUTPATIENT SERVICES | Facility: HOSPITAL | Age: 72
LOS: 1 days | Discharge: HOME | End: 2022-06-07
Payer: MEDICARE

## 2022-06-07 ENCOUNTER — APPOINTMENT (OUTPATIENT)
Dept: WOUND CARE | Facility: HOSPITAL | Age: 72
End: 2022-06-07
Payer: MEDICARE

## 2022-06-07 VITALS
HEIGHT: 67 IN | DIASTOLIC BLOOD PRESSURE: 62 MMHG | SYSTOLIC BLOOD PRESSURE: 126 MMHG | BODY MASS INDEX: 36.1 KG/M2 | TEMPERATURE: 97.7 F | OXYGEN SATURATION: 98 % | WEIGHT: 230 LBS | RESPIRATION RATE: 20 BRPM | HEART RATE: 87 BPM

## 2022-06-07 DIAGNOSIS — Z89.422 ACQUIRED ABSENCE OF OTHER LEFT TOE(S): ICD-10-CM

## 2022-06-07 DIAGNOSIS — Z89.421 ACQUIRED ABSENCE OF OTHER RIGHT TOE(S): Chronic | ICD-10-CM

## 2022-06-07 DIAGNOSIS — I83.893 VARICOSE VEINS OF BILATERAL LOWER EXTREMITIES WITH OTHER COMPLICATIONS: ICD-10-CM

## 2022-06-07 DIAGNOSIS — T87.89 OTHER COMPLICATIONS OF AMPUTATION STUMP: ICD-10-CM

## 2022-06-07 DIAGNOSIS — Z89.421 ACQUIRED ABSENCE OF OTHER RIGHT TOE(S): ICD-10-CM

## 2022-06-07 PROCEDURE — G0463: CPT

## 2022-06-07 PROCEDURE — 99213 OFFICE O/P EST LOW 20 MIN: CPT

## 2022-06-08 DIAGNOSIS — Z90.49 ACQUIRED ABSENCE OF OTHER SPECIFIED PARTS OF DIGESTIVE TRACT: ICD-10-CM

## 2022-06-08 DIAGNOSIS — Z80.41 FAMILY HISTORY OF MALIGNANT NEOPLASM OF OVARY: ICD-10-CM
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
## 2022-06-08 DIAGNOSIS — Z79.899 OTHER LONG TERM (CURRENT) DRUG THERAPY: ICD-10-CM

## 2022-06-08 DIAGNOSIS — Z98.1 ARTHRODESIS STATUS: ICD-10-CM

## 2022-06-08 DIAGNOSIS — Z80.52 FAMILY HISTORY OF MALIGNANT NEOPLASM OF BLADDER: ICD-10-CM

## 2022-06-08 DIAGNOSIS — E78.00 PURE HYPERCHOLESTEROLEMIA, UNSPECIFIED: ICD-10-CM

## 2022-06-08 DIAGNOSIS — L84 CORNS AND CALLOSITIES: ICD-10-CM

## 2022-06-08 DIAGNOSIS — Z89.422 ACQUIRED ABSENCE OF OTHER LEFT TOE(S): ICD-10-CM

## 2022-06-08 DIAGNOSIS — I83.893 VARICOSE VEINS OF BILATERAL LOWER EXTREMITIES WITH OTHER COMPLICATIONS: ICD-10-CM

## 2022-06-08 DIAGNOSIS — T87.89 OTHER COMPLICATIONS OF AMPUTATION STUMP: ICD-10-CM

## 2022-06-08 DIAGNOSIS — Z98.890 OTHER SPECIFIED POSTPROCEDURAL STATES: ICD-10-CM

## 2022-06-08 DIAGNOSIS — Z84.81 FAMILY HISTORY OF CARRIER OF GENETIC DISEASE: ICD-10-CM

## 2022-06-08 DIAGNOSIS — Z89.411 ACQUIRED ABSENCE OF RIGHT GREAT TOE: ICD-10-CM

## 2022-06-08 DIAGNOSIS — E03.9 HYPOTHYROIDISM, UNSPECIFIED: ICD-10-CM

## 2022-06-08 DIAGNOSIS — Z89.421 ACQUIRED ABSENCE OF OTHER RIGHT TOE(S): ICD-10-CM

## 2022-06-08 DIAGNOSIS — I10 ESSENTIAL (PRIMARY) HYPERTENSION: ICD-10-CM

## 2022-06-08 DIAGNOSIS — Z88.0 ALLERGY STATUS TO PENICILLIN: ICD-10-CM

## 2022-06-08 DIAGNOSIS — Z79.82 LONG TERM (CURRENT) USE OF ASPIRIN: ICD-10-CM

## 2022-06-08 NOTE — VITALS
[Pain related to present condition?] : The patient's  pain is related to present condition. [Shooting] : shooting [Occasional] : occasional [] : No [de-identified] : 4/10 [FreeTextEntry3] : Right foot and legs  [FreeTextEntry1] : Pain comes and goes randomly  [FreeTextEntry2] : Pain comes and goes randomly  [FreeTextEntry4] : Auth for vascular studies submitted

## 2022-06-08 NOTE — PHYSICAL EXAM
[1+] : left 1+ [Ankle Swelling (On Exam)] : present [Ankle Swelling Bilaterally] : bilaterally  [Ankle Swelling On The Right] : mild [Varicose Veins Of Lower Extremities] : present [Ankle Swelling On The Left] : moderate [] : not present [Purpura] : no purpura  [Petechiae] : no petechiae [Skin Ulcer] : no ulcer [Alert] : alert [Oriented to Person] : oriented to person [Oriented to Place] : oriented to place [Calm] : calm [de-identified] : A&Ox3, NAD [de-identified] : s/p right hallux partial amputation [de-identified] : right hallux amputation , closed, right hallux stump [de-identified] : Neuropathy  [FreeTextEntry1] : right lateral foot - Cracked callus  [FreeTextEntry2] : 0.1 [FreeTextEntry3] : 1.4 [de-identified] : Callus  [de-identified] : White sock  [de-identified] : Mechanically cleansed with sterile gauze and normal saline.\par * Callus shaved by DPM revealing no open wounds.  [TWNoteComboBox4] : None [de-identified] : other [de-identified] : None [de-identified] : None [de-identified] : Daily [de-identified] : No [de-identified] : Other

## 2022-06-08 NOTE — ASSESSMENT
[Verbal] : Verbal [Written] : Written [Demo] : Demo [Patient] : Patient [Spouse] : Spouse [Good - alert, interested, motivated] : Good - alert, interested, motivated [Needs reinforcement] : needs reinforcement [Foot Care] : foot care [Skin Care] : skin care [Venous Disease] : venous disease [Nutrition] : nutrition [Arterial Disease] : arterial disease [How and When to Call] : how and when to call [Labs and Tests] : labs and tests [Pain Management] : pain management [Patient responsibility to plan of care] : patient responsibility to plan of care [Stable] : stable [Home] : Home [Cane] : Cane [Not Applicable - Long Term Care/Home Health Agency] : Long Term Care/Home Health Agency: Not Applicable [] : No [FreeTextEntry2] : Infection prevention\par Localized wound care \par Goal remaining pain free regarding wounds\par  [FreeTextEntry3] : Presents with new callus  [FreeTextEntry4] : Auth submitted for vascular studies \par Follow up in 4 weeks

## 2022-06-08 NOTE — REVIEW OF SYSTEMS
[Fever] : no fever [Chills] : no chills [Eye Pain] : no eye pain [Loss Of Hearing] : no hearing loss [Shortness Of Breath] : no shortness of breath [Wheezing] : no wheezing [Abdominal Pain] : no abdominal pain [Joint Stiffness] : joint stiffness [Skin Wound] : skin wound [Anxiety] : no anxiety [Easy Bleeding] : no tendency for easy bleeding [Negative] : Endocrine [FreeTextEntry5] : HTN [FreeTextEntry9] : swelling of right great toe with previous amps of toes and hx of OM  [de-identified] : right hallux amputation , closed [de-identified] : neuropathy of both feet , insensate

## 2022-07-28 NOTE — ED PROVIDER NOTE - CCCP TRG CHIEF CMPLNT
[facility]NC[facility]  CARDIOLOGY OFFICE VISIT        PRIMARY CARE PROVIDER  Reza Marquez MD  Last seen: 06/27/2022    SUBJECTIVE  CHIEF COMPLAINT / REASON FOR VISIT:  Follow up bicuspid aortic valve      Cardiac Problem List:   Bicuspid Aortic Valve  Moderate to Severe Aortic Valve Stenosis  Hypertension    Noncardiac Considerations:  COPD, mild  GERD    Pertinent ED Visits/Hospitalizations since last Cardiology office visit:   N/A      HISTORY OF PRESENT ILLNESS  Pleasant 49 year old male seen in follow up for bicuspid aortic valve.  The patient has a possible bicuspid aortic valve and mild aortic stenosis.  His last echocardiogram was performed in 2015.  He has not followed up with cardiology for these issues.    Patient has a history of possible bicuspid aortic valve, mild aortic stenosis and dyslipidemia.  He is tolerating atorvastatin without any difficulties.    Last cardiology visit was on 12/28/2021.  Echocardiogram ordered.     Echocardiogram 12/29/2021 with findings of; Left ventricular ejection fraction, 64%.  No regional wall motion abnormalities.  Grade I/IV diastolic dysfunction.  Normal right ventricular systolic function.  Right ventricular systolic pressure 29.7 mmHg.  Left atrial volume index 23.7 ml/m².  Bicuspid aortic valve with mild calcification. Moderate to severe aortic valve stenosis, mean gradient 36 mmHg, LUCI 1 cm².  Compared to prior study of 02/10/2016 aortic valv area has decreased.      Last saw Reza Marquez MD 06/27/2022 for follow up.  He manages dyslipidemia, hypertension, anxiety, chronic anemia, GERD and aortic stenosis.     Today patient reports he feels great since shoulder surgery.  Reports when mowing his lawn he will have to stop intermittently stop to rest, catch his breath and then resume.  No chest pain, no edema, no palpitations.  No dyspnea upon exertion.  No syncope. Reviewed lipid panel results with patient.        ALLERGIES  ALLERGIES:  No Known  Allergies    MEDICATIONS  Current Outpatient Medications   Medication Sig Dispense Refill   • trazodone (DESYREL) 150 MG tablet Take 1 tablet by mouth nightly. OVERDUE FOR FOLLOW UP. LAST REFILL UNTIL SEEN 90 tablet 1   • lisinopril (ZESTRIL) 10 MG tablet Take 1 tablet by mouth daily. 90 tablet 1   • pantoprazole (PROTONIX) 40 MG tablet Take 1 tablet by mouth daily. 90 tablet 1   • escitalopram (LEXAPRO) 5 MG tablet Take 1 tablet by mouth daily. Please schedule follow up appointment for further refills. 90 tablet 1   • atorvastatin (LIPITOR) 20 MG tablet Take 1 tablet by mouth daily. Please schedule follow up with PCP prior to next refill. 90 tablet 1   • HYDROcodone-acetaminophen (NORCO)  MG per tablet Take 1 tablet by mouth every 6 hours as needed for Pain. 28 tablet 0   • fluticasone (FLONASE) 50 MCG/ACT nasal spray Spray 2 sprays in each nostril daily. 1 each 11   • Ventolin  (90 Base) MCG/ACT inhaler Inhale 2 puffs into the lungs every 4 hours as needed for Shortness of Breath or Wheezing. 18 g 11     No current facility-administered medications for this visit.       MEDICAL HISTORY  Past Medical History:   Diagnosis Date   • Essential (primary) hypertension    • Heart murmur    • High cholesterol    • Sinus problem         SURGICAL HISTORY  Past Surgical History:   Procedure Laterality Date   • Arthroscopy shoulder debride extensive Left 01/04/2022    LT shoulder ASD, Debridement, Biceps tenodesis; 1/4/22   • Arthroscopy shoulder w lysis resect adhesions w or wo manip Left 05/24/2022    LT shoulder CR/LAMAR; 5/24/22   • Tonsillectomy and adenoidectomy  childhood        SOCIAL HISTORY  Social History     Tobacco Use   • Smoking status: Current Every Day Smoker     Packs/day: 1.00     Years: 28.00     Pack years: 28.00     Types: Cigarettes   • Smokeless tobacco: Never Used   • Tobacco comment: patient is considering quiting, he declined informaion   Substance Use Topics   • Alcohol use: Yes      Alcohol/week: 35.0 standard drinks     Types: 35 Cans of beer per week   • Drug use: No        FAMILY HISTORY  Family History   Problem Relation Age of Onset   • Cancer Father         lung   • Thyroid Father    • Cancer Mother         breast   • Diabetes Maternal Grandmother    • Heart Maternal Grandmother    • Diabetes Maternal Grandfather         Review of Systems:  GENERAL: Negative for: fever and chills  NEURO: Negative for: lightheadedness or dizziness   PULMONARY: Negative for: shortness of breath, wheezing, cough, sputum production and hemoptysis   CV: Negative for: syncope, angina, palpitations and claudication      VITALS  Visit Vitals  BP (!) 151/96 (BP Location: RUE - Right upper extremity, Patient Position: Sitting, Cuff Size: Regular)   Pulse 70   Ht 5' 9\" (1.753 m)   Wt 74.3 kg (163 lb 12.8 oz)   BMI 24.19 kg/m²        Physical Exam:  General: comfortable and in no acute distress  HEENT:   no pallor, jaundice  Neck:  supple, no carotid bruits and no JVD  Lungs:  clear to auscultation, good air entry, no rales or wheezes and no rhonchi  Cardiovascular:   regular rate and rhythm, no S1 and S2 normal, no S3 or S4 and POSITIVE FOR murmur 3/6 systolic  Abdomen:   soft, non-tender, nondistended  Extremities:   no edema, no clubbing  Neuro:  awake, alert, oriented X3  Skin:   color, texture, turgor are normal, no bruises or rashes       Lab Results   Component Value Date    SODIUM 136 06/27/2022    POTASSIUM 4.0 06/27/2022    BUN 6 06/27/2022    CREATININE 0.67 06/27/2022    WBC 10.7 05/05/2022    HCT 40.8 05/05/2022    HGB 14.4 05/05/2022     05/05/2022    GLUCOSE 187 (H) 06/27/2022    CHOLESTEROL 170 05/16/2022    HDL 47 05/16/2022    CALCLDL 70 05/16/2022    TRIGLYCERIDE 264 (H) 05/16/2022    GFRA >90 02/20/2018    GFRNA >90 02/20/2018    AST 15 05/05/2022    GPT 20 05/05/2022    ALKPT 83 05/05/2022    BILIRUBIN 0.5 05/05/2022       No results found for: NTPROB       DIAGNOSTICS:  12/29/2021  Echocardiogram  Left ventricular ejection fraction, 64 %.  No regional wall motion abnormalities.  Grade I/IV diastolic dysfunction  Normal right ventricular systolic function.  Right ventricular systolic pressure 29.7 mmHg.  Left atrial volume index 23.7 ml/m².  Bicuspid aortic valve with mild calcification.  Moderate to severe aortic valve stenosis, mean gradient 36 mmHg, LUCI 1 cm².  Compared to prior study of 02/10/2016 aortic valv area has decreased.    12/23/2021 12-Lead EKG  Normal Sinus Rhythm, Incomplete right bundle branch block     03/10/2016 CT Chest  IMPRESSION:  Unremarkable CT of the chest. Specifically no evidence for aortic aneurysm, dissection or significant atherosclerotic disease. There may be a small hiatal hernia.      Assessment:  Bicuspid aortic valve  Moderate to severe aortic valve stenosis  · Echo (12/29/2021) - Bicuspid aortic valve with mild calcification. Moderate to severe aortic valve stenosis, mean gradient 36 mmHg, LUCI 1 cm².  Hypertension    Plan:  Prophylactic antibiotics are indicated for endocarditis prevention, as recommended.  Explained signs symptoms to watch for regarding worsening valve disease.  Patient is asymptomatic at this time.  TAVR video shared with patient during today's visit as a future option.  Images of echocardiogram reviewed with patient on computer  Echocardiogram ordered to be completed prior to next office visit.   Continue lisinopril and atorvastatin.  Follow up in cardiology clinic in 4 months or sooner if symptomatic       Milla GARIBAY, scribe attest that I performed the duties of a scribe for the encounter in the presence of Jos Huggins MD        I, Dr. Jos Huggins, attest that I performed the history of present Illness, physical exam, and assessment and plan for this encounter.  I reviewed the review of systems, past family and social history, verify that it is accurate, and the scribe merely recorded my findings.  Jos Huggins MD [facility]  07/28/22    We  would like to thank Dr. Reza Marquez MD for involving us in the care of Hola Cartwright.   wound check

## 2022-08-03 ENCOUNTER — NON-APPOINTMENT (OUTPATIENT)
Age: 72
End: 2022-08-03

## 2022-08-04 ENCOUNTER — OUTPATIENT (OUTPATIENT)
Dept: OUTPATIENT SERVICES | Facility: HOSPITAL | Age: 72
LOS: 1 days | End: 2022-08-04

## 2022-08-04 ENCOUNTER — APPOINTMENT (OUTPATIENT)
Dept: WOUND CARE | Facility: HOSPITAL | Age: 72
End: 2022-08-04

## 2022-08-04 ENCOUNTER — OUTPATIENT (OUTPATIENT)
Dept: OUTPATIENT SERVICES | Facility: HOSPITAL | Age: 72
LOS: 1 days | Discharge: HOME | End: 2022-08-04
Payer: MEDICARE

## 2022-08-04 VITALS
HEIGHT: 67 IN | OXYGEN SATURATION: 98 % | RESPIRATION RATE: 20 BRPM | HEART RATE: 78 BPM | SYSTOLIC BLOOD PRESSURE: 117 MMHG | WEIGHT: 230 LBS | BODY MASS INDEX: 36.1 KG/M2 | TEMPERATURE: 97.6 F | DIASTOLIC BLOOD PRESSURE: 67 MMHG

## 2022-08-04 DIAGNOSIS — Z89.421 ACQUIRED ABSENCE OF OTHER RIGHT TOE(S): Chronic | ICD-10-CM

## 2022-08-04 DIAGNOSIS — T87.89 OTHER COMPLICATIONS OF AMPUTATION STUMP: ICD-10-CM

## 2022-08-04 PROCEDURE — 93970 EXTREMITY STUDY: CPT

## 2022-08-04 PROCEDURE — 93970 EXTREMITY STUDY: CPT | Mod: 26

## 2022-08-04 PROCEDURE — 99213 OFFICE O/P EST LOW 20 MIN: CPT

## 2022-08-04 PROCEDURE — G0463: CPT

## 2022-08-04 PROCEDURE — 93922 UPR/L XTREMITY ART 2 LEVELS: CPT | Mod: 26

## 2022-08-04 PROCEDURE — 93923 UPR/LXTR ART STDY 3+ LVLS: CPT
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
## 2022-08-05 DIAGNOSIS — Z88.0 ALLERGY STATUS TO PENICILLIN: ICD-10-CM

## 2022-08-05 DIAGNOSIS — Z98.890 OTHER SPECIFIED POSTPROCEDURAL STATES: ICD-10-CM

## 2022-08-05 DIAGNOSIS — Z79.899 OTHER LONG TERM (CURRENT) DRUG THERAPY: ICD-10-CM

## 2022-08-05 DIAGNOSIS — Z89.421 ACQUIRED ABSENCE OF OTHER RIGHT TOE(S): ICD-10-CM

## 2022-08-05 DIAGNOSIS — E78.00 PURE HYPERCHOLESTEROLEMIA, UNSPECIFIED: ICD-10-CM

## 2022-08-05 DIAGNOSIS — Z80.41 FAMILY HISTORY OF MALIGNANT NEOPLASM OF OVARY: ICD-10-CM

## 2022-08-05 DIAGNOSIS — Z89.422 ACQUIRED ABSENCE OF OTHER LEFT TOE(S): ICD-10-CM

## 2022-08-05 DIAGNOSIS — I83.893 VARICOSE VEINS OF BILATERAL LOWER EXTREMITIES WITH OTHER COMPLICATIONS: ICD-10-CM

## 2022-08-05 DIAGNOSIS — E03.9 HYPOTHYROIDISM, UNSPECIFIED: ICD-10-CM

## 2022-08-05 DIAGNOSIS — Z89.411 ACQUIRED ABSENCE OF RIGHT GREAT TOE: ICD-10-CM

## 2022-08-05 DIAGNOSIS — L84 CORNS AND CALLOSITIES: ICD-10-CM

## 2022-08-05 DIAGNOSIS — Z90.49 ACQUIRED ABSENCE OF OTHER SPECIFIED PARTS OF DIGESTIVE TRACT: ICD-10-CM

## 2022-08-05 DIAGNOSIS — I10 ESSENTIAL (PRIMARY) HYPERTENSION: ICD-10-CM

## 2022-08-05 DIAGNOSIS — Z84.81 FAMILY HISTORY OF CARRIER OF GENETIC DISEASE: ICD-10-CM

## 2022-08-05 DIAGNOSIS — Z98.1 ARTHRODESIS STATUS: ICD-10-CM

## 2022-08-05 DIAGNOSIS — T87.89 OTHER COMPLICATIONS OF AMPUTATION STUMP: ICD-10-CM

## 2022-08-05 DIAGNOSIS — Z80.52 FAMILY HISTORY OF MALIGNANT NEOPLASM OF BLADDER: ICD-10-CM

## 2022-08-05 DIAGNOSIS — Z79.82 LONG TERM (CURRENT) USE OF ASPIRIN: ICD-10-CM
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
## 2022-08-05 NOTE — REVIEW OF SYSTEMS
[Joint Stiffness] : joint stiffness [Skin Wound] : skin wound [Negative] : Endocrine [Fever] : no fever [Chills] : no chills [Eye Pain] : no eye pain [Loss Of Hearing] : no hearing loss [Shortness Of Breath] : no shortness of breath [Wheezing] : no wheezing [Abdominal Pain] : no abdominal pain [Anxiety] : no anxiety [Easy Bleeding] : no tendency for easy bleeding [FreeTextEntry5] : HTN [FreeTextEntry9] : swelling of right great toe with previous amps of toes and hx of OM  [de-identified] : right hallux amputation , closed [de-identified] : neuropathy of both feet , insensate

## 2022-08-05 NOTE — PLAN
[FreeTextEntry1] : wear appropriate shoe gear , continue monitoring  PTR 2 months   Spent 20 minutes for patient care and medical decision making. Patient advised regarding scheduling with a vascular surgeon for review of vascular studies with possible vascular intervention if needed.\par

## 2022-08-05 NOTE — PHYSICAL EXAM
[1+] : left 1+ [Ankle Swelling (On Exam)] : present [Ankle Swelling Bilaterally] : bilaterally  [Ankle Swelling On The Right] : mild [Varicose Veins Of Lower Extremities] : bilaterally [Ankle Swelling On The Left] : moderate [Alert] : alert [Oriented to Person] : oriented to person [Oriented to Place] : oriented to place [Calm] : calm [] : not present [Purpura] : no purpura  [Petechiae] : no petechiae [Skin Ulcer] : no ulcer [de-identified] : A&Ox3, NAD [de-identified] : s/p right hallux partial amputation [de-identified] : right hallux amputation , closed, right hallux stump [de-identified] : Neuropathy  [FreeTextEntry1] : right lateral foot - RESOLVED [de-identified] : White sock  [de-identified] : Mechanically cleansed with sterile gauze and normal saline.\par Calluses shaved, distal end of toes (Bilateral Feet) by Dr. Love\par \par Nails trimmed by Dr. Love [TWNoteComboBox4] : None [TWNoteComboBox5] : No [de-identified] : No [de-identified] : Normal [de-identified] : None [de-identified] : None [de-identified] : None [de-identified] : No [de-identified] : Daily [de-identified] : Other

## 2022-08-05 NOTE — ASSESSMENT
[Verbal] : Verbal [Demo] : Demo [Patient] : Patient [Spouse] : Spouse [Good - alert, interested, motivated] : Good - alert, interested, motivated [Needs reinforcement] : needs reinforcement [Foot Care] : foot care [Skin Care] : skin care [Venous Disease] : venous disease [Nutrition] : nutrition [Arterial Disease] : arterial disease [How and When to Call] : how and when to call [Labs and Tests] : labs and tests [Pain Management] : pain management [Patient responsibility to plan of care] : patient responsibility to plan of care [Stable] : stable [Home] : Home [Cane] : Cane [Not Applicable - Long Term Care/Home Health Agency] : Long Term Care/Home Health Agency: Not Applicable [] : Yes [FreeTextEntry2] : Infection prevention\par Localized wound care \par Goal remaining pain free regarding wounds\par  [FreeTextEntry3] : no wounds [FreeTextEntry4] : Vascular studies completed (GRACIE's and Venous Duplex), report pending. Pt provided with contact info for Dr. Osman to have studies reviewed. Pt also provided with Dr. Arechiga contact info if her limited schedule does not coincide with Dr. Silverman office hours. \par \par F/U 1-2 month for assessment

## 2022-08-05 NOTE — HISTORY OF PRESENT ILLNESS
[FreeTextEntry1] : Patient has history of OM and previous amps of the toes both feet , currently no open wounds , no soi. Patient relates that she was able to obtain vascular studies but is having difficulty scheduling an appointment with a vascular surgeon due to her  having chemotherapy.

## 2022-08-18 ENCOUNTER — RESULT REVIEW (OUTPATIENT)
Age: 72
End: 2022-08-18

## 2022-08-30 ENCOUNTER — NON-APPOINTMENT (OUTPATIENT)
Age: 72
End: 2022-08-30

## 2022-08-31 ENCOUNTER — OUTPATIENT (OUTPATIENT)
Dept: OUTPATIENT SERVICES | Facility: HOSPITAL | Age: 72
LOS: 1 days | Discharge: HOME | End: 2022-08-31
Payer: MEDICARE

## 2022-08-31 ENCOUNTER — APPOINTMENT (OUTPATIENT)
Dept: WOUND CARE | Facility: HOSPITAL | Age: 72
End: 2022-08-31

## 2022-08-31 VITALS
TEMPERATURE: 97.8 F | SYSTOLIC BLOOD PRESSURE: 133 MMHG | HEIGHT: 67 IN | DIASTOLIC BLOOD PRESSURE: 75 MMHG | WEIGHT: 230 LBS | RESPIRATION RATE: 18 BRPM | BODY MASS INDEX: 36.1 KG/M2 | OXYGEN SATURATION: 98 % | HEART RATE: 77 BPM

## 2022-08-31 DIAGNOSIS — L84 CORNS AND CALLOSITIES: ICD-10-CM

## 2022-08-31 DIAGNOSIS — Z89.421 ACQUIRED ABSENCE OF OTHER RIGHT TOE(S): Chronic | ICD-10-CM

## 2022-08-31 DIAGNOSIS — Z89.411 ACQUIRED ABSENCE OF RIGHT GREAT TOE: ICD-10-CM

## 2022-08-31 DIAGNOSIS — T87.89 OTHER COMPLICATIONS OF AMPUTATION STUMP: ICD-10-CM

## 2022-08-31 PROCEDURE — G0463: CPT

## 2022-08-31 PROCEDURE — 99213 OFFICE O/P EST LOW 20 MIN: CPT

## 2022-08-31 NOTE — ASSESSMENT
[Verbal] : Verbal [Demo] : Demo [Patient] : Patient [Spouse] : Spouse [Good - alert, interested, motivated] : Good - alert, interested, motivated [Needs reinforcement] : needs reinforcement [Foot Care] : foot care [Skin Care] : skin care [Venous Disease] : venous disease [Nutrition] : nutrition [Arterial Disease] : arterial disease [How and When to Call] : how and when to call [Labs and Tests] : labs and tests [Pain Management] : pain management [Patient responsibility to plan of care] : patient responsibility to plan of care [] : Yes [Stable] : stable [Home] : Home [Cane] : Cane [Not Applicable - Long Term Care/Home Health Agency] : Long Term Care/Home Health Agency: Not Applicable [Glycemic Control] : glycemic control [FreeTextEntry2] : Infection prevention\par Localized wound care \par Goal remaining pain free regarding wounds\par Check feet daily for abnormal changes\par encourage glycemic control [FreeTextEntry3] : no wounds [FreeTextEntry4] : F/U 1-2 months\par Vascular studies completed, viewed by DPM, pt to schedule appointment with Dr. Osman at her [place] or [place] Office, pt has contact info. Scheduling could not be done here due to time constraints related to husbands chemotherapy tx.\par Pt provided with Podiatry Clinic info for routine foot care

## 2022-08-31 NOTE — REVIEW OF SYSTEMS
[Fever] : no fever [Chills] : no chills [Eye Pain] : no eye pain [Loss Of Hearing] : no hearing loss [Shortness Of Breath] : no shortness of breath [Wheezing] : no wheezing [Abdominal Pain] : no abdominal pain [Joint Stiffness] : joint stiffness [Skin Wound] : skin wound [Anxiety] : no anxiety [Easy Bleeding] : no tendency for easy bleeding [Negative] : Endocrine [FreeTextEntry5] : HTN [FreeTextEntry9] : swelling of right great toe with previous amps of toes and hx of OM  [de-identified] : right hallux amputation , closed [de-identified] : neuropathy of both feet , insensate

## 2022-08-31 NOTE — PHYSICAL EXAM
[1+] : left 1+ [Ankle Swelling (On Exam)] : present [Ankle Swelling Bilaterally] : bilaterally  [Ankle Swelling On The Right] : mild [Varicose Veins Of Lower Extremities] : bilaterally [Ankle Swelling On The Left] : moderate [] : not present [Purpura] : no purpura  [Petechiae] : no petechiae [Skin Ulcer] : no ulcer [Alert] : alert [Oriented to Person] : oriented to person [Oriented to Place] : oriented to place [Calm] : calm [de-identified] : A&Ox3, NAD [de-identified] : s/p right hallux partial amputation [de-identified] : right hallux amputation , closed, right hallux stump [de-identified] : Neuropathy  [FreeTextEntry1] : right lateral foot - RESOLVED [de-identified] : White sock  [de-identified] : Mechanically cleansed with sterile gauze and normal saline.\par Calluses shaved, distal end of toes (Bilateral Feet) by Dr. Love\par \par Nails trimmed by Dr. Love [TWNoteComboBox4] : None [TWNoteComboBox5] : No [de-identified] : No [de-identified] : Normal [de-identified] : None [de-identified] : None [de-identified] : None [de-identified] : No [de-identified] : Daily [de-identified] : Other

## 2022-09-01 DIAGNOSIS — I83.893 VARICOSE VEINS OF BILATERAL LOWER EXTREMITIES WITH OTHER COMPLICATIONS: ICD-10-CM
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
## 2022-09-01 DIAGNOSIS — L84 CORNS AND CALLOSITIES: ICD-10-CM

## 2022-09-01 DIAGNOSIS — Z88.0 ALLERGY STATUS TO PENICILLIN: ICD-10-CM

## 2022-09-01 DIAGNOSIS — Z80.41 FAMILY HISTORY OF MALIGNANT NEOPLASM OF OVARY: ICD-10-CM
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
## 2022-09-01 DIAGNOSIS — Z89.422 ACQUIRED ABSENCE OF OTHER LEFT TOE(S): ICD-10-CM

## 2022-09-01 DIAGNOSIS — Z79.899 OTHER LONG TERM (CURRENT) DRUG THERAPY: ICD-10-CM

## 2022-09-01 DIAGNOSIS — Z98.1 ARTHRODESIS STATUS: ICD-10-CM

## 2022-09-01 DIAGNOSIS — E78.00 PURE HYPERCHOLESTEROLEMIA, UNSPECIFIED: ICD-10-CM

## 2022-09-01 DIAGNOSIS — Z90.49 ACQUIRED ABSENCE OF OTHER SPECIFIED PARTS OF DIGESTIVE TRACT: ICD-10-CM

## 2022-09-01 DIAGNOSIS — Z89.411 ACQUIRED ABSENCE OF RIGHT GREAT TOE: ICD-10-CM

## 2022-09-01 DIAGNOSIS — Z80.52 FAMILY HISTORY OF MALIGNANT NEOPLASM OF BLADDER: ICD-10-CM

## 2022-09-01 DIAGNOSIS — T87.89 OTHER COMPLICATIONS OF AMPUTATION STUMP: ICD-10-CM

## 2022-09-01 DIAGNOSIS — Z98.890 OTHER SPECIFIED POSTPROCEDURAL STATES: ICD-10-CM

## 2022-09-01 DIAGNOSIS — Z89.421 ACQUIRED ABSENCE OF OTHER RIGHT TOE(S): ICD-10-CM

## 2022-09-01 DIAGNOSIS — Z79.82 LONG TERM (CURRENT) USE OF ASPIRIN: ICD-10-CM

## 2022-09-01 DIAGNOSIS — I10 ESSENTIAL (PRIMARY) HYPERTENSION: ICD-10-CM

## 2022-09-01 DIAGNOSIS — E03.9 HYPOTHYROIDISM, UNSPECIFIED: ICD-10-CM

## 2022-09-01 DIAGNOSIS — Z84.81 FAMILY HISTORY OF CARRIER OF GENETIC DISEASE: ICD-10-CM

## 2022-12-27 NOTE — DISCHARGE NOTE PROVIDER - INSTRUCTIONS
Physical Therapy Treatment Note    [facility] [place]  [address], [address]  [place] [address]    Patient: Joy C Sprigler   : 1940  Diagnosis/ICD-10 Code:  S/P lumbar laminectomy [Z98.890]  Referring practitioner: Jairo Wolf MD  Date of Initial Visit: Type: THERAPY  Noted: 2022  Today's Date: 2022  Patient seen for 5 sessions         NEXT MD VISIT: 2023    Subjective     Joy Sprigler reports: Her leg pain continues to decrease.  Still there but less frequent and intense.        Objective   See Exercise, Manual, and Modality Logs for complete treatment.     Was able to tolerate all strengthening exercises today.      Progressed / advanced exercises as noted in flow sheets;      Continued manual therapy with long axis traction to provide distraction on back and hip.    Assessment/Plan   Only with very minor L groin pain.   Good tolerance to interventions today in clinic.  Doing well with HEP,  therapeutic exercises and therapeutic activities.  Continued improvement with long axis traction and exercises.  Still with mild limp during gait.    Progress strengthening /stabilization /functional activity as tolerated.             Manual Therapy:     5    mins  13923;  Therapeutic Exercise:    25     mins  94927;     Neuromuscular Eliot:        mins  27529;    Therapeutic Activity:     10     mins  84594;     Gait Training:           mins  04647;     Ultrasound:          mins  58496;    Electrical Stimulation:         mins  32133 ( );  Dry Needling          mins self-pay    Timed Treatment:   40   mins   Total Treatment:     40   mins    Filippo Chavez PT  Physical Therapist                     regular

## 2023-02-02 ENCOUNTER — EMERGENCY (EMERGENCY)
Facility: HOSPITAL | Age: 73
LOS: 1 days | Discharge: HOME | End: 2023-02-02
Attending: EMERGENCY MEDICINE | Admitting: EMERGENCY MEDICINE
Payer: MEDICARE

## 2023-02-02 VITALS
SYSTOLIC BLOOD PRESSURE: 152 MMHG | HEART RATE: 82 BPM | RESPIRATION RATE: 18 BRPM | TEMPERATURE: 98 F | OXYGEN SATURATION: 98 % | DIASTOLIC BLOOD PRESSURE: 67 MMHG

## 2023-02-02 VITALS
HEART RATE: 80 BPM | DIASTOLIC BLOOD PRESSURE: 63 MMHG | OXYGEN SATURATION: 98 % | RESPIRATION RATE: 18 BRPM | SYSTOLIC BLOOD PRESSURE: 151 MMHG | TEMPERATURE: 97 F | WEIGHT: 250 LBS

## 2023-02-02 DIAGNOSIS — Z89.421 ACQUIRED ABSENCE OF OTHER RIGHT TOE(S): Chronic | ICD-10-CM

## 2023-02-02 PROCEDURE — 99284 EMERGENCY DEPT VISIT MOD MDM: CPT | Mod: FS,25

## 2023-02-02 PROCEDURE — 70450 CT HEAD/BRAIN W/O DYE: CPT | Mod: MA

## 2023-02-02 PROCEDURE — 99284 EMERGENCY DEPT VISIT MOD MDM: CPT | Mod: 25

## 2023-02-02 PROCEDURE — 12002 RPR S/N/AX/GEN/TRNK2.6-7.5CM: CPT

## 2023-02-02 PROCEDURE — 72125 CT NECK SPINE W/O DYE: CPT | Mod: MA

## 2023-02-02 PROCEDURE — 72125 CT NECK SPINE W/O DYE: CPT | Mod: 26,MA

## 2023-02-02 PROCEDURE — 70450 CT HEAD/BRAIN W/O DYE: CPT | Mod: 26,MA

## 2023-02-02 RX ORDER — ACETAMINOPHEN 500 MG
650 TABLET ORAL ONCE
Refills: 0 | Status: COMPLETED | OUTPATIENT
Start: 2023-02-02 | End: 2023-02-02

## 2023-02-02 RX ADMIN — Medication 650 MILLIGRAM(S): at 20:26

## 2023-02-02 NOTE — ED PROVIDER NOTE - CARE PLAN
Principal Discharge DX:	CHI (closed head injury), initial encounter  Secondary Diagnosis:	Scalp laceration   1

## 2023-02-02 NOTE — ED PROVIDER NOTE - DIFFERENTIAL DIAGNOSIS
Differential Diagnosis Differential diagnosis includes but is not limited to: Brain bleed, concussion, cervical strain, cervical fracture.

## 2023-02-02 NOTE — ED PROVIDER NOTE - NEUROLOGICAL, MLM
Alert and oriented, no focal deficits, no motor or sensory deficits. CN II-XII intact. neurovascular intact,

## 2023-02-02 NOTE — ED PROVIDER NOTE - PATIENT PORTAL LINK FT
You can access the FollowMyHealth Patient Portal offered by [facility] by registering at the following website: [email or web address]. By joining [employer]’s FollowMyHealth portal, you will also be able to view your health information using other applications (apps) compatible with our system.

## 2023-02-02 NOTE — ED PROVIDER NOTE - NS ED ATTENDING STATEMENT MOD
This was a shared visit with the MAICO. I reviewed and verified the documentation and independently performed the documented:

## 2023-02-02 NOTE — ED PROVIDER NOTE - OBJECTIVE STATEMENT
72-year-old female with history of hypertension, hyperlipidemia, hypothyroidism presents to the ED status post fall at home at 8 AM this morning.  Patient states she lost her balance and fell backwards hitting her head on a dresser.  No LOC.  No blood thinners.  Patient complains of mild headache and neck discomfort.  Denies fever, chills, chest pain, shortness of breath, abdominal pain, nausea, vomiting, upper or lower extremity weakness or paresthesias.Up-to-date with tetanus. Patient takes baby aspirin daily.

## 2023-02-02 NOTE — ED PROVIDER NOTE - CLINICAL SUMMARY MEDICAL DECISION MAKING FREE TEXT BOX
72-year-old female presents to the status post fall at home 10 hours prior to arrival.  Patient states that she lost her balance and hit her scalp on a dresser.  Patient denies LOC or headache.  Patient states tetanus is up-to-date.  CT head rule out intracranial pathology, lack repair.  Patient is on aspirin 81 mg.

## 2023-02-02 NOTE — ED PROVIDER NOTE - MUSCULOSKELETAL, MLM
moving all extremities. no midline cervical tenderness, FROM of neck. radial pulses equal and intact bilaterally.

## 2023-02-02 NOTE — ED PROVIDER NOTE - NSFOLLOWUPINSTRUCTIONS_ED_ALL_ED_FT
Please return in 10 days for staple removal.      Scalp Contusion      A facial or scalp contusion is a bruise (contusion) on the face or head. Bruises happen when an injury causes bleeding under the skin. The bruise may turn blue, purple, or yellow (discoloration). Minor injuries may cause a bruise that is not painful. Some bruises are painful and swollen for a few weeks.    Injuries to the face and head usually cause a lot of swelling, especially around the eyes. You may have other injuries as well, such as broken bones or cuts.      What are the causes?    •An injury to the face or head from an object.      •A fall.      •A hit to the face or head area.      •Car accidents.      •Sports injuries.      •Attacks from another person (assaults).        What are the signs or symptoms?    •Swelling in the area of the injury. The swelling may be in a small areas and very noticeable.      •The injured area being a different color than normal.      •Pain or soreness in the injured area.      If you also have broken bones, your nose might be a different shape, you may be unable to close your mouth and you might have vision changes.      How is this treated?    •Applying cold compresses to the hurt area. This is often the best treatment.      •Taking over-the-counter medicines to help take the pain away, if your doctor tells you to take them.      •If there are any cuts, these will need to be repaired as well.      •Any deeper injuries may require treatment and follow up with a specialist, such as a surgeon or eye specialist.        Follow these instructions at home:      Managing pain, stiffness, and swelling   Bag of ice on a towel on the skin.  •If told, put ice on the injured area. To do this:  •Put ice in a plastic bag.      •Place a towel between your skin and the bag.      •Leave the ice on for 20 minutes, 2–3 times a day.      •Take off the ice if your skin turns bright red. This is very important. If you cannot feel pain, heat, or cold, you have a greater risk of damage to the area.        •Raise the injured area above the level of your heart while you are sitting or lying down.      General instructions     •Take over-the-counter and prescription medicines only as told by your doctor.      •Rest as told by your doctor.      •Return to your normal activities when your doctor says that it is safe.      • Do not blow your nose if you have any broken bones in your face.      •Eat soft foods if you are having jaw pain.      •Keep all follow-up visits.        Contact a doctor if:    •You have trouble biting or chewing.      •Your pain or swelling gets worse.      •The bruised area gets worse.        Get help right away if:    •You have very bad pain or a headache, and medicine does not help.      •You are very tired or confused.      •Your personality changes.      •You vomit.      •You have a nosebleed that does not stop.      •You see two of everything (double vision) or have blurry vision.      •You have clear fluid coming from your nose or ear, and it does not go away.      •You have problems walking or using your arms or legs.      •You feel very dizzy.        Summary    •A facial or scalp contusion is a bruise on the face or head.      •Bruises happen when an injury causes bleeding under the skin.      •Minor injuries will cause a bruise that is not painful, but worse bruises can stay painful and swollen for a few weeks.      •Go to a doctor if you have problems seeing, bleeding from your face or nose, or you have trouble biting or chewing.      •Applying cold compresses to the hurt area is often the best treatment.      This information is not intended to replace advice given to you by your health care provider. Make sure you discuss any questions you have with your health care provider.

## 2023-02-02 NOTE — ED ADULT NURSE REASSESSMENT NOTE - NS ED NURSE REASSESS COMMENT FT1
72 yr old female received from K[facility] Ramirez. A+O x 4.  at the bedside. Pt reports she fell this morning approx 8 am and hit her head on a chest of drawers. Lac noted to right posterior scalp with moderate bleeding noted. All other skin intact. CT results pending. VSS. Pt reports headache 6/10 at rest and 7/10 with activity. Pt denies LOC, dizziness, change in vision/hearing. will continue to monitor. Referred To Plastics For Closure Text (Leave Blank If You Do Not Want): After obtaining clear surgical margins the patient was sent to plastics for surgical repair.  The patient understands they will receive post-surgical care and follow-up from the referring physician's office.

## 2023-02-16 NOTE — PHYSICAL THERAPY INITIAL EVALUATION ADULT - PATIENT/FAMILY/SIGNIFICANT OTHER GOALS STATEMENT, PT EVAL
Care Transitions Program Week 14 Follow-up Call    Current Issues/Problems reviewed on call:   - No current issues.      Details of Interventions/Education completed on call:   - No interventions.  Last phone call today for Medicare Bundles Program.  Encouraged patient to reach out to providers with any future concerns.  Patient verbalizes understanding.    Review of Systems:   Care Transition System Evaluation:     Temp - Patient Reported :  (Denies fevers)  Fever: is not present   Pain:   0  Pain Location: None reported  General Symptoms: WDL (absence of symptoms)  Neurologic/Neuromuscular Symptoms: WDL (absence of symptoms)  ENT Symptoms: WDL (absence of symptoms)  Respiratory Symptoms: Shortness of breath  Shortness of Breath: SOB with mild exertion (if patient is not wearing oxygen)  He takes oxygen off to get mail from the mailbox since it will not reach out that far.  Often times his daughter will get the mail for him, or he drives up to the mailbox if he is out and about.  Cardiovascular Symptoms: Edema (minor amt around ankles at night; gone by morning)  GI Symptoms: WDL (absence of symptoms)   Symptoms: WDL (absence of symptoms)  Skin Symptoms: WDL (absence of symptoms)  Sleeping Behaviors:WDL (absence of symptoms)  Current Lines/Drains:No    The patient is taking all medications as prescribed. The patient did not have questions or concerns regarding the medications prescribed.    Transitional Care Management (TCM) appointment was completed.  TCM appointment plan of care and upcoming appointments were reviewed with patient.  Transportation to upcoming appointments to be provided by patient.    Next Care Transitions follow-up -- 90 days completed on 2/13.  Writer to close program today.     
to go home
to go home

## 2023-05-19 NOTE — ED ADULT NURSE NOTE - NSFALLRSKPASTHIST_ED_ALL_ED
Physical Therapy Treatment Session Note    Patient's Name: Molina Lewis    Admitting Diagnosis  Osteomyelitis of foot ([place] [address] ) [M86 [id number]]    Problem List  Patient Active Problem List   Diagnosis    Diabetic ulcer of midfoot associated with diabetes mellitus due to underlying condition, with bone involvement without evidence of necrosis ([place] [address] )    H/O bariatric surgery    Anxiety    Acquired hypothyroidism    Psychiatric illness    Type 2 diabetes mellitus with diabetic neuropathy, without long-term current use of insulin ([place] [address] )    Osteomyelitis of left foot ([place] [address] )    Diabetic foot ulcer ([place] [address] )    Acquired absence of other left toe(s) ([place] [address] )    Essential hypertension    Arthritis    Other chronic pain    OCD (obsessive compulsive disorder)    PTSD (post-traumatic stress disorder)    Right foot ulcer, with fat layer exposed ([place] [address] )    Gastroesophageal reflux disease without esophagitis    Migraine without status migrainosus, not intractable    Obstructive sleep apnea    Sepsis ([place] [address] )    Morbid obesity with BMI of 40 0-44 9, adult ([place] [address] )    Homeless single person    Hx of right BKA ([place] [address] )    Bipolar 1 disorder, depressed ([place] [address] )    Abnormal x-ray of cervical spine    Acquired equinovarus deformity of left foot    Acute osteomyelitis of metatarsal bone of right foot (HCC)    Allergy to bee sting    Amenorrhea    Asthma, intrinsic    Atypical squamous cell changes of undetermined significance (ASCUS) on cervical cytology with positive high risk human papilloma virus (HPV)    Bipolar affective disorder in remission (HCC)    Chronic paroxysmal hemicrania, not intractable    Diastasis of rectus abdominis    Drusen body, unspecified laterality    Folate deficiency    Hyperlipidemia associated with type 2 diabetes mellitus (HCC)    Impaired intestinal absorption    Instability of left knee joint    Intertriginous dermatitis associated with moisture    Intestinal malabsorption following gastrectomy    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 ([facility] [address] )    Radiculopathy, lumbar region    Right cervical radiculopathy    S/P laparoscopic sleeve gastrectomy    Chronic right-sided low back pain with left-sided sciatica       Past Medical History  Past Medical History:   Diagnosis Date    Anxiety     Arthritis     Asthma     Chronic pain     Diabetes mellitus (HCC)     Hypothyroidism     Lymphedema     left leg    Manic bipolar I disorder (HCC)     Morbid obesity (HCC)     OCD (obsessive compulsive disorder)     PTSD (post-traumatic stress disorder)        Past Surgical History  Past Surgical History:   Procedure Laterality Date    CHOLECYSTECTOMY      CHOLECYSTECTOMY      LEG AMPUTATION THROUGH LOWER TIBIA AND FIBULA Right 3/23/2023    Procedure: AMPUTATION BELOW KNEE (BKA); Surgeon: Ulysses Bellows, MD;  [facility];  Service: General    KS AMPUTATION FOOT TRANSMETARSAL Right 9/4/2021    Procedure: AMPUTATION TRANSMETATARSAL (TMA);  Surgeon: Katherine Avina DPM;  [facility];  Service: Podiatry    KS AMPUTATION METATARSAL W/TOE SINGLE Left 9/8/2019    Procedure: PARTIAL 3RD RAY RESECTION, PSB TOE FILLET FLAP;  Surgeon: Erwin León DPM;  [facility];  Service: Podiatry    KS AMPUTATION METATARSAL W/TOE SINGLE Left 3/23/2023    Procedure: RAY RESECTION FOOT;  Surgeon: Mateus Toro DPM;  [facility];  Service: Podiatry    KS AMPUTATION METATARSAL W/TOE SINGLE Left 5/8/2023    Procedure: Left first RAY RESECTION FOOT;  Surgeon: Cally Reyez DPM;  [facility];  Service: Podiatry    TOE AMPUTATION Right 1/13/2021    Procedure: AMPUTATION 2ND TOE;  Surgeon: Katherine Avina DPM;  [facility];  Service: Podiatry    WOUND DEBRIDEMENT Left 2/17/2022    Procedure: DEBRIDEMENT GREAT TOE WOUND;  Surgeon: Katherine Avina DPM;  [facility];  Service: Podiatry    WOUND DEBRIDEMENT Left 5/16/2023    Procedure: DEBRIDEMENT WOUND [facility]);   Surgeon: Barbara Salmon DPM;  [facility]: [facility];  Service: Podiatry        05/19/23 0733   PT Last Visit   PT Visit Date 05/19/23   Note Type   Note Type Treatment   Pain Assessment   Pain Assessment Tool 0-10   Pain Score 9   Pain Location/Orientation Orientation: Left; Location: Foot   Pain Onset/Description Onset: Ongoing;Frequency: Constant/Continuous; Descriptor: [place]; Descriptor: Sore   Effect of Pain on Daily Activities yes   Patient's Stated Pain Goal No pain   Hospital Pain Intervention(s) Repositioned; Emotional support; Environmental changes   Multiple Pain Sites No   Restrictions/Precautions   Weight Bearing Precautions Per Order Yes   LLE Weight Bearing Per Order (S)  NWB   Braces or Orthoses Other (Comment)  (RLE prosthetic available, however decreased skin integrity impedes wear)   Other Precautions Chair Alarm; Bed Alarm; Fall Risk;Pain  (decreased skin integrity L foot, DI drain L foot)   General   Chart Reviewed Yes   Response to Previous Treatment Patient with no complaints from previous session  Family/Caregiver Present No   Cognition   Overall Cognitive Status WFL   Arousal/Participation Alert; Cooperative   Attention Attends with cues to redirect   Orientation Level Oriented X4   Memory Within functional limits   Following Commands Follows one step commands with increased time or repetition   Comments Jonah Mylene was agreeable to PT treatment session  Subjective   Subjective 'I can't wait until I can put weight on my foot'   Bed Mobility   Supine to Sit 5  Supervision   Additional items HOB elevated; Bedrails;Assist x 1;Verbal cues  (cues for bedrail utilization)   Sit to Supine   (DNT as Jonahperez Chakraborty was sitting on the bedside upon conclusion )   Additional Comments Jonah Chakraborty denied lightheadedness/dizziness with positional changes  Transfers   Sit to Stand Unable to assess  (unable to assess due to NWB status LLE and inability to don prosthesis RLE   However Jonahperez Javierrob was agreeable to sitting BLE ther ex )   Ambulation/Elevation   Gait pattern Not tested   Balance   Static Sitting Normal   Dynamic Sitting Good   Static Standing   (unable to safely assess at this time)   Endurance Deficit   Endurance Deficit Yes   Activity Tolerance   Activity Tolerance Patient limited by pain; Other (Comment)  (weight bearing restriction and decreased skin integrity in multiple sites)   Nurse Made Aware yes, Lashae Madera RN   Exercises   Glute Sets Sitting;20 reps;AROM; Bilateral   Hip Flexion Sitting;20 reps;AROM; Bilateral   Hip Abduction Sitting;20 reps;AROM; Bilateral   Hip Adduction Sitting;20 reps;AROM; Bilateral   Knee AROM Long Arc Quad Sitting;20 reps;AROM; Bilateral   Ankle Pumps Sitting;20 reps;AROM; Left   Assessment   Prognosis Fair   Problem List Decreased strength;Decreased endurance; Impaired balance;Decreased mobility;Obesity; Decreased skin integrity;Pain   Assessment Pt seen for PT treatment session this date with interventions consisting of Therapeutic exercise to improve endurance consisting of: AROM 20 reps B LE in sitting position and therapeutic activity to reduce fall risk consisting of training: supine<>sit transfers and vc and tactile cues for static sitting posture faciliation  Pt agreeable to PT treatment session upon arrival, pt found supine in bed w/ HOB elevated, A&O x 4  In comparison to previous session, pt with improvements in BLE ther ex tolerance  Post session: all needs in reach and RN notified of session findings/recommendations  Continue to recommend post acute rehabilitation services at time of d/c in order to maximize pt's functional independence and safety w/ mobility  Pt continues to be functioning below baseline level, and remains limited 2* factors listed above and including weakness,pain, weight bearing restrictions, decreased skin integrity  PT will continue to see pt during current hospitalization in order to address the deficits listed above and provide interventions consistent w/ POC in effort to achieve STGs     Barriers to Discharge Decreased caregiver support   Goals   Patient Goals to get better and to be able to put weight on her left foot   STG Expiration Date 05/27/23   Short Term Goal #1 STGs remain appropriate   PT Treatment Day 3   Plan   Treatment/Interventions Functional transfer training;LE strengthening/ROM; Therapeutic exercise; Endurance training;Bed mobility;Spoke to nursing   Progress Progressing toward goals   PT Frequency 3-5x/wk   Recommendation   PT Discharge Recommendation Post acute rehabilitation services  (maintained recommendation)   Additional Comments Upon conclusion, Sheila Rangel was resting on the bedside with all needs within reach     AM-PAC Basic Mobility Inpatient   Turning in Flat Bed Without Bedrails 4   Lying on Back to Sitting on Edge of Flat Bed Without Bedrails 3   Moving Bed to Chair 1   Standing Up From Chair Using Arms 1   Walk in Room 1   Climb 3-5 Stairs With Railing 1   Basic Mobility Inpatient Raw Score 11   Basic Mobility Standardized Score 30 25   Highest Level Of Mobility   JH-HLM Goal 4: Move to chair/commode   JH-HLM Achieved 3: Sit at edge of bed     Treatment Time: 2675-3669    Sharon Cleaning, PT no

## 2023-09-08 NOTE — PROGRESS NOTE ADULT - ASSESSMENT
Patient is a 71 year old female with PMH of neuropathy, spinal stenosis, HTN, HLD, hypothyroidism who was sent by Dr. Gray due to right 3rd toe infection.    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, neuropathy   - R foot x-ray with digit soft tissue swelling, mild foot charcot joint, no evidence of OM   - LE DVT study negative    - 6/21 for partial 3rd ray amputation -    - BCx as below   - wound culture from R toe with MSSA  Bone margin is negative for acute osteomyelitis.  -with clear margin but some drainage and erythema will change to Keflex 500mg PO QID with last day 6/30  -follow up with podiatry    Penicillin Allergy  Reviewed w/ pt--childhood allergy w/ rash only  No reported hx of anaphylaxis. tolerates cephalosporins     Thank you for consulting us and involving us in the management of this most interesting and challenging case.  Please call us at [phone] or text me directly on my cell#[phone] with any concerns or further questions.         Admitting MD

## 2023-11-15 ENCOUNTER — APPOINTMENT (OUTPATIENT)
Dept: OBGYN | Facility: CLINIC | Age: 73
End: 2023-11-15
Payer: MEDICARE

## 2023-11-15 VITALS
DIASTOLIC BLOOD PRESSURE: 77 MMHG | WEIGHT: 236 LBS | BODY MASS INDEX: 35.77 KG/M2 | SYSTOLIC BLOOD PRESSURE: 122 MMHG | HEART RATE: 78 BPM | HEIGHT: 68 IN

## 2023-11-15 PROCEDURE — 99214 OFFICE O/P EST MOD 30 MIN: CPT

## 2023-11-16 LAB
APPEARANCE: CLEAR
BACTERIA UR CULT: NORMAL
BACTERIA: NEGATIVE /HPF
BILIRUBIN URINE: NEGATIVE
BLOOD URINE: NEGATIVE
CAST: 0 /LPF
COLOR: YELLOW
EPITHELIAL CELLS: 1 /HPF
GLUCOSE QUALITATIVE U: NEGATIVE MG/DL
KETONES URINE: NEGATIVE MG/DL
LEUKOCYTE ESTERASE URINE: NEGATIVE
MICROSCOPIC-UA: NORMAL
NITRITE URINE: NEGATIVE
PH URINE: 5.5
PROTEIN URINE: NEGATIVE MG/DL
RED BLOOD CELLS URINE: 1 /HPF
SPECIFIC GRAVITY URINE: 1.01
UROBILINOGEN URINE: 0.2 MG/DL
WHITE BLOOD CELLS URINE: 0 /HPF

## 2023-11-30 ENCOUNTER — TRANSCRIPTION ENCOUNTER (OUTPATIENT)
Age: 73
End: 2023-11-30

## 2023-11-30 ENCOUNTER — NON-APPOINTMENT (OUTPATIENT)
Age: 73
End: 2023-11-30

## 2023-12-01 NOTE — PROGRESS NOTE ADULT - ASSESSMENT
71 year old female with HTN, HLD, neuropathy, and spinal stenosis sp partial ray resection of second toe January 2021 now presenting Right toe ulcer 3rd digit    Right 3rd toe ulcer  - s/p Right 3rd toe amputation, tolerated procedure well  - Vascular following.  Vascular studies normal  - Pain  control     HTN  - BP stable at systolyc 120-130  - Continue home BB and Lasix  - Echo 2/2021 revealing normal LV systolic function ef 55%, trace MR, trace TR.  No need to repeat    HLD  - Continue statin    DVT ppx  - Per Primary    Monitor and replete electrolytes. Keep K>4.0 and Mg>2.0.  Will continue to follow    Olga Lidia Garcia DNP, NP-C  Cardiology   Spectra #[phone]        CARDIAC REHABILITATION  Cardiac rehabilitation is an essential part of cardiovascular services offered through [facility].  Through the rehabilitation program, the cardiac patient and family receive physical, emotional and social support in returning to a normal lifestyle.  Cardiac rehabilitation consists of individually prescribed exercises and education designed to return the patient to optimal health.  This exercise program is established through your physician and physical assessment and counseling is done with the cardiac rehabilitation team.  Emphasis is placed on what the patient can do to prevent and reduce the recurrence of heart disease.  Referral to any phase of cardiac rehabilitation is through the patient’s physician.    PHASE 1   This phase begins when the patient is in hospital recovering from a cardiac event.  Emphasis is placed on education for the patient and family as well as supervised exercise.  The patient is discharged with special home care and recovery instructions to be followed during recuperation.    EVALUATIONS   Prior to joining phase II, each patient must be referred by a physician and may undergo a graded exercise stress test.  Both the patient and cardiac rehabilitation specialist meet one-on-one to develop a comprehensive rehabilitation plan, in combination with a physical assessment, medical history and selected laboratory tests.      PHASE 2   A continuation of phase I, this phase is conducted on an outpatient basis.  The primary focus of this phase is supervised exercise and risk reduction counseling.  Prompt return to work and recreational activities are emphasized to ensure a better quality of life.  Counseling, patient education and modifications of the patient’s lifestyle are a vital part of the rehabilitation program.  Education classes are conducted to further educate the patient and family on preventive lifestyle measures.            For more information or  to schedule an appointment,   call [phone]. [address], [address], [place] [address]       HEART FAILURE  You have heart failure.  This means that your heart muscle is weakened and cannot pump blood the way it should.    MEDICATIONS:  See Medication List (keep list up to date and bring it to your doctor appointments).  Talk to your doctor if you have problems getting or taking medications.    VACCINES:  Most Recent Immunizations   Administered Date(s) Administered    COVID Moderna 0.5 mL 12Y+ 01/08/2022       ACTIVITY:  Continue activity as you were doing in the hospital.  You can slowly increase to what you were doing before you were hospitalized.  Balance activity with rest.  Elevate legs when resting.  Daily Weight:  Weigh yourself first thing every morning (at the same time with same amount of clothes on).  Keep a record of your weights, and bring it to your doctor appointments.    SMOKING:  Avoid all tobacco products and secondhand smoke.  Smoking Cessation Counseling offered.  [place] Toll Free Quit Line: [phone]  [place] Toll Free Quite Line: [phone]-YES ([phone]).     LOW SALT (SODIUM) DIET:  Limit salt to help prevent fluid build-up in your body.  This will help prevent shortness of breath and swelling of feet and ankles.  Avoid adding salt to food at the table and use products labeled \"low sodium\" or \"no salt\" (<300 mg per serving).  Avoid susi salt foods like canned soup, sauces, bouillon, lunch meat, cheese, fast food, salty snacks, canned and packaged foods.    HEART FAILURE ACTION PLAN:  Use this plan to help you decide when to change your routine or call the doctor.    CALL 515 IF YOU:  Have pain or tightness in my chest.  Have trouble breathing.  Have dizzy spells or feel faint.  Feel anxious or like something bad will happen.    CONTACT YOUR DOCTOR (even on weekends and holidays) IF YOU:  Have to sleep sitting up.  Start coughing at night.  Notice swelling in your  ankles or any part of your body.  Lose your appetite.  Gain more than 3 pounds in 1-2 days or 5 pounds in a week.  Lose more than 5 pounds in 2 days.  Become tired faster or feel yourself losing energy.  Have noisy breathing.      American Heart Association (AHA) - My HF Guide/Heart Failure Interactive Workbook  To help you manage your Heart Failure symptoms and reach your treatment goals, the American Heart Association offers a FREE Interactive Heart Failure Workbook online “Healthier Living with Heart Failure.   You may visit: [email or web address].[email or web address].com/ or scan the QR code:

## 2023-12-22 ENCOUNTER — OUTPATIENT (OUTPATIENT)
Dept: OUTPATIENT SERVICES | Facility: HOSPITAL | Age: 73
LOS: 1 days | End: 2023-12-22
Payer: MEDICARE

## 2023-12-22 VITALS
RESPIRATION RATE: 16 BRPM | SYSTOLIC BLOOD PRESSURE: 111 MMHG | WEIGHT: 229.94 LBS | HEART RATE: 85 BPM | DIASTOLIC BLOOD PRESSURE: 74 MMHG | OXYGEN SATURATION: 96 % | HEIGHT: 65.5 IN | TEMPERATURE: 98 F

## 2023-12-22 DIAGNOSIS — Z89.421 ACQUIRED ABSENCE OF OTHER RIGHT TOE(S): Chronic | ICD-10-CM

## 2023-12-22 DIAGNOSIS — Z91.89 OTHER SPECIFIED PERSONAL RISK FACTORS, NOT ELSEWHERE CLASSIFIED: ICD-10-CM

## 2023-12-22 DIAGNOSIS — N93.9 ABNORMAL UTERINE AND VAGINAL BLEEDING, UNSPECIFIED: ICD-10-CM

## 2023-12-22 DIAGNOSIS — M54.9 DORSALGIA, UNSPECIFIED: ICD-10-CM

## 2023-12-22 DIAGNOSIS — N95.0 POSTMENOPAUSAL BLEEDING: ICD-10-CM

## 2023-12-22 DIAGNOSIS — Z98.890 OTHER SPECIFIED POSTPROCEDURAL STATES: Chronic | ICD-10-CM

## 2023-12-22 DIAGNOSIS — Z98.49 CATARACT EXTRACTION STATUS, UNSPECIFIED EYE: Chronic | ICD-10-CM

## 2023-12-22 DIAGNOSIS — K21.9 GASTRO-ESOPHAGEAL REFLUX DISEASE WITHOUT ESOPHAGITIS: Chronic | ICD-10-CM

## 2023-12-22 DIAGNOSIS — I10 ESSENTIAL (PRIMARY) HYPERTENSION: ICD-10-CM

## 2023-12-22 DIAGNOSIS — Z96.89 PRESENCE OF OTHER SPECIFIED FUNCTIONAL IMPLANTS: Chronic | ICD-10-CM

## 2023-12-22 DIAGNOSIS — E03.9 HYPOTHYROIDISM, UNSPECIFIED: ICD-10-CM

## 2023-12-22 LAB
ANION GAP SERPL CALC-SCNC: 12 MMOL/L — SIGNIFICANT CHANGE UP (ref 7–14)
ANION GAP SERPL CALC-SCNC: 12 MMOL/L — SIGNIFICANT CHANGE UP (ref 7–14)
BUN SERPL-MCNC: 24 MG/DL — HIGH (ref 7–23)
BUN SERPL-MCNC: 24 MG/DL — HIGH (ref 7–23)
CALCIUM SERPL-MCNC: 9.9 MG/DL — SIGNIFICANT CHANGE UP (ref 8.4–10.5)
CALCIUM SERPL-MCNC: 9.9 MG/DL — SIGNIFICANT CHANGE UP (ref 8.4–10.5)
CHLORIDE SERPL-SCNC: 100 MMOL/L — SIGNIFICANT CHANGE UP (ref 98–107)
CHLORIDE SERPL-SCNC: 100 MMOL/L — SIGNIFICANT CHANGE UP (ref 98–107)
CO2 SERPL-SCNC: 26 MMOL/L — SIGNIFICANT CHANGE UP (ref 22–31)
CO2 SERPL-SCNC: 26 MMOL/L — SIGNIFICANT CHANGE UP (ref 22–31)
CREAT SERPL-MCNC: 0.77 MG/DL — SIGNIFICANT CHANGE UP (ref 0.5–1.3)
CREAT SERPL-MCNC: 0.77 MG/DL — SIGNIFICANT CHANGE UP (ref 0.5–1.3)
EGFR: 80 ML/MIN/1.73M2 — SIGNIFICANT CHANGE UP
EGFR: 80 ML/MIN/1.73M2 — SIGNIFICANT CHANGE UP
GLUCOSE SERPL-MCNC: 124 MG/DL — HIGH (ref 70–99)
GLUCOSE SERPL-MCNC: 124 MG/DL — HIGH (ref 70–99)
HCT VFR BLD CALC: 41.1 % — SIGNIFICANT CHANGE UP (ref 34.5–45)
HCT VFR BLD CALC: 41.1 % — SIGNIFICANT CHANGE UP (ref 34.5–45)
HGB BLD-MCNC: 12.8 G/DL — SIGNIFICANT CHANGE UP (ref 11.5–15.5)
HGB BLD-MCNC: 12.8 G/DL — SIGNIFICANT CHANGE UP (ref 11.5–15.5)
MCHC RBC-ENTMCNC: 26.9 PG — LOW (ref 27–34)
MCHC RBC-ENTMCNC: 26.9 PG — LOW (ref 27–34)
MCHC RBC-ENTMCNC: 31.1 GM/DL — LOW (ref 32–36)
MCHC RBC-ENTMCNC: 31.1 GM/DL — LOW (ref 32–36)
MCV RBC AUTO: 86.5 FL — SIGNIFICANT CHANGE UP (ref 80–100)
MCV RBC AUTO: 86.5 FL — SIGNIFICANT CHANGE UP (ref 80–100)
NRBC # BLD: 0 /100 WBCS — SIGNIFICANT CHANGE UP (ref 0–0)
NRBC # BLD: 0 /100 WBCS — SIGNIFICANT CHANGE UP (ref 0–0)
NRBC # FLD: 0 K/UL — SIGNIFICANT CHANGE UP (ref 0–0)
NRBC # FLD: 0 K/UL — SIGNIFICANT CHANGE UP (ref 0–0)
PLATELET # BLD AUTO: 310 K/UL — SIGNIFICANT CHANGE UP (ref 150–400)
PLATELET # BLD AUTO: 310 K/UL — SIGNIFICANT CHANGE UP (ref 150–400)
POTASSIUM SERPL-MCNC: 4.3 MMOL/L — SIGNIFICANT CHANGE UP (ref 3.5–5.3)
POTASSIUM SERPL-MCNC: 4.3 MMOL/L — SIGNIFICANT CHANGE UP (ref 3.5–5.3)
POTASSIUM SERPL-SCNC: 4.3 MMOL/L — SIGNIFICANT CHANGE UP (ref 3.5–5.3)
POTASSIUM SERPL-SCNC: 4.3 MMOL/L — SIGNIFICANT CHANGE UP (ref 3.5–5.3)
RBC # BLD: 4.75 M/UL — SIGNIFICANT CHANGE UP (ref 3.8–5.2)
RBC # BLD: 4.75 M/UL — SIGNIFICANT CHANGE UP (ref 3.8–5.2)
RBC # FLD: 14.9 % — HIGH (ref 10.3–14.5)
RBC # FLD: 14.9 % — HIGH (ref 10.3–14.5)
SODIUM SERPL-SCNC: 138 MMOL/L — SIGNIFICANT CHANGE UP (ref 135–145)
SODIUM SERPL-SCNC: 138 MMOL/L — SIGNIFICANT CHANGE UP (ref 135–145)
WBC # BLD: 5.52 K/UL — SIGNIFICANT CHANGE UP (ref 3.8–10.5)
WBC # BLD: 5.52 K/UL — SIGNIFICANT CHANGE UP (ref 3.8–10.5)
WBC # FLD AUTO: 5.52 K/UL — SIGNIFICANT CHANGE UP (ref 3.8–10.5)
WBC # FLD AUTO: 5.52 K/UL — SIGNIFICANT CHANGE UP (ref 3.8–10.5)

## 2023-12-22 PROCEDURE — 93010 ELECTROCARDIOGRAM REPORT: CPT

## 2023-12-22 RX ORDER — FUROSEMIDE 40 MG
1 TABLET ORAL
Qty: 0 | Refills: 0 | DISCHARGE

## 2023-12-22 RX ORDER — LUBIPROSTONE 24 UG/1
0 CAPSULE, GELATIN COATED ORAL
Refills: 0 | DISCHARGE

## 2023-12-22 RX ORDER — BACLOFEN 100 %
1 POWDER (GRAM) MISCELLANEOUS
Qty: 0 | Refills: 0 | DISCHARGE

## 2023-12-22 RX ORDER — ACETAMINOPHEN 500 MG
1 TABLET ORAL
Qty: 0 | Refills: 0 | DISCHARGE

## 2023-12-22 RX ORDER — NALOXEGOL OXALATE 12.5 MG/1
1 TABLET, FILM COATED ORAL
Qty: 0 | Refills: 0 | DISCHARGE

## 2023-12-22 RX ORDER — NALOXEGOL OXALATE 12.5 MG/1
1 TABLET, FILM COATED ORAL
Refills: 0 | DISCHARGE

## 2023-12-22 RX ORDER — LIDOCAINE 4 G/100G
3 CREAM TOPICAL
Qty: 0 | Refills: 0 | DISCHARGE

## 2023-12-22 RX ORDER — LANOLIN ALCOHOL/MO/W.PET/CERES
1 CREAM (GRAM) TOPICAL
Qty: 0 | Refills: 0 | DISCHARGE

## 2023-12-22 RX ORDER — ROPINIROLE 8 MG/1
2 TABLET, FILM COATED, EXTENDED RELEASE ORAL
Qty: 0 | Refills: 0 | DISCHARGE

## 2023-12-22 RX ORDER — OLMESARTAN MEDOXOMIL 5 MG/1
1 TABLET, FILM COATED ORAL
Qty: 0 | Refills: 0 | DISCHARGE

## 2023-12-22 RX ORDER — TAPENTADOL HYDROCHLORIDE 50 MG/1
1 TABLET, FILM COATED ORAL
Qty: 0 | Refills: 0 | DISCHARGE

## 2023-12-22 RX ORDER — OXYCODONE HYDROCHLORIDE 5 MG/1
1 TABLET ORAL
Qty: 0 | Refills: 0 | DISCHARGE

## 2023-12-22 RX ORDER — AMITRIPTYLINE HCL 25 MG
1 TABLET ORAL
Qty: 0 | Refills: 0 | DISCHARGE

## 2023-12-22 RX ORDER — SUCRALFATE 1 G
10 TABLET ORAL
Qty: 0 | Refills: 0 | DISCHARGE

## 2023-12-22 RX ORDER — PANTOPRAZOLE SODIUM 20 MG/1
1 TABLET, DELAYED RELEASE ORAL
Qty: 0 | Refills: 0 | DISCHARGE

## 2023-12-22 RX ORDER — DOCUSATE SODIUM 100 MG
2 CAPSULE ORAL
Qty: 0 | Refills: 0 | DISCHARGE

## 2023-12-22 RX ORDER — LEVOTHYROXINE SODIUM 125 MCG
1 TABLET ORAL
Qty: 0 | Refills: 0 | DISCHARGE

## 2023-12-22 RX ORDER — GABAPENTIN 400 MG/1
4 CAPSULE ORAL
Qty: 0 | Refills: 0 | DISCHARGE

## 2023-12-22 RX ORDER — DOXEPIN HCL 100 MG
1 CAPSULE ORAL
Qty: 0 | Refills: 0 | DISCHARGE

## 2023-12-22 RX ORDER — SIMVASTATIN 20 MG/1
1 TABLET, FILM COATED ORAL
Qty: 0 | Refills: 0 | DISCHARGE

## 2023-12-22 RX ORDER — DULOXETINE HYDROCHLORIDE 30 MG/1
1 CAPSULE, DELAYED RELEASE ORAL
Qty: 0 | Refills: 0 | DISCHARGE

## 2023-12-22 NOTE — H&P PST ADULT - PROBLEM SELECTOR PLAN 1
Patient scheduled for surgery on: 12/29/23  Provided with verbal and written presurgical instructions  Verbalized understanding  with teach back on the following: personal omeprazole for GI prophylaxis     Lab specimen drawn at [facility] today: cbc, bmp Patient scheduled for surgery on: 12/29/23  Provided with verbal and written presurgical instructions  Verbalized understanding  with teach back on the following: personal omeprazole for GI prophylaxis     Lab specimen drawn at [facility] today: cbc, bmp

## 2023-12-22 NOTE — H&P PST ADULT - NSICDXPASTSURGICALHX_GEN_ALL_CORE_FT
PAST SURGICAL HISTORY:  GERD (gastroesophageal reflux disease)     H/O arthroscopy of knee     H/O colonoscopy     H/O hernia repair     H/O shoulder surgery     S/P cataract surgery     Spinal cord stimulator status     Toe amputation status, right tip of right 3rd toe
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
## 2023-12-22 NOTE — H&P PST ADULT - RESPIRATORY
clear to auscultation bilaterally/no respiratory distress clear to auscultation bilaterally/no wheezes/no respiratory distress

## 2023-12-22 NOTE — H&P PST ADULT - ATTENDING COMMENTS
74 y/o with PMB for D&C, hysteroscopy  R/b/a discussed with patient, patient voices understanding. Consent signed and witnessed    Pb Cooper MD 72 y/o with PMB for D&C, hysteroscopy  R/b/a discussed with patient, patient voices understanding. Consent signed and witnessed    Pb Cooper MD

## 2023-12-22 NOTE — H&P PST ADULT - PROBLEM SELECTOR PLAN 3
Patient instructed to take coreg, olmesartan, pot klor on day of procedure with small sips of water, hold lasix on DOS verbalized understanding. Patient instructed to take coreg, olmesartan, on day of procedure with small sips of water, hold lasix on DOS verbalized understanding.

## 2023-12-22 NOTE — H&P PST ADULT - CARDIOVASCULAR
regular rate and rhythm/S1 S2 present/no murmur details… regular rate and rhythm/S1 S2 present/no murmur/no pedal edema

## 2023-12-22 NOTE — H&P PST ADULT - MUSCULOSKELETAL COMMENTS
Hx spinal stenosis, chronic back pain and lower extremity weakness, frequent falls -in past 6 months fell 2-3x due to neuropathy Hx spinal stenosis, chronic back pain and lower extremity weakness, frequent falls - fell 2-3x due to neuropathy in past 6 months

## 2023-12-22 NOTE — H&P PST ADULT - HISTORY OF PRESENT ILLNESS
73 yr old female presents with history of HTN, GERD, chronic pain- herniated discs, neuropathy, stress incontinence and post-menopausal staining for past 2.5 years, reports biopsy in Fall 2023 she started bleeding, previous D&C obtained insufficient tissue samples, scheduled for dilation and curettage hysteroscopy  73 yr old female presents with history of HTN, GERD, chronic pain- herniated discs, neuropathy, stress incontinence and post-menopausal staining for past 2.5 years, s/p D&C Fall 2023- insufficient tissue samples, scheduled for dilation and curettage hysteroscopy

## 2023-12-22 NOTE — H&P PST ADULT - PROBLEM SELECTOR PLAN 2
Patient instructed to take pain medications as indicated on day of procedure with small sips of water, verbalized understanding. Patient instructed to take pain medications as indicated on day of procedure with small sips of water, verbalized understanding.    Instructed to bring spinal cord stimulator control

## 2023-12-28 ENCOUNTER — TRANSCRIPTION ENCOUNTER (OUTPATIENT)
Age: 73
End: 2023-12-28

## 2023-12-28 NOTE — ASU PATIENT PROFILE, ADULT - FALL HARM RISK - HARM RISK INTERVENTIONS
Assistance with ambulation/Assistance OOB with selected safe patient handling equipment/Communicate Risk of Fall with Harm to all staff/Discuss with provider need for PT consult/Monitor gait and stability/Provide patient with walking aids - walker, cane, crutches/Reinforce activity limits and safety measures with patient and family/Tailored Fall Risk Interventions/Visual Cue: Yellow wristband and red socks/Bed in lowest position, wheels locked, appropriate side rails in place/Call bell, personal items and telephone in reach/Instruct patient to call for assistance before getting out of bed or chair/Non-slip footwear when patient is out of bed/[place] to call system/Physically safe environment - no spills, clutter or unnecessary equipment/Purposeful Proactive Rounding/Room/bathroom lighting operational, light cord in reach Assistance with ambulation/Assistance OOB with selected safe patient handling equipment/Communicate Risk of Fall with Harm to all staff/Discuss with provider need for PT consult/Monitor gait and stability/Provide patient with walking aids - walker, cane, crutches/Reinforce activity limits and safety measures with patient and family/Tailored Fall Risk Interventions/Visual Cue: Yellow wristband and red socks/Bed in lowest position, wheels locked, appropriate side rails in place/Call bell, personal items and telephone in reach/Instruct patient to call for assistance before getting out of bed or chair/Non-slip footwear when patient is out of bed/[place] to call system/Physically safe environment - no spills, clutter or unnecessary equipment/Purposeful Proactive Rounding/Room/bathroom lighting operational, light cord in reach

## 2023-12-28 NOTE — ASU PATIENT PROFILE, ADULT - MEDICATION HERBAL REMEDIES, PROFILE
VIDEO VISIT PROGRESS NOTE    This visit is being performed virtually via Non-integrated Video Visit. Consent to treat includes permission to submit charges to the patient's insurance. It was shared that without being seen and evaluated in person, there is a risk that the information and/or assessment may be incomplete or inaccurate. This video visit may be discontinued by patient or clinician, if it is felt that the videoconferencing connections are not adequate for her situation.     Clinical Location: [place] [address] [address]  [facility] location Home and is physically present in   the [facility] at the time of this visit.       CHIEF COMPLAINT  Video Visit and Hospital F/U (Was seen at [facility] from 3/15/2023 through 3/17/2023 for bowel obstruction, returned to work on 3/20/2023. )      SUBJECTIVE  The patient is a 61 year old female who is being evaluated via a Video Visit for [facility] follow up was seen on 3/15/2023 and discharged on 3/17/2023 for small bowel obstruction and umbilical hernia. Reports has been eating and tolerating general diet. Denies any fever, nausea, vomiting or diarrhea.   Hx of HTN. Taking Losartan 50 mg daily. Requesting refill. Denies any chest pain, sob, palpitations or dyspnea.     REVIEW OF SYSTEMS  All systems reviewed and are negative with the exception of the findings noted in the history of present illness.     PHYSICAL EXAM  Vitals - Patient Reported  Weight - Patient Reported: (!) 325 lb (147.4 kg)  Height - Patient Reported: 5' 2\" (157.5 cm)  BMI kg/m2: 59.44     She is alert, nontoxic with fluent speech      ASSESSMENT/PLAN  Ileus of unspecified type (CMD)  - SERVICE TO GASTROENTEROLOGY    Diverticulosis of colon    Umbilical hernia without obstruction and without gangrene  - SERVICE TO GASTROENTEROLOGY    Benign essential hypertension     Referral for GI provided.  Encouraged to make healthy choices. Limit sugary drinks.  Avoid acidic or spicy foods.  Refill for Losartan provided. Take as directed.  Keep in office appointment with PCP.  Pt verb understanding of plan of care and agrees.     This includes pre-charting, chart review and documenting.    FOLLOW UP  Return in about 4 weeks (around 4/27/2023) for follow up on treatment.     no

## 2023-12-29 ENCOUNTER — OUTPATIENT (OUTPATIENT)
Dept: OUTPATIENT SERVICES | Facility: HOSPITAL | Age: 73
LOS: 1 days | Discharge: HOME | End: 2023-12-29
Payer: MEDICARE

## 2023-12-29 ENCOUNTER — TRANSCRIPTION ENCOUNTER (OUTPATIENT)
Age: 73
End: 2023-12-29

## 2023-12-29 ENCOUNTER — RESULT REVIEW (OUTPATIENT)
Age: 73
End: 2023-12-29

## 2023-12-29 ENCOUNTER — APPOINTMENT (OUTPATIENT)
Dept: OBGYN | Facility: HOSPITAL | Age: 73
End: 2023-12-29

## 2023-12-29 VITALS
RESPIRATION RATE: 16 BRPM | HEIGHT: 65.5 IN | OXYGEN SATURATION: 97 % | HEART RATE: 68 BPM | SYSTOLIC BLOOD PRESSURE: 122 MMHG | TEMPERATURE: 98 F | WEIGHT: 229.94 LBS | DIASTOLIC BLOOD PRESSURE: 67 MMHG

## 2023-12-29 VITALS
OXYGEN SATURATION: 97 % | SYSTOLIC BLOOD PRESSURE: 118 MMHG | HEART RATE: 64 BPM | DIASTOLIC BLOOD PRESSURE: 64 MMHG | RESPIRATION RATE: 19 BRPM

## 2023-12-29 DIAGNOSIS — Z98.890 OTHER SPECIFIED POSTPROCEDURAL STATES: Chronic | ICD-10-CM

## 2023-12-29 DIAGNOSIS — Z89.421 ACQUIRED ABSENCE OF OTHER RIGHT TOE(S): Chronic | ICD-10-CM

## 2023-12-29 DIAGNOSIS — Z96.89 PRESENCE OF OTHER SPECIFIED FUNCTIONAL IMPLANTS: Chronic | ICD-10-CM

## 2023-12-29 DIAGNOSIS — N93.9 ABNORMAL UTERINE AND VAGINAL BLEEDING, UNSPECIFIED: ICD-10-CM

## 2023-12-29 DIAGNOSIS — K21.9 GASTRO-ESOPHAGEAL REFLUX DISEASE WITHOUT ESOPHAGITIS: Chronic | ICD-10-CM

## 2023-12-29 DIAGNOSIS — Z98.49 CATARACT EXTRACTION STATUS, UNSPECIFIED EYE: Chronic | ICD-10-CM

## 2023-12-29 PROCEDURE — 88305 TISSUE EXAM BY PATHOLOGIST: CPT | Mod: 26

## 2023-12-29 PROCEDURE — 58558 HYSTEROSCOPY BIOPSY: CPT | Mod: GC

## 2023-12-29 RX ORDER — NALOXEGOL OXALATE 12.5 MG/1
1 TABLET, FILM COATED ORAL
Refills: 0 | DISCHARGE

## 2023-12-29 RX ORDER — ASPIRIN/CALCIUM CARB/MAGNESIUM 324 MG
1 TABLET ORAL
Refills: 0 | DISCHARGE

## 2023-12-29 RX ORDER — GABAPENTIN 400 MG/1
1 CAPSULE ORAL
Refills: 0 | DISCHARGE

## 2023-12-29 RX ORDER — CALCIUM CARBONATE 500(1250)
0 TABLET ORAL
Refills: 0 | DISCHARGE

## 2023-12-29 RX ORDER — SUCRALFATE 1 G
10 TABLET ORAL
Refills: 0 | DISCHARGE

## 2023-12-29 RX ORDER — TAPENTADOL HYDROCHLORIDE 50 MG/1
1 TABLET, FILM COATED ORAL
Refills: 0 | DISCHARGE

## 2023-12-29 RX ORDER — SIMVASTATIN 20 MG/1
1 TABLET, FILM COATED ORAL
Refills: 0 | DISCHARGE

## 2023-12-29 RX ORDER — OMEGA-3 ACID ETHYL ESTERS 1 G
0 CAPSULE ORAL
Refills: 0 | DISCHARGE

## 2023-12-29 RX ORDER — BACLOFEN 100 %
1 POWDER (GRAM) MISCELLANEOUS
Refills: 0 | DISCHARGE

## 2023-12-29 RX ORDER — OXYCODONE HYDROCHLORIDE 5 MG/1
1 TABLET ORAL
Refills: 0 | DISCHARGE

## 2023-12-29 RX ORDER — FUROSEMIDE 40 MG
1 TABLET ORAL
Refills: 0 | DISCHARGE

## 2023-12-29 RX ORDER — POTASSIUM CHLORIDE 20 MEQ
1 PACKET (EA) ORAL
Qty: 0 | Refills: 0 | DISCHARGE

## 2023-12-29 RX ORDER — ACETAMINOPHEN 500 MG
2 TABLET ORAL
Refills: 0 | DISCHARGE

## 2023-12-29 RX ORDER — DOCUSATE SODIUM 100 MG
0 CAPSULE ORAL
Refills: 0 | DISCHARGE

## 2023-12-29 RX ORDER — OLMESARTAN MEDOXOMIL 5 MG/1
1 TABLET, FILM COATED ORAL
Refills: 0 | DISCHARGE

## 2023-12-29 RX ORDER — DULOXETINE HYDROCHLORIDE 30 MG/1
0 CAPSULE, DELAYED RELEASE ORAL
Refills: 0 | DISCHARGE

## 2023-12-29 RX ORDER — LEVOTHYROXINE SODIUM 125 MCG
1 TABLET ORAL
Refills: 0 | DISCHARGE

## 2023-12-29 RX ORDER — CARVEDILOL PHOSPHATE 80 MG/1
1 CAPSULE, EXTENDED RELEASE ORAL
Refills: 0 | DISCHARGE

## 2023-12-29 RX ORDER — OMEPRAZOLE 10 MG/1
1 CAPSULE, DELAYED RELEASE ORAL
Refills: 0 | DISCHARGE

## 2023-12-29 RX ORDER — LUBIPROSTONE 24 UG/1
1 CAPSULE, GELATIN COATED ORAL
Refills: 0 | DISCHARGE

## 2023-12-29 NOTE — ASU DISCHARGE PLAN (ADULT/PEDIATRIC) - CARE PROVIDER_API CALL
Pb Cooper  Obstetrics and Gynecology  [address]  [place] [address]  Phone: [phone]  Fax: [phone]  Follow Up Time:    Pb Cooper  Obstetrics and Gynecology  [address]  [place] [address]  Phone: [phone]  Fax: [phone]  Follow Up Time:

## 2023-12-29 NOTE — ASU DISCHARGE PLAN (ADULT/PEDIATRIC) - ASU DC SPECIAL INSTRUCTIONSFT
Postoperative Instructions      Pain control     For pain control, take the followin. Motrin 600mg four times a day, take with food.  2. Add Tylenol 975 four times a day, alternated with motrin.    Motrin and Tylenol can be obtained over the counter.    Postoperative restrictions   Do not drive or make important decisions for 24 hours after anesthesia. Nothing in the vagina (tampons, sexual intercourse), no tub baths, pools or hot tubs for 2 weeks (showers are ok!). No lifting anything heavier than 15 lbs, no strenuous exercise for 2 weeks after surgery.        Vaginal bleeding   Spotting and intermittent passage of blood clots per vagina is normal in first few weeks after surgery.  If you are soaking 1 pad per hour, that is NOT normal and you should notify Dr. Cooper's office and seek medical attention right away.      Signs of Infection    Call the office and/or come to the emergency room for any of the following: foul smelling vaginal discharge, fever over 100.4F, abdominal pain or cramping that does not get better with over the counter medications, nausea/vomiting (especially if you become unable to tolerate oral intake), inability to urinate. Any of these could be signs that you may be developing an infection requiring antibiotics.      Follow Up  Call Dr. Cooper's office to schedule a postoperative appointment in 2 weeks.

## 2023-12-29 NOTE — BRIEF OPERATIVE NOTE - NSICDXBRIEFPROCEDURE_GEN_ALL_CORE_FT
PROCEDURES:  Hysteroscopy 29-Dec-2023 18:37:44  Sarah Manuel  Dilation and curettage, uterus 29-Dec-2023 18:37:53  Sarah Manuel  Exam, vaginal, under anesthesia 29-Dec-2023 18:38:25  Sarah Manuel

## 2023-12-29 NOTE — BRIEF OPERATIVE NOTE - NSICDXBRIEFPOSTOP_GEN_ALL_CORE_FT
POST-OP DIAGNOSIS:  Abnormal uterine bleeding 29-Dec-2023 18:38:45  Sarah Manuel   POST-OP DIAGNOSIS:  Abnormal uterine bleeding 29-Dec-2023 18:38:45  Sarah Maunel

## 2023-12-29 NOTE — ASU DISCHARGE PLAN (ADULT/PEDIATRIC) - NURSING INSTRUCTIONS
97.7 You received Tylenol in the operating room at  10:30 am. Do not take any medications containing Tylenol, acetominophen (including Percocet) until 4:30pm  You received a medication similar to Motrin (ibuprofen) 10:15am in the operating room. Do not take any medications containing motrin(ibuprofen, advil, aleve) until 4:15 pm

## 2023-12-29 NOTE — BRIEF OPERATIVE NOTE - OPERATION/FINDINGS
EUA: Normal external female genitalia. Small mobile anteverted uterus, no adnexal masses palpated bilaterally.   Ostia visualized bilaterally. Atrophic endometrium with polypoid area on the right portion of the uterine cavity. Fluid deficit 100cc.

## 2023-12-29 NOTE — ASU DISCHARGE PLAN (ADULT/PEDIATRIC) - NS MD DC FALL RISK RISK
For information on Fall & Injury Prevention, visit: [email or web address] OR  [email or web address] OR  https://www.cdc.gov/steadi/patient.html For information on Fall & Injury Prevention, visit: [email or web address] OR  [email or web address] OR  https://www.cdc.gov/steadi/patient.html

## 2024-01-05 NOTE — PRE-OP CHECKLIST - VERIFY SURGICAL SITE/SIDE WITH PATIENT
At time of evaluation, pt meets criteria to continue home insulin pump usage.  - Has all adequate supplies   - Insulin pump site change on 1/3/24  - Bolus settings reviewed    - No changes to home regimen.   - Nurse to check BG qac/hs/0200 & record in epic   - Patient to input glucose into pump and use bolus wizard for prandial needs   - Will continue to monitor accuchecks and titrate insulin as clinically indicated .     - Discussed above plan with patient, patient verbalized understanding.   - Understands in case of pump malfunction or cognitive decline in which pt can no longer safely use insulin pump, will transition to [facility] MDI       Current inpatient pump settings:  Omnipod 5   1:10 carb ratio     BG target  12   6      ISF 30     Basal 1.5 units/hr     If pump malfunctions or is disconnected, contact endocrine on call immediately.      done

## 2024-01-08 ENCOUNTER — APPOINTMENT (OUTPATIENT)
Dept: OBGYN | Facility: CLINIC | Age: 74
End: 2024-01-08
Payer: MEDICARE

## 2024-01-08 VITALS
HEIGHT: 68 IN | HEART RATE: 82 BPM | BODY MASS INDEX: 33.34 KG/M2 | SYSTOLIC BLOOD PRESSURE: 146 MMHG | DIASTOLIC BLOOD PRESSURE: 80 MMHG | WEIGHT: 220 LBS

## 2024-01-08 DIAGNOSIS — N93.9 ABNORMAL UTERINE AND VAGINAL BLEEDING, UNSPECIFIED: ICD-10-CM

## 2024-01-08 LAB
SURGICAL PATHOLOGY STUDY: SIGNIFICANT CHANGE UP
SURGICAL PATHOLOGY STUDY: SIGNIFICANT CHANGE UP

## 2024-01-08 PROCEDURE — 99213 OFFICE O/P EST LOW 20 MIN: CPT

## 2024-01-09 PROBLEM — N95.0 POSTMENOPAUSAL BLEEDING: Chronic | Status: ACTIVE | Noted: 2023-12-22

## 2024-01-09 PROBLEM — Z86.59 PERSONAL HISTORY OF OTHER MENTAL AND BEHAVIORAL DISORDERS: Chronic | Status: ACTIVE | Noted: 2023-12-22
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
## 2024-01-09 PROBLEM — R29.6 REPEATED FALLS: Chronic | Status: ACTIVE | Noted: 2023-12-22

## 2024-01-09 PROBLEM — R60.9 EDEMA, UNSPECIFIED: Chronic | Status: ACTIVE | Noted: 2023-12-22

## 2024-01-09 PROBLEM — M54.9 DORSALGIA, UNSPECIFIED: Chronic | Status: ACTIVE | Noted: 2023-12-22

## 2024-01-09 PROBLEM — M41.9 SCOLIOSIS, UNSPECIFIED: Chronic | Status: ACTIVE | Noted: 2023-12-22

## 2024-01-09 PROBLEM — N39.3 STRESS INCONTINENCE (FEMALE) (MALE): Chronic | Status: ACTIVE | Noted: 2023-12-22

## 2024-01-09 PROBLEM — N93.9 ABNORMAL UTERINE AND VAGINAL BLEEDING, UNSPECIFIED: Chronic | Status: ACTIVE | Noted: 2023-12-22

## 2024-01-09 PROBLEM — Z87.39 PERSONAL HISTORY OF OTHER DISEASES OF THE MUSCULOSKELETAL SYSTEM AND CONNECTIVE TISSUE: Chronic | Status: ACTIVE | Noted: 2023-12-22

## 2024-01-18 PROBLEM — N93.9 ABNORMAL UTERINE BLEEDING: Status: ACTIVE | Noted: 2023-11-15

## 2024-02-22 ENCOUNTER — NON-APPOINTMENT (OUTPATIENT)
Age: 74
End: 2024-02-22

## 2024-02-22 DIAGNOSIS — R32 UNSPECIFIED URINARY INCONTINENCE: ICD-10-CM

## 2024-02-28 NOTE — H&P ADULT - ENMT
Negative for confusion and hallucinations. The patient is not nervous/anxious.         Physical Exam  Vitals and nursing note reviewed.   Constitutional:       General: She is not in acute distress.     Appearance: Normal appearance. She is well-developed. She is obese. She is not diaphoretic.   HENT:      Head: Normocephalic and atraumatic.      Right Ear: External ear normal.      Left Ear: External ear normal.   Eyes:      General:         Right eye: No discharge.         Left eye: No discharge.   Neck:      Trachea: No tracheal deviation.   Cardiovascular:      Rate and Rhythm: Normal rate and regular rhythm.      Heart sounds: Normal heart sounds. No murmur heard.     No friction rub. No gallop.   Pulmonary:      Effort: Pulmonary effort is normal. No respiratory distress.      Breath sounds: Normal breath sounds. No stridor. No wheezing, rhonchi or rales.   Chest:      Chest wall: No tenderness.   Abdominal:      General: Bowel sounds are normal. There is no distension.      Palpations: Abdomen is soft. There is no mass.      Tenderness: There is no abdominal tenderness.      Comments: Obese   Musculoskeletal:         General: No swelling.      Right lower leg: Edema present.      Left lower leg: Edema (+1 pretibial edema bilateral nonpitting) present.   Skin:     General: Skin is warm and dry.      Capillary Refill: Capillary refill takes less than 2 seconds.      Coloration: Skin is not jaundiced or pale.      Findings: No rash.   Neurological:      Mental Status: She is alert.      Sensory: No sensory deficit.      Motor: Weakness present.      Coordination: Coordination normal.   Psychiatric:         Mood and Affect: Mood normal.         Behavior: Behavior normal.         Vital signs: Temperature: 97.8 °F, blood pressure 134/71, pulse 77, respirations 18, SpO2 93% on 2 L.  On arrival into room patient did have her oxygen off and was 84%.  O2 saturations improved with reapplication as noted above.  Weight  negative No oral lesions; no gross abnormalities

## 2024-04-24 NOTE — DISCHARGE NOTE NURSING/CASE MANAGEMENT/SOCIAL WORK - NSFLUVACAGEDISCH_IMM_ALL_CORE
Adult
Take over the counter pain medications such as tylenol/advil. Use oxycodone as needed for severe pain

## 2024-04-27 NOTE — ED ADULT NURSE NOTE - OBJECTIVE STATEMENT
Yes
Patient sent from wound care for right second toe infection. Patient second toe swollen and red. Patient states she sustained an injury to the toe 1 month ago but was unable to feel it because of her neropathy. Per patient, there was a lot of blood at the time of injury. Pending further labs and radiology reports. Will cont to monitor.
Path Notes (To The Dermatopathologist): Please check margins.

## 2024-08-19 NOTE — ED ADULT NURSE NOTE - DISCHARGE DATE/TIME
Vital Signs    
    
    
                                    08/20/24    
                                      10:31    
     
Height                             5 ft 11 in    
     
Weight                             166.922 kg    
     
BMI                                   51.3    
     
BP                                   120/74    
     
BP Location                       Left Brachial    
     
BP Position                          Sitting    
     
BP Cuff Size                          Adult    
     
BP Source                          Manual Cuff    
     
Respiration                            20    
     
Pulse                                  75    
     
Pulse Source                         Monitor    
     
Pulse Oximetry (%)                    94 L    
     
Oxygen Delivery Method              Room Air    
     
Comment             The patient's blood pressure is elevated.    
    
    
Varicose Veins    
Patient is a 66 year old male in this day as a referral from Elaine Espino NP at   
[facility] secondary to cellulitis to right lower leg along with   
multiple slow healing wounds to right lower anterior and posterior lower leg   
which initially started in May 2024 after his C-Pap machine fell on his right   
leg.  Patient has since worn an uniboot to right leg with little to no progress   
in healing right leg ulcers.  Patient previous to this has had varicose vein   
disease and treatments performed by Dr. Cameron to bilateral leg varicose veins.    
Patient also experienced a spontaneous DVT to right lower leg.  Patient has worn  
bilateral leg knee high compression stockings >5years.  Patient states no family  
history of varicose vein disease.  Patient c/o bilateral leg pain, edema,   
bulging veins, heaviness.  Patient rates pain at a  10  on a scale of 1-10, with  
right leg worse than left leg.    
    
    
    
    
IAlonso MD personally performed the services described in this   
documentation, as scribed by Khang Bahena RN in my presence and it is both accurate  
and complete.    
    
    
    
IKhang RN, am scribing for, and in the presence of, Dr. Alonso Nation and in   
the presence of the patient.    
    
    
    
.    
    
    
    
    
thigh: bilateral (right leg > left leg), knee: bilateral, calf: bilateral,   
ankle: bilateral and shin: bilateral    
aching, burning, cramping, dull and tender    
10    
3 years    
Worsened in recent months: Yes (last 5 months)    
standing, sitting and walking    
analgesics (Tylenol), elevating extremities, compression stockings and wraps    
Reports muscle spasms of leg, fatigue, heaviness, limb pain, edema and leg edema    
History of lower extremity trauma: Yes (a piece of cpap machine fell and hit   
right lower leg resulting in hemtoma)    
Superficial thrombophlebitis: Yes (DVT right leg)    
Family history of varicose veins: no    
Has patient had previous lower extremity venous surgery: Yes    
Patient has previously received the following treatment(s) for lower extremity   
varicose veins: Reports foam therapy and laser therapy    
Does patient have a history of pregnancy: no    
Has patient had lower extremity venous scan with relux testing: Yes    
Support hose used: Yes    
Problems walking or doing physical activity: Yes    
How does it affect you: pain decreases ability to walk    
Do you walk much: No    
Do you stand much: Yes    
    
Review of Systems    
    
    
ROS      
    
 Narrative                              [id number] not well approximated wound to r    
ight mid-distal anterior     
lower leg    
    
    
    
    
IAlonso MD personally performed the services described in this   
documentation, as scribed by Khang Bahena RN in my presence and it is both accurate  
and complete.    
    
    
    
I, Khang Bahena RN, am scribing for, and in the presence of, Dr. Alonso Nation and in   
the presence of the patient.    
     
                                        .       
    
 Status of ROS                          10 or more systems reviewed and unremark    
able except as noted in     
history and below       
    
 Cardiovascular Reports: edema       
    
 Integumentary/Breast Reports: itching, redness, skin pain, skin tenderness,   
skin swelling, non-healing lesion and changes in skin color       
    
 Neurological Reports: numbness in extremities and weakness in extremities       
    
 Hematologic/Lymphatic Reports: easy bruising and easy bleeding       
    
    
PFSH    
PFS    
Medical History (Updated 08/20/24 @ 10:58 by Khang Bahena)    
    
Varicose veins of bilateral lower extremities with pain    
   ?I83.813 - Varicose veins of bilateral lower extremities with pain (ICD-10)    
Lymphedema of both lower extremities    
   ?I89.0 - Lymphedema, not elsewhere classified (ICD-10)    
Arthritis    
   ?M19.90 - Unspecified osteoarthritis, unspecified site (ICD-10)    
Hypertension    
   ?I10 - Essential (primary) hypertension (ICD-10)    
Deep vein thrombosis    
   ?I82.409 - Acute embolism and thrombosis of unspecified deep veins of   
   unspecified lower extremity (ICD-10)    
Cardiomyopathy    
   ?I42.9 - Cardiomyopathy, unspecified (ICD-10)    
Carpal tunnel syndrome    
   ?G56.00 - Carpal tunnel syndrome, unspecified upper limb (ICD-10)    
Peripheral vascular disease    
   ?I73.9 - Peripheral vascular disease, unspecified (ICD-10)    
Ulcer of right leg    
   ?L97.919 - Non-pressure chronic ulcer of unspecified part of right lower leg   
   with unspecified severity (ICD-10)    
Coronary artery disease    
   ?I25.10 - Atherosclerotic heart disease of native coronary artery without   
   angina pectoris (ICD-10)    
CHF (congestive heart failure)    
   ?I50.9 - Heart failure, unspecified (ICD-10)    
Obstructive sleep apnea    
   ?G47.33 - Obstructive sleep apnea (adult) (pediatric) (ICD-10)    
Chronic back pain    
   ?M54.9 - Dorsalgia, unspecified (ICD-10)    
   ?G89.29 - Other chronic pain (ICD-10)    
Postphlebetic syndrome with inflammation    
   ?I87.029 - Postthrombotic syndrome with inflammation of unspecified lower   
   extremity (ICD-10)    
Type 2 diabetes mellitus    
   ?E11.9 - Type 2 diabetes mellitus without complications (ICD-10)    
    
    
Surgical History (Updated 08/20/24 @ 10:54 by Khang Bahena)    
    
History of carpal tunnel surgery of right wrist    
   ?Z98.890 - Other specified postprocedural states (ICD-10)    
History of cholecystectomy    
   ?Z90.49 - Acquired absence of other specified parts of digestive tract (ICD-  
   10)    
    
    
Family History (Updated 08/19/24 @ 11:42 by Khang Bahena)    
Sister   Family history of diabetes mellitus    
Father   Family history of diabetes mellitus    
Other   Family history of CHF (congestive heart failure)    
   Family history of hypertension    
   Family history of myocardial infarction    
    
    
    
Social History (Updated 08/20/24 @ 10:55 by Khang Bahena)    
Within the past year, how often did you have a drink containing alcohol:  never     
Score interpretation:  A score less than 4 is consistent with normal alcohol   
consumption.     
Smoking status:  Never smoker     
Non-prescribed substance use:  denies use     
    
    
    
Meds    
Home Medications and Allergies    
                                Home Medications    
    
    
    
?Medication ?Instructions ?Recorded ?Confirmed ?Type    
     
Lactobacillus acidophilus 10 100 mmu cells PO DAILY 08/19/24 08/19/24 History    
    
billion cell capsule        
     
acetaminophen 500 mg capsule 500 mg PO Q6H PRN pain 08/19/24 08/19/24 History    
     
carvedilol 6.25 mg tablet 6.25 mg PO BID 08/19/24 08/19/24 History    
     
empagliflozin 10 mg-linagliptin 5 1 tab PO DAILY 08/19/24 08/19/24 History    
    
mg tablet        
     
furosemide 40 mg tablet 40 mg PO BID 08/19/24 08/19/24 History    
     
rivaroxaban 10 mg tablet 10 mg PO DAILY 08/19/24 08/19/24 History    
     
sacubitril 24 mg-valsartan 26 mg 1 tab PO BID 08/19/24 08/19/24 History    
    
tablet        
     
semaglutide 0.25 mg or 0.5 mg (2 0.25 mg subcut QWEEK 08/19/24 08/19/24 History    
    
mg/3 mL) subcutaneous pen injector        
    
(Ozempic)        
     
spironolactone 25 mg tablet 25 mg PO DAILY 08/19/24 08/19/24 History    
    
(Aldactone)        
    
    
    
                                    Allergies    
    
    
    
Allergy/AdvReac Type Severity Reaction Status Date / Time    
     
cefixime Allergy  Unknown Verified 08/19/24 11:44    
     
diclofenac Allergy  Unknown Verified 08/19/24 11:44    
    
    
    
    
Exam    
Narrative    
Exam Narrative:     
[id number] not well approximated wound noted to right mid-distal anterior lower leg    
    
    
Alonso TOSCANO MD personally performed the services described in this   
documentation, as scribed by Khang Bahena RN in my presence and it is both accurate  
and complete.    
    
    
    
IKhang RN, am scribing for, and in the presence of, Dr. Alonso Nation and in   
the presence of the patient.    
Constitutional    
Documenting provider has reviewed patient's vital signs: yes    
Common normals: oriented x3    
Nutritional appearance: overweight    
Cardio    
Peripheral pulses: posterior tibial pulses present and dorsalis pedis pulses   
present    
Extremity    
Common normals: normal capillary refill    
General: calf tenderness and edema    
Right lower extremity: lower leg Right lower leg: inspection and palpation    
Left lower extremity: lower leg Left lower leg: inspection and palpation    
Neuro    
Common normals: oriented x3    
    
Results    
Additional Findings    
Additional findings:     
Bilateral leg reflux u/s reveals moderate venous insufficiency with   
saphenofemoral junction reflux and dilatation right AASV, incompetent   
perforating vein right right leg subjacent to ulcerations, bilateral leg   
incompetent truncal tributary branch saphenous varicosities.    
    
    
    
Alonso TOSCANO MD personally performed the services described in this d  
ocumentation, as scribed by Khang Bahena RN in my presence and it is both accurate   
and complete.    
    
    
    
Khang TOSCANO RN, am scribing for, and in the presence of, Dr. Alonso Nation and in   
the presence of the patient.    
    
Assessment and Plan    
Assessment and Plan    
(1) Varicose veins of bilateral lower extremities with pain:     
    
Plan    
Dr. Nation examines patient and reviews results of bilateral leg reflux u/s.    
Patient and Dr. Nation create plan of care.    
Plan is for patient to continue us of bilateral leg compression stockings, rest,  
and elevation of bilateral legs/feet.    
Patient to return for EVLT of right AASV, right leg perforating veins, and   
lastly microfoam chemical ablation bilateral leg branch saphenous varicosities.     
    
    
Alonso TOSCANO MD personally performed the services described in this   
documentation, as scribed by Khang Bahena RN in my presence and it is both accurate  
and complete.    
    
    
    
I, Khang Bahena RN, am scribing for, and in the presence of, Dr. Alonso Nation and in   
the presence of the patient. 02-Feb-2023 21:02

## 2024-10-02 ENCOUNTER — APPOINTMENT (OUTPATIENT)
Dept: OBGYN | Facility: CLINIC | Age: 74
End: 2024-10-02

## 2025-02-01 NOTE — H&P ADULT - NSICDXPASTSURGICALHX_GEN_ALL_CORE_FT
Problem: Adult Inpatient Plan of Care  Goal: Plan of Care Review  Outcome: Progressing  Goal: Patient-Specific Goal (Individualized)  Outcome: Progressing  Goal: Absence of Hospital-Acquired Illness or Injury  Outcome: Progressing  Goal: Optimal Comfort and Wellbeing  Outcome: Progressing  Goal: Readiness for Transition of Care  Outcome: Progressing     Problem: Bariatric Environmental Safety  Goal: Safety Maintained with Care  Outcome: Progressing     Problem: Diabetes Comorbidity  Goal: Blood Glucose Level Within Targeted Range  Outcome: Progressing      PAST SURGICAL HISTORY:  Toe amputation status, right tip of right 3rd toe

## 2025-06-19 NOTE — H&P PST ADULT - CARDIOVASCULAR SYMPTOMS
Laurie Garces to Alyssa Wilkins Christopher (Selected Message)  TT      6/12/25  2:05 PM  Ms. Carlos Walters's case has been assigned to Jayden Smith @[phone].         What happens next? Assigned advocate will review your patients chart and research available options.  Patient may be asked to provide specific documentation to help determine eligibility. Failure to provide the requested documentation will delay assistance.    Please note all requests are subject to program availability and patient eligibility verification.   Please note each program has it's own unique eligibility criteria (e.g., income limits, insurance status medical condition, residency).Therefore eligibility is determined by the specific program being applied to not by the Pharmacy Patient Assistance Team.  Please note epic chart must reflect a current order for the requested medication written by an [facility] provider to begin PAP process.   Provider may review progress notes by typing pharmacy patient assistance in Epic search box.         Thank you,   [facility]  [address]  [place] [address]  Fax: [phone]  Email: [email or web address]      Alyssa Wilkins to Eric DEE Staff  Pharmacy Patient Assistance Team  [facility]      6/11/25  5:14 PM  Hello,     The prior authorization for Yumiko Gonzalez's Xifaxan prescription has been APPROVED FROM 6/11/25 TO 6/25/25 with copayment of $729.40.        [facility] will reach out to patient for further correspondence.      Due to the high copay, a referral will be sent to Pharmacy Patient Assistance.     If there are any additional questions or concerns, please contact me.     Sincerely,     Alyssa Wilkins CPhT  Patient Advocate, Prior Authorization Department   [facility]  Phone [phone]  Fax [phone]   peripheral edema

## (undated) DEVICE — PRESSURE INFUSOR BAG 1000ML

## (undated) DEVICE — WARMING BLANKET UPPER ADULT

## (undated) DEVICE — GOWN LG

## (undated) DEVICE — POSITIONER STRAP ARMBOARD VELCRO TS-30

## (undated) DEVICE — DRAPE LIGHT HANDLE COVER (GREEN)

## (undated) DEVICE — LABELS BLANK W PEN

## (undated) DEVICE — BASIN SET DOUBLE

## (undated) DEVICE — DRAPE IRRIGATION POUCH 19X23"

## (undated) DEVICE — TUBING IRR SET FOR CYSTOSCOPY 77"

## (undated) DEVICE — DRSG TELFA 3 X 8

## (undated) DEVICE — CANISTER SUCTION HI FLOW W FILTER 1200CC

## (undated) DEVICE — GOWN XL

## (undated) DEVICE — PACK D&C

## (undated) DEVICE — TUBING SUCTION NONCONDUCTIVE 6MM X 12FT

## (undated) DEVICE — SOL IRR POUR H2O 500ML

## (undated) DEVICE — PROTECTOR HEEL / ELBOW FLUFFY

## (undated) DEVICE — VENODYNE/SCD SLEEVE CALF MEDIUM

## (undated) DEVICE — SOL IRR BAG NS 0.9% 1000ML

## (undated) DEVICE — ELCTR BOVIE TIP BLADE INSULATED 2.75" EDGE

## (undated) DEVICE — GLV 6.5 PROTEXIS (WHITE)

## (undated) DEVICE — PREP BETADINE SPONGE STICKS

## (undated) DEVICE — GLV 7 PROTEXIS (BLUE)

## (undated) DEVICE — WARMING BLANKET FULL ADULT

## (undated) DEVICE — DRSG CURITY GAUZE SPONGE 4 X 4" 12-PLY